# Patient Record
Sex: MALE | Race: BLACK OR AFRICAN AMERICAN
[De-identification: names, ages, dates, MRNs, and addresses within clinical notes are randomized per-mention and may not be internally consistent; named-entity substitution may affect disease eponyms.]

---

## 2018-01-05 ENCOUNTER — HOSPITAL ENCOUNTER (INPATIENT)
Dept: HOSPITAL 72 - EMR | Age: 63
LOS: 28 days | Discharge: HOME | DRG: 871 | End: 2018-02-02
Payer: MEDICAID

## 2018-01-05 VITALS — SYSTOLIC BLOOD PRESSURE: 141 MMHG | DIASTOLIC BLOOD PRESSURE: 72 MMHG

## 2018-01-05 VITALS — DIASTOLIC BLOOD PRESSURE: 67 MMHG | SYSTOLIC BLOOD PRESSURE: 120 MMHG

## 2018-01-05 VITALS — WEIGHT: 137 LBS | BODY MASS INDEX: 19.61 KG/M2 | HEIGHT: 70 IN

## 2018-01-05 DIAGNOSIS — D63.1: ICD-10-CM

## 2018-01-05 DIAGNOSIS — N17.9: ICD-10-CM

## 2018-01-05 DIAGNOSIS — N10: ICD-10-CM

## 2018-01-05 DIAGNOSIS — R19.7: ICD-10-CM

## 2018-01-05 DIAGNOSIS — E87.5: ICD-10-CM

## 2018-01-05 DIAGNOSIS — J18.9: ICD-10-CM

## 2018-01-05 DIAGNOSIS — R11.2: ICD-10-CM

## 2018-01-05 DIAGNOSIS — I26.99: ICD-10-CM

## 2018-01-05 DIAGNOSIS — I12.9: ICD-10-CM

## 2018-01-05 DIAGNOSIS — A41.9: Primary | ICD-10-CM

## 2018-01-05 DIAGNOSIS — N18.9: ICD-10-CM

## 2018-01-05 DIAGNOSIS — I82.433: ICD-10-CM

## 2018-01-05 DIAGNOSIS — R31.0: ICD-10-CM

## 2018-01-05 DIAGNOSIS — D57.1: ICD-10-CM

## 2018-01-05 DIAGNOSIS — N28.1: ICD-10-CM

## 2018-01-05 DIAGNOSIS — D69.6: ICD-10-CM

## 2018-01-05 DIAGNOSIS — K59.00: ICD-10-CM

## 2018-01-05 DIAGNOSIS — I82.443: ICD-10-CM

## 2018-01-05 LAB
ADD MANUAL DIFF: YES
ALBUMIN SERPL-MCNC: 3.1 G/DL (ref 3.4–5)
ALBUMIN/GLOB SERPL: 0.9 {RATIO} (ref 1–2.7)
ALP SERPL-CCNC: 69 U/L (ref 46–116)
ALT SERPL-CCNC: 21 U/L (ref 12–78)
ANION GAP SERPL CALC-SCNC: 11 MMOL/L (ref 5–15)
APPEARANCE UR: CLEAR
APTT PPP: (no result) S
AST SERPL-CCNC: 28 U/L (ref 15–37)
BILIRUB SERPL-MCNC: 0.4 MG/DL (ref 0.2–1)
BUN SERPL-MCNC: 31 MG/DL (ref 7–18)
CALCIUM SERPL-MCNC: 8.2 MG/DL (ref 8.5–10.1)
CHLORIDE SERPL-SCNC: 105 MMOL/L (ref 98–107)
CO2 SERPL-SCNC: 20 MMOL/L (ref 21–32)
CREAT SERPL-MCNC: 2.7 MG/DL (ref 0.55–1.3)
ERYTHROCYTE [DISTWIDTH] IN BLOOD BY AUTOMATED COUNT: 11.4 % (ref 11.6–14.8)
GLOBULIN SER-MCNC: 3.4 G/DL
GLUCOSE UR STRIP-MCNC: NEGATIVE MG/DL
HCT VFR BLD CALC: 35 % (ref 42–52)
HGB BLD-MCNC: 11.2 G/DL (ref 14.2–18)
KETONES UR QL STRIP: (no result)
LEUKOCYTE ESTERASE UR QL STRIP: NEGATIVE
MCV RBC AUTO: 86 FL (ref 80–99)
NITRITE UR QL STRIP: NEGATIVE
PH UR STRIP: 6 [PH] (ref 4.5–8)
PLATELET # BLD: 226 K/UL (ref 150–450)
POTASSIUM SERPL-SCNC: 4.4 MMOL/L (ref 3.5–5.1)
PROT UR QL STRIP: (no result)
RBC # BLD AUTO: 4.09 M/UL (ref 4.7–6.1)
SODIUM SERPL-SCNC: 136 MMOL/L (ref 136–145)
SP GR UR STRIP: 1.01 (ref 1–1.03)
UROBILINOGEN UR-MCNC: NORMAL MG/DL (ref 0–1)
WBC # BLD AUTO: 17 K/UL (ref 4.8–10.8)

## 2018-01-05 PROCEDURE — 81001 URINALYSIS AUTO W/SCOPE: CPT

## 2018-01-05 PROCEDURE — 86850 RBC ANTIBODY SCREEN: CPT

## 2018-01-05 PROCEDURE — 93306 TTE W/DOPPLER COMPLETE: CPT

## 2018-01-05 PROCEDURE — 83550 IRON BINDING TEST: CPT

## 2018-01-05 PROCEDURE — 84165 PROTEIN E-PHORESIS SERUM: CPT

## 2018-01-05 PROCEDURE — 82746 ASSAY OF FOLIC ACID SERUM: CPT

## 2018-01-05 PROCEDURE — 82728 ASSAY OF FERRITIN: CPT

## 2018-01-05 PROCEDURE — 87324 CLOSTRIDIUM AG IA: CPT

## 2018-01-05 PROCEDURE — 87205 SMEAR GRAM STAIN: CPT

## 2018-01-05 PROCEDURE — 80053 COMPREHEN METABOLIC PANEL: CPT

## 2018-01-05 PROCEDURE — 86710 INFLUENZA VIRUS ANTIBODY: CPT

## 2018-01-05 PROCEDURE — 85007 BL SMEAR W/DIFF WBC COUNT: CPT

## 2018-01-05 PROCEDURE — 84484 ASSAY OF TROPONIN QUANT: CPT

## 2018-01-05 PROCEDURE — 83090 ASSAY OF HOMOCYSTEINE: CPT

## 2018-01-05 PROCEDURE — 85060 BLOOD SMEAR INTERPRETATION: CPT

## 2018-01-05 PROCEDURE — 94760 N-INVAS EAR/PLS OXIMETRY 1: CPT

## 2018-01-05 PROCEDURE — 87045 FECES CULTURE AEROBIC BACT: CPT

## 2018-01-05 PROCEDURE — 71045 X-RAY EXAM CHEST 1 VIEW: CPT

## 2018-01-05 PROCEDURE — 84550 ASSAY OF BLOOD/URIC ACID: CPT

## 2018-01-05 PROCEDURE — 85044 MANUAL RETICULOCYTE COUNT: CPT

## 2018-01-05 PROCEDURE — 86039 ANTINUCLEAR ANTIBODIES (ANA): CPT

## 2018-01-05 PROCEDURE — 87040 BLOOD CULTURE FOR BACTERIA: CPT

## 2018-01-05 PROCEDURE — 80048 BASIC METABOLIC PNL TOTAL CA: CPT

## 2018-01-05 PROCEDURE — 84443 ASSAY THYROID STIM HORMONE: CPT

## 2018-01-05 PROCEDURE — 83020 HEMOGLOBIN ELECTROPHORESIS: CPT

## 2018-01-05 PROCEDURE — 87086 URINE CULTURE/COLONY COUNT: CPT

## 2018-01-05 PROCEDURE — 82607 VITAMIN B-12: CPT

## 2018-01-05 PROCEDURE — 83540 ASSAY OF IRON: CPT

## 2018-01-05 PROCEDURE — 86021 WBC ANTIBODY IDENTIFICATION: CPT

## 2018-01-05 PROCEDURE — 71250 CT THORAX DX C-: CPT

## 2018-01-05 PROCEDURE — 93970 EXTREMITY STUDY: CPT

## 2018-01-05 PROCEDURE — 93005 ELECTROCARDIOGRAM TRACING: CPT

## 2018-01-05 PROCEDURE — 87070 CULTURE OTHR SPECIMN AEROBIC: CPT

## 2018-01-05 PROCEDURE — 74176 CT ABD & PELVIS W/O CONTRAST: CPT

## 2018-01-05 PROCEDURE — 84439 ASSAY OF FREE THYROXINE: CPT

## 2018-01-05 PROCEDURE — 85610 PROTHROMBIN TIME: CPT

## 2018-01-05 PROCEDURE — 85025 COMPLETE CBC W/AUTO DIFF WBC: CPT

## 2018-01-05 PROCEDURE — 83735 ASSAY OF MAGNESIUM: CPT

## 2018-01-05 PROCEDURE — 36415 COLL VENOUS BLD VENIPUNCTURE: CPT

## 2018-01-05 PROCEDURE — 86901 BLOOD TYPING SEROLOGIC RH(D): CPT

## 2018-01-05 PROCEDURE — 82962 GLUCOSE BLOOD TEST: CPT

## 2018-01-05 PROCEDURE — 83921 ORGANIC ACID SINGLE QUANT: CPT

## 2018-01-05 PROCEDURE — 84100 ASSAY OF PHOSPHORUS: CPT

## 2018-01-05 PROCEDURE — 94664 DEMO&/EVAL PT USE INHALER: CPT

## 2018-01-05 PROCEDURE — 86900 BLOOD TYPING SEROLOGIC ABO: CPT

## 2018-01-05 PROCEDURE — 84238 ASSAY NONENDOCRINE RECEPTOR: CPT

## 2018-01-05 PROCEDURE — 82270 OCCULT BLOOD FECES: CPT

## 2018-01-05 PROCEDURE — 83690 ASSAY OF LIPASE: CPT

## 2018-01-05 PROCEDURE — 86920 COMPATIBILITY TEST SPIN: CPT

## 2018-01-05 PROCEDURE — 85730 THROMBOPLASTIN TIME PARTIAL: CPT

## 2018-01-05 PROCEDURE — 85384 FIBRINOGEN ACTIVITY: CPT

## 2018-01-05 PROCEDURE — 83615 LACTATE (LD) (LDH) ENZYME: CPT

## 2018-01-05 PROCEDURE — 81003 URINALYSIS AUTO W/O SCOPE: CPT

## 2018-01-05 PROCEDURE — 76775 US EXAM ABDO BACK WALL LIM: CPT

## 2018-01-05 PROCEDURE — 94640 AIRWAY INHALATION TREATMENT: CPT

## 2018-01-05 PROCEDURE — 36600 WITHDRAWAL OF ARTERIAL BLOOD: CPT

## 2018-01-05 PROCEDURE — 82378 CARCINOEMBRYONIC ANTIGEN: CPT

## 2018-01-05 PROCEDURE — 99285 EMERGENCY DEPT VISIT HI MDM: CPT

## 2018-01-05 PROCEDURE — 86431 RHEUMATOID FACTOR QUANT: CPT

## 2018-01-05 PROCEDURE — 82803 BLOOD GASES ANY COMBINATION: CPT

## 2018-01-05 NOTE — EMERGENCY ROOM REPORT
History of Present Illness


General


Chief Complaint:  Abdominal Pain


Source:  Patient, EMS





Present Illness


HPI


Patient presents emergency department today complaining acute onset of severe 

abdominal pain associate nausea and vomiting.  Patient denies any fever.  

Denies any chest pain shortness of breath.  Symptoms noted to be severe and 

patient came in for further evaluation.  Patient states that he has not had the 

symptoms before.  He denies any cough or runny nose.  Denies any diarrhea.No 

other modifying factors.  No other associated signs and symptoms.  No other 

complaints were noted.


Allergies:  


Coded Allergies:  


     CODEINE (Verified  Allergy, Unknown, 1/5/18)





Patient History


Past Medical History:  other - sickle cell disease


Past Surgical History:  none


Pertinent Family History:  none


Social History:  Denies: smoking, alcohol use, drug use


Reviewed Nursing Documentation:  PMH: Agreed, PSxH: Agreed





Nursing Documentation-PMH


Past Medical History:  No Stated History





Review of Systems


All Other Systems:  negative except mentioned in HPI





Physical Exam





Vital Signs








  Date Time  Temp Pulse Resp B/P (MAP) Pulse Ox O2 Delivery O2 Flow Rate FiO2


 


1/5/18 18:37 99.5  20 157/79 99 Room Air  








Sp02 EP Interpretation:  reviewed, normal


General Appearance:  alert, moderate distress


Head:  atraumatic


Eyes:  bilateral eye normal inspection


ENT:  normal ENT inspection, hearing grossly normal, normal voice


Neck:  normal inspection, full range of motion, supple, no bony tend


Respiratory:  normal inspection, lungs clear, normal breath sounds, no 

respiratory distress, no retraction, no wheezing


Cardiovascular #1:  regular rate, rhythm, no edema


Gastrointestinal:  soft, other - diffuse tender to palpation


Genitourinary:  no CVA tenderness


Musculoskeletal:  normal inspection, back normal, normal range of motion


Neurologic:  normal inspection, alert, responsive, speech normal


Psychiatric:  normal inspection, judgement/insight normal, mood/affect normal


Skin:  normal inspection, normal color, no rash





Medical Decision Making


Diagnostic Impression:  


 Primary Impression:  


 Abdominal pain


 Additional Impressions:  


 Renal cyst, native, hemorrhage


 Renal mass, left


ER Course


Patient presents emergency department today by acute onset abdominal pain.  

Differential considerations include bowel obstruction, colitis, gastritis, 

kidney stones just to name a few. Given the severity of the patient's 

presentation I felt this is a highly complex patient.  This patient required 

extensive workup.  Patient's laboratory workup shows elevated white blood cell 

count.  Patient's chemistry showed renal insufficiency.  Patient's CT scan of 

the abdomen and pelvis show evidence of left renal mass with evidence of 

hemorrhage.  Because his presentation I felt the patient require admission to 

the hospital.  Case was discussed with Dr. Holm the patient will be admitted 

to [facility] further treatment.  Because of patient's elevated white blood cell 

count patient was started on antibiotics.





Labs








Test


  1/5/18


19:54


 


White Blood Count


  17.0 K/UL


(4.8-10.8)


 


Red Blood Count


  4.09 M/UL


(4.70-6.10)


 


Hemoglobin


  11.2 G/DL


(14.2-18.0)


 


Hematocrit


  35.0 %


(42.0-52.0)


 


Mean Corpuscular Volume 86 FL (80-99) 


 


Mean Corpuscular Hemoglobin


  27.3 PG


(27.0-31.0)


 


Mean Corpuscular Hemoglobin


Concent 31.9 G/DL


(32.0-36.0)


 


Red Cell Distribution Width


  11.4 %


(11.6-14.8)


 


Platelet Count


  226 K/UL


(150-450)


 


Mean Platelet Volume


  10.6 FL


(6.5-10.1)


 


Neutrophils (%) (Auto)  % (45.0-75.0) 


 


Lymphocytes (%) (Auto)  % (20.0-45.0) 


 


Monocytes (%) (Auto)  % (1.0-10.0) 


 


Eosinophils (%) (Auto)  % (0.0-3.0) 


 


Basophils (%) (Auto)  % (0.0-2.0) 


 


Differential Total Cells


Counted 100 


 


 


Neutrophils % (Manual) 94 % (45-75) 


 


Lymphocytes % (Manual) 3 % (20-45) 


 


Monocytes % (Manual) 3 % (1-10) 


 


Eosinophils % (Manual) 0 % (0-3) 


 


Basophils % (Manual) 0 % (0-2) 


 


Band Neutrophils 0 % (0-8) 


 


Platelet Estimate Adequate 


 


Platelet Morphology Normal 


 


Sodium Level


  136 MMOL/L


(136-145)


 


Potassium Level


  4.4 MMOL/L


(3.5-5.1)


 


Chloride Level


  105 MMOL/L


()


 


Carbon Dioxide Level


  20 MMOL/L


(21-32)


 


Anion Gap


  11 mmol/L


(5-15)


 


Blood Urea Nitrogen


  31 mg/dL


(7-18)


 


Creatinine


  2.7 MG/DL


(0.55-1.30)


 


Estimat Glomerular Filtration


Rate 23.9 mL/min


(>60)


 


Glucose Level


  131 MG/DL


()


 


Calcium Level


  8.2 MG/DL


(8.5-10.1)


 


Total Bilirubin


  0.4 MG/DL


(0.2-1.0)


 


Aspartate Amino Transf


(AST/SGOT) 28 U/L (15-37) 


 


 


Alanine Aminotransferase


(ALT/SGPT) 21 U/L (12-78) 


 


 


Alkaline Phosphatase


  69 U/L


()


 


Total Protein


  6.5 G/DL


(6.4-8.2)


 


Albumin


  3.1 G/DL


(3.4-5.0)


 


Globulin 3.4 g/dL 


 


Albumin/Globulin Ratio 0.9 (1.0-2.7) 


 


Lipase


  132 U/L


()








CT/MRI/US Diagnostic Results


CT/MRI/US Diagnostic Results :  


   Imaging Test Ordered:  left renal mass on CT





Last Vital Signs








  Date Time  Temp Pulse Resp B/P (MAP) Pulse Ox O2 Delivery O2 Flow Rate FiO2


 


1/5/18 18:37 99.5  20 157/79 99 Room Air  








Status:  improved


Disposition:  ADMITTED AS INPATIENT


Condition:  Serious


Referrals:  


NOT CHOSEN IPA/MD,REFERRING (PCP)











IMER MONTENEGRO M.D. Jan 5, 2018 22:11

## 2018-01-06 VITALS — SYSTOLIC BLOOD PRESSURE: 112 MMHG | DIASTOLIC BLOOD PRESSURE: 75 MMHG

## 2018-01-06 VITALS — DIASTOLIC BLOOD PRESSURE: 74 MMHG | SYSTOLIC BLOOD PRESSURE: 140 MMHG

## 2018-01-06 VITALS — DIASTOLIC BLOOD PRESSURE: 81 MMHG | SYSTOLIC BLOOD PRESSURE: 119 MMHG

## 2018-01-06 VITALS — SYSTOLIC BLOOD PRESSURE: 113 MMHG | DIASTOLIC BLOOD PRESSURE: 75 MMHG

## 2018-01-06 VITALS — DIASTOLIC BLOOD PRESSURE: 79 MMHG | SYSTOLIC BLOOD PRESSURE: 112 MMHG

## 2018-01-06 VITALS — DIASTOLIC BLOOD PRESSURE: 65 MMHG | SYSTOLIC BLOOD PRESSURE: 124 MMHG

## 2018-01-06 LAB
ADD MANUAL DIFF: YES
ANION GAP SERPL CALC-SCNC: 9 MMOL/L (ref 5–15)
BUN SERPL-MCNC: 30 MG/DL (ref 7–18)
CALCIUM SERPL-MCNC: 7.3 MG/DL (ref 8.5–10.1)
CHLORIDE SERPL-SCNC: 107 MMOL/L (ref 98–107)
CO2 SERPL-SCNC: 20 MMOL/L (ref 21–32)
CREAT SERPL-MCNC: 2.6 MG/DL (ref 0.55–1.3)
ERYTHROCYTE [DISTWIDTH] IN BLOOD BY AUTOMATED COUNT: 11.4 % (ref 11.6–14.8)
HCT VFR BLD CALC: 30 % (ref 42–52)
HGB BLD-MCNC: 10 G/DL (ref 14.2–18)
MCV RBC AUTO: 85 FL (ref 80–99)
PLATELET # BLD: 141 K/UL (ref 150–450)
POTASSIUM SERPL-SCNC: 4.8 MMOL/L (ref 3.5–5.1)
RBC # BLD AUTO: 3.53 M/UL (ref 4.7–6.1)
SODIUM SERPL-SCNC: 136 MMOL/L (ref 136–145)
WBC # BLD AUTO: 13.4 K/UL (ref 4.8–10.8)

## 2018-01-06 RX ADMIN — MORPHINE SULFATE PRN MG: 2 INJECTION, SOLUTION INTRAMUSCULAR; INTRAVENOUS at 22:20

## 2018-01-06 RX ADMIN — MORPHINE SULFATE PRN MG: 2 INJECTION, SOLUTION INTRAMUSCULAR; INTRAVENOUS at 18:14

## 2018-01-06 RX ADMIN — MORPHINE SULFATE PRN MG: 2 INJECTION, SOLUTION INTRAMUSCULAR; INTRAVENOUS at 12:48

## 2018-01-06 RX ADMIN — MORPHINE SULFATE PRN MG: 2 INJECTION, SOLUTION INTRAMUSCULAR; INTRAVENOUS at 01:21

## 2018-01-06 RX ADMIN — MORPHINE SULFATE PRN MG: 2 INJECTION, SOLUTION INTRAMUSCULAR; INTRAVENOUS at 05:46

## 2018-01-06 NOTE — CONSULTATION
History of Present Illness


General


Date patient seen:  Jan 6, 2018


Time patient seen:  16:00


Chief Complaint:  Abdominal Pain


Referring physician:  


Reason for Consultation:  Renal cyst, hemorrhage





Present Illness


HPI


61 yo male with severe abdominal pain.  Imagine showed left renal cyst with 

hemorrhage.  patient complains of diffuse abdominal pain, mild left flank pain.

  No nausea vomiting at this time.


Allergies:  


Coded Allergies:  


     CODEINE (Verified  Allergy, Unknown, 1/5/18)





Medication History


Scheduled


No Known Medications* (NKM - No Known Medications*), 0 ., (Reported)





Patient History


History Provided By:  Patient


Healthcare decision maker


n./a


Resuscitation status


Full Code


Advanced Directive on File


No





Past Medical/Surgical History


Past Medical/Surgical History:  


(1) Abdominal pain





Review of Systems


Constitutional:  Denies: no symptoms, see HPI, chills, sweats, fever, malaise, 

weakness, other


Eye:  Denies: no symptoms, see HPI, eye pain, blurred vision, tearing, double 

vision, nose pain, nose congestion, acuity changes, discharge, other


ENT:  Denies: no symptoms, see HPI, ear pain, ear discharge, nose pain, nose 

congestion, throat pain, throat swelling, mouth pain, hearing loss, nasal 

discharge, other


Respiratory:  Denies: no symptoms, see HPI, cough, orthopnea, shortness of 

breath, stridor, wheezing, LYNN, sputum, other


Cardiovascular:  Denies: no symptoms, see HPI, chest pain, edema, palpitations, 

syncope, PND, other


Gastrointestinal:  Reports: abdominal pain


Genitourinary:  Denies: no symptoms, see HPI, discharge, dysuria, frequency, 

hematuria, pain, retention, incontinence, urgency, vag bleed/dc, other


Musculoskeletal:  Denies: no symptoms, see HPI, back pain, gout, joint pain, 

joint swelling, muscle pain, muscle stiffness, other


Skin:  Denies: no symptoms, see HPI, rash, change in color, change in hair/nails

, dryness, lesions, other


Psychiatric:  Denies: no symptoms, see HPI, prior hx, anxiety, depressed 

feelings, emotional problems, SI, HI, hallucinations, other


Neurological:  Denies: no symptoms, see HPI, headache, numbness, paresthesia, 

seizure, tingling, tremors, focal weakness, syncope, dizziness, other


Endocrine:  Denies: no symptoms, see HPI, excessive sweating, flushing, 

intolerance to temperature, increased thirst, increased urine, unexplained 

weight loss, other


Hematologic/Lymphatic:  Denies: no symptoms, see HPI, anemia, blood clots, easy 

bleeding, easy bruising, swollen glands, diathesis, other





Physical Exam


General Appearance:  moderate distress


HEENT:  atraumatic


Neck:  non-tender


Respiratory/Chest:  lungs clear


Cardiovascular/Chest:  normal rate, regular rhythm


Abdomen:  soft, distended


Extremities:  non-tender


Skin Exam:  warm/dry


Neurologic:  alert, oriented x 3





Last 24 Hour Vital Signs








  Date Time  Temp Pulse Resp B/P (MAP) Pulse Ox O2 Delivery O2 Flow Rate FiO2


 


1/6/18 20:54 98.6 110 20 113/75 95   


 


1/6/18 18:33 99.5       


 


1/6/18 16:00 100.7 108 20 124/65 97 Room Air  


 


1/6/18 12:00 98.9 101 18 119/81 97 Room Air  


 


1/6/18 08:00 98.9 99 18 112/75 99 Room Air  


 


1/6/18 04:00 98.8 95 20 112/79 99 Room Air  


 


1/6/18 00:00 98.3 91 20 140/74 100 Room Air  

















Intake and Output  


 


 1/5/18 1/6/18





 19:00 07:00


 


Intake Total  1315 ml


 


Balance  1315 ml


 


  


 


Intake Oral  240 ml


 


IV Total  1075 ml


 


# Voids  2











Laboratory Tests








Test


  1/6/18


11:35


 


White Blood Count


  13.4 K/UL


(4.8-10.8)  H


 


Red Blood Count


  3.53 M/UL


(4.70-6.10)  L


 


Hemoglobin


  10.0 G/DL


(14.2-18.0)  L


 


Hematocrit


  30.0 %


(42.0-52.0)  L


 


Mean Corpuscular Volume 85 FL (80-99)  


 


Mean Corpuscular Hemoglobin


  28.3 PG


(27.0-31.0)


 


Mean Corpuscular Hemoglobin


Concent 33.2 G/DL


(32.0-36.0)


 


Red Cell Distribution Width


  11.4 %


(11.6-14.8)  L


 


Platelet Count


  141 K/UL


(150-450)  L


 


Mean Platelet Volume


  10.6 FL


(6.5-10.1)  H


 


Neutrophils (%) (Auto)


  % (45.0-75.0)


 


 


Lymphocytes (%) (Auto)


  % (20.0-45.0)


 


 


Monocytes (%) (Auto)  % (1.0-10.0)  


 


Eosinophils (%) (Auto)  % (0.0-3.0)  


 


Basophils (%) (Auto)  % (0.0-2.0)  


 


Differential Total Cells


Counted 100  


 


 


Neutrophils % (Manual) 86 % (45-75)  H


 


Lymphocytes % (Manual) 6 % (20-45)  L


 


Monocytes % (Manual) 8 % (1-10)  


 


Eosinophils % (Manual) 0 % (0-3)  


 


Basophils % (Manual) 0 % (0-2)  


 


Band Neutrophils 0 % (0-8)  


 


Platelet Estimate Decreased  L


 


Platelet Morphology Normal  


 


Hypochromasia 1+  


 


Anisocytosis 1+  


 


Sodium Level


  136 MMOL/L


(136-145)


 


Potassium Level


  4.8 MMOL/L


(3.5-5.1)


 


Chloride Level


  107 MMOL/L


()


 


Carbon Dioxide Level


  20 MMOL/L


(21-32)  L


 


Anion Gap


  9 mmol/L


(5-15)


 


Blood Urea Nitrogen


  30 mg/dL


(7-18)  H


 


Creatinine


  2.6 MG/DL


(0.55-1.30)  H


 


Estimat Glomerular Filtration


Rate 30.3 mL/min


(>60)


 


Glucose Level


  133 MG/DL


()  H


 


Calcium Level


  7.3 MG/DL


(8.5-10.1)  L








Height (Feet):  5


Height (Inches):  10.00


Weight (Pounds):  138


Medications





Current Medications








 Medications


  (Trade)  Dose


 Ordered  Sig/Judie


 Route


 PRN Reason  Start Time


 Stop Time Status Last Admin


Dose Admin


 


 Acetaminophen


  (Tylenol)  650 mg  Q4H  PRN


 ORAL


 Mild Pain/Temp > 100.5  1/6/18 00:45


 2/5/18 00:44  1/6/18 17:34


 


 


 Dextrose


  (Dextrose 50%)    STAT  PRN


 IV


 Hypoglycemia  1/6/18 00:45


 2/5/18 00:44   


 


 


 Morphine Sulfate


  (Morphine


 Sulfate)  2 mg  Q4H  PRN


 IVP


 Severe Pain (Pain Scale 7-10)  1/6/18 01:15


 1/13/18 01:14  1/6/18 22:20


 


 


 Ondansetron HCl


  (Zofran)  4 mg  Q6H  PRN


 IVP


 Nausea & Vomiting  1/6/18 00:45


 2/5/18 00:44   


 


 


 Sodium Chloride  1,000 ml @ 


 75 mls/hr  [id number]


 IV


   1/6/18 00:45


 2/5/18 00:44  1/6/18 15:10


 








Objective Narrative


CT:left renal cysts, multiple.  Larger cyst with hemorrhage in it.  stranding 

around kidney





Assessment/Plan


Status:  stable


Assessment/Plan


61 yo male with likely ruptured cyst with hemorrhage on left side.  Should not 

be affecting other side of abdomen.  At this point, unclear etiology for rest 

of abdominal pain.  Cyst hemorrhage is very benign and self limited.  





1. pain control.


2. no intervention











Gus Jhaveri M.D. Jan 6, 2018 23:08

## 2018-01-06 NOTE — HISTORY AND PHYSICAL
History of Present Illness


General


Date patient seen:  Jan 6, 2018


Reason for Hospitalization:  Abdominal Pain





Present Illness


HPI


63 y/o male patient with PMH for sickle cell trait who presented to the ED c/o 

severe 10/10 abdominal pain. Patient also reported nausea but no vomiting or 

diarrhea. Patient denies any fever.  Denies any chest pain shortness of breath. 

He has been in otherwise good health. 





In the ED, patient was noted to have leukocytosis with gross hematuria. CT A/P 

showed renal mass. He was given empiric abx and admitted for further care.


Allergies:  


Coded Allergies:  


     CODEINE (Verified  Allergy, Unknown, 1/5/18)





Medication History


Scheduled


No Known Medications* (NKM - No Known Medications*), 0 ., (Reported)





Patient History


Healthcare decision maker


n./a


Resuscitation status


Full Code


Advanced Directive on File


No





Past Medical/Surgical History


Past Medical/Surgical History:  


(1) Sickle cell trait





Review of Systems


All Other Systems:  negative except mentioned in HPI





Physical Exam


General Appearance:  WD/WN, no apparent distress


HEENT:  normocephalic, atraumatic


Neck:  supple


Respiratory/Chest:  lungs clear


Cardiovascular/Chest:  normal rate, regular rhythm


Abdomen:  soft, tender


Extremities:  no edema


Neurologic:  alert, oriented x 3





Last 24 Hour Vital Signs








  Date Time  Temp Pulse Resp B/P (MAP) Pulse Ox O2 Delivery O2 Flow Rate FiO2


 


1/6/18 08:00 98.9 99 18 112/75 99 Room Air  


 


1/6/18 04:00 98.8 95 20 112/79 99 Room Air  


 


1/6/18 00:00 98.3 91 20 140/74 100 Room Air  


 


1/5/18 22:44 97.6 90 20 120/67 100 Room Air  


 


1/5/18 22:32 97.6 90 20 120/67 100 Room Air  


 


1/5/18 20:51 97.6       


 


1/5/18 19:30 99.5 97 20 141/72 99 Room Air  


 


1/5/18 18:37 99.5  20 157/79 99 Room Air  

















Intake and Output  


 


 1/5/18 1/6/18





 19:00 07:00


 


Intake Total  1315 ml


 


Balance  1315 ml


 


  


 


Intake Oral  240 ml


 


IV Total  1075 ml


 


# Voids  2











Laboratory Tests








Test


  1/5/18


19:54 1/5/18


22:10 1/6/18


11:35


 


White Blood Count


  17.0 K/UL


(4.8-10.8)  H 


  Pending  


 


 


Red Blood Count


  4.09 M/UL


(4.70-6.10)  L 


  Pending  


 


 


Hemoglobin


  11.2 G/DL


(14.2-18.0)  L 


  Pending  


 


 


Hematocrit


  35.0 %


(42.0-52.0)  L 


  Pending  


 


 


Mean Corpuscular Volume 86 FL (80-99)    Pending  


 


Mean Corpuscular Hemoglobin


  27.3 PG


(27.0-31.0) 


  Pending  


 


 


Mean Corpuscular Hemoglobin


Concent 31.9 G/DL


(32.0-36.0)  L 


  Pending  


 


 


Red Cell Distribution Width


  11.4 %


(11.6-14.8)  L 


  Pending  


 


 


Platelet Count


  226 K/UL


(150-450) 


  Pending  


 


 


Mean Platelet Volume


  10.6 FL


(6.5-10.1)  H 


  Pending  


 


 


Neutrophils (%) (Auto)


  % (45.0-75.0)


  


  Pending  


 


 


Lymphocytes (%) (Auto)


  % (20.0-45.0)


  


  Pending  


 


 


Monocytes (%) (Auto)  % (1.0-10.0)    Pending  


 


Eosinophils (%) (Auto)  % (0.0-3.0)    Pending  


 


Basophils (%) (Auto)  % (0.0-2.0)    Pending  


 


Differential Total Cells


Counted 100  


  


  


 


 


Neutrophils % (Manual) 94 % (45-75)  H  


 


Lymphocytes % (Manual) 3 % (20-45)  L  


 


Monocytes % (Manual) 3 % (1-10)    


 


Eosinophils % (Manual) 0 % (0-3)    


 


Basophils % (Manual) 0 % (0-2)    


 


Band Neutrophils 0 % (0-8)    


 


Platelet Estimate Adequate    


 


Platelet Morphology Normal    


 


Sodium Level


  136 MMOL/L


(136-145) 


  Pending  


 


 


Potassium Level


  4.4 MMOL/L


(3.5-5.1) 


  Pending  


 


 


Chloride Level


  105 MMOL/L


() 


  Pending  


 


 


Carbon Dioxide Level


  20 MMOL/L


(21-32)  L 


  Pending  


 


 


Anion Gap


  11 mmol/L


(5-15) 


  


 


 


Blood Urea Nitrogen


  31 mg/dL


(7-18)  H 


  Pending  


 


 


Creatinine


  2.7 MG/DL


(0.55-1.30)  H 


  Pending  


 


 


Estimat Glomerular Filtration


Rate 23.9 mL/min


(>60) 


  Pending  


 


 


Glucose Level


  131 MG/DL


()  H 


  Pending  


 


 


Calcium Level


  8.2 MG/DL


(8.5-10.1)  L 


  Pending  


 


 


Total Bilirubin


  0.4 MG/DL


(0.2-1.0) 


  


 


 


Aspartate Amino Transf


(AST/SGOT) 28 U/L (15-37)


  


  


 


 


Alanine Aminotransferase


(ALT/SGPT) 21 U/L (12-78)


  


  


 


 


Alkaline Phosphatase


  69 U/L


() 


  


 


 


Total Protein


  6.5 G/DL


(6.4-8.2) 


  


 


 


Albumin


  3.1 G/DL


(3.4-5.0)  L 


  


 


 


Globulin 3.4 g/dL    


 


Albumin/Globulin Ratio


  0.9 (1.0-2.7)


L 


  


 


 


Lipase


  132 U/L


() 


  


 


 


Urine Color  Pale yellow   


 


Urine Appearance  Clear   


 


Urine pH  6 (4.5-8.0)   


 


Urine Specific Gravity


  


  1.015


(1.005-1.035) 


 


 


Urine Protein


  


  4+ (NEGATIVE)


H 


 


 


Urine Glucose (UA)


  


  Negative


(NEGATIVE) 


 


 


Urine Ketones


  


  1+ (NEGATIVE)


H 


 


 


Urine Occult Blood


  


  4+ (NEGATIVE)


H 


 


 


Urine Nitrite


  


  Negative


(NEGATIVE) 


 


 


Urine Bilirubin


  


  Negative


(NEGATIVE) 


 


 


Urine Urobilinogen


  


  Normal MG/DL


(0.0-1.0) 


 


 


Urine Leukocyte Esterase


  


  Negative


(NEGATIVE) 


 


 


Urine RBC


  


  2-4 /HPF (0 -


0)  H 


 


 


Urine WBC


  


  0-2 /HPF (0 -


0) 


 


 


Urine Squamous Epithelial


Cells 


  Few /LPF


(NONE/OCC) 


 


 


Urine Bacteria


  


  Few /HPF


(NONE) 


 








Height (Feet):  5


Height (Inches):  10.00


Weight (Pounds):  138


Medications





Current Medications








 Medications


  (Trade)  Dose


 Ordered  Sig/Judie


 Route


 PRN Reason  Start Time


 Stop Time Status Last Admin


Dose Admin


 


 Acetaminophen


  (Tylenol)  650 mg  Q4H  PRN


 ORAL


 Mild Pain/Temp > 100.5  1/6/18 00:45


 2/5/18 00:44   


 


 


 Dextrose


  (Dextrose 50%)    STAT  PRN


 IV


 Hypoglycemia  1/6/18 00:45


 2/5/18 00:44   


 


 


 Morphine Sulfate


  (Morphine


 Sulfate)  2 mg  Q4H  PRN


 IVP


 Severe Pain (Pain Scale 7-10)  1/6/18 01:15


 1/13/18 01:14  1/6/18 05:46


 


 


 Ondansetron HCl


  (Zofran)  4 mg  Q6H  PRN


 IVP


 Nausea & Vomiting  1/6/18 00:45


 2/5/18 00:44   


 


 


 Sodium Chloride  1,000 ml @ 


 75 mls/hr  [id number]


 IV


   1/6/18 00:45


 2/5/18 00:44  1/6/18 00:45


 











Assessment/Plan


Problem List:  


(1) Abdominal pain


ICD Codes:  R10.9 - Unspecified abdominal pain


SNOMED:  20719176


(2) Renal mass, left


ICD Codes:  N28.89 - Other specified disorders of kidney and ureter


SNOMED:  875923030


(3) Sickle cell trait


ICD Codes:  D57.3 - Sickle-cell trait


SNOMED:  82657800


(4) Renal cyst, native, hemorrhage


ICD Codes:  N28.89 - Other specified disorders of kidney and ureter; N28.1 - 

Cyst of kidney, acquired


SNOMED:  23102126


Assessment/Plan


Urology consult pending. 


Follow up CT A/P results.


IVF. Pain management. 


D/w care with Dr. Jim who agrees with plan.











DELVIS SKELTON Jan 6, 2018 12:21

## 2018-01-07 VITALS — SYSTOLIC BLOOD PRESSURE: 115 MMHG | DIASTOLIC BLOOD PRESSURE: 75 MMHG

## 2018-01-07 VITALS — SYSTOLIC BLOOD PRESSURE: 116 MMHG | DIASTOLIC BLOOD PRESSURE: 67 MMHG

## 2018-01-07 VITALS — DIASTOLIC BLOOD PRESSURE: 58 MMHG | SYSTOLIC BLOOD PRESSURE: 102 MMHG

## 2018-01-07 VITALS — DIASTOLIC BLOOD PRESSURE: 76 MMHG | SYSTOLIC BLOOD PRESSURE: 139 MMHG

## 2018-01-07 VITALS — DIASTOLIC BLOOD PRESSURE: 68 MMHG | SYSTOLIC BLOOD PRESSURE: 123 MMHG

## 2018-01-07 VITALS — DIASTOLIC BLOOD PRESSURE: 74 MMHG | SYSTOLIC BLOOD PRESSURE: 120 MMHG

## 2018-01-07 LAB
ADD MANUAL DIFF: NO
ANION GAP SERPL CALC-SCNC: 11 MMOL/L (ref 5–15)
BASOPHILS NFR BLD AUTO: 0.8 % (ref 0–2)
BUN SERPL-MCNC: 35 MG/DL (ref 7–18)
CALCIUM SERPL-MCNC: 7.3 MG/DL (ref 8.5–10.1)
CHLORIDE SERPL-SCNC: 106 MMOL/L (ref 98–107)
CO2 SERPL-SCNC: 19 MMOL/L (ref 21–32)
CREAT SERPL-MCNC: 2.9 MG/DL (ref 0.55–1.3)
EOSINOPHIL NFR BLD AUTO: 0.1 % (ref 0–3)
ERYTHROCYTE [DISTWIDTH] IN BLOOD BY AUTOMATED COUNT: 11.4 % (ref 11.6–14.8)
HCT VFR BLD CALC: 25.8 % (ref 42–52)
HGB BLD-MCNC: 8.7 G/DL (ref 14.2–18)
LYMPHOCYTES NFR BLD AUTO: 4.5 % (ref 20–45)
MCV RBC AUTO: 85 FL (ref 80–99)
MONOCYTES NFR BLD AUTO: 9.7 % (ref 1–10)
NEUTROPHILS NFR BLD AUTO: 84.8 % (ref 45–75)
PLATELET # BLD: 160 K/UL (ref 150–450)
POTASSIUM SERPL-SCNC: 4.1 MMOL/L (ref 3.5–5.1)
RBC # BLD AUTO: 3.04 M/UL (ref 4.7–6.1)
SODIUM SERPL-SCNC: 136 MMOL/L (ref 136–145)
WBC # BLD AUTO: 15.6 K/UL (ref 4.8–10.8)

## 2018-01-07 RX ADMIN — SODIUM CHLORIDE SCH MLS/HR: 0.9 INJECTION INTRAVENOUS at 11:49

## 2018-01-07 RX ADMIN — MORPHINE SULFATE PRN MG: 2 INJECTION, SOLUTION INTRAMUSCULAR; INTRAVENOUS at 08:56

## 2018-01-07 RX ADMIN — METRONIDAZOLE SCH MG: 500 TABLET ORAL at 21:18

## 2018-01-07 RX ADMIN — MORPHINE SULFATE PRN MG: 2 INJECTION, SOLUTION INTRAMUSCULAR; INTRAVENOUS at 04:41

## 2018-01-07 RX ADMIN — METRONIDAZOLE SCH MG: 500 TABLET ORAL at 14:12

## 2018-01-07 RX ADMIN — MORPHINE SULFATE PRN MG: 2 INJECTION, SOLUTION INTRAMUSCULAR; INTRAVENOUS at 15:32

## 2018-01-07 NOTE — NEPHROLOGY PROGRESS NOTE
Assessment/Plan


Problem List:  


(1) Abdominal pain


(2) Renal mass, left


(3) Sickle cell trait


(4) Renal cyst, native, hemorrhage


(5) Fever


Plan


check influenza. chck bcx and ucx. check cxr. 


ID consulted Dr. Talley called. 


empiric cefepime. 


d/w Dr. Jim. 


D/w urology - no need for any intervention.





Subjective


Subjective


abd pain slightly improved. no n/v. no dysuria. does have some sob. spiking 

temp since last night.





Objective


Objective





Last 24 Hour Vital Signs








  Date Time  Temp Pulse Resp B/P (MAP) Pulse Ox O2 Delivery O2 Flow Rate FiO2


 


1/7/18 04:55 102.1 109 19 123/68 94   


 


1/7/18 00:00 98.4 107 18 120/74 96 Room Air  


 


1/6/18 20:54 98.6 110 20 113/75 95   


 


1/6/18 18:33 99.5       


 


1/6/18 16:00 100.7 108 20 124/65 97 Room Air  


 


1/6/18 12:00 98.9 101 18 119/81 97 Room Air  

















Intake and Output  


 


 1/6/18 1/7/18





 19:00 07:00


 


Intake Total 1305 ml 300 ml


 


Balance 1305 ml 300 ml


 


  


 


Intake Oral 480 ml 300 ml


 


IV Total 825 ml 


 


# Voids 2 2








Laboratory Tests


1/6/18 11:35: 


White Blood Count 13.4H, Red Blood Count 3.53L, Hemoglobin 10.0L, Hematocrit 

30.0L, Mean Corpuscular Volume 85, Mean Corpuscular Hemoglobin 28.3, Mean 

Corpuscular Hemoglobin Concent 33.2, Red Cell Distribution Width 11.4L, 

Platelet Count 141L, Mean Platelet Volume 10.6H, Neutrophils (%) (Auto) , 

Lymphocytes (%) (Auto) , Monocytes (%) (Auto) , Eosinophils (%) (Auto) , 

Basophils (%) (Auto) , Differential Total Cells Counted 100, Neutrophils % (

Manual) 86H, Lymphocytes % (Manual) 6L, Monocytes % (Manual) 8, Eosinophils % (

Manual) 0, Basophils % (Manual) 0, Band Neutrophils 0, Platelet Estimate 

DecreasedL, Platelet Morphology Normal, Hypochromasia 1+, Anisocytosis 1+, 

Sodium Level 136, Potassium Level 4.8, Chloride Level 107, Carbon Dioxide Level 

20L, Anion Gap 9, Blood Urea Nitrogen 30H, Creatinine 2.6H, Estimat Glomerular 

Filtration Rate 30.3, Glucose Level 133H, Calcium Level 7.3L


Height (Feet):  5


Height (Inches):  10.00


Weight (Pounds):  138


General Appearance:  no apparent distress


Cardiovascular:  normal rate, regular rhythm


Respiratory/Chest:  lungs clear


Abdomen:  non tender, soft


Extremities:  non-tender


Neurologic:  alert, oriented x 3











DELVIS SKELTON Jan 7, 2018 09:17

## 2018-01-07 NOTE — INFECTIOUS DISEASES PROG NOTE
Assessment/Plan


Problems:  


(1) Renal cyst, native, hemorrhage


Assessment & Plan:  possible pyelonephritis, will send blood culture and urine 

culture, start cefepime with flagyl empiric coverage , urology eval no 

intervention





(2) Sepsis


Assessment & Plan:  due to the above , will send blood culture, and start 

cefepime empiric coverage 





(3) Abdominal pain


Assessment & Plan:  due to the above, continue pain meds 





(4) Fever


Assessment & Plan:  due to hemorrhagic cysts most likely, continue antibiotics 

pending culture, tylenol prn 








Subjective


Allergies:  


Coded Allergies:  


     CODEINE (Verified  Allergy, Unknown, 1/5/18)





Objective


Vital Signs





Last 24 Hour Vital Signs








  Date Time  Temp Pulse Resp B/P (MAP) Pulse Ox O2 Delivery O2 Flow Rate FiO2


 


1/7/18 08:00 99.8 101 18 116/67 94 Nasal Cannula  


 


1/7/18 04:55 102.1 109 19 123/68 94   


 


1/7/18 00:00 98.4 107 18 120/74 96 Room Air  


 


1/6/18 20:54 98.6 110 20 113/75 95   


 


1/6/18 18:33 99.5       


 


1/6/18 16:00 100.7 108 20 124/65 97 Room Air  


 


1/6/18 12:00 98.9 101 18 119/81 97 Room Air  








Height (Feet):  5


Height (Inches):  10.00


Weight (Pounds):  138





Laboratory Tests








Test


  1/6/18


11:35


 


White Blood Count


  13.4 K/UL


(4.8-10.8)  H


 


Red Blood Count


  3.53 M/UL


(4.70-6.10)  L


 


Hemoglobin


  10.0 G/DL


(14.2-18.0)  L


 


Hematocrit


  30.0 %


(42.0-52.0)  L


 


Mean Corpuscular Volume 85 FL (80-99)  


 


Mean Corpuscular Hemoglobin


  28.3 PG


(27.0-31.0)


 


Mean Corpuscular Hemoglobin


Concent 33.2 G/DL


(32.0-36.0)


 


Red Cell Distribution Width


  11.4 %


(11.6-14.8)  L


 


Platelet Count


  141 K/UL


(150-450)  L


 


Mean Platelet Volume


  10.6 FL


(6.5-10.1)  H


 


Neutrophils (%) (Auto)


  % (45.0-75.0)


 


 


Lymphocytes (%) (Auto)


  % (20.0-45.0)


 


 


Monocytes (%) (Auto)  % (1.0-10.0)  


 


Eosinophils (%) (Auto)  % (0.0-3.0)  


 


Basophils (%) (Auto)  % (0.0-2.0)  


 


Differential Total Cells


Counted 100  


 


 


Neutrophils % (Manual) 86 % (45-75)  H


 


Lymphocytes % (Manual) 6 % (20-45)  L


 


Monocytes % (Manual) 8 % (1-10)  


 


Eosinophils % (Manual) 0 % (0-3)  


 


Basophils % (Manual) 0 % (0-2)  


 


Band Neutrophils 0 % (0-8)  


 


Platelet Estimate Decreased  L


 


Platelet Morphology Normal  


 


Hypochromasia 1+  


 


Anisocytosis 1+  


 


Sodium Level


  136 MMOL/L


(136-145)


 


Potassium Level


  4.8 MMOL/L


(3.5-5.1)


 


Chloride Level


  107 MMOL/L


()


 


Carbon Dioxide Level


  20 MMOL/L


(21-32)  L


 


Anion Gap


  9 mmol/L


(5-15)


 


Blood Urea Nitrogen


  30 mg/dL


(7-18)  H


 


Creatinine


  2.6 MG/DL


(0.55-1.30)  H


 


Estimat Glomerular Filtration


Rate 30.3 mL/min


(>60)


 


Glucose Level


  133 MG/DL


()  H


 


Calcium Level


  7.3 MG/DL


(8.5-10.1)  L











Current Medications








 Medications


  (Trade)  Dose


 Ordered  Sig/Judie


 Route


 PRN Reason  Start Time


 Stop Time Status Last Admin


Dose Admin


 


 Acetaminophen


  (Tylenol)  650 mg  Q4H  PRN


 ORAL


 Mild Pain/Temp > 100.5  1/6/18 00:45


 2/5/18 00:44  1/6/18 17:34


 


 


 Cefepime HCl 1 gm/


 Dextrose  55 ml @ 


 110 mls/hr  Q24H


 IVPB


   1/7/18 10:30


 1/14/18 10:29   


 


 


 Dextrose


  (Dextrose 50%)    STAT  PRN


 IV


 Hypoglycemia  1/6/18 00:45


 2/5/18 00:44   


 


 


 Metronidazole


  (Flagyl)  500 mg  Q8HR


 ORAL


   1/7/18 14:00


 1/14/18 13:59   


 


 


 Morphine Sulfate


  (Morphine


 Sulfate)  2 mg  Q4H  PRN


 IVP


 Severe Pain (Pain Scale 7-10)  1/6/18 01:15


 1/13/18 01:14  1/7/18 08:56


 


 


 Ondansetron HCl


  (Zofran)  4 mg  Q6H  PRN


 IVP


 Nausea & Vomiting  1/6/18 00:45


 2/5/18 00:44   


 


 


 Sodium Chloride  1,000 ml @ 


 75 mls/hr  [id number]


 IV


   1/6/18 00:45


 2/5/18 00:44  1/7/18 03:44


 

















Pablo Talley M.D. Jan 7, 2018 11:06

## 2018-01-07 NOTE — CONSULTATION
DATE OF CONSULTATION:  01/07/2018



INFECTIOUS DISEASE CONSULTATION



CONSULTING PHYSICIAN:  Pablo Talley M.D.



REQUESTING PHYSICIAN:  Rajeev Jim M.D.



REASON FOR CONSULTATION:  Fever, sepsis, and possible pyelonephritis.

Recommendation for antibiotics treatment.



HISTORY OF PRESENT ILLNESS:  The patient is a 62-year-old male with history

of sickle cell disease, presented to [facility] emergency room

with sudden onset of severe abdominal pain, mainly localized in the

periumbilical area and radiates to his left flank area.  His pain was

10/10 associated with nausea and vomiting.  He had also fever.  No

shortness of breath.  No chest pain.  No other symptoms.  The patient's

abdominal pain seems different to the previous episode of sickle cell

crisis, which he used to get before this time.  No cough or runny nose.

No diarrhea, but he noticed some hematuria.  In the emergency room, he had

a CT abdomen and pelvis, which showed multiple left kidney cysts and

evidence of hemorrhage in one of his cysts, so he was admitted to the

hospital.  The patient's white count was found to be elevated around

17,000.  He started spiking fever, so I was consulted by the primary

provider for antibiotics treatment and further management.



PAST MEDICAL HISTORY:  Significant for sickle cell disease with crisis.



PAST SURGICAL HISTORY:  Negative.



MEDICATIONS:  He received morphine and Zofran in the emergency

room.



ALLERGIES:  He is allergic to codeine only.



FAMILY HISTORY:  Not contributory.



SOCIAL HISTORY:  The patient lives at home with family.  Denied using any

drugs, tobacco, or alcohol.



REVIEW OF SYSTEMS:  A 14-point of system reviewed were all negative apart

from the one I mentioned above in my History and Physical.



PHYSICAL EXAMINATION:

VITAL SIGNS:  Temperature 99.8, pulse 101, respirations 18, blood pressure

116/67, and saturation 94% on nasal cannula.

GENERAL:  A middle-aged male, up in bed, awake, alert, complaining of

abdominal pain, not in acute distress.

HEENT:  Normocephalic and atraumatic.  Pupils reactive to light.  Moist

oral mucosa.  No exudate.

NECK:  Supple.  No lymphadenopathy.

CARDIOVASCULAR:  He is tachycardic.  S1 and S2 normal.  No murmur.

LUNGS:  He had diminished breathing sound on the left lower lobe.  No

wheezing.  No crackles.

ABDOMEN:  Soft and tender in the periumbilical area and left flank area

with CVA tenderness.  No rebound.  No organomegaly.  No ascites.

EXTREMITIES:  No edema.  No cyanosis.

SKIN:  No rash.  No hives.



LABORATORY DATA:  Labs showed white count of 13.4, hemoglobin of 10, and

platelet count of 141,000.  BUN of 30 and creatinine of 2.6.  Urinalysis

showed +4 occult blood and WBC of 2.



IMAGING:  Images showed left renal cysts with hemorrhage in his cyst on CT

abdomen.



ASSESSMENT AND RECOMMENDATIONS:

1. Renal cysts with hemorrhage, possible pyelonephritis.  We will send

blood culture and urine culture.  Start cefepime and Flagyl empiric

coverage.  Urology already evaluated the patient with no need for any

intervention.

2. Sepsis due to the above.  We will send blood culture and start

cefepime empiric coverage.

3. Abdominal pain due to the above.  Continue pain medicine as per

primary team.

4. Fever due to hemorrhagic cyst most likely.  Continue antibiotics

pending culture and Tylenol p.r.n.



Thank you for the consult.  ID will continue to follow.









  ______________________________________________

  Pablo Talley M.D.





DR:  RAYSHAWN

D:  01/07/2018 12:57

T:  01/07/2018 16:05

JOB#:  [id number]

CC:

## 2018-01-08 VITALS — DIASTOLIC BLOOD PRESSURE: 74 MMHG | SYSTOLIC BLOOD PRESSURE: 123 MMHG

## 2018-01-08 VITALS — DIASTOLIC BLOOD PRESSURE: 80 MMHG | SYSTOLIC BLOOD PRESSURE: 124 MMHG

## 2018-01-08 VITALS — DIASTOLIC BLOOD PRESSURE: 74 MMHG | SYSTOLIC BLOOD PRESSURE: 132 MMHG

## 2018-01-08 VITALS — SYSTOLIC BLOOD PRESSURE: 119 MMHG | DIASTOLIC BLOOD PRESSURE: 75 MMHG

## 2018-01-08 VITALS — DIASTOLIC BLOOD PRESSURE: 72 MMHG | SYSTOLIC BLOOD PRESSURE: 130 MMHG

## 2018-01-08 VITALS — DIASTOLIC BLOOD PRESSURE: 85 MMHG | SYSTOLIC BLOOD PRESSURE: 130 MMHG

## 2018-01-08 LAB
ADD MANUAL DIFF: YES
ANION GAP SERPL CALC-SCNC: 14 MMOL/L (ref 5–15)
BUN SERPL-MCNC: 42 MG/DL (ref 7–18)
CALCIUM SERPL-MCNC: 7.8 MG/DL (ref 8.5–10.1)
CHLORIDE SERPL-SCNC: 106 MMOL/L (ref 98–107)
CO2 SERPL-SCNC: 16 MMOL/L (ref 21–32)
CREAT SERPL-MCNC: 3.2 MG/DL (ref 0.55–1.3)
ERYTHROCYTE [DISTWIDTH] IN BLOOD BY AUTOMATED COUNT: 11.3 % (ref 11.6–14.8)
HCT VFR BLD CALC: 26.2 % (ref 42–52)
HGB BLD-MCNC: 9.1 G/DL (ref 14.2–18)
MCV RBC AUTO: 85 FL (ref 80–99)
PLATELET # BLD: 178 K/UL (ref 150–450)
POTASSIUM SERPL-SCNC: 3.9 MMOL/L (ref 3.5–5.1)
RBC # BLD AUTO: 3.1 M/UL (ref 4.7–6.1)
SODIUM SERPL-SCNC: 136 MMOL/L (ref 136–145)
WBC # BLD AUTO: 19.6 K/UL (ref 4.8–10.8)

## 2018-01-08 RX ADMIN — MORPHINE SULFATE PRN MG: 2 INJECTION, SOLUTION INTRAMUSCULAR; INTRAVENOUS at 18:00

## 2018-01-08 RX ADMIN — METRONIDAZOLE SCH MG: 500 TABLET ORAL at 22:00

## 2018-01-08 RX ADMIN — METRONIDAZOLE SCH MG: 500 TABLET ORAL at 05:42

## 2018-01-08 RX ADMIN — METRONIDAZOLE SCH MG: 500 TABLET ORAL at 15:19

## 2018-01-08 RX ADMIN — MORPHINE SULFATE PRN MG: 2 INJECTION, SOLUTION INTRAMUSCULAR; INTRAVENOUS at 08:02

## 2018-01-08 RX ADMIN — SODIUM CHLORIDE SCH MLS/HR: 0.9 INJECTION INTRAVENOUS at 11:34

## 2018-01-08 NOTE — NEPHROLOGY PROGRESS NOTE
Assessment/Plan


Problem List:  


(1) Sickle cell trait


(2) Abdominal pain


(3) Renal cyst, native, hemorrhage


(4) Sepsis


Plan


Continue current treatment plan


Monitor lytes, correct prn


Monitor lytes, correct prn


Monitor renal function, avoid nephrotoxins


Add chavo-ivet to treatment plan


CT abdomen result pending 


Pain management prn


AM labs





Subjective


Constitutional:  Denies: no symptoms, chills, diaphoresis, fever, malaise, 

weakness, other


HEENT:  Denies: no symptoms, eye pain, blurred vision, tearing, double vision, 

ear pain, ear discharge, nose pain, nose congestion, throat pain, throat 

swelling, mouth pain, mouth swelling, other


Genitourinary:  Denies: no symptoms, burning, discharge, frequency, flank pain, 

hematuria, incontinence, pain, urgency, other


Neurologic/Psychiatric:  Denies: no symptoms, anxiety, depressed, emotional 

problems, headache, numbness, paresthesia, pre-existing deficit, seizure, 

tingling, tremors, weakness, other


Subjective


States that he is still having gen abd pain, denies chest pain, no N/V. Also 

stated that he was told in the past that he has some kidney disease and will be 

monitored





Objective


Objective





Last 24 Hour Vital Signs








  Date Time  Temp Pulse Resp B/P (MAP) Pulse Ox O2 Delivery O2 Flow Rate FiO2


 


1/8/18 12:00 98.2 98 17 119/75 97   


 


1/8/18 08:00 99.5 101 19 123/74 97   


 


1/8/18 04:00 98.8 99 18 132/74 95 Room Air  


 


1/8/18 00:00 100.2 100 19 130/72 94 Room Air  


 


1/7/18 20:00 102.4 111 19 139/76 96 Room Air  

















Intake and Output  


 


 1/7/18 1/8/18





 19:00 07:00


 


Intake Total 765 ml 


 


Output Total 400 ml 


 


Balance 365 ml 


 


  


 


Intake Oral 240 ml 


 


IV Total 525 ml 


 


Output Urine Total 400 ml 








Laboratory Tests


1/8/18 05:10: 


White Blood Count 19.6H, Red Blood Count 3.10L, Hemoglobin 9.1L, Hematocrit 

26.2L, Mean Corpuscular Volume 85, Mean Corpuscular Hemoglobin 29.3, Mean 

Corpuscular Hemoglobin Concent 34.6, Red Cell Distribution Width 11.3L, 

Platelet Count 178, Mean Platelet Volume 9.6, Neutrophils (%) (Auto) , 

Lymphocytes (%) (Auto) , Monocytes (%) (Auto) , Eosinophils (%) (Auto) , 

Basophils (%) (Auto) , Differential Total Cells Counted 100, Neutrophils % (

Manual) 85H, Lymphocytes % (Manual) 8L, Monocytes % (Manual) 7, Eosinophils % (

Manual) 0, Basophils % (Manual) 0, Band Neutrophils 0, Platelet Estimate 

Adequate, Platelet Morphology Normal, Hypochromasia 2+, Anisocytosis 1+, Sodium 

Level 136, Potassium Level 3.9, Chloride Level 106, Carbon Dioxide Level 16L, 

Anion Gap 14, Blood Urea Nitrogen 42H, Creatinine 3.2H, Estimat Glomerular 

Filtration Rate 23.9, Glucose Level 114H, Calcium Level 7.8L


Height (Feet):  5


Height (Inches):  10.00


Weight (Pounds):  138


General Appearance:  no apparent distress, alert


EENT:  normal ENT inspection


Neck:  normal alignment, supple, normal inspection


Cardiovascular:  normal rate, regular rhythm


Respiratory/Chest:  lungs clear, normal breath sounds, no respiratory distress


Abdomen:  non tender, distended


Extremities:  non-tender, normal inspection


Neurologic:  alert, oriented x 3, responsive, normal mood/affect











Dayanara Hawley N.P. Jan 8, 2018 18:30

## 2018-01-08 NOTE — INFECTIOUS DISEASES PROG NOTE
Assessment/Plan


Problems:  


(1) Renal cyst, native, hemorrhage


Assessment & Plan:  possible pyelonephritis, await  blood culture and urine 

culture, continue  cefepime with flagyl empiric coverage , urology eval no 

intervention





(2) Sepsis


Assessment & Plan:  due to the above , await  blood culture, and start cefepime 

empiric coverage 





(3) Abdominal pain


Assessment & Plan:  due to the above, continue pain meds 





(4) Fever


Assessment & Plan:  due to hemorrhagic cysts most likely, continue antibiotics 

pending culture, tylenol prn 





(5) Diarrhea


Assessment & Plan:  rule out C diff related, will send stool sample, he is 

already on flagyl 








Subjective


Constitutional:  Reports: no symptoms


HEENT:  Reports: no symptoms


Respiratory:  Reports: no symptoms


Breasts:  Reports: no symptoms


Cardiovascular:  Reports: no symptoms


Gastrointestinal/Abdominal:  Reports: diarrhea


Genitourinary:  Reports: dysuria, hematuria


Neurologic:  Reports: no symptoms


Psychiatric:  Reports: no symptoms


Skin:  Reports: no symptoms


Endocrine:  Reports: no symptoms


Hematologic:  Reports: no symptoms


Musculoskeletal:  Reports: no symptoms


Allergies:  


Coded Allergies:  


     CODEINE (Verified  Allergy, Unknown, 1/5/18)





Objective


Vital Signs





Last 24 Hour Vital Signs








  Date Time  Temp Pulse Resp B/P (MAP) Pulse Ox O2 Delivery O2 Flow Rate FiO2


 


1/8/18 12:00 98.2 98 17 119/75 97   


 


1/8/18 08:00 99.5 101 19 123/74 97   


 


1/8/18 04:00 98.8 99 18 132/74 95 Room Air  


 


1/8/18 00:00 100.2 100 19 130/72 94 Room Air  


 


1/7/18 20:00 102.4 111 19 139/76 96 Room Air  


 


1/7/18 16:00 100.2 102 20 115/75 98 Room Air  


 


1/7/18 15:47 100.4       








Height (Feet):  5


Height (Inches):  10.00


Weight (Pounds):  138


General Appearance:  WD/WN, no acute distress


HEENT:  normocephalic, atraumatic, anicteric, mucous membranes moist, PERRL


Respiratory/Chest:  chest wall non-tender, lungs clear, normal breath sounds, 

no respiratory distress, no accessory muscle use


Cardiovascular:  normal peripheral pulses, normal rate, regular rhythm, no 

gallop/murmur, no JVD


Abdomen:  normal bowel sounds, soft, non tender, no organomegaly, non distended

, no mass, no scars


Extremities:  no cyanosis, no clubbing


Skin:  no rash, no lesions, no ulcers


Neurologic/Psychiatric:  alert, oriented x 3, responsive





Microbiology








 Date/Time


Source Procedure


Growth Status


 


 


 1/7/18 15:45


Nasal Nares Influenza Types A,B Antigen (ILYA) - Final Complete


 


 1/7/18 09:45


Urine,Clean Catch Urine Culture - Preliminary Resulted











Laboratory Tests








Test


  1/8/18


05:10


 


White Blood Count


  19.6 K/UL


(4.8-10.8)  H


 


Red Blood Count


  3.10 M/UL


(4.70-6.10)  L


 


Hemoglobin


  9.1 G/DL


(14.2-18.0)  L


 


Hematocrit


  26.2 %


(42.0-52.0)  L


 


Mean Corpuscular Volume 85 FL (80-99)  


 


Mean Corpuscular Hemoglobin


  29.3 PG


(27.0-31.0)


 


Mean Corpuscular Hemoglobin


Concent 34.6 G/DL


(32.0-36.0)


 


Red Cell Distribution Width


  11.3 %


(11.6-14.8)  L


 


Platelet Count


  178 K/UL


(150-450)


 


Mean Platelet Volume


  9.6 FL


(6.5-10.1)


 


Neutrophils (%) (Auto)


  % (45.0-75.0)


 


 


Lymphocytes (%) (Auto)


  % (20.0-45.0)


 


 


Monocytes (%) (Auto)  % (1.0-10.0)  


 


Eosinophils (%) (Auto)  % (0.0-3.0)  


 


Basophils (%) (Auto)  % (0.0-2.0)  


 


Differential Total Cells


Counted 100  


 


 


Neutrophils % (Manual) 85 % (45-75)  H


 


Lymphocytes % (Manual) 8 % (20-45)  L


 


Monocytes % (Manual) 7 % (1-10)  


 


Eosinophils % (Manual) 0 % (0-3)  


 


Basophils % (Manual) 0 % (0-2)  


 


Band Neutrophils 0 % (0-8)  


 


Platelet Estimate Adequate  


 


Platelet Morphology Normal  


 


Hypochromasia 2+  


 


Anisocytosis 1+  


 


Sodium Level


  136 MMOL/L


(136-145)


 


Potassium Level


  3.9 MMOL/L


(3.5-5.1)


 


Chloride Level


  106 MMOL/L


()


 


Carbon Dioxide Level


  16 MMOL/L


(21-32)  L


 


Anion Gap


  14 mmol/L


(5-15)


 


Blood Urea Nitrogen


  42 mg/dL


(7-18)  H


 


Creatinine


  3.2 MG/DL


(0.55-1.30)  H


 


Estimat Glomerular Filtration


Rate 23.9 mL/min


(>60)


 


Glucose Level


  114 MG/DL


()  H


 


Calcium Level


  7.8 MG/DL


(8.5-10.1)  L











Current Medications








 Medications


  (Trade)  Dose


 Ordered  Sig/Judie


 Route


 PRN Reason  Start Time


 Stop Time Status Last Admin


Dose Admin


 


 Acetaminophen


  (Tylenol)  650 mg  Q4H  PRN


 ORAL


 Mild Pain/Temp > 100.5  1/6/18 00:45


 2/5/18 00:44  1/7/18 14:48


 


 


 Cefepime HCl 1 gm/


 Dextrose  55 ml @ 


 110 mls/hr  Q24H


 IVPB


   1/7/18 10:30


 1/14/18 10:29  1/8/18 11:34


 


 


 Dextrose


  (Dextrose 50%)    STAT  PRN


 IV


 Hypoglycemia  1/6/18 00:45


 2/5/18 00:44   


 


 


 Magnesium


 Hydroxide


  (Mom)  30 ml  BIDPRN  PRN


 ORAL


 constipation  1/7/18 15:30


 2/6/18 15:29   


 


 


 Metronidazole


  (Flagyl)  500 mg  Q8HR


 ORAL


   1/7/18 14:00


 1/14/18 13:59  1/8/18 05:42


 


 


 Morphine Sulfate


  (Morphine


 Sulfate)  2 mg  Q4H  PRN


 IVP


 Severe Pain (Pain Scale 7-10)  1/6/18 01:15


 1/13/18 01:14  1/8/18 08:02


 


 


 Ondansetron HCl


  (Zofran)  4 mg  Q6H  PRN


 IVP


 Nausea & Vomiting  1/6/18 00:45


 2/5/18 00:44   


 


 


 Sodium Chloride  1,000 ml @ 


 75 mls/hr  [id number]


 IV


   1/6/18 00:45


 2/5/18 00:44  1/8/18 05:42


 

















Pablo Talley M.D. Jan 8, 2018 14:06

## 2018-01-08 NOTE — DIAGNOSTIC IMAGING REPORT
Indication: Dyspnea

 

Comparison:  None

 

A single view chest radiograph was obtained.

 

Findings:

 

Cardiomediastinal appearance is within normal limits for age.  Pulmonary vascularity

is appropriate. The diaphragmatic contour is smooth and costophrenic angles are

sharp. No pleural effusions are identified. The bones are osteopenic.

 

Impression: No acute findings

## 2018-01-09 VITALS — DIASTOLIC BLOOD PRESSURE: 79 MMHG | SYSTOLIC BLOOD PRESSURE: 138 MMHG

## 2018-01-09 VITALS — DIASTOLIC BLOOD PRESSURE: 88 MMHG | SYSTOLIC BLOOD PRESSURE: 161 MMHG

## 2018-01-09 VITALS — DIASTOLIC BLOOD PRESSURE: 82 MMHG | SYSTOLIC BLOOD PRESSURE: 148 MMHG

## 2018-01-09 VITALS — DIASTOLIC BLOOD PRESSURE: 75 MMHG | SYSTOLIC BLOOD PRESSURE: 123 MMHG

## 2018-01-09 VITALS — SYSTOLIC BLOOD PRESSURE: 126 MMHG | DIASTOLIC BLOOD PRESSURE: 73 MMHG

## 2018-01-09 VITALS — SYSTOLIC BLOOD PRESSURE: 115 MMHG | DIASTOLIC BLOOD PRESSURE: 70 MMHG

## 2018-01-09 VITALS — DIASTOLIC BLOOD PRESSURE: 76 MMHG | SYSTOLIC BLOOD PRESSURE: 121 MMHG

## 2018-01-09 VITALS — DIASTOLIC BLOOD PRESSURE: 80 MMHG | SYSTOLIC BLOOD PRESSURE: 124 MMHG

## 2018-01-09 VITALS — SYSTOLIC BLOOD PRESSURE: 118 MMHG | DIASTOLIC BLOOD PRESSURE: 68 MMHG

## 2018-01-09 LAB
ADD MANUAL DIFF: YES
ADD MANUAL DIFF: YES
ANION GAP SERPL CALC-SCNC: 13 MMOL/L (ref 5–15)
ANION GAP SERPL CALC-SCNC: 14 MMOL/L (ref 5–15)
BUN SERPL-MCNC: 37 MG/DL (ref 7–18)
BUN SERPL-MCNC: 37 MG/DL (ref 7–18)
CALCIUM SERPL-MCNC: 7.4 MG/DL (ref 8.5–10.1)
CALCIUM SERPL-MCNC: 8.1 MG/DL (ref 8.5–10.1)
CHLORIDE SERPL-SCNC: 107 MMOL/L (ref 98–107)
CHLORIDE SERPL-SCNC: 107 MMOL/L (ref 98–107)
CO2 SERPL-SCNC: 16 MMOL/L (ref 21–32)
CO2 SERPL-SCNC: 17 MMOL/L (ref 21–32)
CREAT SERPL-MCNC: 2.9 MG/DL (ref 0.55–1.3)
CREAT SERPL-MCNC: 3 MG/DL (ref 0.55–1.3)
ERYTHROCYTE [DISTWIDTH] IN BLOOD BY AUTOMATED COUNT: 11.3 % (ref 11.6–14.8)
ERYTHROCYTE [DISTWIDTH] IN BLOOD BY AUTOMATED COUNT: 11.3 % (ref 11.6–14.8)
HCT VFR BLD CALC: 20.9 % (ref 42–52)
HCT VFR BLD CALC: 21.2 % (ref 42–52)
HGB BLD-MCNC: 6.9 G/DL (ref 14.2–18)
HGB BLD-MCNC: 7.2 G/DL (ref 14.2–18)
LDH SERPL L TO P-CCNC: 233 U/L (ref 135–225)
MCV RBC AUTO: 84 FL (ref 80–99)
MCV RBC AUTO: 85 FL (ref 80–99)
PLATELET # BLD: 171 K/UL (ref 150–450)
PLATELET # BLD: 186 K/UL (ref 150–450)
POTASSIUM SERPL-SCNC: 3.3 MMOL/L (ref 3.5–5.1)
POTASSIUM SERPL-SCNC: 3.6 MMOL/L (ref 3.5–5.1)
RBC # BLD AUTO: 2.5 M/UL (ref 4.7–6.1)
RBC # BLD AUTO: 2.5 M/UL (ref 4.7–6.1)
SODIUM SERPL-SCNC: 136 MMOL/L (ref 136–145)
SODIUM SERPL-SCNC: 137 MMOL/L (ref 136–145)
WBC # BLD AUTO: 13.3 K/UL (ref 4.8–10.8)
WBC # BLD AUTO: 13.6 K/UL (ref 4.8–10.8)

## 2018-01-09 RX ADMIN — SODIUM CHLORIDE SCH MLS/HR: 0.9 INJECTION INTRAVENOUS at 12:03

## 2018-01-09 RX ADMIN — METRONIDAZOLE SCH MG: 500 TABLET ORAL at 14:12

## 2018-01-09 RX ADMIN — MORPHINE SULFATE PRN MG: 2 INJECTION, SOLUTION INTRAMUSCULAR; INTRAVENOUS at 23:26

## 2018-01-09 RX ADMIN — METRONIDAZOLE SCH MG: 500 TABLET ORAL at 05:31

## 2018-01-09 RX ADMIN — MORPHINE SULFATE PRN MG: 2 INJECTION, SOLUTION INTRAMUSCULAR; INTRAVENOUS at 04:35

## 2018-01-09 RX ADMIN — Medication PRN ML: at 08:47

## 2018-01-09 RX ADMIN — MORPHINE SULFATE PRN MG: 2 INJECTION, SOLUTION INTRAMUSCULAR; INTRAVENOUS at 14:13

## 2018-01-09 RX ADMIN — MORPHINE SULFATE PRN MG: 2 INJECTION, SOLUTION INTRAMUSCULAR; INTRAVENOUS at 08:47

## 2018-01-09 RX ADMIN — MORPHINE SULFATE PRN MG: 2 INJECTION, SOLUTION INTRAMUSCULAR; INTRAVENOUS at 18:26

## 2018-01-09 RX ADMIN — Medication PRN ML: at 18:26

## 2018-01-09 RX ADMIN — METRONIDAZOLE SCH MG: 500 TABLET ORAL at 20:37

## 2018-01-09 NOTE — NEPHROLOGY PROGRESS NOTE
Assessment/Plan


Problem List:  


(1) Abdominal pain


(2) Renal mass, left


(3) Sickle cell trait


Assessment:  with crisis? 





(4) Renal cyst, native, hemorrhage


(5) Fever


Plan


Dr. Sanches was called for hemeonc. 


cont IVF. cont pain management.


ID following. 


empiric cefepime and metronidazole. 


d/w Dr. Jim. 


D/w urology - no need for any intervention.





Subjective


Subjective


abd pain better. Having water clear stool. Still having fevers.





Objective


Objective





Last 24 Hour Vital Signs








  Date Time  Temp Pulse Resp B/P (MAP) Pulse Ox O2 Delivery O2 Flow Rate FiO2


 


1/9/18 09:17 98.6       


 


1/9/18 08:00 100.1 99 20 121/76 97   


 


1/9/18 08:00      Room Air  


 


1/9/18 04:59 98.6 106 18 115/70 95   


 


1/9/18 00:00 100.3 104 19 124/80 94   


 


1/8/18 20:00 100.9 109 20 130/85 94   


 


1/8/18 16:00 99.1 104 20 124/80 95   

















Intake and Output  


 


 1/8/18 1/9/18





 19:00 07:00


 


Intake Total 865 ml 990 ml


 


Balance 865 ml 990 ml


 


  


 


Intake Oral 790 ml 240 ml


 


IV Total 75 ml 750 ml


 


# Voids 4 2








Laboratory Tests


1/9/18 06:35: 


White Blood Count 13.6H, Red Blood Count 2.50L, Hemoglobin 7.2L, Hematocrit 

21.2L, Mean Corpuscular Volume 85, Mean Corpuscular Hemoglobin 28.7, Mean 

Corpuscular Hemoglobin Concent 33.9, Red Cell Distribution Width 11.3L, 

Platelet Count 171, Mean Platelet Volume 9.4, Neutrophils (%) (Auto) , 

Lymphocytes (%) (Auto) , Monocytes (%) (Auto) , Eosinophils (%) (Auto) , 

Basophils (%) (Auto) , Differential Total Cells Counted 100, Neutrophils % (

Manual) 82H, Lymphocytes % (Manual) 9L, Monocytes % (Manual) 8, Eosinophils % (

Manual) 1, Basophils % (Manual) 0, Band Neutrophils 0, Platelet Estimate 

Adequate, Platelet Morphology Normal, Hypochromasia 3+, Anisocytosis 1+, 

Spherocytes 2+, Sodium Level 137, Potassium Level 3.6, Chloride Level 107, 

Carbon Dioxide Level 17L, Anion Gap 14, Blood Urea Nitrogen 37H, Creatinine 3.0H

, Estimat Glomerular Filtration Rate 25.7, Glucose Level 110H, Calcium Level 

7.4L


Height (Feet):  5


Height (Inches):  10.00


Weight (Pounds):  138


General Appearance:  no apparent distress


Cardiovascular:  normal rate, regular rhythm


Respiratory/Chest:  lungs clear


Abdomen:  non tender, soft


Extremities:  non-pitting


Neurologic:  alert, oriented x 3











DELVIS SKELTON Jan 9, 2018 12:49

## 2018-01-09 NOTE — INFECTIOUS DISEASES PROG NOTE
Assessment/Plan


Problems:  


(1) Renal cyst, native, hemorrhage


Assessment & Plan:  possible pyelonephritis, await  blood culture ,  urine 

culture grew mixed contaminant , continue  cefepime with flagyl empiric 

coverage , had recurrent bleeding today , already had urology eval with no 

intervention





(2) Sepsis


Assessment & Plan:  due to the above , await  blood culture, and start cefepime 

empiric coverage 





(3) Abdominal pain


Assessment & Plan:  due to the above, continue pain meds 





(4) Fever


Assessment & Plan:  due to hemorrhagic cysts most likely, continue antibiotics 

pending culture, tylenol prn 





(5) Diarrhea


Assessment & Plan:  rule out C diff related, await  stool sample, he is already 

on flagyl 








Subjective


Constitutional:  Reports: fatigue


HEENT:  Reports: no symptoms


Respiratory:  Reports: no symptoms


Breasts:  Reports: no symptoms


Cardiovascular:  Reports: no symptoms


Gastrointestinal/Abdominal:  Reports: diarrhea


Genitourinary:  Reports: hematuria


Neurologic:  Reports: no symptoms


Psychiatric:  Reports: no symptoms


Skin:  Reports: no symptoms


Endocrine:  Reports: no symptoms


Hematologic:  Reports: no symptoms


Musculoskeletal:  Reports: no symptoms


Allergies:  


Coded Allergies:  


     CODEINE (Verified  Allergy, Unknown, 1/5/18)


Uncoded Allergies:  


     PEANUTS (Adverse Reaction, Severe, Anaphylaxis, 1/9/18)





Objective


Vital Signs





Last 24 Hour Vital Signs








  Date Time  Temp Pulse Resp B/P (MAP) Pulse Ox O2 Delivery O2 Flow Rate FiO2


 


1/9/18 14:43 98.5       


 


1/9/18 12:00 98.5 100 20 161/88 98   


 


1/9/18 08:00 100.1 99 20 121/76 97   


 


1/9/18 08:00      Room Air  


 


1/9/18 04:59 98.6 106 18 115/70 95   


 


1/9/18 00:00 100.3 104 19 124/80 94   


 


1/8/18 20:00 100.9 109 20 130/85 94   


 


1/8/18 16:00 99.1 104 20 124/80 95   








Height (Feet):  5


Height (Inches):  10.00


Weight (Pounds):  138


General Appearance:  WD/WN, no acute distress


HEENT:  normocephalic, atraumatic, anicteric, mucous membranes moist, PERRL, 

EOMI, pharynx normal, supple, no JVD


Respiratory/Chest:  chest wall non-tender, lungs clear, normal breath sounds, 

no respiratory distress, no accessory muscle use


Cardiovascular:  normal peripheral pulses, normal rate, regular rhythm, no 

gallop/murmur, no JVD


Abdomen:  normal bowel sounds, soft, non tender, no organomegaly, non distended

, no mass, no scars


Extremities:  no cyanosis, no clubbing


Skin:  no rash, no lesions, no ulcers


Neurologic/Psychiatric:  alert, responsive





Microbiology








 Date/Time


Source Procedure


Growth Status


 


 


 1/7/18 12:25


Blood Blood Culture - Preliminary


NO GROWTH AFTER 24 HOURS Resulted


 


 1/7/18 12:20


Blood Blood Culture - Preliminary


NO GROWTH AFTER 24 HOURS Resulted


 


 1/7/18 15:45


Nasal Nares Influenza Types A,B Antigen (ILYA) - Final Complete


 


 1/7/18 09:45


Urine,Clean Catch Urine Culture - Preliminary


Mixed Urogenital Contaminants Resulted











Laboratory Tests








Test


  1/9/18


06:35 1/9/18


12:08


 


White Blood Count


  13.6 K/UL


(4.8-10.8)  H 


 


 


Red Blood Count


  2.50 M/UL


(4.70-6.10)  L 


 


 


Hemoglobin


  7.2 G/DL


(14.2-18.0)  L 


 


 


Hematocrit


  21.2 %


(42.0-52.0)  L 


 


 


Mean Corpuscular Volume 85 FL (80-99)   


 


Mean Corpuscular Hemoglobin


  28.7 PG


(27.0-31.0) 


 


 


Mean Corpuscular Hemoglobin


Concent 33.9 G/DL


(32.0-36.0) 


 


 


Red Cell Distribution Width


  11.3 %


(11.6-14.8)  L 


 


 


Platelet Count


  171 K/UL


(150-450) 


 


 


Mean Platelet Volume


  9.4 FL


(6.5-10.1) 


 


 


Neutrophils (%) (Auto)


  % (45.0-75.0)


  


 


 


Lymphocytes (%) (Auto)


  % (20.0-45.0)


  


 


 


Monocytes (%) (Auto)  % (1.0-10.0)   


 


Eosinophils (%) (Auto)  % (0.0-3.0)   


 


Basophils (%) (Auto)  % (0.0-2.0)   


 


Differential Total Cells


Counted 100  


  


 


 


Neutrophils % (Manual) 82 % (45-75)  H 


 


Lymphocytes % (Manual) 9 % (20-45)  L 


 


Monocytes % (Manual) 8 % (1-10)   


 


Eosinophils % (Manual) 1 % (0-3)   


 


Basophils % (Manual) 0 % (0-2)   


 


Band Neutrophils 0 % (0-8)   


 


Platelet Estimate Adequate   


 


Platelet Morphology Normal   


 


Hypochromasia 3+   


 


Anisocytosis 1+   


 


Spherocytes 2+   


 


Sodium Level


  137 MMOL/L


(136-145) 


 


 


Potassium Level


  3.6 MMOL/L


(3.5-5.1) 


 


 


Chloride Level


  107 MMOL/L


() 


 


 


Carbon Dioxide Level


  17 MMOL/L


(21-32)  L 


 


 


Anion Gap


  14 mmol/L


(5-15) 


 


 


Blood Urea Nitrogen


  37 mg/dL


(7-18)  H 


 


 


Creatinine


  3.0 MG/DL


(0.55-1.30)  H 


 


 


Estimat Glomerular Filtration


Rate 25.7 mL/min


(>60) 


 


 


Glucose Level


  110 MG/DL


()  H 


 


 


Calcium Level


  7.4 MG/DL


(8.5-10.1)  L 


 


 


Stool Occult Blood  Pending  











Current Medications








 Medications


  (Trade)  Dose


 Ordered  Sig/Judie


 Route


 PRN Reason  Start Time


 Stop Time Status Last Admin


Dose Admin


 


 Acetaminophen


  (Tylenol)  650 mg  Q4H  PRN


 ORAL


 Mild Pain/Temp > 100.5  1/6/18 00:45


 2/5/18 00:44  1/7/18 14:48


 


 


 Cefepime HCl 1 gm/


 Dextrose  55 ml @ 


 110 mls/hr  Q24H


 IVPB


   1/7/18 10:30


 1/14/18 10:29  1/9/18 12:03


 


 


 Dextrose


  (Dextrose 50%)    STAT  PRN


 IV


 Hypoglycemia  1/6/18 00:45


 2/5/18 00:44   


 


 


 Magnesium


 Hydroxide


  (Mom)  30 ml  BIDPRN  PRN


 ORAL


 constipation  1/7/18 15:30


 2/6/18 15:29   


 


 


 Metronidazole


  (Flagyl)  500 mg  Q8HR


 ORAL


   1/7/18 14:00


 1/14/18 13:59  1/9/18 14:12


 


 


 Morphine Sulfate


  (Morphine


 Sulfate)  2 mg  Q4H  PRN


 IVP


 Severe Pain (Pain Scale 7-10)  1/6/18 01:15


 1/13/18 01:14  1/9/18 14:13


 


 


 Ondansetron HCl


  (Zofran)  4 mg  Q6H  PRN


 IVP


 Nausea & Vomiting  1/6/18 00:45


 2/5/18 00:44   


 


 


 Sodium Chloride  1,000 ml @ 


 75 mls/hr  [id number]


 IV


   1/6/18 00:45


 2/5/18 00:44  1/9/18 05:31


 


 


 Vitamin B Complex/


 Vit C/Folic Acid


  (Nephrovite)  1 tab  DAILY


 ORAL


   1/9/18 09:00


 2/8/18 08:59  1/9/18 08:46


 

















Pablo Talley M.D. Jan 9, 2018 14:58

## 2018-01-09 NOTE — GI INITIAL CONSULT NOTE
LesviaElsy Nolasco N.P. 1/9/18 1623:


History of Present Illness


General


Date patient seen:  Jan 9, 2018


Time patient seen:  12:00


Reason for Hospitalization:  Abdominal Pain


Referring physician:  


Reason for Consultation:  Renal cyst, hemorrhage





Present Illness


HPI


Patient presents emergency department today complaining acute onset of severe 

abdominal pain associate nausea and vomiting.  Patient denies any fever.  

Denies any chest pain shortness of breath.  Symptoms noted to be severe and 

patient came in for further evaluation.  Patient states that he has not had the 

symptoms before.  He denies any cough or runny nose.  Denies any diarrhea.No 

other modifying factors.  No other associated signs and symptoms.  No other 

complaints were noted.


GI consulted for diarrhea.   HPI noted above.  Pt seen on floor, awake A&Ox4 

NAD with no active s/sx of N/V.  Noted patient has active diarrhea x 4 days, 

noted to be clear in color.  Denies any recent travels.  Denies any changes in 

dietary habits.  First episode of clear diarrhea.  Has abdominal tenderness and 

generalized weakness.   State he had hematuria, where he noted urine appeared 

as stream of blood.  He presents today with anemia due to blood loss Hgb 7.2.  

Last colonoscopy was 8 years ago in which the patient stated was unremarkable.


Home Meds


Reported Medications


No Known Medications* (NKM - No Known Medications*)  ., 0 ., 0 Refills


   1/5/18


Med list reviewed/reconciled:  Yes


Allergies:  


Coded Allergies:  


     CODEINE (Verified  Allergy, Unknown, 1/5/18)


Uncoded Allergies:  


     PEANUTS (Adverse Reaction, Severe, Anaphylaxis, 1/9/18)





Patient History


History Provided By:  Patient, Medical Record


PMH Narrative


Past Medical History:  other - sickle cell disease


Past Surgical History:  none


Pertinent Family History:  none


Social History:  Denies: smoking, alcohol use, drug use


Reviewed Nursing Documentation:  PMH: Agreed, PSxH: Agreed





Nursing Documentation-PM


Past Medical History:  No Stated History


Social History:  Denies: smoking, alcohol use, drug use, other





Review of Systems


All Other Systems:  limited





Physical Exam





Vital Signs








  Date Time  Temp Pulse Resp B/P (MAP) Pulse Ox O2 Delivery O2 Flow Rate FiO2


 


1/5/18 18:37 99.5  20 157/79 99 Room Air  


 


1/5/18 19:30  97      








Sp02 EP Interpretation:  reviewed, normal


Labs





Laboratory Tests








Test


  1/9/18


06:35 1/9/18


12:08


 


White Blood Count


  13.6 K/UL


(4.8-10.8)  H 


 


 


Red Blood Count


  2.50 M/UL


(4.70-6.10)  L 


 


 


Hemoglobin


  7.2 G/DL


(14.2-18.0)  L 


 


 


Hematocrit


  21.2 %


(42.0-52.0)  L 


 


 


Mean Corpuscular Volume 85 FL (80-99)   


 


Mean Corpuscular Hemoglobin


  28.7 PG


(27.0-31.0) 


 


 


Mean Corpuscular Hemoglobin


Concent 33.9 G/DL


(32.0-36.0) 


 


 


Red Cell Distribution Width


  11.3 %


(11.6-14.8)  L 


 


 


Platelet Count


  171 K/UL


(150-450) 


 


 


Mean Platelet Volume


  9.4 FL


(6.5-10.1) 


 


 


Neutrophils (%) (Auto)


  % (45.0-75.0)


  


 


 


Lymphocytes (%) (Auto)


  % (20.0-45.0)


  


 


 


Monocytes (%) (Auto)  % (1.0-10.0)   


 


Eosinophils (%) (Auto)  % (0.0-3.0)   


 


Basophils (%) (Auto)  % (0.0-2.0)   


 


Differential Total Cells


Counted 100  


  


 


 


Neutrophils % (Manual) 82 % (45-75)  H 


 


Lymphocytes % (Manual) 9 % (20-45)  L 


 


Monocytes % (Manual) 8 % (1-10)   


 


Eosinophils % (Manual) 1 % (0-3)   


 


Basophils % (Manual) 0 % (0-2)   


 


Band Neutrophils 0 % (0-8)   


 


Platelet Estimate Adequate   


 


Platelet Morphology Normal   


 


Hypochromasia 3+   


 


Anisocytosis 1+   


 


Spherocytes 2+   


 


Sodium Level


  137 MMOL/L


(136-145) 


 


 


Potassium Level


  3.6 MMOL/L


(3.5-5.1) 


 


 


Chloride Level


  107 MMOL/L


() 


 


 


Carbon Dioxide Level


  17 MMOL/L


(21-32)  L 


 


 


Anion Gap


  14 mmol/L


(5-15) 


 


 


Blood Urea Nitrogen


  37 mg/dL


(7-18)  H 


 


 


Creatinine


  3.0 MG/DL


(0.55-1.30)  H 


 


 


Estimat Glomerular Filtration


Rate 25.7 mL/min


(>60) 


 


 


Glucose Level


  110 MG/DL


()  H 


 


 


Calcium Level


  7.4 MG/DL


(8.5-10.1)  L 


 


 


Stool Occult Blood  Pending  








General Appearance:  well appearing, no apparent distress, alert


Head:  normocephalic


EENT:  PERRL/EOMI, normal ENT inspection


Neck:  supple


Respiratory:  normal breath sounds, no respiratory distress


Cardiovascular:  normal rate


Gastrointestinal:  normal inspection, non tender, soft, normal bowel sounds, non

-distended


Rectal:  deferred


Genitourinary:  deferred


Musculoskeletal:  normal inspection, back normal


Neurologic:  normal inspection, alert, oriented x3, responsive


Psychiatric:  normal inspection, judgement/insight normal, memory normal


Skin:  normal inspection, normal color, no rash, warm/dry, palpation normal, 

well hydrated


Lymphatic:  normal inspection, no adenopathy


Current Medications





Current Medications








 Medications


  (Trade)  Dose


 Ordered  Sig/Judie


 Route


 PRN Reason  Start Time


 Stop Time Status Last Admin


Dose Admin


 


 Acetaminophen


  (Tylenol)  650 mg  Q4H  PRN


 ORAL


 Mild Pain/Temp > 100.5  1/6/18 00:45


 2/5/18 00:44  1/7/18 14:48


 


 


 Cefepime HCl 1 gm/


 Dextrose  55 ml @ 


 110 mls/hr  Q24H


 IVPB


   1/7/18 10:30


 1/14/18 10:29  1/9/18 12:03


 


 


 Dextrose


  (Dextrose 50%)    STAT  PRN


 IV


 Hypoglycemia  1/6/18 00:45


 2/5/18 00:44   


 


 


 Magnesium


 Hydroxide


  (Mom)  30 ml  BIDPRN  PRN


 ORAL


 constipation  1/7/18 15:30


 2/6/18 15:29   


 


 


 Metronidazole


  (Flagyl)  500 mg  Q8HR


 ORAL


   1/7/18 14:00


 1/14/18 13:59  1/9/18 14:12


 


 


 Morphine Sulfate


  (Morphine


 Sulfate)  2 mg  Q4H  PRN


 IVP


 Severe Pain (Pain Scale 7-10)  1/6/18 01:15


 1/13/18 01:14  1/9/18 14:13


 


 


 Ondansetron HCl


  (Zofran)  4 mg  Q6H  PRN


 IVP


 Nausea & Vomiting  1/6/18 00:45


 2/5/18 00:44   


 


 


 Sodium Chloride  1,000 ml @ 


 75 mls/hr  [id number]


 IV


   1/6/18 00:45


 2/5/18 00:44  1/9/18 05:31


 


 


 Vitamin B Complex/


 Vit C/Folic Acid


  (Nephrovite)  1 tab  DAILY


 ORAL


   1/9/18 09:00


 2/8/18 08:59  1/9/18 08:46


 











GI: Plan


Problems:  


(1) Diarrhea


(2) Renal cyst, native, hemorrhage


(3) Abdominal pain


(4) Sickle cell trait


Plan


anemia work up


OB stool r/o GI bleed 


monitor H&H, prn transfusions


send for cdiff, stool studies, O&P


renal diet, tolerating


ppi


abx


fu labs


outpatient GI procedures





Discussed with Dr. Delgado.


Thank you for this patient referral, we will follow.





EMERITA DELGADO 1/11/18 0721:


History of Present Illness


General


Reason for Hospitalization:  Abdominal Pain





Present Illness


Home Meds


Reported Medications


No Known Medications* (NKM - No Known Medications*)  ., 0 ., 0 Refills


   1/5/18


Allergies:  


Coded Allergies:  


     CODEINE (Verified  Allergy, Unknown, 1/5/18)


Uncoded Allergies:  


     PEANUTS (Adverse Reaction, Severe, Anaphylaxis, 1/9/18)





GI: Plan


Plan


The patient was seen and examined at bedside and all new and available data was 

reviewed in the patients chart. I agree with the above findings, impression 

and plan.  (Patient seen earlier today. Signature stamp does not reflect 

patient encounter time.). - MD Lesvia Rothman Anh Gaurav GRAVES Jan 9, 2018 16:23


EMERITA DELGADO Jan 11, 2018 07:21

## 2018-01-10 VITALS — DIASTOLIC BLOOD PRESSURE: 85 MMHG | SYSTOLIC BLOOD PRESSURE: 138 MMHG

## 2018-01-10 VITALS — SYSTOLIC BLOOD PRESSURE: 117 MMHG | DIASTOLIC BLOOD PRESSURE: 71 MMHG

## 2018-01-10 VITALS — SYSTOLIC BLOOD PRESSURE: 139 MMHG | DIASTOLIC BLOOD PRESSURE: 74 MMHG

## 2018-01-10 VITALS — SYSTOLIC BLOOD PRESSURE: 136 MMHG | DIASTOLIC BLOOD PRESSURE: 77 MMHG

## 2018-01-10 VITALS — DIASTOLIC BLOOD PRESSURE: 87 MMHG | SYSTOLIC BLOOD PRESSURE: 141 MMHG

## 2018-01-10 VITALS — SYSTOLIC BLOOD PRESSURE: 137 MMHG | DIASTOLIC BLOOD PRESSURE: 75 MMHG

## 2018-01-10 LAB
% IRON SATURATION: 11 % (ref 15–50)
ADD MANUAL DIFF: NO
ANION GAP SERPL CALC-SCNC: 13 MMOL/L (ref 5–15)
APTT BLD: 31 SEC (ref 23–33)
BASOPHILS NFR BLD AUTO: 0.8 % (ref 0–2)
BUN SERPL-MCNC: 33 MG/DL (ref 7–18)
CALCIUM SERPL-MCNC: 8.3 MG/DL (ref 8.5–10.1)
CHLORIDE SERPL-SCNC: 108 MMOL/L (ref 98–107)
CO2 SERPL-SCNC: 17 MMOL/L (ref 21–32)
CREAT SERPL-MCNC: 2.7 MG/DL (ref 0.55–1.3)
EOSINOPHIL NFR BLD AUTO: 3.4 % (ref 0–3)
ERYTHROCYTE [DISTWIDTH] IN BLOOD BY AUTOMATED COUNT: 11.7 % (ref 11.6–14.8)
FERRITIN SERPL-MCNC: 498 NG/ML (ref 8–388)
HCT VFR BLD CALC: 24.3 % (ref 42–52)
HGB BLD-MCNC: 8 G/DL (ref 14.2–18)
INR PPP: 1.1 (ref 0.9–1.1)
IRON SERPL-MCNC: 15 UG/DL (ref 50–175)
LYMPHOCYTES NFR BLD AUTO: 5.2 % (ref 20–45)
MCV RBC AUTO: 85 FL (ref 80–99)
MONOCYTES NFR BLD AUTO: 8.1 % (ref 1–10)
NEUTROPHILS NFR BLD AUTO: 82.6 % (ref 45–75)
PLATELET # BLD: 188 K/UL (ref 150–450)
POTASSIUM SERPL-SCNC: 3.3 MMOL/L (ref 3.5–5.1)
RBC # BLD AUTO: 2.87 M/UL (ref 4.7–6.1)
SODIUM SERPL-SCNC: 138 MMOL/L (ref 136–145)
TIBC SERPL-MCNC: 137 UG/DL (ref 250–450)
UNSATURATED IRON BINDING: 122 UG/DL (ref 112–346)
WBC # BLD AUTO: 12 K/UL (ref 4.8–10.8)

## 2018-01-10 RX ADMIN — MORPHINE SULFATE PRN MG: 2 INJECTION, SOLUTION INTRAMUSCULAR; INTRAVENOUS at 23:19

## 2018-01-10 RX ADMIN — SODIUM CHLORIDE SCH MLS/HR: 0.9 INJECTION INTRAVENOUS at 10:50

## 2018-01-10 RX ADMIN — Medication SCH TAB: at 10:49

## 2018-01-10 RX ADMIN — MORPHINE SULFATE PRN MG: 2 INJECTION, SOLUTION INTRAMUSCULAR; INTRAVENOUS at 18:31

## 2018-01-10 RX ADMIN — METRONIDAZOLE SCH MG: 500 TABLET ORAL at 15:10

## 2018-01-10 RX ADMIN — MORPHINE SULFATE PRN MG: 2 INJECTION, SOLUTION INTRAMUSCULAR; INTRAVENOUS at 06:38

## 2018-01-10 RX ADMIN — METRONIDAZOLE SCH MG: 500 TABLET ORAL at 22:41

## 2018-01-10 RX ADMIN — METRONIDAZOLE SCH MG: 500 TABLET ORAL at 06:34

## 2018-01-10 NOTE — CARDIOLOGY REPORT
--------------- APPROVED REPORT --------------





EXAM: Two-dimensional and M-mode echocardiogram with Doppler and color 

Doppler.



INDICATION

Chest Pain 



M-Mode DIMENSIONS 

IVSd0.9 (0.7-1.1cm)Left Atrium (MM)3.4 (1.6-4.0cm)

LVDd4.2 (3.5-5.6cm)Aortic Root3.4 (2.0-3.7cm)

PWd0.9 (0.7-1.1cm)Aortic Cusp Exc.2.0 (1.5-2.0cm)



LVDs2.6 (2.5-4.0cm)

PWs0.8 cm





Technically difficult study due to poor acoustical windows.

Normal left ventricular chamber size, systolic function and wall motion.

Left ventricular ejection fraction estimated to be 60-65%.

No evidence of  left ventricular hypertrophy.

No evidence of pericardial or pleural effusion.

All other cardiac chamber sizes are within normal limits.

Focal aortic valve sclerosis with adequate cusp excursion.

Thickened mitral valve leaflets with normal excursion.

Mild mitral annulus and aortic root calcification.

Pulmonic valve is well visualized.

Normal tricuspid valve structure.

IVC is normal in size and collapsible with respiration.



A  color flow and spectral Doppler study was performed and revealed:

No aortic regurgitation.

Trace mitral regurgitation.

Mitral inflow velocities indicates possible pseudo normalization pattern implying 

significant left ventricular diastolic dysfunction.

Trace tricuspid regurgitation.

Pulmonic regurgitation present.

## 2018-01-10 NOTE — CONSULTATION
DATE OF CONSULTATION:  01/09/2018



NOTE:  POOR AUDIO



HEMATOLOGY/ONCOLOGY CONSULTATION



CONSULTING PHYSICIAN:  Joe Sanches M.D.



REASON FOR CONSULTATION:  Evaluation of sickle cell trait.



IDENTIFYING DATA:  Dear Dr. Rajeev Jim and _____,



The patient is a pleasant 62-year-old male with past medical history

significant for sickle cell trait, at this time presents to the ER with

10/10 abdominal pain with nausea and vomiting.  Denies any fevers.  Denies

any shortness of breath.  Otherwise, has been in good health _____ gross

hematuria.  CAT scan showed adrenal mass, admitted for further evaluation,

started on empiric antibiotics specifically.  The CAT scan is still

pending at this time.  _____ sickle cell trait.



PAST MEDICAL HISTORY:  Sickle cell disease, sickle cell trait.



PAST SURGICAL HISTORY:  None known.



FAMILY HISTORY:  Noncontributory.



SOCIAL HISTORY:  No alcohol, tobacco, or illicit drug use.



REVIEW OF SYSTEMS:  The 12-point review of systems _____ otherwise

negative.



PHYSICAL EXAMINATION:

GENERAL:  No acute distress.

VITAL SIGNS:  Reviewed.

PULMONARY:  Decreased breath sounds.

CARDIOVASCULAR:  Regular rate.  No S3 or S4.

ABDOMEN:  Soft, nontender, and nondistended.

EXTREMITIES:  A 1+ edema.



LABORATORY AND DIAGNOSTIC DATA:  WBC 13.6, hemoglobin 7.2, hematocrit 21,

and platelet count 171,000.  No differential.  _____ pending.  _____.  BUN

of 31 and creatinine of 3.  Other _____ pending.



ASSESSMENT AND RECOMMENDATIONS:

1. Leukocytosis with fever.  It is unlikely the patient has a sickle

cell disease _____ MCV.  Peripheral smear has been reviewed.  Does not

show significant amount of _____ that may be consistent with sickle cell

crisis.  _____ sickle cell crisis.  In addition, the reticulocyte count is

pending _____ likely within normal limits.

2. Anemia, likely related to chronic disease in addition to kidney

disease.  _____ workup.

3. Anemia secondary to chronic kidney disease.

4. Nausea, vomiting, and diarrhea.  Evaluated by GI Service.  Outpatient

gastrointestinal procedure as needed.

5. Abdominal pain.  Imaging reviewed.

6. _____.  Continue to closely monitor.

7. Sepsis.  Blood culture is pending.  He is on cefepime.

8. Hypertension, currently better, is improving.  Continue to closely

monitor.  Again, unlikely the patient has sickle cell crisis and/or

disease.  We will order _____ hemoglobin electrophoresis to confirm this.

 









  ______________________________________________

  Joe Sanches M.D.





DR:  MINH

D:  01/09/2018 20:37

T:  01/10/2018 07:44

JOB#:  [id number]

CC:

## 2018-01-10 NOTE — INFECTIOUS DISEASES PROG NOTE
Assessment/Plan


Problems:  


(1) Renal cyst, native, hemorrhage


Assessment & Plan:  possible pyelonephritis, blood culture remains negative ,  

urine culture grew mixed contaminant , continue  cefepime with flagyl empiric 

coverage , had recurrent bleeding yesterday , getting blood transfusion today , 

already had urology eval with no intervention





(2) Sepsis


Assessment & Plan:  due to the above , await  blood culture, and start cefepime 

empiric coverage 





(3) Abdominal pain


Assessment & Plan:  due to the above, continue pain meds 





(4) Fever


Assessment & Plan:  due to hemorrhagic cysts most likely, blood culture and 

urine culture remained negative ,  continue antibiotics empiric coverage , and  

tylenol prn 





(5) Diarrhea


Assessment & Plan:  rule out C diff related, await  stool study , he is already 

on flagyl 








Subjective


Constitutional:  Reports: no symptoms


HEENT:  Reports: no symptoms


Respiratory:  Reports: no symptoms


Breasts:  Reports: no symptoms


Cardiovascular:  Reports: no symptoms


Gastrointestinal/Abdominal:  Reports: no symptoms


Genitourinary:  Reports: no symptoms


Neurologic:  Reports: no symptoms


Psychiatric:  Reports: no symptoms


Skin:  Reports: no symptoms


Endocrine:  Reports: no symptoms


Hematologic:  Reports: no symptoms


Musculoskeletal:  Reports: no symptoms


Allergies:  


Coded Allergies:  


     CODEINE (Verified  Allergy, Unknown, 1/5/18)


Uncoded Allergies:  


     PEANUTS (Adverse Reaction, Severe, Anaphylaxis, 1/9/18)





Objective


Vital Signs





Last 24 Hour Vital Signs








  Date Time  Temp Pulse Resp B/P (MAP) Pulse Ox O2 Delivery O2 Flow Rate FiO2


 


1/10/18 08:00  118      


 


1/10/18 04:00 98.6 95 18 117/71 96 Room Air  


 


1/10/18 04:00  102      


 


1/10/18 00:12 97.5 93 19 138/85 97 Room Air  


 


1/10/18 00:00  91      


 


1/9/18 21:56 97.7 96 19 118/68 95 Nasal Cannula 2.0 


 


1/9/18 21:30 98.9 96 19 138/79 97   


 


1/9/18 20:40 99.3 102 19 148/82 95   


 


1/9/18 20:00 100.8 100 19 126/73 93   


 


1/9/18 19:29 100.1       


 


1/9/18 18:56 100.1       


 


1/9/18 18:00      Room Air  


 


1/9/18 16:00 100.2 99 19 123/75 95   








Height (Feet):  5


Height (Inches):  10.00


Weight (Pounds):  138


General Appearance:  WD/WN, no acute distress


HEENT:  normocephalic, atraumatic, anicteric, mucous membranes moist, PERRL


Respiratory/Chest:  chest wall non-tender, lungs clear, normal breath sounds, 

no respiratory distress, no accessory muscle use


Cardiovascular:  normal peripheral pulses, normal rate, regular rhythm, no 

gallop/murmur, no JVD


Abdomen:  normal bowel sounds, no organomegaly, non distended, no mass, no scars

, tender


Extremities:  no cyanosis, no clubbing


Skin:  no rash, no lesions, no ulcers


Neurologic/Psychiatric:  alert, oriented x 3





Microbiology








 Date/Time


Source Procedure


Growth Status


 


 


 1/7/18 15:45


Nasal Nares Influenza Types A,B Antigen (ILYA) - Final Complete


 


 1/9/18 12:08


Stool Stool Culture - Preliminary


NO GROWTH Resulted











Laboratory Tests








Test


  1/9/18


21:10 1/10/18


05:50


 


White Blood Count


  13.3 K/UL


(4.8-10.8)  H 12.0 K/UL


(4.8-10.8)  H


 


Red Blood Count


  2.50 M/UL


(4.70-6.10)  L 2.87 M/UL


(4.70-6.10)  L


 


Hemoglobin


  6.9 G/DL


(14.2-18.0)  *L 8.0 G/DL


(14.2-18.0)  L


 


Hematocrit


  20.9 %


(42.0-52.0)  L 24.3 %


(42.0-52.0)  L


 


Mean Corpuscular Volume 84 FL (80-99)   85 FL (80-99)  


 


Mean Corpuscular Hemoglobin


  27.8 PG


(27.0-31.0) 28.0 PG


(27.0-31.0)


 


Mean Corpuscular Hemoglobin


Concent 33.2 G/DL


(32.0-36.0) 33.1 G/DL


(32.0-36.0)


 


Red Cell Distribution Width


  11.3 %


(11.6-14.8)  L 11.7 %


(11.6-14.8)


 


Platelet Count


  186 K/UL


(150-450) 188 K/UL


(150-450)


 


Mean Platelet Volume


  8.2 FL


(6.5-10.1) 8.7 FL


(6.5-10.1)


 


Neutrophils (%) (Auto)


  % (45.0-75.0)


  82.6 %


(45.0-75.0)  H


 


Lymphocytes (%) (Auto)


  % (20.0-45.0)


  5.2 %


(20.0-45.0)  L


 


Monocytes (%) (Auto)


   % (1.0-10.0)  


  8.1 %


(1.0-10.0)


 


Eosinophils (%) (Auto)


   % (0.0-3.0)  


  3.4 %


(0.0-3.0)  H


 


Basophils (%) (Auto)


   % (0.0-2.0)  


  0.8 %


(0.0-2.0)


 


Differential Total Cells


Counted 100  


  


 


 


Neutrophils % (Manual) 89 % (45-75)  H 


 


Lymphocytes % (Manual) 5 % (20-45)  L 


 


Monocytes % (Manual) 4 % (1-10)   


 


Eosinophils % (Manual) 1 % (0-3)   


 


Basophils % (Manual) 0 % (0-2)   


 


Band Neutrophils 1 % (0-8)   


 


Other Cell Type


  Pathologist


comment 


 


 


Platelet Estimate Adequate   


 


Platelet Morphology Normal   


 


Hypochromasia 3+   


 


Anisocytosis 1+   


 


Spherocytes    


 


Hemoglobin A Pending   


 


Hemoglobin A2 Pending   


 


Hemoglobin C Pending   


 


Hemoglobin F (Fetal) Pending   


 


Hemoglobin S Pending   


 


Variant Hemoglobin Pending   


 


Hemoglobin Electrophoresis


Interp Pending  


  


 


 


Hemoglobin Interpretation Pending   


 


Hemoglobin Solubility Pending   


 


Fibrinogen


  743 mg/dL


(200-400)  H 


 


 


Sodium Level


  136 MMOL/L


(136-145) 138 MMOL/L


(136-145)


 


Potassium Level


  3.3 MMOL/L


(3.5-5.1)  L 3.3 MMOL/L


(3.5-5.1)  L


 


Chloride Level


  107 MMOL/L


() 108 MMOL/L


()  H


 


Carbon Dioxide Level


  16 MMOL/L


(21-32)  L 17 MMOL/L


(21-32)  L


 


Anion Gap


  13 mmol/L


(5-15) 13 mmol/L


(5-15)


 


Blood Urea Nitrogen


  37 mg/dL


(7-18)  H 33 mg/dL


(7-18)  H


 


Creatinine


  2.9 MG/DL


(0.55-1.30)  H 2.7 MG/DL


(0.55-1.30)  H


 


Estimat Glomerular Filtration


Rate 26.7 mL/min


(>60) 29.0 mL/min


(>60)


 


Glucose Level


  142 MG/DL


()  H 116 MG/DL


()  H


 


Uric Acid


  7.2 MG/DL


(2.6-7.2) 


 


 


Calcium Level


  8.1 MG/DL


(8.5-10.1)  L 8.3 MG/DL


(8.5-10.1)  L


 


Soluble Transferrin Receptor Pending   


 


Lactate Dehydrogenase


  233 U/L


(135-225)  H 


 


 


Troponin I


  0.010 ng/mL


(0.000-0.056) 0.011 ng/mL


(0.000-0.056)


 


Total Protein (PEP) Pending   


 


Albumin (PEP) Pending   


 


Globulin (PEP) Pending   


 


Albumin/Globulin Ratio Pending   


 


Alpha-1-Globulins Pending   


 


Alpha-2-Globulins Pending   


 


Beta Globulins Pending   


 


Beta Gamma Globulin Pending   


 


PEP Abnormal Protein Bands Pending   


 


Protein Electrophoresis


Interpret Pending  


  


 


 


Methylmalonic Acid Pending   


 


Homocystine Pending   


 


Reticulocyte Count


  


  1.0 %


(0.0-2.0)


 


Prothrombin Time


  


  11.1 SEC


(9.30-11.50)


 


Prothromb Time International


Ratio 


  1.1 (0.9-1.1)  


 


 


Activated Partial


Thromboplast Time 


  31 SEC (23-33)


 


 


Iron Level


  


  15 ug/dL


()  L


 


Total Iron Binding Capacity


  


  137 ug/dL


(250-450)  L


 


Percent Iron Saturation  11 % (15-50)  L


 


Unsaturated Iron Binding


  


  122 ug/dL


(112-346)


 


Ferritin


  


  498 NG/ML


(8-388)  H


 


Vitamin B12 Level


  


  478 PG/ML


(193-986)


 


Folate


  


  6.6 NG/ML


(8.6-58.9)  L


 


Thyroid Stimulating Hormone


(TSH) 


  2.004 uiU/mL


(0.358-3.740)


 


Free Thyroxine


  


  1.02 NG/DL


(0.76-1.46)











Current Medications








 Medications


  (Trade)  Dose


 Ordered  Sig/Judie


 Route


 PRN Reason  Start Time


 Stop Time Status Last Admin


Dose Admin


 


 Acetaminophen


  (Tylenol)  650 mg  Q4H  PRN


 ORAL


 Mild Pain/Temp > 100.5  1/10/18 08:45


 2/5/18 00:44   


 


 


 Cefepime HCl 1 gm/


 Dextrose  55 ml @ 


 110 mls/hr  Q24H


 IVPB


   1/10/18 10:30


 1/14/18 10:29  1/10/18 10:50


 


 


 Dextrose


  (Dextrose 50%)    STAT  PRN


 IV


 Hypoglycemia  1/10/18 08:30


 2/9/18 08:29   


 


 


 Diphenoxylate HCl/


 Atropine


  (Lomotil)  2.5 mg  Q4H  PRN


 ORAL


 Diarrhea  1/10/18 13:15


 2/9/18 13:14   


 


 


 Magnesium


 Hydroxide


  (Mom)  30 ml  BIDPRN  PRN


 ORAL


 constipation  1/10/18 08:15


 2/6/18 08:14   


 


 


 Metronidazole


  (Flagyl)  500 mg  Q8HR


 ORAL


   1/10/18 14:00


 1/14/18 13:59   


 


 


 Morphine Sulfate


  (Morphine


 Sulfate)  2 mg  Q4H  PRN


 IVP


 Severe Pain (Pain Scale 7-10)  1/10/18 10:00


 1/13/18 09:59   


 


 


 Ondansetron HCl


  (Zofran)  4 mg  Q6H  PRN


 IVP


 Nausea & Vomiting  1/10/18 08:15


 2/5/18 08:14   


 


 


 Sodium Chloride  1,000 ml @ 


 75 mls/hr  [id number]


 IV


   1/10/18 08:30


 2/5/18 08:29  1/10/18 10:49


 


 


 Vitamin B Complex/


 Vit C/Folic Acid


  (Nephrovite)  1 tab  DAILY


 ORAL


   1/10/18 09:00


 2/8/18 08:59  1/10/18 10:49


 

















Pablo Talley M.D. Jase 10, 2018 13:53

## 2018-01-10 NOTE — CARDIAC ELECTROPHYSIOLOGY PN
Subjective


Subjective


Cardiology consult dictated. Tachy due to pain, anemia and fever. EF 60% [id number]





Objective





Last 24 Hour Vital Signs








  Date Time  Temp Pulse Resp B/P (MAP) Pulse Ox O2 Delivery O2 Flow Rate FiO2


 


1/10/18 12:00 98.8 90 20 139/74 96 Room Air  


 


1/10/18 08:00  118      


 


1/10/18 08:00 99.0 108 18 136/77 96 Room Air  


 


1/10/18 04:00 98.6 95 18 117/71 96 Room Air  


 


1/10/18 04:00  102      


 


1/10/18 00:12 97.5 93 19 138/85 97 Room Air  


 


1/10/18 00:00  91      


 


1/9/18 21:56 97.7 96 19 118/68 95 Nasal Cannula 2.0 


 


1/9/18 21:30 98.9 96 19 138/79 97   


 


1/9/18 20:40 99.3 102 19 148/82 95   


 


1/9/18 20:00 100.8 100 19 126/73 93   


 


1/9/18 19:29 100.1       


 


1/9/18 18:56 100.1       


 


1/9/18 18:00      Room Air  


 


1/9/18 16:00 100.2 99 19 123/75 95   

















Intake and Output  


 


 1/9/18 1/10/18





 19:00 07:00


 


Intake Total 1485 ml 


 


Output Total  625 ml


 


Balance 1485 ml -625 ml


 


  


 


Intake Oral 680 ml 


 


IV Total 805 ml 


 


Output Urine Total  625 ml











Laboratory Tests








Test


  1/9/18


21:10 1/10/18


05:50


 


White Blood Count


  13.3 K/UL


(4.8-10.8)  H 12.0 K/UL


(4.8-10.8)  H


 


Red Blood Count


  2.50 M/UL


(4.70-6.10)  L 2.87 M/UL


(4.70-6.10)  L


 


Hemoglobin


  6.9 G/DL


(14.2-18.0)  *L 8.0 G/DL


(14.2-18.0)  L


 


Hematocrit


  20.9 %


(42.0-52.0)  L 24.3 %


(42.0-52.0)  L


 


Mean Corpuscular Volume 84 FL (80-99)   85 FL (80-99)  


 


Mean Corpuscular Hemoglobin


  27.8 PG


(27.0-31.0) 28.0 PG


(27.0-31.0)


 


Mean Corpuscular Hemoglobin


Concent 33.2 G/DL


(32.0-36.0) 33.1 G/DL


(32.0-36.0)


 


Red Cell Distribution Width


  11.3 %


(11.6-14.8)  L 11.7 %


(11.6-14.8)


 


Platelet Count


  186 K/UL


(150-450) 188 K/UL


(150-450)


 


Mean Platelet Volume


  8.2 FL


(6.5-10.1) 8.7 FL


(6.5-10.1)


 


Neutrophils (%) (Auto)


  % (45.0-75.0)


  82.6 %


(45.0-75.0)  H


 


Lymphocytes (%) (Auto)


  % (20.0-45.0)


  5.2 %


(20.0-45.0)  L


 


Monocytes (%) (Auto)


   % (1.0-10.0)  


  8.1 %


(1.0-10.0)


 


Eosinophils (%) (Auto)


   % (0.0-3.0)  


  3.4 %


(0.0-3.0)  H


 


Basophils (%) (Auto)


   % (0.0-2.0)  


  0.8 %


(0.0-2.0)


 


Differential Total Cells


Counted 100  


  


 


 


Neutrophils % (Manual) 89 % (45-75)  H 


 


Lymphocytes % (Manual) 5 % (20-45)  L 


 


Monocytes % (Manual) 4 % (1-10)   


 


Eosinophils % (Manual) 1 % (0-3)   


 


Basophils % (Manual) 0 % (0-2)   


 


Band Neutrophils 1 % (0-8)   


 


Other Cell Type


  Pathologist


comment 


 


 


Platelet Estimate Adequate   


 


Platelet Morphology Normal   


 


Hypochromasia 3+   


 


Anisocytosis 1+   


 


Spherocytes    


 


Hemoglobin A Pending   


 


Hemoglobin A2 Pending   


 


Hemoglobin C Pending   


 


Hemoglobin F (Fetal) Pending   


 


Hemoglobin S Pending   


 


Variant Hemoglobin Pending   


 


Hemoglobin Electrophoresis


Interp Pending  


  


 


 


Hemoglobin Interpretation Pending   


 


Hemoglobin Solubility Pending   


 


Fibrinogen


  743 mg/dL


(200-400)  H 


 


 


Sodium Level


  136 MMOL/L


(136-145) 138 MMOL/L


(136-145)


 


Potassium Level


  3.3 MMOL/L


(3.5-5.1)  L 3.3 MMOL/L


(3.5-5.1)  L


 


Chloride Level


  107 MMOL/L


() 108 MMOL/L


()  H


 


Carbon Dioxide Level


  16 MMOL/L


(21-32)  L 17 MMOL/L


(21-32)  L


 


Anion Gap


  13 mmol/L


(5-15) 13 mmol/L


(5-15)


 


Blood Urea Nitrogen


  37 mg/dL


(7-18)  H 33 mg/dL


(7-18)  H


 


Creatinine


  2.9 MG/DL


(0.55-1.30)  H 2.7 MG/DL


(0.55-1.30)  H


 


Estimat Glomerular Filtration


Rate 26.7 mL/min


(>60) 29.0 mL/min


(>60)


 


Glucose Level


  142 MG/DL


()  H 116 MG/DL


()  H


 


Uric Acid


  7.2 MG/DL


(2.6-7.2) 


 


 


Calcium Level


  8.1 MG/DL


(8.5-10.1)  L 8.3 MG/DL


(8.5-10.1)  L


 


Soluble Transferrin Receptor Pending   


 


Lactate Dehydrogenase


  233 U/L


(135-225)  H 


 


 


Troponin I


  0.010 ng/mL


(0.000-0.056) 0.011 ng/mL


(0.000-0.056)


 


Total Protein (PEP) Pending   


 


Albumin (PEP) Pending   


 


Globulin (PEP) Pending   


 


Albumin/Globulin Ratio Pending   


 


Alpha-1-Globulins Pending   


 


Alpha-2-Globulins Pending   


 


Beta Globulins Pending   


 


Beta Gamma Globulin Pending   


 


PEP Abnormal Protein Bands Pending   


 


Protein Electrophoresis


Interpret Pending  


  


 


 


Methylmalonic Acid Pending   


 


Homocystine Pending   


 


Reticulocyte Count


  


  1.0 %


(0.0-2.0)


 


Prothrombin Time


  


  11.1 SEC


(9.30-11.50)


 


Prothromb Time International


Ratio 


  1.1 (0.9-1.1)  


 


 


Activated Partial


Thromboplast Time 


  31 SEC (23-33)


 


 


Iron Level


  


  15 ug/dL


()  L


 


Total Iron Binding Capacity


  


  137 ug/dL


(250-450)  L


 


Percent Iron Saturation  11 % (15-50)  L


 


Unsaturated Iron Binding


  


  122 ug/dL


(112-346)


 


Ferritin


  


  498 NG/ML


(8-388)  H


 


Vitamin B12 Level


  


  478 PG/ML


(193-986)


 


Folate


  


  6.6 NG/ML


(8.6-58.9)  L


 


Thyroid Stimulating Hormone


(TSH) 


  2.004 uiU/mL


(0.358-3.740)


 


Free Thyroxine


  


  1.02 NG/DL


(0.76-1.46)











Microbiology








 Date/Time


Source Procedure


Growth Status


 


 


 1/7/18 15:45


Nasal Nares Influenza Types A,B Antigen (ILYA) - Final Complete


 


 1/9/18 12:08


Stool Stool Culture - Preliminary


NO GROWTH Resulted

















KAYLEEN HAIR Jase 10, 2018 15:14

## 2018-01-10 NOTE — NEPHROLOGY PROGRESS NOTE
Assessment/Plan


Problem List:  


(1) Sickle cell trait


(2) Abdominal pain


(3) Renal cyst, native, hemorrhage


(4) Sepsis


(5) Hematuria


(6) Shortness of breath


(7) Chest pain


(8) Severe anemia


Plan


Continue current treatment plan


Monitor H&H and transfuse prn


Hematology, cardiology GI, and ID following, will f/u with recs


Monitor lytes, correct prn


Continue abx per ID


Monitor renal function, avoid nephrotoxins


CT abdomen result pending 


Pain management prn


AM labs





Subjective


Constitutional:  Reports: weakness


HEENT:  Denies: no symptoms, eye pain, blurred vision, tearing, double vision, 

ear pain, ear discharge, nose pain, nose congestion, throat pain, throat 

swelling, mouth pain, mouth swelling, other


Genitourinary:  Reports: hematuria


Neurologic/Psychiatric:  Denies: no symptoms, anxiety, depressed, emotional 

problems, headache, numbness, paresthesia, pre-existing deficit, seizure, 

tingling, tremors, weakness, other


Subjective


In bed, blood transfusion ongoing, in no apparent distress, stated that he had 

blood in his urine earlier.





Objective


Objective





Last 24 Hour Vital Signs








  Date Time  Temp Pulse Resp B/P (MAP) Pulse Ox O2 Delivery O2 Flow Rate FiO2


 


1/10/18 12:00 98.8 90 20 139/74 96 Room Air  


 


1/10/18 08:00  118      


 


1/10/18 08:00 99.0 108 18 136/77 96 Room Air  


 


1/10/18 04:00 98.6 95 18 117/71 96 Room Air  


 


1/10/18 04:00  102      


 


1/10/18 00:12 97.5 93 19 138/85 97 Room Air  


 


1/10/18 00:00  91      


 


1/9/18 21:56 97.7 96 19 118/68 95 Nasal Cannula 2.0 


 


1/9/18 21:30 98.9 96 19 138/79 97   


 


1/9/18 20:40 99.3 102 19 148/82 95   


 


1/9/18 20:00 100.8 100 19 126/73 93   


 


1/9/18 19:29 100.1       


 


1/9/18 18:56 100.1       


 


1/9/18 18:00      Room Air  


 


1/9/18 16:00 100.2 99 19 123/75 95   

















Intake and Output  


 


 1/9/18 1/10/18





 19:00 07:00


 


Intake Total 1485 ml 


 


Output Total  625 ml


 


Balance 1485 ml -625 ml


 


  


 


Intake Oral 680 ml 


 


IV Total 805 ml 


 


Output Urine Total  625 ml








Laboratory Tests


1/9/18 21:10: 


White Blood Count 13.3H, Red Blood Count 2.50L, Hemoglobin 6.9*L, Hematocrit 

20.9L, Mean Corpuscular Volume 84, Mean Corpuscular Hemoglobin 27.8, Mean 

Corpuscular Hemoglobin Concent 33.2, Red Cell Distribution Width 11.3L, 

Platelet Count 186, Mean Platelet Volume 8.2, Neutrophils (%) (Auto) , 

Lymphocytes (%) (Auto) , Monocytes (%) (Auto) , Eosinophils (%) (Auto) , 

Basophils (%) (Auto) , Differential Total Cells Counted 100, Neutrophils % (

Manual) 89H, Lymphocytes % (Manual) 5L, Monocytes % (Manual) 4, Eosinophils % (

Manual) 1, Basophils % (Manual) 0, Band Neutrophils 1, Other Cell Type 

Pathologist comment, Platelet Estimate Adequate, Platelet Morphology Normal, 

Hypochromasia 3+, Anisocytosis 1+, Spherocytes , Hemoglobin A [Pending], 

Hemoglobin A2 [Pending], Hemoglobin C [Pending], Hemoglobin F (Fetal) [Pending]

, Hemoglobin S [Pending], Variant Hemoglobin [Pending], Hemoglobin 

Electrophoresis Interp [Pending], Hemoglobin Interpretation [Pending], 

Hemoglobin Solubility [Pending], Fibrinogen 743H, Sodium Level 136, Potassium 

Level 3.3L, Chloride Level 107, Carbon Dioxide Level 16L, Anion Gap 13, Blood 

Urea Nitrogen 37H, Creatinine 2.9H, Estimat Glomerular Filtration Rate 26.7, 

Glucose Level 142H, Uric Acid 7.2, Calcium Level 8.1L, Soluble Transferrin 

Receptor [Pending], Lactate Dehydrogenase 233H, Troponin I 0.010, Total Protein 

(PEP) [Pending], Albumin (PEP) [Pending], Globulin (PEP) [Pending], Albumin/

Globulin Ratio [Pending], Alpha-1-Globulins [Pending], Alpha-2-Globulins [

Pending], Beta Globulins [Pending], Beta Gamma Globulin [Pending], PEP Abnormal 

Protein Bands [Pending], Protein Electrophoresis Interpret [Pending], 

Methylmalonic Acid [Pending], Homocystine [Pending]


1/10/18 05:50: 


White Blood Count 12.0H, Red Blood Count 2.87L, Hemoglobin 8.0L, Hematocrit 

24.3L, Mean Corpuscular Volume 85, Mean Corpuscular Hemoglobin 28.0, Mean 

Corpuscular Hemoglobin Concent 33.1, Red Cell Distribution Width 11.7, Platelet 

Count 188, Mean Platelet Volume 8.7, Neutrophils (%) (Auto) 82.6H, Lymphocytes (

%) (Auto) 5.2L, Monocytes (%) (Auto) 8.1, Eosinophils (%) (Auto) 3.4H, 

Basophils (%) (Auto) 0.8, Sodium Level 138, Potassium Level 3.3L, Chloride 

Level 108H, Carbon Dioxide Level 17L, Anion Gap 13, Blood Urea Nitrogen 33H, 

Creatinine 2.7H, Estimat Glomerular Filtration Rate 29.0, Glucose Level 116H, 

Calcium Level 8.3L, Troponin I 0.011, Reticulocyte Count 1.0, Prothrombin Time 

11.1, Prothromb Time International Ratio 1.1, Activated Partial Thromboplast 

Time 31, Iron Level 15L, Total Iron Binding Capacity 137L, Percent Iron 

Saturation 11L, Unsaturated Iron Binding 122, Ferritin 498H, Vitamin B12 Level 

478, Folate 6.6L, Thyroid Stimulating Hormone (TSH) 2.004, Free Thyroxine 1.02


Height (Feet):  5


Height (Inches):  10.00


Weight (Pounds):  138


General Appearance:  no apparent distress, alert


EENT:  normal ENT inspection


Neck:  normal alignment, supple


Cardiovascular:  normal rate


Respiratory/Chest:  normal breath sounds, no respiratory distress


Abdomen:  soft, no organomegaly


Extremities:  normal inspection, no calf tenderness, normal capillary refill


Neurologic:  alert, oriented x 3, responsive, normal mood/affect











Dayanara Hawley N.P. Jase 10, 2018 15:47

## 2018-01-10 NOTE — GI PROGRESS NOTE
Assessment/Plan


Problems:  


(1) Diarrhea


ICD Codes:  R19.7 - Diarrhea, unspecified


SNOMED:  01745967


(2) Sickle cell trait


ICD Codes:  D57.3 - Sickle-cell trait


SNOMED:  21752163


(3) Abdominal pain


ICD Codes:  R10.9 - Unspecified abdominal pain


SNOMED:  21828590


(4) Renal cyst, native, hemorrhage


ICD Codes:  N28.89 - Other specified disorders of kidney and ureter; N28.1 - 

Cyst of kidney, acquired


SNOMED:  40383224


Status:  unchanged


Status Narrative


Discussed with Dr. Gamez.


Assessment/Plan


anemia work up


- no iron supplementation given elevated ferritin levels


OB stool r/o GI bleed  >> negative


stool cx >> negative





monitor H&H, prn transfusions


send for cdiff, O&P


renal diet, tolerating


lomotil prn


ppi


abx


fu labs


outpatient GI procedures





The patient's chart including H&P, medications and appropriate diagnostic test 

results have been reviewed. The risks, benefits, and alternatives to the above 

recommended method of anesthesia have been discussed with the patient and/or 

their legal representative and he/she or a responsible party has consented to 

the planned method of anesthesia. All of patient's questions have been 

answered. The patient agrees with the anesthetic plan and wishes to proceed.





Subjective


Subjective


generalized weakness





Objective





Last 24 Hour Vital Signs








  Date Time  Temp Pulse Resp B/P (MAP) Pulse Ox O2 Delivery O2 Flow Rate FiO2


 


1/10/18 04:00 98.6 95 18 117/71 96 Room Air  


 


1/10/18 04:00  102      


 


1/10/18 00:12 97.5 93 19 138/85 97 Room Air  


 


1/10/18 00:00  91      


 


1/9/18 21:56 97.7 96 19 118/68 95 Nasal Cannula 2.0 


 


1/9/18 21:30 98.9 96 19 138/79 97   


 


1/9/18 20:40 99.3 102 19 148/82 95   


 


1/9/18 20:00 100.8 100 19 126/73 93   


 


1/9/18 19:29 100.1       


 


1/9/18 18:56 100.1       


 


1/9/18 18:00      Room Air  


 


1/9/18 16:00 100.2 99 19 123/75 95   

















Intake and Output  


 


 1/9/18 1/10/18





 19:00 07:00


 


Intake Total 1485 ml 


 


Output Total  625 ml


 


Balance 1485 ml -625 ml


 


  


 


Intake Oral 680 ml 


 


IV Total 805 ml 


 


Output Urine Total  625 ml











Laboratory Tests








Test


  1/9/18


21:10 1/10/18


05:50


 


White Blood Count


  13.3 K/UL


(4.8-10.8)  H 12.0 K/UL


(4.8-10.8)  H


 


Red Blood Count


  2.50 M/UL


(4.70-6.10)  L 2.87 M/UL


(4.70-6.10)  L


 


Hemoglobin


  6.9 G/DL


(14.2-18.0)  *L 8.0 G/DL


(14.2-18.0)  L


 


Hematocrit


  20.9 %


(42.0-52.0)  L 24.3 %


(42.0-52.0)  L


 


Mean Corpuscular Volume 84 FL (80-99)   85 FL (80-99)  


 


Mean Corpuscular Hemoglobin


  27.8 PG


(27.0-31.0) 28.0 PG


(27.0-31.0)


 


Mean Corpuscular Hemoglobin


Concent 33.2 G/DL


(32.0-36.0) 33.1 G/DL


(32.0-36.0)


 


Red Cell Distribution Width


  11.3 %


(11.6-14.8)  L 11.7 %


(11.6-14.8)


 


Platelet Count


  186 K/UL


(150-450) 188 K/UL


(150-450)


 


Mean Platelet Volume


  8.2 FL


(6.5-10.1) 8.7 FL


(6.5-10.1)


 


Neutrophils (%) (Auto)


  % (45.0-75.0)


  82.6 %


(45.0-75.0)  H


 


Lymphocytes (%) (Auto)


  % (20.0-45.0)


  5.2 %


(20.0-45.0)  L


 


Monocytes (%) (Auto)


   % (1.0-10.0)  


  8.1 %


(1.0-10.0)


 


Eosinophils (%) (Auto)


   % (0.0-3.0)  


  3.4 %


(0.0-3.0)  H


 


Basophils (%) (Auto)


   % (0.0-2.0)  


  0.8 %


(0.0-2.0)


 


Differential Total Cells


Counted 100  


  


 


 


Neutrophils % (Manual) 89 % (45-75)  H 


 


Lymphocytes % (Manual) 5 % (20-45)  L 


 


Monocytes % (Manual) 4 % (1-10)   


 


Eosinophils % (Manual) 1 % (0-3)   


 


Basophils % (Manual) 0 % (0-2)   


 


Band Neutrophils 1 % (0-8)   


 


Other Cell Type


  Pathologist


comment 


 


 


Platelet Estimate Adequate   


 


Platelet Morphology Normal   


 


Hypochromasia 3+   


 


Anisocytosis 1+   


 


Spherocytes    


 


Hemoglobin A Pending   


 


Hemoglobin A2 Pending   


 


Hemoglobin C Pending   


 


Hemoglobin F (Fetal) Pending   


 


Hemoglobin S Pending   


 


Variant Hemoglobin Pending   


 


Hemoglobin Electrophoresis


Interp Pending  


  


 


 


Hemoglobin Interpretation Pending   


 


Hemoglobin Solubility Pending   


 


Fibrinogen


  743 mg/dL


(200-400)  H 


 


 


Sodium Level


  136 MMOL/L


(136-145) 138 MMOL/L


(136-145)


 


Potassium Level


  3.3 MMOL/L


(3.5-5.1)  L 3.3 MMOL/L


(3.5-5.1)  L


 


Chloride Level


  107 MMOL/L


() 108 MMOL/L


()  H


 


Carbon Dioxide Level


  16 MMOL/L


(21-32)  L 17 MMOL/L


(21-32)  L


 


Anion Gap


  13 mmol/L


(5-15) 13 mmol/L


(5-15)


 


Blood Urea Nitrogen


  37 mg/dL


(7-18)  H 33 mg/dL


(7-18)  H


 


Creatinine


  2.9 MG/DL


(0.55-1.30)  H 2.7 MG/DL


(0.55-1.30)  H


 


Estimat Glomerular Filtration


Rate 26.7 mL/min


(>60) 29.0 mL/min


(>60)


 


Glucose Level


  142 MG/DL


()  H 116 MG/DL


()  H


 


Uric Acid


  7.2 MG/DL


(2.6-7.2) 


 


 


Calcium Level


  8.1 MG/DL


(8.5-10.1)  L 8.3 MG/DL


(8.5-10.1)  L


 


Soluble Transferrin Receptor Pending   


 


Lactate Dehydrogenase


  233 U/L


(135-225)  H 


 


 


Troponin I


  0.010 ng/mL


(0.000-0.056) 0.011 ng/mL


(0.000-0.056)


 


Total Protein (PEP) Pending   


 


Albumin (PEP) Pending   


 


Globulin (PEP) Pending   


 


Albumin/Globulin Ratio Pending   


 


Alpha-1-Globulins Pending   


 


Alpha-2-Globulins Pending   


 


Beta Globulins Pending   


 


Beta Gamma Globulin Pending   


 


PEP Abnormal Protein Bands Pending   


 


Protein Electrophoresis


Interpret Pending  


  


 


 


Methylmalonic Acid Pending   


 


Homocystine Pending   


 


Reticulocyte Count


  


  1.0 %


(0.0-2.0)


 


Prothrombin Time


  


  11.1 SEC


(9.30-11.50)


 


Prothromb Time International


Ratio 


  1.1 (0.9-1.1)  


 


 


Activated Partial


Thromboplast Time 


  31 SEC (23-33)


 


 


Iron Level


  


  15 ug/dL


()  L


 


Total Iron Binding Capacity


  


  137 ug/dL


(250-450)  L


 


Percent Iron Saturation  11 % (15-50)  L


 


Unsaturated Iron Binding


  


  122 ug/dL


(112-346)


 


Ferritin


  


  498 NG/ML


(8-388)  H


 


Vitamin B12 Level


  


  478 PG/ML


(193-986)


 


Folate


  


  6.6 NG/ML


(8.6-58.9)  L


 


Thyroid Stimulating Hormone


(TSH) 


  2.004 uiU/mL


(0.358-3.740)


 


Free Thyroxine


  


  1.02 NG/DL


(0.76-1.46)








Height (Feet):  5


Height (Inches):  10.00


Weight (Pounds):  138


General Appearance:  WD/WN, no apparent distress, alert


Cardiovascular:  normal rate


Respiratory/Chest:  normal breath sounds, no respiratory distress


Abdominal Exam:  normal bowel sounds, non tender, soft


Extremities:  normal range of motion, non-tender











Elsy Lakhani N.COLBY Jase 10, 2018 13:05


EMERITA GAMEZ Jan 11, 2018 07:51

## 2018-01-10 NOTE — CONSULTATION
DATE OF CONSULTATION:  01/10/2018



CARDIOLOGY CONSULTATION



CONSULTING PHYSICIAN:  Moses Mott M.D.



ATTENDING/REFERRING PHYSICIAN:  Rajeev Jim M.D.



REASON FOR CONSULTATION:  Shortness of breath and tachycardia.



HISTORY OF PRESENT ILLNESS:  The patient is a 62-year-old 

gentleman with history of sickle cell anemia, who presented to the

emergency room with severe abdominal pain in periumbilical area with

radiation to his left flank.  The pain was 10/10, associated with nausea

and vomiting.  The patient's pain was different than his previous sickle

cell crisis.  The patient underwent CT of abdomen and pelvis that showed

multiple left kidney cysts and there was hemorrhage around the cysts.  The

patient was admitted and cardiology consultation was obtained for further

evaluation.  The patient's white count was at 17,000, _____ fever, so ID

consultation was obtained by Dr. Talley, who already evaluated the

patient.



REVIEW OF SYSTEMS:  Review of systems was performed and was negative other

than what was mentioned in the history of present illness.



PAST MEDICAL HISTORY:

1. Sickle cell anemia.

2. Sickle cell crisis.



PAST SURGICAL HISTORY:  Negative.



MEDICATIONS:  Per reconciliation.



ALLERGIES:  He is allergic to codeine.



FAMILY HISTORY:  Noncontributory.



SOCIAL HISTORY:  He lives at home.  Does not smoke or drink

alcohol.



PHYSICAL EXAMINATION:

VITAL SIGNS:  Blood pressure is 139/74, pulse is 90, respirations 18, and

he is afebrile.

HEAD AND NECK:  No JVD.

LUNGS:  Clear.

CARDIOVASCULAR:  Regular S1 and S2 with no gallop or murmur.

ABDOMEN:  Soft and nontender.

EXTREMITIES:  No pitting edema.



LABORATORY AND DIAGNOSTIC DATA:  White count of 12.  Hemoglobin was 6.9,

and after transfusion, it was 8.  Platelet count is 188,000.  Sodium 138,

potassium is 3.3, BUN of 37, creatinine 2.7, and glucose of 116.  Troponin

is negative x2.  INR is 1.1.



ASSESSMENT AND PLAN:

1. Tachycardia due to pain of sickle cell crisis as well as fever and

severe anemia.  He needs treatment of underlying cause.  We will hold off

on any beta-blocker at this time.  _____ the patient underwent an

echocardiogram which showed ejection fraction of 60% to 65%.

2. Sepsis, on intravenous antibiotic per Dr. Talley.

3. Abdominal pain, likely due to renal cysts and hemorrhage.

4. Diarrhea.  Clostridium difficile colitis has been ruled out.  Further

evaluation by gastroenterology.



Thank you very much, Dr. Jim, for allowing me to participate in

the care of this patient.  Please do not hesitate to contact me for any

questions regarding my evaluation.









  ______________________________________________

  Moses Mott M.D.





DR:  ADAN

D:  01/10/2018 15:13

T:  01/10/2018 21:47

JOB#:  [id number]

CC:

## 2018-01-11 VITALS — SYSTOLIC BLOOD PRESSURE: 124 MMHG | DIASTOLIC BLOOD PRESSURE: 65 MMHG

## 2018-01-11 VITALS — SYSTOLIC BLOOD PRESSURE: 129 MMHG | DIASTOLIC BLOOD PRESSURE: 69 MMHG

## 2018-01-11 VITALS — DIASTOLIC BLOOD PRESSURE: 81 MMHG | SYSTOLIC BLOOD PRESSURE: 135 MMHG

## 2018-01-11 VITALS — SYSTOLIC BLOOD PRESSURE: 115 MMHG | DIASTOLIC BLOOD PRESSURE: 78 MMHG

## 2018-01-11 VITALS — SYSTOLIC BLOOD PRESSURE: 150 MMHG | DIASTOLIC BLOOD PRESSURE: 78 MMHG

## 2018-01-11 VITALS — DIASTOLIC BLOOD PRESSURE: 81 MMHG | SYSTOLIC BLOOD PRESSURE: 155 MMHG

## 2018-01-11 LAB
ADD MANUAL DIFF: NO
ANION GAP SERPL CALC-SCNC: 12 MMOL/L (ref 5–15)
BASOPHILS NFR BLD AUTO: 0.9 % (ref 0–2)
BUN SERPL-MCNC: 32 MG/DL (ref 7–18)
CALCIUM SERPL-MCNC: 8.1 MG/DL (ref 8.5–10.1)
CHLORIDE SERPL-SCNC: 111 MMOL/L (ref 98–107)
CO2 SERPL-SCNC: 17 MMOL/L (ref 21–32)
CREAT SERPL-MCNC: 2.5 MG/DL (ref 0.55–1.3)
EOSINOPHIL NFR BLD AUTO: 6 % (ref 0–3)
ERYTHROCYTE [DISTWIDTH] IN BLOOD BY AUTOMATED COUNT: 11.8 % (ref 11.6–14.8)
HCT VFR BLD CALC: 23.3 % (ref 42–52)
HGB BLD-MCNC: 8 G/DL (ref 14.2–18)
LYMPHOCYTES NFR BLD AUTO: 6.3 % (ref 20–45)
MCV RBC AUTO: 84 FL (ref 80–99)
MONOCYTES NFR BLD AUTO: 10.8 % (ref 1–10)
NEUTROPHILS NFR BLD AUTO: 75.9 % (ref 45–75)
PLATELET # BLD: 192 K/UL (ref 150–450)
POTASSIUM SERPL-SCNC: 3.3 MMOL/L (ref 3.5–5.1)
RBC # BLD AUTO: 2.77 M/UL (ref 4.7–6.1)
SODIUM SERPL-SCNC: 140 MMOL/L (ref 136–145)
WBC # BLD AUTO: 9.9 K/UL (ref 4.8–10.8)

## 2018-01-11 RX ADMIN — METRONIDAZOLE SCH MG: 500 TABLET ORAL at 21:31

## 2018-01-11 RX ADMIN — MORPHINE SULFATE PRN MG: 2 INJECTION, SOLUTION INTRAMUSCULAR; INTRAVENOUS at 17:07

## 2018-01-11 RX ADMIN — Medication SCH TAB: at 10:39

## 2018-01-11 RX ADMIN — METRONIDAZOLE SCH MG: 500 TABLET ORAL at 14:49

## 2018-01-11 RX ADMIN — METRONIDAZOLE SCH MG: 500 TABLET ORAL at 05:41

## 2018-01-11 RX ADMIN — DIPHENOXYLATE HYDROCHLORIDE AND ATROPINE SULFATE PRN MG: 2.5; .025 TABLET ORAL at 14:52

## 2018-01-11 RX ADMIN — MORPHINE SULFATE PRN MG: 2 INJECTION, SOLUTION INTRAMUSCULAR; INTRAVENOUS at 04:45

## 2018-01-11 RX ADMIN — SODIUM CHLORIDE SCH MLS/HR: 0.9 INJECTION INTRAVENOUS at 10:40

## 2018-01-11 RX ADMIN — MORPHINE SULFATE PRN MG: 2 INJECTION, SOLUTION INTRAMUSCULAR; INTRAVENOUS at 10:42

## 2018-01-11 NOTE — NEPHROLOGY PROGRESS NOTE
Assessment/Plan


Problem List:  


(1) Sickle cell trait


Assessment:  ??





(2) Fever


(3) Sepsis


(4) Diarrhea


(5) Abdominal pain


(6) Renal mass, left


(7) Renal cyst, native, hemorrhage


(8) Shortness of breath


(9) Hematuria


(10) Chest pain


(11) Severe anemia


Plan


f/u hemeonc recs. 


monitor h/h. 


transfuse prn. 


d/c plan soon.





Subjective


Subjective


BP stable.





Objective


Objective





Last 24 Hour Vital Signs








  Date Time  Temp Pulse Resp B/P (MAP) Pulse Ox O2 Delivery O2 Flow Rate FiO2


 


1/11/18 20:00 99.5 97 20 135/81 96   


 


1/11/18 16:00 98.8 81 20 129/69 96 Room Air  


 


1/11/18 16:00  88      


 


1/11/18 12:00  84      


 


1/11/18 12:00 97.3 90 20 115/78 96 Room Air  


 


1/11/18 08:00 97.3 86 20 124/65 98 Nasal Cannula 2.0 


 


1/11/18 08:00  89      


 


1/11/18 05:27 98.2       


 


1/11/18 04:15 98.2 84 20 155/81 97   


 


1/11/18 04:00  85      


 


1/11/18 00:00 99.3 96 20 150/78 95   


 


1/11/18 00:00  91      

















Intake and Output  


 


 1/10/18 1/11/18





 19:00 07:00


 


Output Total 650 ml 400 ml


 


Balance -650 ml -400 ml


 


  


 


Output Urine Total 650 ml 400 ml


 


# Voids  2








Laboratory Tests


1/11/18 09:05: 


White Blood Count 9.9, Red Blood Count 2.77L, Hemoglobin 8.0L, Hematocrit 23.3L

, Mean Corpuscular Volume 84, Mean Corpuscular Hemoglobin 29.1, Mean 

Corpuscular Hemoglobin Concent 34.6, Red Cell Distribution Width 11.8, Platelet 

Count 192, Mean Platelet Volume 7.2, Neutrophils (%) (Auto) 75.9H, Lymphocytes (

%) (Auto) 6.3L, Monocytes (%) (Auto) 10.8H, Eosinophils (%) (Auto) 6.0H, 

Basophils (%) (Auto) 0.9, Sodium Level 140, Potassium Level 3.3L, Chloride 

Level 111H, Carbon Dioxide Level 17L, Anion Gap 12, Blood Urea Nitrogen 32H, 

Creatinine 2.5H, Estimat Glomerular Filtration Rate 31.6, Glucose Level 108H, 

Calcium Level 8.1L


Height (Feet):  5


Height (Inches):  10.00


Weight (Pounds):  138


General Appearance:  no apparent distress


Cardiovascular:  normal rate, regular rhythm


Respiratory/Chest:  lungs clear


Abdomen:  non tender, soft











SCARLETT ELIZABETH Jan 11, 2018 22:16

## 2018-01-11 NOTE — GI PROGRESS NOTE
Assessment/Plan


Problems:  


(1) Diarrhea


ICD Codes:  R19.7 - Diarrhea, unspecified


SNOMED:  15276228


(2) Sickle cell trait


ICD Codes:  D57.3 - Sickle-cell trait


SNOMED:  87860417


(3) Abdominal pain


ICD Codes:  R10.9 - Unspecified abdominal pain


SNOMED:  35774668


(4) Renal cyst, native, hemorrhage


ICD Codes:  N28.89 - Other specified disorders of kidney and ureter; N28.1 - 

Cyst of kidney, acquired


SNOMED:  81859820


Status:  unchanged


Status Narrative


Discussed with Dr. Gamez.


Assessment/Plan


anemia work up


iv iron


OB stool r/o GI bleed  >> negative


stool cx >> negative





monitor H&H, prn transfusions


send for cdiff, O&P


renal diet, tolerating


lomotil prn


ppi


abx


fu labs


outpatient GI procedures





Subjective


Subjective


bilaterally flank pain


diarrhea still present





Objective





Last 24 Hour Vital Signs








  Date Time  Temp Pulse Resp B/P (MAP) Pulse Ox O2 Delivery O2 Flow Rate FiO2


 


1/11/18 08:00 97.3 86 20 124/65 98 Nasal Cannula 2.0 


 


1/11/18 08:00  89      


 


1/11/18 05:27 98.2       


 


1/11/18 04:15 98.2 84 20 155/81 97   


 


1/11/18 04:00  85      


 


1/11/18 00:00 99.3 96 20 150/78 95   


 


1/11/18 00:00  91      


 


1/10/18 20:20 99.0 105 20 141/87 95   


 


1/10/18 20:00  108      


 


1/10/18 16:00 99.0 89 18 137/75 95 Room Air  


 


1/10/18 16:00  91      

















Intake and Output  


 


 1/10/18 1/11/18





 19:00 07:00


 


Output Total 650 ml 400 ml


 


Balance -650 ml -400 ml


 


  


 


Output Urine Total 650 ml 400 ml


 


# Voids  2











Laboratory Tests








Test


  1/11/18


09:05


 


White Blood Count


  9.9 K/UL


(4.8-10.8)


 


Red Blood Count


  2.77 M/UL


(4.70-6.10)  L


 


Hemoglobin


  8.0 G/DL


(14.2-18.0)  L


 


Hematocrit


  23.3 %


(42.0-52.0)  L


 


Mean Corpuscular Volume 84 FL (80-99)  


 


Mean Corpuscular Hemoglobin


  29.1 PG


(27.0-31.0)


 


Mean Corpuscular Hemoglobin


Concent 34.6 G/DL


(32.0-36.0)


 


Red Cell Distribution Width


  11.8 %


(11.6-14.8)


 


Platelet Count


  192 K/UL


(150-450)


 


Mean Platelet Volume


  7.2 FL


(6.5-10.1)


 


Neutrophils (%) (Auto)


  75.9 %


(45.0-75.0)  H


 


Lymphocytes (%) (Auto)


  6.3 %


(20.0-45.0)  L


 


Monocytes (%) (Auto)


  10.8 %


(1.0-10.0)  H


 


Eosinophils (%) (Auto)


  6.0 %


(0.0-3.0)  H


 


Basophils (%) (Auto)


  0.9 %


(0.0-2.0)


 


Sodium Level


  140 MMOL/L


(136-145)


 


Potassium Level


  3.3 MMOL/L


(3.5-5.1)  L


 


Chloride Level


  111 MMOL/L


()  H


 


Carbon Dioxide Level


  17 MMOL/L


(21-32)  L


 


Anion Gap


  12 mmol/L


(5-15)


 


Blood Urea Nitrogen


  32 mg/dL


(7-18)  H


 


Creatinine


  2.5 MG/DL


(0.55-1.30)  H


 


Estimat Glomerular Filtration


Rate 31.6 mL/min


(>60)


 


Glucose Level


  108 MG/DL


()  H


 


Calcium Level


  8.1 MG/DL


(8.5-10.1)  L








Height (Feet):  5


Height (Inches):  10.00


Weight (Pounds):  138


General Appearance:  WD/WN, no apparent distress, alert


Cardiovascular:  normal rate


Respiratory/Chest:  normal breath sounds, no respiratory distress


Abdominal Exam:  normal bowel sounds, non tender, soft


Extremities:  normal range of motion, non-tender











Elsy Lakhani N.P. Jan 11, 2018 12:56


EMERITA GAMEZ Jan 12, 2018 09:35

## 2018-01-11 NOTE — CARDIAC ELECTROPHYSIOLOGY PN
Assessment/Plan


Assessment/Plan


1. Tachycardia due to pain of sickle cell crisis as well as fever and severe 

anemia. Better after PRBC 1 unit.


Echocardiogram which showed ejection fraction of 60% to 65%.  





2. Sepsis, on intravenous antibiotic per Dr. Talley. 





3. Abdominal pain, likely due to renal cysts and hemorrhage.





4. Diarrhea.Persistent.  Clostridium difficile colitis has been ruled out.  

Further evaluation by gastroenterology.


Awaiting Stool sample





Subjective


Subjective


Feeling better. No chest pain or SOB.





Objective





Last 24 Hour Vital Signs








  Date Time  Temp Pulse Resp B/P (MAP) Pulse Ox O2 Delivery O2 Flow Rate FiO2


 


1/11/18 12:00 97.3 90 20 115/78 96 Room Air  


 


1/11/18 08:00 97.3 86 20 124/65 98 Nasal Cannula 2.0 


 


1/11/18 08:00  89      


 


1/11/18 05:27 98.2       


 


1/11/18 04:15 98.2 84 20 155/81 97   


 


1/11/18 04:00  85      


 


1/11/18 00:00 99.3 96 20 150/78 95   


 


1/11/18 00:00  91      


 


1/10/18 20:20 99.0 105 20 141/87 95   


 


1/10/18 20:00  108      


 


1/10/18 16:00 99.0 89 18 137/75 95 Room Air  


 


1/10/18 16:00  91      

















Intake and Output  


 


 1/10/18 1/11/18





 19:00 07:00


 


Output Total 650 ml 400 ml


 


Balance -650 ml -400 ml


 


  


 


Output Urine Total 650 ml 400 ml


 


# Voids  2











Laboratory Tests








Test


  1/11/18


09:05


 


White Blood Count


  9.9 K/UL


(4.8-10.8)


 


Red Blood Count


  2.77 M/UL


(4.70-6.10)  L


 


Hemoglobin


  8.0 G/DL


(14.2-18.0)  L


 


Hematocrit


  23.3 %


(42.0-52.0)  L


 


Mean Corpuscular Volume 84 FL (80-99)  


 


Mean Corpuscular Hemoglobin


  29.1 PG


(27.0-31.0)


 


Mean Corpuscular Hemoglobin


Concent 34.6 G/DL


(32.0-36.0)


 


Red Cell Distribution Width


  11.8 %


(11.6-14.8)


 


Platelet Count


  192 K/UL


(150-450)


 


Mean Platelet Volume


  7.2 FL


(6.5-10.1)


 


Neutrophils (%) (Auto)


  75.9 %


(45.0-75.0)  H


 


Lymphocytes (%) (Auto)


  6.3 %


(20.0-45.0)  L


 


Monocytes (%) (Auto)


  10.8 %


(1.0-10.0)  H


 


Eosinophils (%) (Auto)


  6.0 %


(0.0-3.0)  H


 


Basophils (%) (Auto)


  0.9 %


(0.0-2.0)


 


Sodium Level


  140 MMOL/L


(136-145)


 


Potassium Level


  3.3 MMOL/L


(3.5-5.1)  L


 


Chloride Level


  111 MMOL/L


()  H


 


Carbon Dioxide Level


  17 MMOL/L


(21-32)  L


 


Anion Gap


  12 mmol/L


(5-15)


 


Blood Urea Nitrogen


  32 mg/dL


(7-18)  H


 


Creatinine


  2.5 MG/DL


(0.55-1.30)  H


 


Estimat Glomerular Filtration


Rate 31.6 mL/min


(>60)


 


Glucose Level


  108 MG/DL


()  H


 


Calcium Level


  8.1 MG/DL


(8.5-10.1)  L











Microbiology








 Date/Time


Source Procedure


Growth Status


 


 


 1/9/18 12:08


Stool Stool Culture - Preliminary Resulted


 


 1/10/18 04:18


Urine,Clean Catch Urine Culture - Preliminary


NO GROWTH AFTER 24 HOURS Resulted








Objective


HEAD AND NECK:  No JVD.


LUNGS:  Clear.


CARDIOVASCULAR:  Regular S1 and S2 with no gallop or murmur.


ABDOMEN:  Soft and nontender.


EXTREMITIES:  No pitting edema.











KAYLEEN HAIR Jan 11, 2018 15:51

## 2018-01-11 NOTE — INFECTIOUS DISEASES PROG NOTE
Assessment/Plan


Problems:  


(1) Renal cyst, native, hemorrhage


Assessment & Plan:  possible pyelonephritis, blood culture remains negative ,  

urine culture grew mixed contaminant , continue  cefepime with flagyl empiric 

coverage , had recurrent bleeding yesterday , getting blood transfusion today , 

already had urology eval with no intervention





(2) Sepsis


Assessment & Plan:  due to the above , await  blood culture, and start cefepime 

empiric coverage 





(3) Abdominal pain


Assessment & Plan:  due to the above, continue pain meds 





(4) Fever


Assessment & Plan:  due to hemorrhagic cysts most likely, blood culture and 

urine culture remained negative ,  continue antibiotics empiric coverage , and  

tylenol prn 





(5) Diarrhea


Assessment & Plan:  rule out C diff related, await  stool study , he is already 

on flagyl 








Subjective


Constitutional:  Reports: fatigue


HEENT:  Reports: no symptoms


Respiratory:  Reports: no symptoms


Breasts:  Reports: no symptoms


Cardiovascular:  Reports: no symptoms


Gastrointestinal/Abdominal:  Reports: no symptoms


Genitourinary:  Reports: frequency


Neurologic:  Reports: no symptoms


Psychiatric:  Reports: no symptoms


Skin:  Reports: no symptoms


Endocrine:  Reports: no symptoms


Hematologic:  Reports: no symptoms


Musculoskeletal:  Reports: pain


Allergies:  


Coded Allergies:  


     CODEINE (Verified  Allergy, Unknown, 1/5/18)


Uncoded Allergies:  


     PEANUTS (Adverse Reaction, Severe, Anaphylaxis, 1/9/18)





Objective


Vital Signs





Last 24 Hour Vital Signs








  Date Time  Temp Pulse Resp B/P (MAP) Pulse Ox O2 Delivery O2 Flow Rate FiO2


 


1/11/18 12:00 97.3 90 20 115/78 96 Room Air  


 


1/11/18 08:00 97.3 86 20 124/65 98 Nasal Cannula 2.0 


 


1/11/18 08:00  89      


 


1/11/18 05:27 98.2       


 


1/11/18 04:15 98.2 84 20 155/81 97   


 


1/11/18 04:00  85      


 


1/11/18 00:00 99.3 96 20 150/78 95   


 


1/11/18 00:00  91      


 


1/10/18 20:20 99.0 105 20 141/87 95   


 


1/10/18 20:00  108      


 


1/10/18 16:00 99.0 89 18 137/75 95 Room Air  


 


1/10/18 16:00  91      








Height (Feet):  5


Height (Inches):  10.00


Weight (Pounds):  138


General Appearance:  WD/WN, no acute distress


HEENT:  normocephalic, atraumatic, anicteric, mucous membranes moist, PERRL, 

supple, no JVD


Respiratory/Chest:  chest wall non-tender, lungs clear, normal breath sounds, 

no respiratory distress, no accessory muscle use, decreased breath sounds


Cardiovascular:  normal peripheral pulses, normal rate, regular rhythm, no 

gallop/murmur, no JVD


Abdomen:  normal bowel sounds, soft, non tender, no organomegaly, non distended

, no mass, no scars


Extremities:  no cyanosis, no clubbing


Skin:  no rash, no lesions, no ulcers


Neurologic/Psychiatric:  alert, oriented x 3, responsive





Microbiology








 Date/Time


Source Procedure


Growth Status


 


 


 1/9/18 12:08


Stool Stool Culture - Preliminary Resulted


 


 1/10/18 04:18


Urine,Clean Catch Urine Culture - Preliminary


NO GROWTH AFTER 24 HOURS Resulted











Laboratory Tests








Test


  1/11/18


09:05


 


White Blood Count


  9.9 K/UL


(4.8-10.8)


 


Red Blood Count


  2.77 M/UL


(4.70-6.10)  L


 


Hemoglobin


  8.0 G/DL


(14.2-18.0)  L


 


Hematocrit


  23.3 %


(42.0-52.0)  L


 


Mean Corpuscular Volume 84 FL (80-99)  


 


Mean Corpuscular Hemoglobin


  29.1 PG


(27.0-31.0)


 


Mean Corpuscular Hemoglobin


Concent 34.6 G/DL


(32.0-36.0)


 


Red Cell Distribution Width


  11.8 %


(11.6-14.8)


 


Platelet Count


  192 K/UL


(150-450)


 


Mean Platelet Volume


  7.2 FL


(6.5-10.1)


 


Neutrophils (%) (Auto)


  75.9 %


(45.0-75.0)  H


 


Lymphocytes (%) (Auto)


  6.3 %


(20.0-45.0)  L


 


Monocytes (%) (Auto)


  10.8 %


(1.0-10.0)  H


 


Eosinophils (%) (Auto)


  6.0 %


(0.0-3.0)  H


 


Basophils (%) (Auto)


  0.9 %


(0.0-2.0)


 


Sodium Level


  140 MMOL/L


(136-145)


 


Potassium Level


  3.3 MMOL/L


(3.5-5.1)  L


 


Chloride Level


  111 MMOL/L


()  H


 


Carbon Dioxide Level


  17 MMOL/L


(21-32)  L


 


Anion Gap


  12 mmol/L


(5-15)


 


Blood Urea Nitrogen


  32 mg/dL


(7-18)  H


 


Creatinine


  2.5 MG/DL


(0.55-1.30)  H


 


Estimat Glomerular Filtration


Rate 31.6 mL/min


(>60)


 


Glucose Level


  108 MG/DL


()  H


 


Calcium Level


  8.1 MG/DL


(8.5-10.1)  L











Current Medications








 Medications


  (Trade)  Dose


 Ordered  Sig/Judie


 Route


 PRN Reason  Start Time


 Stop Time Status Last Admin


Dose Admin


 


 Acetaminophen


  (Tylenol)  650 mg  Q4H  PRN


 ORAL


 Mild Pain/Temp > 100.5  1/10/18 08:45


 2/5/18 00:44   


 


 


 Cefepime HCl 1 gm/


 Dextrose  55 ml @ 


 110 mls/hr  Q24H


 IVPB


   1/10/18 10:30


 1/14/18 10:29  1/11/18 10:40


 


 


 Dextrose


  (Dextrose 50%)    STAT  PRN


 IV


 Hypoglycemia  1/10/18 08:30


 2/9/18 08:29   


 


 


 Diphenoxylate HCl/


 Atropine


  (Lomotil)  2.5 mg  Q4H  PRN


 ORAL


 Diarrhea  1/10/18 13:15


 2/9/18 13:14  1/11/18 14:52


 


 


 Magnesium


 Hydroxide


  (Mom)  30 ml  BIDPRN  PRN


 ORAL


 constipation  1/10/18 08:15


 2/6/18 08:14   


 


 


 Metronidazole


  (Flagyl)  500 mg  Q8HR


 ORAL


   1/10/18 14:00


 1/14/18 13:59  1/11/18 14:49


 


 


 Morphine Sulfate


  (Morphine


 Sulfate)  2 mg  Q4H  PRN


 IVP


 Severe Pain (Pain Scale 7-10)  1/10/18 10:00


 1/13/18 09:59  1/11/18 10:42


 


 


 Ondansetron HCl


  (Zofran)  4 mg  Q6H  PRN


 IVP


 Nausea & Vomiting  1/10/18 08:15


 2/5/18 08:14   


 


 


 Sodium Chloride  1,000 ml @ 


 75 mls/hr  [id number]


 IV


   1/11/18 14:00


 2/10/18 13:59  1/11/18 14:49


 


 


 Vitamin B Complex/


 Vit C/Folic Acid


  (Nephrovite)  1 tab  DAILY


 ORAL


   1/10/18 09:00


 2/8/18 08:59  1/11/18 10:39


 

















Pablo Talley M.D. Jan 11, 2018 15:16

## 2018-01-12 VITALS — SYSTOLIC BLOOD PRESSURE: 115 MMHG | DIASTOLIC BLOOD PRESSURE: 71 MMHG

## 2018-01-12 VITALS — SYSTOLIC BLOOD PRESSURE: 134 MMHG | DIASTOLIC BLOOD PRESSURE: 73 MMHG

## 2018-01-12 VITALS — SYSTOLIC BLOOD PRESSURE: 107 MMHG | DIASTOLIC BLOOD PRESSURE: 68 MMHG

## 2018-01-12 VITALS — DIASTOLIC BLOOD PRESSURE: 80 MMHG | SYSTOLIC BLOOD PRESSURE: 136 MMHG

## 2018-01-12 VITALS — SYSTOLIC BLOOD PRESSURE: 139 MMHG | DIASTOLIC BLOOD PRESSURE: 75 MMHG

## 2018-01-12 VITALS — DIASTOLIC BLOOD PRESSURE: 73 MMHG | SYSTOLIC BLOOD PRESSURE: 131 MMHG

## 2018-01-12 LAB
ADD MANUAL DIFF: NO
ANION GAP SERPL CALC-SCNC: 12 MMOL/L (ref 5–15)
BASOPHILS NFR BLD AUTO: 3.1 % (ref 0–2)
BUN SERPL-MCNC: 31 MG/DL (ref 7–18)
CALCIUM SERPL-MCNC: 8.4 MG/DL (ref 8.5–10.1)
CHLORIDE SERPL-SCNC: 111 MMOL/L (ref 98–107)
CO2 SERPL-SCNC: 19 MMOL/L (ref 21–32)
CREAT SERPL-MCNC: 2.5 MG/DL (ref 0.55–1.3)
EOSINOPHIL NFR BLD AUTO: 3.4 % (ref 0–3)
ERYTHROCYTE [DISTWIDTH] IN BLOOD BY AUTOMATED COUNT: 11.5 % (ref 11.6–14.8)
HCT VFR BLD CALC: 47.6 % (ref 42–52)
HGB BLD-MCNC: 16.6 G/DL (ref 14.2–18)
LYMPHOCYTES NFR BLD AUTO: 29.8 % (ref 20–45)
MCV RBC AUTO: 90 FL (ref 80–99)
MONOCYTES NFR BLD AUTO: 6.7 % (ref 1–10)
NEUTROPHILS NFR BLD AUTO: 56.9 % (ref 45–75)
PLATELET # BLD: 281 K/UL (ref 150–450)
POTASSIUM SERPL-SCNC: 3.2 MMOL/L (ref 3.5–5.1)
RBC # BLD AUTO: 5.28 M/UL (ref 4.7–6.1)
SODIUM SERPL-SCNC: 142 MMOL/L (ref 136–145)
WBC # BLD AUTO: 4.4 K/UL (ref 4.8–10.8)

## 2018-01-12 RX ADMIN — SODIUM CHLORIDE SCH MLS/HR: 0.9 INJECTION INTRAVENOUS at 09:36

## 2018-01-12 RX ADMIN — METRONIDAZOLE SCH MG: 500 TABLET ORAL at 14:13

## 2018-01-12 RX ADMIN — METRONIDAZOLE SCH MG: 500 TABLET ORAL at 05:35

## 2018-01-12 RX ADMIN — MORPHINE SULFATE PRN MG: 2 INJECTION, SOLUTION INTRAMUSCULAR; INTRAVENOUS at 23:36

## 2018-01-12 RX ADMIN — SODIUM CHLORIDE SCH MLS/HR: 0.9 INJECTION INTRAVENOUS at 21:25

## 2018-01-12 RX ADMIN — MORPHINE SULFATE PRN MG: 2 INJECTION, SOLUTION INTRAMUSCULAR; INTRAVENOUS at 02:39

## 2018-01-12 RX ADMIN — DIPHENOXYLATE HYDROCHLORIDE AND ATROPINE SULFATE PRN MG: 2.5; .025 TABLET ORAL at 14:13

## 2018-01-12 RX ADMIN — Medication SCH TAB: at 09:36

## 2018-01-12 NOTE — NEPHROLOGY PROGRESS NOTE
Assessment/Plan


Problem List:  


(1) Sickle cell trait


(2) Abdominal pain


(3) Renal cyst, native, hemorrhage


(4) Sepsis


(5) Hematuria


(6) Shortness of breath


(7) Chest pain


(8) Severe anemia


Plan


Continue current treatment plan


H&H improved post transfusion, will monitor


Hematology, cardiology GI, and ID following, will f/u with recs


Monitor lytes, correct prn


Continue abx per ID


Monitor renal function, avoid nephrotoxins


renal ultrasound 


Pain management prn


AM labs





Subjective


Constitutional:  Denies: no symptoms, chills, diaphoresis, fever, malaise, 

weakness, other


HEENT:  Denies: no symptoms, eye pain, blurred vision, tearing, double vision, 

ear pain, ear discharge, nose pain, nose congestion, throat pain, throat 

swelling, mouth pain, mouth swelling, other


Genitourinary:  Denies: no symptoms, burning, discharge, frequency, flank pain, 

hematuria, incontinence, pain, urgency, other


Neurologic/Psychiatric:  Denies: no symptoms, anxiety, depressed, emotional 

problems, headache, numbness, paresthesia, pre-existing deficit, seizure, 

tingling, tremors, weakness, other


Subjective


In bed, in no apparent distress, states that he feels better





Objective


Objective





Last 24 Hour Vital Signs








  Date Time  Temp Pulse Resp B/P (MAP) Pulse Ox O2 Delivery O2 Flow Rate FiO2


 


1/12/18 12:00 97.2 88 22 139/75 99 Room Air  


 


1/12/18 12:00  98      


 


1/12/18 08:00 97.8 80 21 134/73 98 Room Air  


 


1/12/18 08:00  95      


 


1/12/18 05:00 99.5       


 


1/12/18 04:30      Room Air  


 


1/12/18 04:30 100.6 91 20 115/71 98 Room Air  


 


1/12/18 04:00  84      


 


1/12/18 03:51 99.5       


 


1/12/18 00:30 99.5       


 


1/12/18 00:00      Room Air  


 


1/12/18 00:00  92      


 


1/12/18 00:00 100.0 101 20 107/68 95   


 


1/11/18 20:00 99.5 97 20 135/81 96   


 


1/11/18 20:00  100      


 


1/11/18 20:00      Room Air  


 


1/11/18 16:00 98.8 81 20 129/69 96 Room Air  


 


1/11/18 16:00  88      

















Intake and Output  


 


 1/11/18 1/12/18





 19:00 07:00


 


Intake Total 650 ml 895 ml


 


Output Total 1000 ml 


 


Balance -350 ml 895 ml


 


  


 


Intake Oral 650 ml 


 


IV Total  895 ml


 


Output Urine Total 1000 ml 


 


# Voids  2








Laboratory Tests


1/12/18 08:11: 


White Blood Count 4.4#L, Red Blood Count 5.28, Hemoglobin 16.6#, Hematocrit 47.6

#, Mean Corpuscular Volume 90, Mean Corpuscular Hemoglobin 31.4H, Mean 

Corpuscular Hemoglobin Concent 34.8, Red Cell Distribution Width 11.5L, 

Platelet Count 281, Mean Platelet Volume 6.4L, Neutrophils (%) (Auto) 56.9, 

Lymphocytes (%) (Auto) 29.8, Monocytes (%) (Auto) 6.7, Eosinophils (%) (Auto) 

3.4H, Basophils (%) (Auto) 3.1H, Sodium Level 142, Potassium Level 3.2L, 

Chloride Level 111H, Carbon Dioxide Level 19L, Anion Gap 12, Blood Urea 

Nitrogen 31H, Creatinine 2.5H, Estimat Glomerular Filtration Rate 31.6, Glucose 

Level 103, Calcium Level 8.4L


Height (Feet):  5


Height (Inches):  10.00


Weight (Pounds):  138


General Appearance:  no apparent distress, alert


EENT:  normal ENT inspection


Neck:  normal alignment, supple


Cardiovascular:  regular rhythm


Respiratory/Chest:  normal breath sounds, no respiratory distress


Abdomen:  soft, no organomegaly


Extremities:  non-tender, normal inspection


Neurologic:  alert, oriented x 3, responsive, normal mood/affect











Dayanara Hawley N.P. Jan 12, 2018 16:06

## 2018-01-12 NOTE — GI PROGRESS NOTE
Assessment/Plan


Problems:  


(1) Diarrhea


ICD Codes:  R19.7 - Diarrhea, unspecified


SNOMED:  14746612


(2) Sickle cell trait


ICD Codes:  D57.3 - Sickle-cell trait


SNOMED:  77280221


(3) Abdominal pain


ICD Codes:  R10.9 - Unspecified abdominal pain


SNOMED:  24504480


(4) Renal cyst, native, hemorrhage


ICD Codes:  N28.89 - Other specified disorders of kidney and ureter; N28.1 - 

Cyst of kidney, acquired


SNOMED:  16813226


Status:  unchanged


Status Narrative


Discussed with Dr. Gamez.


Assessment/Plan


anemia work up


- no iron supplementation given elevated ferritin levels


OB stool r/o GI bleed  >> negative


stool cx >> negative


cdiff >> negative





monitor H&H, prn transfusions


fu O&P


renal diet, tolerating


lomotil prn


ppi


abx


fu labs


outpatient GI procedures





Subjective


Subjective


generalized weakness


diarrhea still present





Objective





Last 24 Hour Vital Signs








  Date Time  Temp Pulse Resp B/P (MAP) Pulse Ox O2 Delivery O2 Flow Rate FiO2


 


1/12/18 08:00 97.8 80 21 134/73 98 Room Air  


 


1/12/18 08:00  95      


 


1/12/18 05:00 99.5       


 


1/12/18 04:30      Room Air  


 


1/12/18 04:30 100.6 91 20 115/71 98 Room Air  


 


1/12/18 04:00  84      


 


1/12/18 03:51 99.5       


 


1/12/18 00:30 99.5       


 


1/12/18 00:00      Room Air  


 


1/12/18 00:00  92      


 


1/12/18 00:00 100.0 101 20 107/68 95   


 


1/11/18 20:00 99.5 97 20 135/81 96   


 


1/11/18 20:00  100      


 


1/11/18 20:00      Room Air  


 


1/11/18 16:00 98.8 81 20 129/69 96 Room Air  


 


1/11/18 16:00  88      


 


1/11/18 12:00  84      


 


1/11/18 12:00 97.3 90 20 115/78 96 Room Air  

















Intake and Output  


 


 1/11/18 1/12/18





 19:00 07:00


 


Intake Total 650 ml 895 ml


 


Output Total 1000 ml 


 


Balance -350 ml 895 ml


 


  


 


Intake Oral 650 ml 


 


IV Total  895 ml


 


Output Urine Total 1000 ml 


 


# Voids  2











Laboratory Tests








Test


  1/12/18


08:11


 


White Blood Count


  4.4 K/UL


(4.8-10.8)  #L


 


Red Blood Count


  5.28 M/UL


(4.70-6.10)


 


Hemoglobin


  16.6 G/DL


(14.2-18.0)  #


 


Hematocrit


  47.6 %


(42.0-52.0)  #


 


Mean Corpuscular Volume 90 FL (80-99)  


 


Mean Corpuscular Hemoglobin


  31.4 PG


(27.0-31.0)  H


 


Mean Corpuscular Hemoglobin


Concent 34.8 G/DL


(32.0-36.0)


 


Red Cell Distribution Width


  11.5 %


(11.6-14.8)  L


 


Platelet Count


  281 K/UL


(150-450)


 


Mean Platelet Volume


  6.4 FL


(6.5-10.1)  L


 


Neutrophils (%) (Auto)


  56.9 %


(45.0-75.0)


 


Lymphocytes (%) (Auto)


  29.8 %


(20.0-45.0)


 


Monocytes (%) (Auto)


  6.7 %


(1.0-10.0)


 


Eosinophils (%) (Auto)


  3.4 %


(0.0-3.0)  H


 


Basophils (%) (Auto)


  3.1 %


(0.0-2.0)  H


 


Sodium Level


  142 MMOL/L


(136-145)


 


Potassium Level


  3.2 MMOL/L


(3.5-5.1)  L


 


Chloride Level


  111 MMOL/L


()  H


 


Carbon Dioxide Level


  19 MMOL/L


(21-32)  L


 


Anion Gap


  12 mmol/L


(5-15)


 


Blood Urea Nitrogen


  31 mg/dL


(7-18)  H


 


Creatinine


  2.5 MG/DL


(0.55-1.30)  H


 


Estimat Glomerular Filtration


Rate 31.6 mL/min


(>60)


 


Glucose Level


  103 MG/DL


()


 


Calcium Level


  8.4 MG/DL


(8.5-10.1)  L











Microbiology








 Date/Time


Source Procedure


Growth Status


 


 


 1/11/18 21:20


Stool Clostridium difficile Toxin Assay - Final Complete








Height (Feet):  5


Height (Inches):  10.00


Weight (Pounds):  138


General Appearance:  WD/WN, no apparent distress, alert


Cardiovascular:  normal rate


Respiratory/Chest:  normal breath sounds, no respiratory distress


Abdominal Exam:  normal bowel sounds, non tender, soft


Extremities:  normal range of motion, non-tender











Elsy Lakhani N.P. Jan 12, 2018 10:43

## 2018-01-12 NOTE — INFECTIOUS DISEASES PROG NOTE
Assessment/Plan


Problems:  


(1) Renal cyst, native, hemorrhage


Assessment & Plan:  with no evidence of  pyelonephritis, blood culture remains 

negative ,  urine culture grew mixed contaminant , will stop cefepime with 

flagyl empiric coverage , had urology eval with no intervention





(2) Abdominal pain


Assessment & Plan:  due to the above, continue pain meds 





(3) Fever


Assessment & Plan:  due to hemorrhagic cysts most likely, blood culture and 

urine culture remained negative ,  will stop  antibiotics empiric coverage , 

continue  tylenol prn 





(4) Diarrhea


Assessment & Plan:  no evidence of  C diff , will stop  flagyl 








Subjective


Constitutional:  Reports: no symptoms


HEENT:  Reports: no symptoms


Respiratory:  Reports: no symptoms


Breasts:  Reports: no symptoms


Cardiovascular:  Reports: no symptoms


Gastrointestinal/Abdominal:  Reports: no symptoms


Genitourinary:  Reports: no symptoms


Neurologic:  Reports: no symptoms


Psychiatric:  Reports: no symptoms


Skin:  Reports: no symptoms


Endocrine:  Reports: no symptoms


Hematologic:  Reports: no symptoms


Musculoskeletal:  Reports: no symptoms


Allergies:  


Coded Allergies:  


     CODEINE (Verified  Allergy, Unknown, 1/5/18)


Uncoded Allergies:  


     PEANUTS (Adverse Reaction, Severe, Anaphylaxis, 1/9/18)





Objective


Vital Signs





Last 24 Hour Vital Signs








  Date Time  Temp Pulse Resp B/P (MAP) Pulse Ox O2 Delivery O2 Flow Rate FiO2


 


1/12/18 12:00 97.2 88 22 139/75 99 Room Air  


 


1/12/18 12:00  98      


 


1/12/18 08:00 97.8 80 21 134/73 98 Room Air  


 


1/12/18 08:00  95      


 


1/12/18 05:00 99.5       


 


1/12/18 04:30      Room Air  


 


1/12/18 04:30 100.6 91 20 115/71 98 Room Air  


 


1/12/18 04:00  84      


 


1/12/18 03:51 99.5       


 


1/12/18 00:30 99.5       


 


1/12/18 00:00      Room Air  


 


1/12/18 00:00  92      


 


1/12/18 00:00 100.0 101 20 107/68 95   


 


1/11/18 20:00 99.5 97 20 135/81 96   


 


1/11/18 20:00  100      


 


1/11/18 20:00      Room Air  


 


1/11/18 16:00 98.8 81 20 129/69 96 Room Air  


 


1/11/18 16:00  88      








Height (Feet):  5


Height (Inches):  10.00


Weight (Pounds):  138


General Appearance:  WD/WN, no acute distress


HEENT:  normocephalic, atraumatic, anicteric, mucous membranes moist, PERRL


Respiratory/Chest:  chest wall non-tender, lungs clear, normal breath sounds, 

no respiratory distress, no accessory muscle use


Cardiovascular:  normal peripheral pulses, normal rate, regular rhythm, no 

gallop/murmur, no JVD


Abdomen:  normal bowel sounds, soft, non tender, no organomegaly, non distended

, no mass, no scars


Extremities:  no cyanosis, no clubbing


Skin:  no rash, no lesions, no ulcers


Neurologic/Psychiatric:  alert, oriented x 3, responsive





Microbiology








 Date/Time


Source Procedure


Growth Status


 


 


 1/11/18 21:20


Stool Clostridium difficile Toxin Assay - Final Complete


 


 1/10/18 04:18


Urine,Clean Catch Urine Culture - Final


Mixed Gram Positive Organism Complete











Laboratory Tests








Test


  1/12/18


08:11


 


White Blood Count


  4.4 K/UL


(4.8-10.8)  #L


 


Red Blood Count


  5.28 M/UL


(4.70-6.10)


 


Hemoglobin


  16.6 G/DL


(14.2-18.0)  #


 


Hematocrit


  47.6 %


(42.0-52.0)  #


 


Mean Corpuscular Volume 90 FL (80-99)  


 


Mean Corpuscular Hemoglobin


  31.4 PG


(27.0-31.0)  H


 


Mean Corpuscular Hemoglobin


Concent 34.8 G/DL


(32.0-36.0)


 


Red Cell Distribution Width


  11.5 %


(11.6-14.8)  L


 


Platelet Count


  281 K/UL


(150-450)


 


Mean Platelet Volume


  6.4 FL


(6.5-10.1)  L


 


Neutrophils (%) (Auto)


  56.9 %


(45.0-75.0)


 


Lymphocytes (%) (Auto)


  29.8 %


(20.0-45.0)


 


Monocytes (%) (Auto)


  6.7 %


(1.0-10.0)


 


Eosinophils (%) (Auto)


  3.4 %


(0.0-3.0)  H


 


Basophils (%) (Auto)


  3.1 %


(0.0-2.0)  H


 


Sodium Level


  142 MMOL/L


(136-145)


 


Potassium Level


  3.2 MMOL/L


(3.5-5.1)  L


 


Chloride Level


  111 MMOL/L


()  H


 


Carbon Dioxide Level


  19 MMOL/L


(21-32)  L


 


Anion Gap


  12 mmol/L


(5-15)


 


Blood Urea Nitrogen


  31 mg/dL


(7-18)  H


 


Creatinine


  2.5 MG/DL


(0.55-1.30)  H


 


Estimat Glomerular Filtration


Rate 31.6 mL/min


(>60)


 


Glucose Level


  103 MG/DL


()


 


Calcium Level


  8.4 MG/DL


(8.5-10.1)  L











Current Medications








 Medications


  (Trade)  Dose


 Ordered  Sig/Judie


 Route


 PRN Reason  Start Time


 Stop Time Status Last Admin


Dose Admin


 


 Acetaminophen


  (Tylenol)  650 mg  Q4H  PRN


 ORAL


 Mild Pain/Temp > 100.5  1/10/18 08:45


 2/5/18 00:44  1/12/18 02:52


 


 


 Cefepime HCl 1 gm/


 Dextrose  55 ml @ 


 110 mls/hr  Q24H


 IVPB


   1/10/18 10:30


 1/14/18 10:29  1/12/18 09:36


 


 


 Dextrose


  (Dextrose 50%)    STAT  PRN


 IV


 Hypoglycemia  1/10/18 08:30


 2/9/18 08:29   


 


 


 Diphenoxylate HCl/


 Atropine


  (Lomotil)  2.5 mg  Q4H  PRN


 ORAL


 Diarrhea  1/10/18 13:15


 2/9/18 13:14  1/12/18 14:13


 


 


 Folic Acid


  (Folate)  1 mg  DAILY


 ORAL


   1/12/18 15:00


 2/11/18 14:59  1/12/18 15:32


 


 


 Iron Sucrose 100


 mg/Sodium Chloride  60 ml @ 


 240 mls/hr  BEDTIME


 IV


   1/12/18 21:00


 1/16/18 21:14   


 


 


 Magnesium


 Hydroxide


  (Mom)  30 ml  BIDPRN  PRN


 ORAL


 constipation  1/10/18 08:15


 2/6/18 08:14   


 


 


 Metronidazole


  (Flagyl)  500 mg  Q8HR


 ORAL


   1/10/18 14:00


 1/14/18 13:59  1/12/18 14:13


 


 


 Morphine Sulfate


  (Morphine


 Sulfate)  2 mg  Q4H  PRN


 IVP


 Severe Pain (Pain Scale 7-10)  1/10/18 10:00


 1/13/18 09:59  1/12/18 02:39


 


 


 Ondansetron HCl


  (Zofran)  4 mg  Q6H  PRN


 IVP


 Nausea & Vomiting  1/10/18 08:15


 2/5/18 08:14   


 


 


 Potassium Chloride


  (K-Dur)  20 meq  TWICE A  DAY


 ORAL


   1/12/18 13:15


 2/11/18 13:14  1/12/18 14:14


 


 


 Sodium Chloride  1,000 ml @ 


 75 mls/hr  [id number]


 IV


   1/11/18 14:00


 2/10/18 13:59  1/12/18 03:44


 


 


 Vitamin B Complex/


 Vit C/Folic Acid


  (Nephrovite)  1 tab  DAILY


 ORAL


   1/10/18 09:00


 2/8/18 08:59  1/12/18 09:36


 

















Pablo Talley M.D. Jan 12, 2018 15:38

## 2018-01-12 NOTE — GENERAL PROGRESS NOTE
Assessment/Plan


Assessment/Plan


#. Anemia, likely related to chronic disease in addition to kidney


disease.  


--> Hemoglobin goal >7


--> Does not need transfusion at the moment. 


#. Anemia secondary to chronic kidney disease.


#. Leukocytosis with fever.  It is unlikely the patient has a sickle


cell disease  MCV.  Peripheral smear has been reviewed. 


--> hemoglobin electrophoresis revealed sick cell trait


#. Nausea, vomiting, and diarrhea.  Evaluated by GI Service.  Outpatient


gastrointestinal procedure as needed.


#. Abdominal pain.  Imaging reviewed.


#. Sepsis.  Blood culture is pending.  He is on cefepime.


#. Hypertension, currently better, is improving.  Continue to closely


monitor.





Subjective


Date patient seen:  Jan 12, 2018


Cardiovascular:  Denies: no symptoms, chest pain, edema, irregular heart rate, 

lightheadedness, palpitations, syncope, other


Hematologic/Lymphatic:  Reports: anemia, other - sickle cell trait


Allergies:  


Coded Allergies:  


     CODEINE (Verified  Allergy, Unknown, 1/5/18)


Uncoded Allergies:  


     PEANUTS (Adverse Reaction, Severe, Anaphylaxis, 1/9/18)


Subjective


Has fever. On pain control. Lying in bed.





Objective





Last 24 Hour Vital Signs








  Date Time  Temp Pulse Resp B/P (MAP) Pulse Ox O2 Delivery O2 Flow Rate FiO2


 


1/12/18 20:00 100.9 97 20 136/80 98 Room Air  


 


1/12/18 16:00  117      


 


1/12/18 16:00 97.0 98 20 131/73 98 Room Air  


 


1/12/18 12:00 97.2 88 22 139/75 99 Room Air  


 


1/12/18 12:00  98      


 


1/12/18 08:00 97.8 80 21 134/73 98 Room Air  


 


1/12/18 08:00  95      


 


1/12/18 05:00 99.5       


 


1/12/18 04:30      Room Air  


 


1/12/18 04:30 100.6 91 20 115/71 98 Room Air  


 


1/12/18 04:00  84      


 


1/12/18 03:51 99.5       


 


1/12/18 00:30 99.5       


 


1/12/18 00:00      Room Air  


 


1/12/18 00:00  92      


 


1/12/18 00:00 100.0 101 20 107/68 95   

















Intake and Output  


 


 1/11/18 1/12/18





 19:00 07:00


 


Intake Total 650 ml 895 ml


 


Output Total 1000 ml 


 


Balance -350 ml 895 ml


 


  


 


Intake Oral 650 ml 


 


IV Total  895 ml


 


Output Urine Total 1000 ml 


 


# Voids  2








Laboratory Tests


1/12/18 08:11: 


White Blood Count 4.4#L, Red Blood Count 5.28, Hemoglobin 16.6#, Hematocrit 47.6

#, Mean Corpuscular Volume 90, Mean Corpuscular Hemoglobin 31.4H, Mean 

Corpuscular Hemoglobin Concent 34.8, Red Cell Distribution Width 11.5L, 

Platelet Count 281, Mean Platelet Volume 6.4L, Neutrophils (%) (Auto) 56.9, 

Lymphocytes (%) (Auto) 29.8, Monocytes (%) (Auto) 6.7, Eosinophils (%) (Auto) 

3.4H, Basophils (%) (Auto) 3.1H, Sodium Level 142, Potassium Level 3.2L, 

Chloride Level 111H, Carbon Dioxide Level 19L, Anion Gap 12, Blood Urea 

Nitrogen 31H, Creatinine 2.5H, Estimat Glomerular Filtration Rate 31.6, Glucose 

Level 103, Calcium Level 8.4L


Height (Feet):  5


Height (Inches):  10.00


Weight (Pounds):  138


Cardiovascular:  tachycardia


Edema:  trace edema











Joe Sanches Jan 12, 2018 23:27

## 2018-01-12 NOTE — CARDIAC ELECTROPHYSIOLOGY PN
Assessment/Plan


Assessment/Plan


1. Tachycardia due to  sickle cell crisis, fever and severe anemia. Better 

after 1 unit PRBC 


       Echocardiogram ejection fraction of 60% to 65%.  





2. Sepsis, on intravenous antibiotic per Dr. Talley. 





3. Abdominal pain, likely due to renal cysts and hemorrhage.





4. Diarrhea.Persistent.  Clostridium difficile colitis has been ruled out.  


     Stool culture negative.








DW RN





Subjective


Subjective


Still has diarrhea. No Arrhythmias on tele. No chest pain or SOB.





Objective





Last 24 Hour Vital Signs








  Date Time  Temp Pulse Resp B/P (MAP) Pulse Ox O2 Delivery O2 Flow Rate FiO2


 


1/12/18 12:00 97.2 88 22 139/75 99 Room Air  


 


1/12/18 08:00 97.8 80 21 134/73 98 Room Air  


 


1/12/18 08:00  95      


 


1/12/18 05:00 99.5       


 


1/12/18 04:30      Room Air  


 


1/12/18 04:30 100.6 91 20 115/71 98 Room Air  


 


1/12/18 04:00  84      


 


1/12/18 03:51 99.5       


 


1/12/18 00:30 99.5       


 


1/12/18 00:00      Room Air  


 


1/12/18 00:00  92      


 


1/12/18 00:00 100.0 101 20 107/68 95   


 


1/11/18 20:00 99.5 97 20 135/81 96   


 


1/11/18 20:00  100      


 


1/11/18 20:00      Room Air  


 


1/11/18 16:00 98.8 81 20 129/69 96 Room Air  


 


1/11/18 16:00  88      

















Intake and Output  


 


 1/11/18 1/12/18





 19:00 07:00


 


Intake Total 650 ml 895 ml


 


Output Total 1000 ml 


 


Balance -350 ml 895 ml


 


  


 


Intake Oral 650 ml 


 


IV Total  895 ml


 


Output Urine Total 1000 ml 


 


# Voids  2











Laboratory Tests








Test


  1/12/18


08:11


 


White Blood Count


  4.4 K/UL


(4.8-10.8)  #L


 


Red Blood Count


  5.28 M/UL


(4.70-6.10)


 


Hemoglobin


  16.6 G/DL


(14.2-18.0)  #


 


Hematocrit


  47.6 %


(42.0-52.0)  #


 


Mean Corpuscular Volume 90 FL (80-99)  


 


Mean Corpuscular Hemoglobin


  31.4 PG


(27.0-31.0)  H


 


Mean Corpuscular Hemoglobin


Concent 34.8 G/DL


(32.0-36.0)


 


Red Cell Distribution Width


  11.5 %


(11.6-14.8)  L


 


Platelet Count


  281 K/UL


(150-450)


 


Mean Platelet Volume


  6.4 FL


(6.5-10.1)  L


 


Neutrophils (%) (Auto)


  56.9 %


(45.0-75.0)


 


Lymphocytes (%) (Auto)


  29.8 %


(20.0-45.0)


 


Monocytes (%) (Auto)


  6.7 %


(1.0-10.0)


 


Eosinophils (%) (Auto)


  3.4 %


(0.0-3.0)  H


 


Basophils (%) (Auto)


  3.1 %


(0.0-2.0)  H


 


Sodium Level


  142 MMOL/L


(136-145)


 


Potassium Level


  3.2 MMOL/L


(3.5-5.1)  L


 


Chloride Level


  111 MMOL/L


()  H


 


Carbon Dioxide Level


  19 MMOL/L


(21-32)  L


 


Anion Gap


  12 mmol/L


(5-15)


 


Blood Urea Nitrogen


  31 mg/dL


(7-18)  H


 


Creatinine


  2.5 MG/DL


(0.55-1.30)  H


 


Estimat Glomerular Filtration


Rate 31.6 mL/min


(>60)


 


Glucose Level


  103 MG/DL


()


 


Calcium Level


  8.4 MG/DL


(8.5-10.1)  L











Microbiology








 Date/Time


Source Procedure


Growth Status


 


 


 1/11/18 21:20


Stool Clostridium difficile Toxin Assay - Final Complete


 


 1/10/18 04:18


Urine,Clean Catch Urine Culture - Final


Mixed Gram Positive Organism Complete








Objective


HEAD AND NECK:  No JVD.


LUNGS:  Clear.


CARDIOVASCULAR:  Regular S1 and S2 with no gallop or murmur.


ABDOMEN:  Soft and nontender.


EXTREMITIES:  No pitting edema.











KAYLEEN HAIR Jan 12, 2018 13:29

## 2018-01-13 VITALS — SYSTOLIC BLOOD PRESSURE: 133 MMHG | DIASTOLIC BLOOD PRESSURE: 74 MMHG

## 2018-01-13 VITALS — SYSTOLIC BLOOD PRESSURE: 135 MMHG | DIASTOLIC BLOOD PRESSURE: 72 MMHG

## 2018-01-13 VITALS — DIASTOLIC BLOOD PRESSURE: 70 MMHG | SYSTOLIC BLOOD PRESSURE: 128 MMHG

## 2018-01-13 VITALS — DIASTOLIC BLOOD PRESSURE: 78 MMHG | SYSTOLIC BLOOD PRESSURE: 145 MMHG

## 2018-01-13 VITALS — SYSTOLIC BLOOD PRESSURE: 121 MMHG | DIASTOLIC BLOOD PRESSURE: 72 MMHG

## 2018-01-13 VITALS — DIASTOLIC BLOOD PRESSURE: 72 MMHG | SYSTOLIC BLOOD PRESSURE: 133 MMHG

## 2018-01-13 LAB
ADD MANUAL DIFF: NO
ADD MANUAL DIFF: NO
ANION GAP SERPL CALC-SCNC: 10 MMOL/L (ref 5–15)
BASOPHILS NFR BLD AUTO: 0.6 % (ref 0–2)
BASOPHILS NFR BLD AUTO: 0.7 % (ref 0–2)
BUN SERPL-MCNC: 24 MG/DL (ref 7–18)
CALCIUM SERPL-MCNC: 8.2 MG/DL (ref 8.5–10.1)
CHLORIDE SERPL-SCNC: 111 MMOL/L (ref 98–107)
CO2 SERPL-SCNC: 18 MMOL/L (ref 21–32)
CREAT SERPL-MCNC: 2.3 MG/DL (ref 0.55–1.3)
EOSINOPHIL NFR BLD AUTO: 0.2 % (ref 0–3)
EOSINOPHIL NFR BLD AUTO: 2.3 % (ref 0–3)
ERYTHROCYTE [DISTWIDTH] IN BLOOD BY AUTOMATED COUNT: 12.2 % (ref 11.6–14.8)
ERYTHROCYTE [DISTWIDTH] IN BLOOD BY AUTOMATED COUNT: 12.6 % (ref 11.6–14.8)
HCT VFR BLD CALC: 25.1 % (ref 42–52)
HCT VFR BLD CALC: 25.7 % (ref 42–52)
HGB BLD-MCNC: 8.5 G/DL (ref 14.2–18)
HGB BLD-MCNC: 8.7 G/DL (ref 14.2–18)
LYMPHOCYTES NFR BLD AUTO: 2.6 % (ref 20–45)
LYMPHOCYTES NFR BLD AUTO: 4.7 % (ref 20–45)
MCV RBC AUTO: 84 FL (ref 80–99)
MCV RBC AUTO: 85 FL (ref 80–99)
MONOCYTES NFR BLD AUTO: 10.4 % (ref 1–10)
MONOCYTES NFR BLD AUTO: 7.6 % (ref 1–10)
NEUTROPHILS NFR BLD AUTO: 82 % (ref 45–75)
NEUTROPHILS NFR BLD AUTO: 88.9 % (ref 45–75)
PLATELET # BLD: 250 K/UL (ref 150–450)
PLATELET # BLD: 262 K/UL (ref 150–450)
POTASSIUM SERPL-SCNC: 3.3 MMOL/L (ref 3.5–5.1)
RBC # BLD AUTO: 2.98 M/UL (ref 4.7–6.1)
RBC # BLD AUTO: 3.03 M/UL (ref 4.7–6.1)
SODIUM SERPL-SCNC: 139 MMOL/L (ref 136–145)
WBC # BLD AUTO: 15.7 K/UL (ref 4.8–10.8)
WBC # BLD AUTO: 19.3 K/UL (ref 4.8–10.8)

## 2018-01-13 RX ADMIN — IPRATROPIUM BROMIDE AND ALBUTEROL SULFATE SCH ML: .5; 3 SOLUTION RESPIRATORY (INHALATION) at 19:00

## 2018-01-13 RX ADMIN — SODIUM CHLORIDE SCH MLS/HR: 0.9 INJECTION INTRAVENOUS at 14:47

## 2018-01-13 RX ADMIN — IPRATROPIUM BROMIDE AND ALBUTEROL SULFATE SCH ML: .5; 3 SOLUTION RESPIRATORY (INHALATION) at 23:40

## 2018-01-13 RX ADMIN — SODIUM CHLORIDE SCH MLS/HR: 0.9 INJECTION INTRAVENOUS at 20:41

## 2018-01-13 RX ADMIN — IPRATROPIUM BROMIDE AND ALBUTEROL SULFATE SCH ML: .5; 3 SOLUTION RESPIRATORY (INHALATION) at 14:51

## 2018-01-13 RX ADMIN — MORPHINE SULFATE PRN MG: 2 INJECTION, SOLUTION INTRAMUSCULAR; INTRAVENOUS at 03:40

## 2018-01-13 RX ADMIN — SODIUM CHLORIDE SCH MLS/HR: 0.9 INJECTION INTRAVENOUS at 22:31

## 2018-01-13 RX ADMIN — Medication SCH TAB: at 09:05

## 2018-01-13 RX ADMIN — MORPHINE SULFATE PRN MG: 2 INJECTION, SOLUTION INTRAMUSCULAR; INTRAVENOUS at 19:17

## 2018-01-13 NOTE — CONSULTATION
Consult Note


Consult Note


DATE OF CONSULTATION:  01/13/2018





PULMONARY CONSULTATION





CONSULTING PHYSICIAN:  Ethan Tom M.D.





ATTENDING/REFERRING PHYSICIAN:  Rajeev Jim M.D.





REASON FOR CONSULTATION:  Shortness of breath and tachycardia.





HISTORY OF PRESENT ILLNESS:  The patient is a 62-year-old 


gentleman with history of sickle cell anemia, who presented to the


emergency room with severe abdominal pain in periumbilical area with


radiation to his left flank.  The pain was 10/10, associated with nausea


and vomiting. He underwent CT of abdomen and pelvis that showed


multiple left kidney cysts and there was hemorrhage around the cysts.  The


patient was admitted and pulmonary consultation was obtained for further


evaluation.  The patient's white count was at 17,000.








REVIEW OF SYSTEMS:  Review of systems was performed and was negative other


than what was mentioned in the history of present illness.





PAST MEDICAL HISTORY:


1. Sickle cell anemia.


2. Sickle cell crisis.





PAST SURGICAL HISTORY:  Negative.





MEDICATIONS:  Per reconciliation.





ALLERGIES:  He is allergic to codeine.





FAMILY HISTORY:  Noncontributory.





SOCIAL HISTORY:  He lives at home.  Does not smoke or drink


alcohol.





PHYSICAL EXAMINATION:


VITAL SIGNS:  Blood pressure is 139/74, pulse is 90, respirations 18, and


he is afebrile.


HEAD AND NECK:  No JVD.


LUNGS:  Clear.


CARDIOVASCULAR:  Regular S1 and S2 with no gallop or murmur.


ABDOMEN:  Soft and nontender.


EXTREMITIES:  No pitting edema.





LABORATORY AND DIAGNOSTIC DATA:  White count of 12.  Hemoglobin was 6.9,


and after transfusion, it was 8.  Platelet count is 188,000.  Sodium 138,


potassium is 3.3, BUN of 37, creatinine 2.7, and glucose of 116.  Troponin


is negative x2.  INR is 1.1.





ASSESSMENT AND PLAN:


1. Tachycardia and tachypnea due to pain of sickle cell crisis. CXR shows 

streaky infiltrates; possible Pna


2. Sepsis, on intravenous antibiotic per Dr. Talley.


3. Abdominal pain, likely due to renal cysts and hemorrhage.


4. Diarrhea.  




















  ______________________________________________


Ethan Katz M.D., MD Jan 13, 2018 17:30

## 2018-01-13 NOTE — DIAGNOSTIC IMAGING REPORT
Indication: Renal failure

 

Technique: Multiplanar grayscale and color Doppler imaging of the kidneys and

bladder.

 

Comparison: No prior renal ultrasound available for comparison. Correlation made to

CT of the abdomen and pelvis 1/5/2018

 

Findings: Bladder is unremarkable in appearance.

 

Right kidney measures approximately 12 cm in length. Multiple well-circumscribed

anechoic lesions are noted within the right kidney compatible with simple cysts.

Please note that the hyperdense lesions noted on prior CT are not definitively

identified on ultrasound. There is no hydronephrosis on the right. Renal parenchymal

echogenicity appears within normal limits.

 

Large heterogeneous lesion in the left kidney consistent with findings of prior CT.

There are hyperechoic and hypoechoic portions of this lesion. Some anechoic

well-circumscribed structures with enhanced through transmission are noted in the

left kidney compatible with simple cysts.

 

Impression: 

Persistent large heterogeneous mass lesion arising from the left kidney. There are

hyperechoic and hypoechoic portions of this lesion possibly related to fluid and

hemorrhage as suggested on prior CT. A solid component cannot be excluded.

Contrast-enhanced CT or MRI would provide a better assessment. Difficult to assess

evolution of the lesion given prior study was a different modality.

 

Multiple simple renal cysts noted bilaterally.

## 2018-01-13 NOTE — NEPHROLOGY PROGRESS NOTE
Assessment/Plan


Problem List:  


(1) Sickle cell trait


(2) Abdominal pain


(3) Renal cyst, native, hemorrhage


(4) Sepsis


(5) Hematuria


(6) Shortness of breath


(7) Chest pain


(8) Severe anemia


(9) Pneumonia


Plan


Continue current treatment plan


Monitor H&H and transfuse prn


Hematology, cardiology GI, and ID and pulmo following, will f/u with recs


Monitor lytes, correct prn


Continue abx per ID


Monitor renal function, avoid nephrotoxins 


Pain management prn


Continue neb treatment


Daily PPI


AM labs





Subjective


Constitutional:  Denies: no symptoms, chills, diaphoresis, fever, malaise, 

weakness, other


HEENT:  Denies: no symptoms, eye pain, blurred vision, tearing, double vision, 

ear pain, ear discharge, nose pain, nose congestion, throat pain, throat 

swelling, mouth pain, mouth swelling, other


Genitourinary:  Denies: no symptoms, burning, discharge, frequency, flank pain, 

hematuria, incontinence, pain, urgency, other


Neurologic/Psychiatric:  Denies: no symptoms, anxiety, depressed, emotional 

problems, headache, numbness, paresthesia, pre-existing deficit, seizure, 

tingling, tremors, weakness, other


Subjective


In bed, in no apparent distress, stated that he was SOB earlier but that he 

feels better now, denies hematuria





Objective


Objective





Last 24 Hour Vital Signs








  Date Time  Temp Pulse Resp B/P (MAP) Pulse Ox O2 Delivery O2 Flow Rate FiO2


 


1/13/18 16:00  102      


 


1/13/18 16:00 99.3 100 22 145/78 99 Nasal Cannula 4.0 


 


1/13/18 15:01  102 22  99 Nasal Cannula 4.0 36


 


1/13/18 14:51  99 22  99 Nasal Cannula 4.0 36


 


1/13/18 13:16  102 24  99 Nasal Cannula 4.0 36


 


1/13/18 13:06  100 24  96 Nasal Cannula 4.0 36


 


1/13/18 13:00  100 24   Nasal Cannula 4.0 36


 


1/13/18 12:00  106      


 


1/13/18 12:00 98.2 92 22 128/70 100 Nasal Cannula 4.0 


 


1/13/18 08:00  84      


 


1/13/18 08:00 97.0 87 18 121/72 100 Nasal Cannula 3.0 


 


1/13/18 05:51 99.5       


 


1/13/18 04:00 101.0 88 20 133/72 99 Nasal Cannula  


 


1/13/18 04:00  90      


 


1/13/18 00:00  84      


 


1/13/18 00:00 100.6 83 20 135/72 99 Room Air  


 


1/12/18 20:00 100.9 97 20 136/80 98 Room Air  


 


1/12/18 20:00  100      

















Intake and Output  


 


 1/12/18 1/13/18





 19:00 07:00


 


Intake Total  1135 ml


 


Output Total  1100 ml


 


Balance  35 ml


 


  


 


Intake Oral  400 ml


 


IV Total  735 ml


 


Output Urine Total  1100 ml


 


# Voids 3 








Laboratory Tests


1/13/18 07:25: 


Sodium Level 139, Potassium Level 3.3L, Chloride Level 111H, Carbon Dioxide 

Level 18L, Anion Gap 10, Blood Urea Nitrogen 24H, Creatinine 2.3H, Estimat 

Glomerular Filtration Rate 34.9, Glucose Level 131H, Calcium Level 8.2L


1/13/18 09:45: 


White Blood Count 15.7#H, Red Blood Count 2.98L, Hemoglobin 8.7#L, Hematocrit 

25.1#L, Mean Corpuscular Volume 84, Mean Corpuscular Hemoglobin 29.1, Mean 

Corpuscular Hemoglobin Concent 34.6, Red Cell Distribution Width 12.6, Platelet 

Count 262, Mean Platelet Volume 8.3, Neutrophils (%) (Auto) 82.0H, Lymphocytes (

%) (Auto) 4.7L, Monocytes (%) (Auto) 10.4H, Eosinophils (%) (Auto) 2.3, 

Basophils (%) (Auto) 0.6


1/13/18 13:30: 


Arterial Blood pH 7.382, Arterial Blood Partial Pressure CO2 25.3L, Arterial 

Blood Partial Pressure O2 94.0, Arterial Blood HCO3 14.7L, Arterial Blood 

Oxygen Saturation 97.0, Arterial Blood Base Excess -9.1, Kevyn Test Positive


1/13/18 16:25: 


White Blood Count 19.3H, Red Blood Count 3.03L, Hemoglobin 8.5L, Hematocrit 

25.7L, Mean Corpuscular Volume 85, Mean Corpuscular Hemoglobin 27.9, Mean 

Corpuscular Hemoglobin Concent 32.9, Red Cell Distribution Width 12.2, Platelet 

Count 250, Mean Platelet Volume 7.7, Neutrophils (%) (Auto) 88.9H, Lymphocytes (

%) (Auto) 2.6L, Monocytes (%) (Auto) 7.6, Eosinophils (%) (Auto) 0.2, Basophils 

(%) (Auto) 0.7, Rheumatoid Factor Screen [Pending], Anti-Nuclear Antibody 

Screen [Pending], c-ANCA Titer [Pending], p-ANCA Titer [Pending]


Height (Feet):  5


Height (Inches):  10.00


Weight (Pounds):  138


General Appearance:  no apparent distress, alert


EENT:  normal ENT inspection


Neck:  normal alignment


Cardiovascular:  normal rate, regular rhythm


Respiratory/Chest:  normal breath sounds, no respiratory distress


Abdomen:  non tender, soft


Extremities:  non-tender, normal inspection


Neurologic:  alert, oriented x 3, responsive, normal mood/affect











Dayanara Hawley N.P. Jan 13, 2018 19:04

## 2018-01-13 NOTE — CARDIAC ELECTROPHYSIOLOGY PN
Assessment/Plan


Assessment/Plan


1. Tachycardia due to  sickle cell crisis, fever and severe anemia. Better 

after 1 unit PRBC 


       Echocardiogram ejection fraction of 60% to 65%.  





2. Sepsis, on intravenous antibiotic per Dr. Talley. 





3. Abdominal pain, likely due to renal cysts and hemorrhage.





4. Diarrhea.Clostridium difficile colitis has been ruled out.  


     Stool culture negative.





5. Hematuria and anemia. HB on 1/10 and 1/11 were 8 and on 1/12 without 

transfusion was 16.6!. Likely lab error.








SERENA RN





Subjective


Subjective


  No Arrhythmias on tele in SR. Has hematuria.  No chest pain or SOB.





Objective





Last 24 Hour Vital Signs








  Date Time  Temp Pulse Resp B/P (MAP) Pulse Ox O2 Delivery O2 Flow Rate FiO2


 


1/13/18 16:00 99.3 100 22 145/78 99 Nasal Cannula 4.0 


 


1/13/18 15:01  102 22  99 Nasal Cannula 4.0 36


 


1/13/18 14:51  99 22  99 Nasal Cannula 4.0 36


 


1/13/18 13:16  102 24  99 Nasal Cannula 4.0 36


 


1/13/18 13:06  100 24  96 Nasal Cannula 4.0 36


 


1/13/18 13:00  100 24   Nasal Cannula 4.0 36


 


1/13/18 12:00  106      


 


1/13/18 12:00 98.2 92 22 128/70 100 Nasal Cannula 4.0 


 


1/13/18 08:00  84      


 


1/13/18 08:00 97.0 87 18 121/72 100 Nasal Cannula 3.0 


 


1/13/18 05:51 99.5       


 


1/13/18 04:00 101.0 88 20 133/72 99 Nasal Cannula  


 


1/13/18 04:00  90      


 


1/13/18 00:00  84      


 


1/13/18 00:00 100.6 83 20 135/72 99 Room Air  


 


1/12/18 20:00 100.9 97 20 136/80 98 Room Air  


 


1/12/18 20:00  100      

















Intake and Output  


 


 1/12/18 1/13/18





 19:00 07:00


 


Intake Total  1135 ml


 


Output Total  1100 ml


 


Balance  35 ml


 


  


 


Intake Oral  400 ml


 


IV Total  735 ml


 


Output Urine Total  1100 ml


 


# Voids 3 











Laboratory Tests








Test


  1/13/18


07:25 1/13/18


09:45 1/13/18


13:30


 


Sodium Level


  139 MMOL/L


(136-145) 


  


 


 


Potassium Level


  3.3 MMOL/L


(3.5-5.1)  L 


  


 


 


Chloride Level


  111 MMOL/L


()  H 


  


 


 


Carbon Dioxide Level


  18 MMOL/L


(21-32)  L 


  


 


 


Anion Gap


  10 mmol/L


(5-15) 


  


 


 


Blood Urea Nitrogen


  24 mg/dL


(7-18)  H 


  


 


 


Creatinine


  2.3 MG/DL


(0.55-1.30)  H 


  


 


 


Estimat Glomerular Filtration


Rate 34.9 mL/min


(>60) 


  


 


 


Glucose Level


  131 MG/DL


()  H 


  


 


 


Calcium Level


  8.2 MG/DL


(8.5-10.1)  L 


  


 


 


White Blood Count


  


  15.7 K/UL


(4.8-10.8)  #H 


 


 


Red Blood Count


  


  2.98 M/UL


(4.70-6.10)  L 


 


 


Hemoglobin


  


  8.7 G/DL


(14.2-18.0)  #L 


 


 


Hematocrit


  


  25.1 %


(42.0-52.0)  #L 


 


 


Mean Corpuscular Volume  84 FL (80-99)   


 


Mean Corpuscular Hemoglobin


  


  29.1 PG


(27.0-31.0) 


 


 


Mean Corpuscular Hemoglobin


Concent 


  34.6 G/DL


(32.0-36.0) 


 


 


Red Cell Distribution Width


  


  12.6 %


(11.6-14.8) 


 


 


Platelet Count


  


  262 K/UL


(150-450) 


 


 


Mean Platelet Volume


  


  8.3 FL


(6.5-10.1) 


 


 


Neutrophils (%) (Auto)


  


  82.0 %


(45.0-75.0)  H 


 


 


Lymphocytes (%) (Auto)


  


  4.7 %


(20.0-45.0)  L 


 


 


Monocytes (%) (Auto)


  


  10.4 %


(1.0-10.0)  H 


 


 


Eosinophils (%) (Auto)


  


  2.3 %


(0.0-3.0) 


 


 


Basophils (%) (Auto)


  


  0.6 %


(0.0-2.0) 


 


 


Arterial Blood pH


  


  


  7.382


(7.350-7.450)


 


Arterial Blood Partial


Pressure CO2 


  


  25.3 mmHg


(35.0-45.0)  L


 


Arterial Blood Partial


Pressure O2 


  


  94.0 mmHg


(75.0-100.0)


 


Arterial Blood HCO3


  


  


  14.7 mmol/L


(22.0-26.0)  L


 


Arterial Blood Oxygen


Saturation 


  


  97.0 %


(92.0-98.0)


 


Arterial Blood Base Excess   -9.1  


 


Kevyn Test   Positive  











Microbiology








 Date/Time


Source Procedure


Growth Status


 


 


 1/11/18 21:20


Stool Clostridium difficile Toxin Assay - Final Complete








Objective


HEAD AND NECK:  No JVD.


LUNGS:  Clear.


CARDIOVASCULAR:  Regular S1 and S2 with no gallop or murmur.


ABDOMEN:  Soft and nontender.


EXTREMITIES:  No pitting edema.











KAYLEEN HAIR Jan 13, 2018 16:34

## 2018-01-13 NOTE — INFECTIOUS DISEASES PROG NOTE
Assessment/Plan


Problems:  


(1) HAP (hospital-acquired pneumonia)


Assessment & Plan:  will send sputum culture and start zosyn empiric coverage , 

monitor CXR 





(2) Sepsis


Assessment & Plan:  due to the above , will send blood culture, and start zosyn

  empiric coverage 





(3) Renal cyst, native, hemorrhage


Assessment & Plan:  with no evidence of  pyelonephritis, blood culture remains 

negative ,  urine culture grew mixed contaminant , will stop cefepime with 

flagyl empiric coverage , had urology eval with no intervention





(4) Abdominal pain


Assessment & Plan:  due to the above, continue pain meds 





(5) Fever


Assessment & Plan:  due to the above , will start antibiotics empiric coverage 

, continue  tylenol prn 





(6) Diarrhea


Assessment & Plan:  no evidence of  C diff , continue antidiarrhea meds  








Subjective


Constitutional:  Reports: fever, fatigue, anorexia


HEENT:  Reports: no symptoms


Respiratory:  Reports: shortness of breath, productive cough


Breasts:  Reports: no symptoms


Cardiovascular:  Reports: no symptoms


Gastrointestinal/Abdominal:  Reports: diarrhea, bloating


Genitourinary:  Reports: no symptoms


Neurologic:  Reports: no symptoms


Psychiatric:  Reports: no symptoms


Skin:  Reports: no symptoms


Endocrine:  Reports: no symptoms


Hematologic:  Reports: no symptoms


Musculoskeletal:  Reports: no symptoms


Allergies:  


Coded Allergies:  


     CODEINE (Verified  Allergy, Unknown, 1/5/18)


Uncoded Allergies:  


     PEANUTS (Adverse Reaction, Severe, Anaphylaxis, 1/9/18)





Objective


Vital Signs





Last 24 Hour Vital Signs








  Date Time  Temp Pulse Resp B/P (MAP) Pulse Ox O2 Delivery O2 Flow Rate FiO2


 


1/13/18 13:16  102 24  99 Nasal Cannula 4.0 36


 


1/13/18 13:06  100 24  96 Nasal Cannula 4.0 36


 


1/13/18 13:00  100 24   Nasal Cannula 4.0 36


 


1/13/18 12:00 98.2 92 22 128/70 100 Nasal Cannula 4.0 


 


1/13/18 08:00  84      


 


1/13/18 08:00 97.0 87 18 121/72 100 Nasal Cannula 3.0 


 


1/13/18 05:51 99.5       


 


1/13/18 04:00 101.0 88 20 133/72 99 Nasal Cannula  


 


1/13/18 04:00  90      


 


1/13/18 00:00  84      


 


1/13/18 00:00 100.6 83 20 135/72 99 Room Air  


 


1/12/18 20:00 100.9 97 20 136/80 98 Room Air  


 


1/12/18 20:00  100      


 


1/12/18 16:00  117      


 


1/12/18 16:00 97.0 98 20 131/73 98 Room Air  








Height (Feet):  5


Height (Inches):  10.00


Weight (Pounds):  138


General Appearance:  WD/WN, no acute distress


HEENT:  normocephalic, atraumatic, anicteric, mucous membranes moist, PERRL, 

EOMI, pharynx normal, supple


Respiratory/Chest:  chest wall non-tender, no respiratory distress, no 

accessory muscle use, decreased breath sounds, crackles/rales


Cardiovascular:  normal peripheral pulses, normal rate, regular rhythm, no 

gallop/murmur, no JVD


Abdomen:  normal bowel sounds, soft, non tender, no organomegaly, non distended

, no mass, no scars


Extremities:  no cyanosis, no clubbing


Skin:  no rash, no lesions, no ulcers


Neurologic/Psychiatric:  alert, oriented x 3, responsive





Microbiology








 Date/Time


Source Procedure


Growth Status


 


 


 1/11/18 21:20


Stool Clostridium difficile Toxin Assay - Final Complete











Laboratory Tests








Test


  1/13/18


07:25 1/13/18


09:45 1/13/18


13:30


 


Sodium Level


  139 MMOL/L


(136-145) 


  


 


 


Potassium Level


  3.3 MMOL/L


(3.5-5.1)  L 


  


 


 


Chloride Level


  111 MMOL/L


()  H 


  


 


 


Carbon Dioxide Level


  18 MMOL/L


(21-32)  L 


  


 


 


Anion Gap


  10 mmol/L


(5-15) 


  


 


 


Blood Urea Nitrogen


  24 mg/dL


(7-18)  H 


  


 


 


Creatinine


  2.3 MG/DL


(0.55-1.30)  H 


  


 


 


Estimat Glomerular Filtration


Rate 34.9 mL/min


(>60) 


  


 


 


Glucose Level


  131 MG/DL


()  H 


  


 


 


Calcium Level


  8.2 MG/DL


(8.5-10.1)  L 


  


 


 


White Blood Count


  


  15.7 K/UL


(4.8-10.8)  #H 


 


 


Red Blood Count


  


  2.98 M/UL


(4.70-6.10)  L 


 


 


Hemoglobin


  


  8.7 G/DL


(14.2-18.0)  #L 


 


 


Hematocrit


  


  25.1 %


(42.0-52.0)  #L 


 


 


Mean Corpuscular Volume  84 FL (80-99)   


 


Mean Corpuscular Hemoglobin


  


  29.1 PG


(27.0-31.0) 


 


 


Mean Corpuscular Hemoglobin


Concent 


  34.6 G/DL


(32.0-36.0) 


 


 


Red Cell Distribution Width


  


  12.6 %


(11.6-14.8) 


 


 


Platelet Count


  


  262 K/UL


(150-450) 


 


 


Mean Platelet Volume


  


  8.3 FL


(6.5-10.1) 


 


 


Neutrophils (%) (Auto)


  


  82.0 %


(45.0-75.0)  H 


 


 


Lymphocytes (%) (Auto)


  


  4.7 %


(20.0-45.0)  L 


 


 


Monocytes (%) (Auto)


  


  10.4 %


(1.0-10.0)  H 


 


 


Eosinophils (%) (Auto)


  


  2.3 %


(0.0-3.0) 


 


 


Basophils (%) (Auto)


  


  0.6 %


(0.0-2.0) 


 


 


Arterial Blood pH


  


  


  7.382


(7.350-7.450)


 


Arterial Blood Partial


Pressure CO2 


  


  25.3 mmHg


(35.0-45.0)  L


 


Arterial Blood Partial


Pressure O2 


  


  94.0 mmHg


(75.0-100.0)


 


Arterial Blood HCO3


  


  


  14.7 mmol/L


(22.0-26.0)  L


 


Arterial Blood Oxygen


Saturation 


  


  97.0 %


(92.0-98.0)


 


Arterial Blood Base Excess   -9.1  


 


Kevyn Test   Positive  











Current Medications








 Medications


  (Trade)  Dose


 Ordered  Sig/Judie


 Route


 PRN Reason  Start Time


 Stop Time Status Last Admin


Dose Admin


 


 Acetaminophen


  (Tylenol)  650 mg  Q4H  PRN


 ORAL


 Mild Pain/Temp > 100.5  1/10/18 08:45


 2/5/18 00:44  1/13/18 04:52


 


 


 Albuterol/


 Ipratropium


  (Albuterol/


 Ipratropium)  3 ml  Q2H  PRN


 HHN


 Shortness of Breath  1/13/18 12:30


 1/18/18 12:29  1/13/18 13:06


 


 


 Albuterol/


 Ipratropium


  (Albuterol/


 Ipratropium)  3 ml  Q4HRT


 HHN


   1/13/18 15:00


 1/18/18 14:59   


 


 


 Dextrose


  (Dextrose 50%)    STAT  PRN


 IV


 Hypoglycemia  1/10/18 08:30


 2/9/18 08:29   


 


 


 Diphenoxylate HCl/


 Atropine


  (Lomotil)  2.5 mg  Q4H  PRN


 ORAL


 Diarrhea  1/10/18 13:15


 2/9/18 13:14  1/12/18 14:13


 


 


 Folic Acid


  (Folate)  1 mg  DAILY


 ORAL


   1/12/18 15:00


 2/11/18 14:59  1/13/18 09:05


 


 


 Iron Sucrose 100


 mg/Sodium Chloride  60 ml @ 


 240 mls/hr  BEDTIME


 IV


   1/12/18 21:00


 1/16/18 21:14  1/12/18 21:25


 


 


 Magnesium


 Hydroxide


  (Mom)  30 ml  BIDPRN  PRN


 ORAL


 constipation  1/10/18 08:15


 2/6/18 08:14   


 


 


 Ondansetron HCl


  (Zofran)  4 mg  Q6H  PRN


 IVP


 Nausea & Vomiting  1/10/18 08:15


 2/5/18 08:14   


 


 


 Piperacillin Sod/


 Tazobactam Sod


 2.25 gm/Dextrose  55 ml @ 


 110 mls/hr  Q8HR


 IVPB


   1/13/18 15:00


 1/18/18 14:59   


 


 


 Potassium Chloride


  (K-Dur)  20 meq  TWICE A  DAY


 ORAL


   1/12/18 13:15


 2/11/18 13:14  1/13/18 09:05


 


 


 Sodium Chloride  1,000 ml @ 


 75 mls/hr  [id number]


 IV


   1/11/18 14:00


 2/10/18 13:59  1/13/18 05:45


 


 


 Vitamin B Complex/


 Vit C/Folic Acid


  (Nephrovite)  1 tab  DAILY


 ORAL


   1/10/18 09:00


 2/8/18 08:59  1/13/18 09:05


 

















Pablo Talley M.D. Jan 13, 2018 14:39

## 2018-01-13 NOTE — DIAGNOSTIC IMAGING REPORT
Indication: COUGH

 

Comparison:  1/7/2019

 

A single view chest radiograph was obtained.

 

Findings:

 

Heart size and mediastinal contours are stable. There are streaky opacities at the

right base thought to represent subsegmental atelectasis. Developing pneumonia should

be excluded clinically. No pleural effusion or pneumothorax. No acute bony lesion

identified.

 

Impression: 

Development of streaky opacities at the right base thought to represent subsegmental

atelectasis. Developing pneumonia not entirely excluded. Clinical correlation and

follow-up exam recommended.

## 2018-01-14 VITALS — DIASTOLIC BLOOD PRESSURE: 69 MMHG | SYSTOLIC BLOOD PRESSURE: 122 MMHG

## 2018-01-14 VITALS — SYSTOLIC BLOOD PRESSURE: 118 MMHG | DIASTOLIC BLOOD PRESSURE: 62 MMHG

## 2018-01-14 VITALS — DIASTOLIC BLOOD PRESSURE: 64 MMHG | SYSTOLIC BLOOD PRESSURE: 144 MMHG

## 2018-01-14 VITALS — SYSTOLIC BLOOD PRESSURE: 122 MMHG | DIASTOLIC BLOOD PRESSURE: 70 MMHG

## 2018-01-14 VITALS — DIASTOLIC BLOOD PRESSURE: 84 MMHG | SYSTOLIC BLOOD PRESSURE: 133 MMHG

## 2018-01-14 LAB
ADD MANUAL DIFF: YES
ANION GAP SERPL CALC-SCNC: 13 MMOL/L (ref 5–15)
BUN SERPL-MCNC: 26 MG/DL (ref 7–18)
CALCIUM SERPL-MCNC: 8.5 MG/DL (ref 8.5–10.1)
CHLORIDE SERPL-SCNC: 110 MMOL/L (ref 98–107)
CO2 SERPL-SCNC: 16 MMOL/L (ref 21–32)
CREAT SERPL-MCNC: 2.6 MG/DL (ref 0.55–1.3)
ERYTHROCYTE [DISTWIDTH] IN BLOOD BY AUTOMATED COUNT: 12.6 % (ref 11.6–14.8)
ERYTHROCYTE [DISTWIDTH] IN BLOOD BY AUTOMATED COUNT: 12.8 % (ref 11.6–14.8)
ERYTHROCYTE [DISTWIDTH] IN BLOOD BY AUTOMATED COUNT: 13.1 % (ref 11.6–14.8)
HCT VFR BLD CALC: 21.8 % (ref 42–52)
HCT VFR BLD CALC: 24.7 % (ref 42–52)
HCT VFR BLD CALC: 25.6 % (ref 42–52)
HGB BLD-MCNC: 7.7 G/DL (ref 14.2–18)
HGB BLD-MCNC: 7.9 G/DL (ref 14.2–18)
HGB BLD-MCNC: 8 G/DL (ref 14.2–18)
MCV RBC AUTO: 85 FL (ref 80–99)
MCV RBC AUTO: 85 FL (ref 80–99)
MCV RBC AUTO: 87 FL (ref 80–99)
PLATELET # BLD: 241 K/UL (ref 150–450)
PLATELET # BLD: 261 K/UL (ref 150–450)
PLATELET # BLD: 288 K/UL (ref 150–450)
POTASSIUM SERPL-SCNC: 3.6 MMOL/L (ref 3.5–5.1)
RBC # BLD AUTO: 2.56 M/UL (ref 4.7–6.1)
RBC # BLD AUTO: 2.92 M/UL (ref 4.7–6.1)
RBC # BLD AUTO: 2.93 M/UL (ref 4.7–6.1)
SODIUM SERPL-SCNC: 139 MMOL/L (ref 136–145)
WBC # BLD AUTO: 18.9 K/UL (ref 4.8–10.8)
WBC # BLD AUTO: 22.8 K/UL (ref 4.8–10.8)
WBC # BLD AUTO: 23.5 K/UL (ref 4.8–10.8)

## 2018-01-14 RX ADMIN — SODIUM CHLORIDE SCH MLS/HR: 0.9 INJECTION INTRAVENOUS at 22:57

## 2018-01-14 RX ADMIN — IPRATROPIUM BROMIDE AND ALBUTEROL SULFATE SCH ML: .5; 3 SOLUTION RESPIRATORY (INHALATION) at 11:00

## 2018-01-14 RX ADMIN — DEXTROSE MONOHYDRATE SCH MLS/HR: 50 INJECTION, SOLUTION INTRAVENOUS at 22:59

## 2018-01-14 RX ADMIN — Medication SCH MCG: at 23:55

## 2018-01-14 RX ADMIN — DEXTROSE MONOHYDRATE SCH MLS/HR: 50 INJECTION, SOLUTION INTRAVENOUS at 14:38

## 2018-01-14 RX ADMIN — IPRATROPIUM BROMIDE AND ALBUTEROL SULFATE SCH ML: .5; 3 SOLUTION RESPIRATORY (INHALATION) at 08:14

## 2018-01-14 RX ADMIN — IPRATROPIUM BROMIDE AND ALBUTEROL SULFATE SCH ML: .5; 3 SOLUTION RESPIRATORY (INHALATION) at 19:00

## 2018-01-14 RX ADMIN — HEPARIN SODIUM SCH MLS/HR: 5000 INJECTION, SOLUTION INTRAVENOUS at 11:20

## 2018-01-14 RX ADMIN — LEVALBUTEROL HYDROCHLORIDE SCH MG: 1.25 SOLUTION, CONCENTRATE RESPIRATORY (INHALATION) at 23:55

## 2018-01-14 RX ADMIN — IPRATROPIUM BROMIDE AND ALBUTEROL SULFATE SCH ML: .5; 3 SOLUTION RESPIRATORY (INHALATION) at 03:35

## 2018-01-14 RX ADMIN — HEPARIN SODIUM SCH MLS/HR: 5000 INJECTION, SOLUTION INTRAVENOUS at 20:50

## 2018-01-14 RX ADMIN — IPRATROPIUM BROMIDE AND ALBUTEROL SULFATE SCH ML: .5; 3 SOLUTION RESPIRATORY (INHALATION) at 10:44

## 2018-01-14 RX ADMIN — MORPHINE SULFATE PRN MG: 2 INJECTION, SOLUTION INTRAMUSCULAR; INTRAVENOUS at 19:00

## 2018-01-14 RX ADMIN — Medication SCH TAB: at 09:18

## 2018-01-14 RX ADMIN — MORPHINE SULFATE PRN MG: 2 INJECTION, SOLUTION INTRAMUSCULAR; INTRAVENOUS at 11:45

## 2018-01-14 RX ADMIN — IPRATROPIUM BROMIDE AND ALBUTEROL SULFATE SCH ML: .5; 3 SOLUTION RESPIRATORY (INHALATION) at 14:44

## 2018-01-14 RX ADMIN — SODIUM CHLORIDE SCH MLS/HR: 0.9 INJECTION INTRAVENOUS at 05:37

## 2018-01-14 NOTE — DIAGNOSTIC IMAGING REPORT
Indication: Dyspnea

 

Comparison:  1/13/2018

 

A single view chest radiograph was obtained.

 

Findings:

 

Heart size and mediastinal contours are stable. There are streaky bibasilar airspace

opacities. No definite pneumothorax. No large pleural effusion. There is elevation of

the right hemidiaphragm.

 

Impression: 

Increased streaky bibasilar airspace opacities thought to be related to increasing

atelectasis. Follow-up exam recommended.

## 2018-01-14 NOTE — NEPHROLOGY PROGRESS NOTE
Assessment/Plan


Problem List:  


(1) Sickle cell trait


(2) Abdominal pain


(3) Renal cyst, native, hemorrhage


(4) Sepsis


(5) Hematuria


(6) Shortness of breath


(7) Chest pain


(8) Severe anemia


(9) Pneumonia


(10) DVT (deep venous thrombosis)


(11) Tachycardia


(12) Sickle cell anemia


(13) HAP (hospital-acquired pneumonia)


Plan


Continue current treatment plan


Continue heparin drip per protocol


Monitor HR, cardio following


Continue o2 therapy


CT angio


Monitor H&H and transfuse if hgb is <7.5


Hematology, cardiology GI, and ID and pulmo following, will f/u with recs


Monitor lytes, correct prn


Continue abx per ID


Monitor renal function, avoid nephrotoxins 


Pain management prn


Continue neb treatment


Daily PPI


AM labs





Subjective


Subjective


In bed, on o2 via face mask, in no apparent distress, asleep, arousable, on 

heparin drip, clear yellow urine in urinal





Objective


Objective





Last 24 Hour Vital Signs








  Date Time  Temp Pulse Resp B/P (MAP) Pulse Ox O2 Delivery O2 Flow Rate FiO2


 


1/14/18 16:00 97.3 132 28 133/84 97 Venturi Mask 15.0 


 


1/14/18 14:51      Venturi Mask 14.0 55


 


1/14/18 14:50      Venturi Mask 14.0 55


 


1/14/18 12:34 98.1 133 36 144/64 99 Venturi Mask 15.0 


 


1/14/18 12:00  131      


 


1/14/18 12:00 98.1 133 36  99 Venturi Mask 15.0 


 


1/14/18 10:51      Venturi Mask 14.0 55


 


1/14/18 10:47      Venturi Mask 14.0 55


 


1/14/18 08:24  99 20  98 Nasal Cannula 2.0 28


 


1/14/18 08:14  101 16  96 Nasal Cannula 2.0 28


 


1/14/18 08:00  93      


 


1/14/18 08:00 98.2 103 24  95 Nasal Cannula 4.0 


 


1/14/18 05:25 97.5       


 


1/14/18 04:14 95.5       


 


1/14/18 04:00 99.5 113 24 118/62 95 Nasal Cannula 3.0 


 


1/14/18 04:00  99      


 


1/14/18 03:40  100 18  97 Nasal Cannula 2.0 28


 


1/14/18 03:38        28


 


1/14/18 03:37  98 18  96 Nasal Cannula 2.0 28


 


1/14/18 00:00 98.2 104 20 122/70 100 Nasal Cannula 2.0 


 


1/14/18 00:00  96      


 


1/14/18 00:00 98.2 104 20 122/70 100   


 


1/14/18 00:00 98.2 104 20 122/70 100   


 


1/13/18 23:50  105 20  96 Nasal Cannula 2.0 28


 


1/13/18 23:41        36


 


1/13/18 23:40  104 20  97 Nasal Cannula 4.0 36


 


1/13/18 20:00  119      


 


1/13/18 20:00 101.8 115 24 133/74 100 Nasal Cannula 3.0 


 


1/13/18 19:57  94 22  95 Nasal Cannula 4.0 36


 


1/13/18 19:38        36


 


1/13/18 19:37  94 22  95 Nasal Cannula 4.0 36

















Intake and Output  


 


 1/13/18 1/14/18





 19:00 07:00


 


Intake Total  2245 ml


 


Output Total 650 ml 1200 ml


 


Balance -650 ml 1045 ml


 


  


 


Intake Oral  1400 ml


 


IV Total  845 ml


 


Output Urine Total 650 ml 1200 ml


 


# Voids  3








Laboratory Tests


1/14/18 06:30: 


White Blood Count 18.9H, Red Blood Count 2.56L, Hemoglobin 7.7L, Hematocrit 

21.8L, Mean Corpuscular Volume 85, Mean Corpuscular Hemoglobin 30.2, Mean 

Corpuscular Hemoglobin Concent 35.5, Red Cell Distribution Width 12.6, Platelet 

Count 241, Mean Platelet Volume 8.3, Neutrophils (%) (Auto) , Lymphocytes (%) (

Auto) , Monocytes (%) (Auto) , Eosinophils (%) (Auto) , Basophils (%) (Auto) , 

Differential Total Cells Counted 100, Neutrophils % (Manual) 86H, Lymphocytes % 

(Manual) 5L, Monocytes % (Manual) 8, Eosinophils % (Manual) 0, Basophils % (

Manual) 1, Band Neutrophils 0, Platelet Estimate Adequate, Platelet Morphology 

Normal, Hypochromasia 1+, Sodium Level 139, Potassium Level 3.6, Chloride Level 

110H, Carbon Dioxide Level 16L, Anion Gap 13, Blood Urea Nitrogen 26H, 

Creatinine 2.6H, Estimat Glomerular Filtration Rate 30.3, Glucose Level 150H, 

Calcium Level 8.5


1/14/18 11:25: Activated Partial Thromboplast Time 30


Height (Feet):  5


Height (Inches):  10.00


Weight (Pounds):  138


General Appearance:  no apparent distress, alert


EENT:  normal ENT inspection


Neck:  normal alignment, supple


Cardiovascular:  tachycardia


Respiratory/Chest:  decreased breath sounds


Abdomen:  soft


Neurologic:  alert, oriented x 3, responsive, normal mood/affect











Dayanara Hawley N.P. Jan 14, 2018 17:13

## 2018-01-14 NOTE — PULMONOLOGY PROGRESS NOTE
Assessment/Plan


Assessment/Plan


ASSESSMENT AND PLAN:


1. Tachycardia and tachypnea due to pain of sickle cell crisis. CXR shows 

streaky infiltrates; possible pneumonia


2. Sepsis, on intravenous antibiotic per Dr. Talley.


3. Abdominal pain, likely due to renal cysts and hemorrhage.


4. Diarrhea.  





Agree with pain control


Antibiotics


IV fluids





Subjective


Interval Events:  Remains SOB


Constitutional:  Reports: no symptoms


HEENT:  Repors: no symptoms


Respiratory:  Reports: dry cough, shortness of breath


Cardiovascular:  Reports: no symptoms


Gastrointestinal/Abdominal:  Reports: no symptoms


Genitourinary:  Reports: no symptoms


Allergies:  


Coded Allergies:  


     CODEINE (Verified  Allergy, Unknown, 1/5/18)


Uncoded Allergies:  


     PEANUTS (Adverse Reaction, Severe, Anaphylaxis, 1/9/18)





Objective





Last 24 Hour Vital Signs








  Date Time  Temp Pulse Resp B/P (MAP) Pulse Ox O2 Delivery O2 Flow Rate FiO2


 


1/14/18 05:25 97.5       


 


1/14/18 04:14 95.5       


 


1/14/18 04:00 99.5 113 24 118/62 95 Nasal Cannula 3.0 


 


1/14/18 04:00  99      


 


1/14/18 03:40  100 18  97 Nasal Cannula 2.0 28


 


1/14/18 03:38        28


 


1/14/18 03:37  98 18  96 Nasal Cannula 2.0 28


 


1/14/18 00:00 98.2 104 20 122/70 100 Nasal Cannula 2.0 


 


1/14/18 00:00  96      


 


1/14/18 00:00 98.2 104 20 122/70 100   


 


1/14/18 00:00 98.2 104 20 122/70 100   


 


1/13/18 23:50  105 20  96 Nasal Cannula 2.0 28


 


1/13/18 23:41        36


 


1/13/18 23:40  104 20  97 Nasal Cannula 4.0 36


 


1/13/18 20:00  119      


 


1/13/18 20:00 101.8 115 24 133/74 100 Nasal Cannula 3.0 


 


1/13/18 19:57  94 22  95 Nasal Cannula 4.0 36


 


1/13/18 19:38        36


 


1/13/18 19:37  94 22  95 Nasal Cannula 4.0 36


 


1/13/18 16:00  102      


 


1/13/18 16:00 99.3 100 22 145/78 99 Nasal Cannula 4.0 


 


1/13/18 15:01  102 22  99 Nasal Cannula 4.0 36


 


1/13/18 14:51  99 22  99 Nasal Cannula 4.0 36


 


1/13/18 13:16  102 24  99 Nasal Cannula 4.0 36


 


1/13/18 13:06  100 24  96 Nasal Cannula 4.0 36


 


1/13/18 13:00  100 24   Nasal Cannula 4.0 36


 


1/13/18 12:00  106      


 


1/13/18 12:00 98.2 92 22 128/70 100 Nasal Cannula 4.0 

















Intake and Output  


 


 1/13/18 1/14/18





 19:00 07:00


 


Intake Total  2245 ml


 


Output Total 650 ml 1200 ml


 


Balance -650 ml 1045 ml


 


  


 


Intake Oral  1400 ml


 


IV Total  845 ml


 


Output Urine Total 650 ml 1200 ml


 


# Voids  3








General Appearance:  no acute distress


HEENT:  normocephalic


Respiratory/Chest:  chest wall non-tender


Cardiovascular:  normal peripheral pulses


Abdomen:  normal bowel sounds, soft, non tender





Microbiology








 Date/Time


Source Procedure


Growth Status


 


 


 1/11/18 21:20


Stool Clostridium difficile Toxin Assay - Final Complete








Laboratory Tests


1/13/18 13:30: 


Arterial Blood pH 7.382, Arterial Blood Partial Pressure CO2 25.3L, Arterial 

Blood Partial Pressure O2 94.0, Arterial Blood HCO3 14.7L, Arterial Blood 

Oxygen Saturation 97.0, Arterial Blood Base Excess -9.1, Kevyn Test Positive


1/13/18 16:25: 


White Blood Count 19.3H, Red Blood Count 3.03L, Hemoglobin 8.5L, Hematocrit 

25.7L, Mean Corpuscular Volume 85, Mean Corpuscular Hemoglobin 27.9, Mean 

Corpuscular Hemoglobin Concent 32.9, Red Cell Distribution Width 12.2, Platelet 

Count 250, Mean Platelet Volume 7.7, Neutrophils (%) (Auto) 88.9H, Lymphocytes (

%) (Auto) 2.6L, Monocytes (%) (Auto) 7.6, Eosinophils (%) (Auto) 0.2, Basophils 

(%) (Auto) 0.7, Rheumatoid Factor Screen 15.1H, Anti-Nuclear Antibody Screen [

Pending], c-ANCA Titer [Pending], p-ANCA Titer [Pending]


1/14/18 06:30: 


White Blood Count 18.9H, Red Blood Count 2.56L, Hemoglobin 7.7L, Hematocrit 

21.8L, Mean Corpuscular Volume 85, Mean Corpuscular Hemoglobin 30.2, Mean 

Corpuscular Hemoglobin Concent 35.5, Red Cell Distribution Width 12.6, Platelet 

Count 241, Mean Platelet Volume 8.3, Neutrophils (%) (Auto) , Lymphocytes (%) (

Auto) , Monocytes (%) (Auto) , Eosinophils (%) (Auto) , Basophils (%) (Auto) , 

Differential Total Cells Counted 100, Neutrophils % (Manual) 86H, Lymphocytes % 

(Manual) 5L, Monocytes % (Manual) 8, Eosinophils % (Manual) 0, Basophils % (

Manual) 1, Band Neutrophils 0, Platelet Estimate Adequate, Platelet Morphology 

Normal, Hypochromasia 1+, Sodium Level 139, Potassium Level 3.6, Chloride Level 

110H, Carbon Dioxide Level 16L, Anion Gap 13, Blood Urea Nitrogen 26H, 

Creatinine 2.6H, Estimat Glomerular Filtration Rate 30.3, Glucose Level 150H, 

Calcium Level 8.5





Current Medications








 Medications


  (Trade)  Dose


 Ordered  Sig/Judie


 Route


 PRN Reason  Start Time


 Stop Time Status Last Admin


Dose Admin


 


 Acetaminophen


  (Tylenol)  650 mg  Q4H  PRN


 ORAL


 Mild Pain/Temp > 100.5  1/10/18 08:45


 2/5/18 00:44  1/14/18 03:15


 


 


 Albuterol/


 Ipratropium


  (Albuterol/


 Ipratropium)  3 ml  Q2H  PRN


 HHN


 Shortness of Breath  1/13/18 12:30


 1/18/18 12:29  1/13/18 13:06


 


 


 Albuterol/


 Ipratropium


  (Albuterol/


 Ipratropium)  3 ml  Q4HRT


 HHN


   1/13/18 15:00


 1/18/18 14:59  1/14/18 08:14


 


 


 Dextrose


  (Dextrose 50%)    STAT  PRN


 IV


 Hypoglycemia  1/10/18 08:30


 2/9/18 08:29   


 


 


 Diphenoxylate HCl/


 Atropine


  (Lomotil)  2.5 mg  Q4H  PRN


 ORAL


 Diarrhea  1/10/18 13:15


 2/9/18 13:14  1/12/18 14:13


 


 


 Folic Acid


  (Folate)  1 mg  DAILY


 ORAL


   1/12/18 15:00


 2/11/18 14:59  1/14/18 09:19


 


 


 Iron Sucrose 100


 mg/Sodium Chloride  60 ml @ 


 240 mls/hr  BEDTIME


 IV


   1/12/18 21:00


 1/16/18 21:14  1/13/18 20:41


 


 


 Magnesium


 Hydroxide


  (Mom)  30 ml  BIDPRN  PRN


 ORAL


 constipation  1/10/18 08:15


 2/6/18 08:14   


 


 


 Morphine Sulfate


  (Morphine


 Sulfate)  2 mg  Q4H  PRN


 IVP


 Moderate to Severe Pain  1/13/18 18:30


 1/20/18 18:29  1/13/18 19:17


 


 


 Ondansetron HCl


  (Zofran)  4 mg  Q6H  PRN


 IVP


 Nausea & Vomiting  1/10/18 08:15


 2/5/18 08:14   


 


 


 Pantoprazole


  (Protonix)  40 mg  DAILY


 ORAL


   1/14/18 09:00


 2/13/18 08:59  1/14/18 09:19


 


 


 Piperacillin Sod/


 Tazobactam Sod


 2.25 gm/Dextrose  55 ml @ 


 110 mls/hr  Q8HR


 IVPB


   1/13/18 15:00


 1/18/18 14:59  1/14/18 05:37


 


 


 Potassium Chloride


  (K-Dur)  20 meq  TWICE A  DAY


 ORAL


   1/12/18 13:15


 2/11/18 13:14  1/14/18 09:19


 


 


 Sodium Chloride  1,000 ml @ 


 75 mls/hr  [id number]


 IV


   1/11/18 14:00


 2/10/18 13:59  1/14/18 09:18


 


 


 Vitamin B Complex/


 Vit C/Folic Acid


  (Nephrovite)  1 tab  DAILY


 ORAL


   1/10/18 09:00


 2/8/18 08:59  1/14/18 09:18


 

















Ethan Tom MD Jan 14, 2018 10:06

## 2018-01-15 VITALS — SYSTOLIC BLOOD PRESSURE: 129 MMHG | DIASTOLIC BLOOD PRESSURE: 69 MMHG

## 2018-01-15 VITALS — DIASTOLIC BLOOD PRESSURE: 73 MMHG | SYSTOLIC BLOOD PRESSURE: 131 MMHG

## 2018-01-15 VITALS — DIASTOLIC BLOOD PRESSURE: 72 MMHG | SYSTOLIC BLOOD PRESSURE: 127 MMHG

## 2018-01-15 VITALS — SYSTOLIC BLOOD PRESSURE: 132 MMHG | DIASTOLIC BLOOD PRESSURE: 67 MMHG

## 2018-01-15 VITALS — DIASTOLIC BLOOD PRESSURE: 73 MMHG | SYSTOLIC BLOOD PRESSURE: 128 MMHG

## 2018-01-15 VITALS — DIASTOLIC BLOOD PRESSURE: 66 MMHG | SYSTOLIC BLOOD PRESSURE: 128 MMHG

## 2018-01-15 LAB
ADD MANUAL DIFF: YES
ANION GAP SERPL CALC-SCNC: 15 MMOL/L (ref 5–15)
BUN SERPL-MCNC: 29 MG/DL (ref 7–18)
CALCIUM SERPL-MCNC: 8.7 MG/DL (ref 8.5–10.1)
CHLORIDE SERPL-SCNC: 112 MMOL/L (ref 98–107)
CO2 SERPL-SCNC: 15 MMOL/L (ref 21–32)
CREAT SERPL-MCNC: 2.7 MG/DL (ref 0.55–1.3)
ERYTHROCYTE [DISTWIDTH] IN BLOOD BY AUTOMATED COUNT: 13.1 % (ref 11.6–14.8)
HCT VFR BLD CALC: 24.9 % (ref 42–52)
HGB BLD-MCNC: 8.5 G/DL (ref 14.2–18)
MCV RBC AUTO: 85 FL (ref 80–99)
PLATELET # BLD: 308 K/UL (ref 150–450)
POTASSIUM SERPL-SCNC: 4.1 MMOL/L (ref 3.5–5.1)
RBC # BLD AUTO: 2.93 M/UL (ref 4.7–6.1)
SODIUM SERPL-SCNC: 142 MMOL/L (ref 136–145)
WBC # BLD AUTO: 22.9 K/UL (ref 4.8–10.8)

## 2018-01-15 RX ADMIN — CLINDAMYCIN PHOSPHATE SCH MLS/HR: 12 INJECTION, SOLUTION INTRAVENOUS at 17:05

## 2018-01-15 RX ADMIN — LEVALBUTEROL HYDROCHLORIDE SCH MG: 1.25 SOLUTION, CONCENTRATE RESPIRATORY (INHALATION) at 22:59

## 2018-01-15 RX ADMIN — LEVALBUTEROL HYDROCHLORIDE SCH MG: 1.25 SOLUTION, CONCENTRATE RESPIRATORY (INHALATION) at 03:00

## 2018-01-15 RX ADMIN — MORPHINE SULFATE PRN MG: 2 INJECTION, SOLUTION INTRAMUSCULAR; INTRAVENOUS at 17:06

## 2018-01-15 RX ADMIN — Medication SCH MCG: at 18:46

## 2018-01-15 RX ADMIN — LEVALBUTEROL HYDROCHLORIDE SCH MG: 1.25 SOLUTION, CONCENTRATE RESPIRATORY (INHALATION) at 10:31

## 2018-01-15 RX ADMIN — HEPARIN SODIUM SCH MLS/HR: 5000 INJECTION, SOLUTION INTRAVENOUS at 08:46

## 2018-01-15 RX ADMIN — HEPARIN SODIUM SCH MLS/HR: 5000 INJECTION, SOLUTION INTRAVENOUS at 21:51

## 2018-01-15 RX ADMIN — Medication SCH MCG: at 03:00

## 2018-01-15 RX ADMIN — DEXTROSE MONOHYDRATE SCH MLS/HR: 50 INJECTION, SOLUTION INTRAVENOUS at 21:41

## 2018-01-15 RX ADMIN — LEVALBUTEROL HYDROCHLORIDE SCH MG: 1.25 SOLUTION, CONCENTRATE RESPIRATORY (INHALATION) at 18:46

## 2018-01-15 RX ADMIN — DEXTROSE MONOHYDRATE SCH MLS/HR: 50 INJECTION, SOLUTION INTRAVENOUS at 05:28

## 2018-01-15 RX ADMIN — DEXTROSE MONOHYDRATE SCH MLS/HR: 50 INJECTION, SOLUTION INTRAVENOUS at 13:28

## 2018-01-15 RX ADMIN — Medication SCH TAB: at 08:42

## 2018-01-15 RX ADMIN — Medication SCH MCG: at 06:45

## 2018-01-15 RX ADMIN — Medication SCH MCG: at 10:31

## 2018-01-15 RX ADMIN — Medication SCH MCG: at 15:22

## 2018-01-15 RX ADMIN — LEVALBUTEROL HYDROCHLORIDE SCH MG: 1.25 SOLUTION, CONCENTRATE RESPIRATORY (INHALATION) at 15:22

## 2018-01-15 RX ADMIN — Medication SCH MCG: at 22:59

## 2018-01-15 RX ADMIN — LEVALBUTEROL HYDROCHLORIDE SCH MG: 1.25 SOLUTION, CONCENTRATE RESPIRATORY (INHALATION) at 06:45

## 2018-01-15 RX ADMIN — SODIUM CHLORIDE SCH MLS/HR: 0.9 INJECTION INTRAVENOUS at 20:51

## 2018-01-15 NOTE — CARDIAC ELECTROPHYSIOLOGY PN
Assessment/Plan


Assessment/Plan


1. Tachycardia due to  sickle cell crisis, fever , anemia and possible PE in 

view of acute bilateral DVt. Better after 1 unit PRBC 


       Echocardiogram ejection fraction of 60% to 65%.  No SVT or VT.





2.  Sepsis, on intravenous antibiotic per Dr. Talley. 





3.  Abdominal pain, likely due to renal cysts and hemorrhage.





4.  Diarrhea.Clostridium difficile colitis has been ruled out.  


     Stool culture negative.





5. Hematuria and anemia. HB on 1/10 and 1/11 were 8 and on 1/12 without 

transfusion was 16.6!. Likely lab error.





6. Acute bilateral LE DVT. Has creatinine 2.5 and Chest CT angio put him at 

risk of going on HD.


SERENA Tom. Will treat with iv heparin.





SERENA RN





Subjective


Subjective


  Transferred to [facility] for tachycardia and Acute bilateral LE DVT.  Heparin drip 

was resumed as has bilateral LE DVT and possible PE as SOB and tachycardic.





Objective





Last 24 Hour Vital Signs








  Date Time  Temp Pulse Resp B/P (MAP) Pulse Ox O2 Delivery O2 Flow Rate FiO2


 


1/15/18 10:40  111 22  99 Nasal Cannula 3.0 32


 


1/15/18 10:32  110 24  98 Nasal Cannula 3.0 32


 


1/15/18 08:12  116      


 


1/15/18 08:00 98.1 113 24 127/72 100 Venturi Mask 15.0 


 


1/15/18 06:54  113 20  97 Venturi Mask 10.0 45


 


1/15/18 06:52      Venturi Mask 10.0 45


 


1/15/18 06:52     97 Venturi Mask 10.0 45


 


1/15/18 06:45  113 20  97 Venturi Mask 10.0 45


 


1/15/18 04:00 99.7 114 20 131/73 99 Venturi Mask 15.0 


 


1/15/18 04:00  121      


 


1/15/18 04:00      Venturi Mask 10.0 45


 


1/15/18 04:00      Venturi Mask 10.0 45


 


1/15/18 00:00 99.4 115 24 128/73 98 Venturi Mask 15.0 


 


1/15/18 00:00  116      


 


1/14/18 23:56  120 20  100 Venturi Mask 6.0 35


 


1/14/18 23:55  117 20  98 Venturi Mask 10.0 45


 


1/14/18 22:54 100.1       


 


1/14/18 21:30 100.4 130      


 


1/14/18 20:00  130      


 


1/14/18 19:38      Venturi Mask 14.0 55


 


1/14/18 19:38      Venturi Mask 14.0 55


 


1/14/18 19:38      Venturi Mask 14.0 55


 


1/14/18 19:35     98 Venturi Mask 14.0 55


 


1/14/18 19:30 101.8 132 24 122/69 100 Venturi Mask 15.0 


 


1/14/18 16:00 97.3 132 28 133/84 97 Venturi Mask 15.0 


 


1/14/18 16:00  134      


 


1/14/18 14:51      Venturi Mask 14.0 55


 


1/14/18 14:50      Venturi Mask 14.0 55


 


1/14/18 12:34 98.1 133 36 144/64 99 Venturi Mask 15.0 


 


1/14/18 12:00  131      


 


1/14/18 12:00 98.1 133 36  99 Venturi Mask 15.0 

















Intake and Output  


 


 1/14/18 1/15/18





 19:00 07:00


 


Intake Total 120 ml 1160.4623 ml


 


Output Total 700 ml 975 ml


 


Balance -580 ml 185.4623 ml


 


  


 


Intake Oral 120 ml 


 


IV Total  1160.4623 ml


 


Output Urine Total 700 ml 975 ml











Laboratory Tests








Test


  1/14/18


18:20 1/14/18


20:38 1/15/18


07:15


 


White Blood Count


  23.5 K/UL


(4.8-10.8)  *H 22.8 K/UL


(4.8-10.8)  *H 22.9 K/UL


(4.8-10.8)  *H


 


Red Blood Count


  2.92 M/UL


(4.70-6.10)  L 2.93 M/UL


(4.70-6.10)  L 2.93 M/UL


(4.70-6.10)  L


 


Hemoglobin


  7.9 G/DL


(14.2-18.0)  L 8.0 G/DL


(14.2-18.0)  L 8.5 G/DL


(14.2-18.0)  L


 


Hematocrit


  24.7 %


(42.0-52.0)  L 25.6 %


(42.0-52.0)  L 24.9 %


(42.0-52.0)  L


 


Mean Corpuscular Volume 85 FL (80-99)   87 FL (80-99)   85 FL (80-99)  


 


Mean Corpuscular Hemoglobin


  27.3 PG


(27.0-31.0) 27.4 PG


(27.0-31.0) 28.8 PG


(27.0-31.0)


 


Mean Corpuscular Hemoglobin


Concent 32.2 G/DL


(32.0-36.0) 31.3 G/DL


(32.0-36.0)  L 34.0 G/DL


(32.0-36.0)


 


Red Cell Distribution Width


  12.8 %


(11.6-14.8) 13.1 %


(11.6-14.8) 13.1 %


(11.6-14.8)


 


Platelet Count


  288 K/UL


(150-450) 261 K/UL


(150-450) 308 K/UL


(150-450)


 


Mean Platelet Volume


  8.2 FL


(6.5-10.1) 7.8 FL


(6.5-10.1) 8.6 FL


(6.5-10.1)


 


Neutrophils (%) (Auto)


  % (45.0-75.0)


 


 


Lymphocytes (%) (Auto)


  % (20.0-45.0)


 


 


Monocytes (%) (Auto)  % (1.0-10.0)    % (1.0-10.0)    % (1.0-10.0)  


 


Eosinophils (%) (Auto)  % (0.0-3.0)    % (0.0-3.0)    % (0.0-3.0)  


 


Basophils (%) (Auto)  % (0.0-2.0)    % (0.0-2.0)    % (0.0-2.0)  


 


Differential Total Cells


Counted 100  


  100  


  100  


 


 


Neutrophils % (Manual) 87 % (45-75)  H 86 % (45-75)  H 79 % (45-75)  H


 


Lymphocytes % (Manual) 4 % (20-45)  L 6 % (20-45)  L 13 % (20-45)  L


 


Monocytes % (Manual) 9 % (1-10)   8 % (1-10)   6 % (1-10)  


 


Eosinophils % (Manual) 0 % (0-3)   0 % (0-3)   1 % (0-3)  


 


Basophils % (Manual) 0 % (0-2)   0 % (0-2)   0 % (0-2)  


 


Band Neutrophils 0 % (0-8)   0 % (0-8)   1 % (0-8)  


 


Platelet Estimate Adequate   Adequate   Adequate  


 


Platelet Morphology Normal   Normal   Normal  


 


Hypochromasia 1+   1+   1+  


 


Anisocytosis 1+   1+   


 


Activated Partial


Thromboplast Time 43 SEC (23-33)


H 


  49 SEC (23-33)


H


 


Sodium Level


  


  


  142 MMOL/L


(136-145)


 


Potassium Level


  


  


  4.1 MMOL/L


(3.5-5.1)


 


Chloride Level


  


  


  112 MMOL/L


()  H


 


Carbon Dioxide Level


  


  


  15 MMOL/L


(21-32)  L


 


Anion Gap


  


  


  15 mmol/L


(5-15)


 


Blood Urea Nitrogen


  


  


  29 mg/dL


(7-18)  H


 


Creatinine


  


  


  2.7 MG/DL


(0.55-1.30)  H


 


Estimat Glomerular Filtration


Rate 


  


  29.0 mL/min


(>60)


 


Glucose Level


  


  


  129 MG/DL


()  H


 


Calcium Level


  


  


  8.7 MG/DL


(8.5-10.1)











Microbiology








 Date/Time


Source Procedure


Growth Status


 


 


 1/13/18 16:25


Blood Blood Culture - Preliminary


NO GROWTH AFTER 24 HOURS Resulted


 


 1/13/18 16:00


Blood Blood Culture - Preliminary


NO GROWTH AFTER 24 HOURS Resulted





 1/14/18 01:00


Sputum Expectorated Gram Stain


Pending Resulted


 


 1/14/18 01:00


Sputum Expectorated Sputum Culture - Preliminary


NORMAL UPPER RESPIRATORY MERVIN AT 24 ... Resulted








Objective


HEAD AND NECK:  No JVD.


LUNGS:  Clear.


CARDIOVASCULAR:  Tachycardic S1 and S2 with no gallop or murmur.


ABDOMEN:  Soft and nontender.


EXTREMITIES:  Bilateral pitting edema.











KAYLEEN HAIR Jase 15, 2018 11:58

## 2018-01-15 NOTE — DIAGNOSTIC IMAGING REPORT
Indication: Acute cardiac, tachypnea

 

Technique: CT chest was performed utilizing automated exposure control without

intravenous contrast material. Axial, sagittal and coronal images were generated.

 

CT dose: Total .16 mGycm; CTDI vol 20.38 mGy

 

Comparison: Comparison made to images of the lower lungs on CT of the abdomen and

pelvis 1/6/2018

 

Findings: 

There is biapical pleural-parenchymal scarring, right greater than left. There are

areas of dense consolidation with air bronchograms in the bilateral lower lobes,

right greater than left. There is a trace right pleural effusion. There is no

pneumothorax. There is linear atelectasis/scarring in the middle lobe. There is no

pneumothorax.

 

Heart size within normal limits. There is hypoattenuation of the ventricular contrast

limits relative to the intraventricular septum suggesting anemia. There is trace

pericardial effusion. Thoracic aorta appears normal in caliber. Main pulmonary artery

appears normal in size. There is no appreciable pathologically enlarged hilar or

mediastinal lymphadenopathy. Imaged portions of the thyroid are grossly unremarkable.

 

Interposition of the colon anterior to the liver. Multiple well-circumscribed

low-attenuation lesions are noted in the liver, many of which demonstrate density

measurements compatible with simple hepatic cysts. Additional smaller lesions are too

small to fully characterize but also likely represent simple hepatic cysts. Spleen is

unremarkable. Left adrenal adenoma is stable. Multiple low-attenuation and high

attenuation cystic lesions are noted in the kidneys, as previously described. There

is a large mass lesion in the left kidney with evidence of low and  high attenuation

components. This is partially visualized. It measures up to 8.3 cm in AP dimension.

There is more uniform high attenuation material within the lesion raising question

for recurrent hemorrhage. Better assessment for interval change can be made with

dedicated imaging of the abdomen. No acute osseous abnormality is identified.

 

IMPRESSION:

Dense consolidations with air bronchograms in the bilateral lower lobes most

concerning for multifocal pneumonia.

 

Linear atelectasis/scarring in the middle lobe.

 

Findings suggestive of anemia.

 

Known large complex mass in the left kidney partially visualized. It demonstrates

more uniform high attenuation components compared to the prior exam raising question

for recurrent hemorrhage. Better assessment for interval change can be made with

dedicated imaging of the abdomen. 

 

Additional findings as above.

 

The CT scanner at [facility] is accredited by the [employer]

[employer] and the scans are performed using protocols designed to limit radiation

exposure to as low as reasonably achievable to attain images of sufficient resolution

adequate for diagnostic evaluation.

## 2018-01-15 NOTE — PULMONOLOGY PROGRESS NOTE
Assessment/Plan


Assessment/Plan


ASSESSMENT AND PLAN:


1. Tachycardia and tachypnea due to pain of sickle cell crisis. CXR shows 

streaky infiltrates; possible pneumonia


2. Sepsis, on intravenous antibiotic per Dr. Talley.


3. Abdominal pain, likely due to renal cysts and hemorrhage.


4. Diarrhea.  


5. DVT.





Agree with pain control


Antibiotics


IV fluids


started on IV heparin.  It would be best to assume that he has pulmonary 

embolism and treat accordingly.





Subjective


Interval Events:  feeling better.  On heparin drip now.


Constitutional:  Reports: no symptoms


HEENT:  Repors: no symptoms


Respiratory:  Reports: productive cough, shortness of breath


Cardiovascular:  Reports: no symptoms


Gastrointestinal/Abdominal:  Reports: no symptoms


Allergies:  


Coded Allergies:  


     CODEINE (Verified  Allergy, Unknown, 1/5/18)


Uncoded Allergies:  


     PEANUTS (Adverse Reaction, Severe, Anaphylaxis, 1/9/18)





Objective





Last 24 Hour Vital Signs








  Date Time  Temp Pulse Resp B/P (MAP) Pulse Ox O2 Delivery O2 Flow Rate FiO2


 


1/15/18 10:40  111 22  99 Nasal Cannula 3.0 32


 


1/15/18 10:32  110 24  98 Nasal Cannula 3.0 32


 


1/15/18 08:12  116      


 


1/15/18 08:00 98.1 113 24 127/72 100 Venturi Mask 15.0 


 


1/15/18 06:54  113 20  97 Venturi Mask 10.0 45


 


1/15/18 06:52      Venturi Mask 10.0 45


 


1/15/18 06:52     97 Venturi Mask 10.0 45


 


1/15/18 06:45  113 20  97 Venturi Mask 10.0 45


 


1/15/18 04:00 99.7 114 20 131/73 99 Venturi Mask 15.0 


 


1/15/18 04:00  121      


 


1/15/18 04:00      Venturi Mask 10.0 45


 


1/15/18 04:00      Venturi Mask 10.0 45


 


1/15/18 00:00 99.4 115 24 128/73 98 Venturi Mask 15.0 


 


1/15/18 00:00  116      


 


1/14/18 23:56  120 20  100 Venturi Mask 6.0 35


 


1/14/18 23:55  117 20  98 Venturi Mask 10.0 45


 


1/14/18 22:54 100.1       


 


1/14/18 21:30 100.4 130      


 


1/14/18 20:00  130      


 


1/14/18 19:38      Venturi Mask 14.0 55


 


1/14/18 19:38      Venturi Mask 14.0 55


 


1/14/18 19:38      Venturi Mask 14.0 55


 


1/14/18 19:35     98 Venturi Mask 14.0 55


 


1/14/18 19:30 101.8 132 24 122/69 100 Venturi Mask 15.0 


 


1/14/18 16:00 97.3 132 28 133/84 97 Venturi Mask 15.0 


 


1/14/18 16:00  134      


 


1/14/18 14:51      Venturi Mask 14.0 55


 


1/14/18 14:50      Venturi Mask 14.0 55


 


1/14/18 12:34 98.1 133 36 144/64 99 Venturi Mask 15.0 

















Intake and Output  


 


 1/14/18 1/15/18





 19:00 07:00


 


Intake Total 120 ml 1160.4623 ml


 


Output Total 700 ml 975 ml


 


Balance -580 ml 185.4623 ml


 


  


 


Intake Oral 120 ml 


 


IV Total  1160.4623 ml


 


Output Urine Total 700 ml 975 ml








General Appearance:  no acute distress


HEENT:  normocephalic


Respiratory/Chest:  chest wall non-tender, lungs clear


Cardiovascular:  normal peripheral pulses, normal rate


Abdomen:  normal bowel sounds, soft, non tender





Microbiology








 Date/Time


Source Procedure


Growth Status


 


 


 1/13/18 16:25


Blood Blood Culture - Preliminary


NO GROWTH AFTER 24 HOURS Resulted


 


 1/13/18 16:00


Blood Blood Culture - Preliminary


NO GROWTH AFTER 24 HOURS Resulted





 1/14/18 01:00


Sputum Expectorated Gram Stain - Final Resulted


 


 1/14/18 01:00


Sputum Expectorated Sputum Culture - Preliminary


NORMAL UPPER RESPIRATORY MERVIN AT 24 ... Resulted








Laboratory Tests


1/14/18 18:20: 


White Blood Count 23.5*H, Red Blood Count 2.92L, Hemoglobin 7.9L, Hematocrit 

24.7L, Mean Corpuscular Volume 85, Mean Corpuscular Hemoglobin 27.3, Mean 

Corpuscular Hemoglobin Concent 32.2, Red Cell Distribution Width 12.8, Platelet 

Count 288, Mean Platelet Volume 8.2, Neutrophils (%) (Auto) , Lymphocytes (%) (

Auto) , Monocytes (%) (Auto) , Eosinophils (%) (Auto) , Basophils (%) (Auto) , 

Differential Total Cells Counted 100, Neutrophils % (Manual) 87H, Lymphocytes % 

(Manual) 4L, Monocytes % (Manual) 9, Eosinophils % (Manual) 0, Basophils % (

Manual) 0, Band Neutrophils 0, Platelet Estimate Adequate, Platelet Morphology 

Normal, Hypochromasia 1+, Anisocytosis 1+, Activated Partial Thromboplast Time 

43H


1/14/18 20:38: 


White Blood Count 22.8*H, Red Blood Count 2.93L, Hemoglobin 8.0L, Hematocrit 

25.6L, Mean Corpuscular Volume 87, Mean Corpuscular Hemoglobin 27.4, Mean 

Corpuscular Hemoglobin Concent 31.3L, Red Cell Distribution Width 13.1, 

Platelet Count 261, Mean Platelet Volume 7.8, Neutrophils (%) (Auto) , 

Lymphocytes (%) (Auto) , Monocytes (%) (Auto) , Eosinophils (%) (Auto) , 

Basophils (%) (Auto) , Differential Total Cells Counted 100, Neutrophils % (

Manual) 86H, Lymphocytes % (Manual) 6L, Monocytes % (Manual) 8, Eosinophils % (

Manual) 0, Basophils % (Manual) 0, Band Neutrophils 0, Platelet Estimate 

Adequate, Platelet Morphology Normal, Hypochromasia 1+, Anisocytosis 1+


1/15/18 07:15: 


White Blood Count 22.9*H, Red Blood Count 2.93L, Hemoglobin 8.5L, Hematocrit 

24.9L, Mean Corpuscular Volume 85, Mean Corpuscular Hemoglobin 28.8, Mean 

Corpuscular Hemoglobin Concent 34.0, Red Cell Distribution Width 13.1, Platelet 

Count 308, Mean Platelet Volume 8.6, Neutrophils (%) (Auto) , Lymphocytes (%) (

Auto) , Monocytes (%) (Auto) , Eosinophils (%) (Auto) , Basophils (%) (Auto) , 

Differential Total Cells Counted 100, Neutrophils % (Manual) 79H, Lymphocytes % 

(Manual) 13L, Monocytes % (Manual) 6, Eosinophils % (Manual) 1, Basophils % (

Manual) 0, Band Neutrophils 1, Platelet Estimate Adequate, Platelet Morphology 

Normal, Hypochromasia 1+, Activated Partial Thromboplast Time 49H, Sodium Level 

142, Potassium Level 4.1, Chloride Level 112H, Carbon Dioxide Level 15L, Anion 

Gap 15, Blood Urea Nitrogen 29H, Creatinine 2.7H, Estimat Glomerular Filtration 

Rate 29.0, Glucose Level 129H, Calcium Level 8.7





Current Medications








 Medications


  (Trade)  Dose


 Ordered  Sig/Judie


 Route


 PRN Reason  Start Time


 Stop Time Status Last Admin


Dose Admin


 


 Acetaminophen


  (Tylenol)  650 mg  Q4H  PRN


 ORAL


 Mild Pain/Temp > 100.5  1/10/18 08:45


 2/5/18 00:44  1/14/18 20:35


 


 


 Dextrose


  (Dextrose 50%)    STAT  PRN


 IV


 Hypoglycemia  1/10/18 08:30


 2/9/18 08:29   


 


 


 Diphenoxylate HCl/


 Atropine


  (Lomotil)  2.5 mg  Q4H  PRN


 ORAL


 Diarrhea  1/10/18 13:15


 2/9/18 13:14  1/12/18 14:13


 


 


 Folic Acid


  (Folate)  1 mg  DAILY


 ORAL


   1/12/18 15:00


 2/11/18 14:59  1/15/18 08:42


 


 


 Heparin Sodium/


 Dextrose  500 ml @ 


 32.55 mls/


 hr  adjust per protocol


 IV


   1/15/18 08:45


 2/14/18 08:44  1/15/18 08:46


 


 


 Ipratropium


 Bromide


  (Atrovent)  500 mcg  Q2H  PRN


 HHN


 Shortness of Breath  1/14/18 20:15


 1/19/18 20:14   


 


 


 Ipratropium


 Bromide


  (Atrovent)  500 mcg  Q4HRT


 HHN


   1/14/18 23:00


 1/19/18 22:59  1/15/18 10:31


 


 


 Iron Sucrose 100


 mg/Sodium Chloride  60 ml @ 


 240 mls/hr  BEDTIME


 IV


   1/12/18 21:00


 1/16/18 21:14  1/14/18 22:57


 


 


 Levalbuterol HCl


  (Xopenex)  1.25 mg  Q2H  PRN


 INH


 Shortness of Breath  1/14/18 20:15


 1/19/18 20:14   


 


 


 Levalbuterol HCl


  (Xopenex)  1.25 mg  Q4HRT


 HHN


   1/14/18 23:00


 1/19/18 22:59  1/15/18 10:31


 


 


 Magnesium


 Hydroxide


  (Mom)  30 ml  BIDPRN  PRN


 ORAL


 constipation  1/10/18 08:15


 2/6/18 08:14   


 


 


 Morphine Sulfate


  (Morphine


 Sulfate)  2 mg  Q4H  PRN


 IVP


 Moderate to Severe Pain  1/13/18 18:30


 1/20/18 18:29  1/14/18 19:00


 


 


 Ondansetron HCl


  (Zofran)  4 mg  Q6H  PRN


 IVP


 Nausea & Vomiting  1/10/18 08:15


 2/5/18 08:14   


 


 


 Pantoprazole


  (Protonix)  40 mg  DAILY


 ORAL


   1/14/18 09:00


 2/13/18 08:59  1/15/18 08:43


 


 


 Piperacillin Sod/


 Tazobactam Sod


 3.375 gm/Dextrose  55 ml @ 


 110 mls/hr  Q8HR


 IVPB


   1/14/18 14:00


 1/21/18 13:59  1/15/18 05:28


 


 


 Potassium Chloride


  (K-Dur)  20 meq  TWICE A  DAY


 ORAL


   1/12/18 13:15


 2/11/18 13:14  1/15/18 08:43


 


 


 Sodium Chloride  1,000 ml @ 


 75 mls/hr  [id number]


 IV


   1/11/18 14:00


 2/10/18 13:59  1/15/18 11:31


 


 


 Vitamin B Complex/


 Vit C/Folic Acid


  (Nephrovite)  1 tab  DAILY


 ORAL


   1/10/18 09:00


 2/8/18 08:59  1/15/18 08:42


 

















Ethan Tom MD Jase 15, 2018 12:23

## 2018-01-15 NOTE — INFECTIOUS DISEASES PROG NOTE
Assessment/Plan


Problems:  


(1) HAP (hospital-acquired pneumonia)


Assessment & Plan:  await  sputum culture , continue  zosyn empiric coverage , 

will add clindamycin for MRSA coverage since WBC went up on zosyn alone , 

monitor CXR 





(2) Sepsis


Assessment & Plan:  due to the above , will send blood culture, and start zosyn

  empiric coverage 





(3) Renal cyst, native, hemorrhage


Assessment & Plan:  with no evidence of  pyelonephritis, blood culture remains 

negative ,  urine culture grew mixed contaminant , will stop cefepime with 

flagyl empiric coverage , had urology eval with no intervention





(4) Abdominal pain


Assessment & Plan:  due to the above, continue pain meds 





(5) Fever


Assessment & Plan:  due to the above , will start antibiotics empiric coverage 

, continue  tylenol prn 





(6) Diarrhea


Assessment & Plan:  no evidence of  C diff , continue antidiarrhea meds  





(7) DVT (deep venous thrombosis)


Assessment & Plan:  in both legs, with possible PE, on heparin drip pending CT 

angio of the lungs. 








Subjective


Constitutional:  Reports: fatigue


HEENT:  Reports: no symptoms


Respiratory:  Reports: shortness of breath, productive cough


Breasts:  Reports: no symptoms


Cardiovascular:  Reports: chest pain


Gastrointestinal/Abdominal:  Reports: bloating


Genitourinary:  Reports: no symptoms


Neurologic:  Reports: weakness


Psychiatric:  Reports: no symptoms


Skin:  Reports: no symptoms


Endocrine:  Reports: no symptoms


Hematologic:  Reports: bleeding


Musculoskeletal:  Reports: pain


Allergies:  


Coded Allergies:  


     CODEINE (Verified  Allergy, Unknown, 1/5/18)


Uncoded Allergies:  


     PEANUTS (Adverse Reaction, Severe, Anaphylaxis, 1/9/18)





Objective


Vital Signs





Last 24 Hour Vital Signs








  Date Time  Temp Pulse Resp B/P (MAP) Pulse Ox O2 Delivery O2 Flow Rate FiO2


 


1/15/18 12:00 99.0 114 24 128/66 100 Nasal Cannula 4.0 


 


1/15/18 11:42  114      


 


1/15/18 10:40  111 22  99 Nasal Cannula 3.0 32


 


1/15/18 10:32  110 24  98 Nasal Cannula 3.0 32


 


1/15/18 08:12  116      


 


1/15/18 08:00 98.1 113 24 127/72 100 Venturi Mask 15.0 


 


1/15/18 06:54  113 20  97 Venturi Mask 10.0 45


 


1/15/18 06:52      Venturi Mask 10.0 45


 


1/15/18 06:52     97 Venturi Mask 10.0 45


 


1/15/18 06:45  113 20  97 Venturi Mask 10.0 45


 


1/15/18 04:00 99.7 114 20 131/73 99 Venturi Mask 15.0 


 


1/15/18 04:00  121      


 


1/15/18 04:00      Venturi Mask 10.0 45


 


1/15/18 04:00      Venturi Mask 10.0 45


 


1/15/18 00:00 99.4 115 24 128/73 98 Venturi Mask 15.0 


 


1/15/18 00:00  116      


 


1/14/18 23:56  120 20  100 Venturi Mask 6.0 35


 


1/14/18 23:55  117 20  98 Venturi Mask 10.0 45


 


1/14/18 22:54 100.1       


 


1/14/18 21:30 100.4 130      


 


1/14/18 20:00  130      


 


1/14/18 19:38      Venturi Mask 14.0 55


 


1/14/18 19:38      Venturi Mask 14.0 55


 


1/14/18 19:38      Venturi Mask 14.0 55


 


1/14/18 19:35     98 Venturi Mask 14.0 55


 


1/14/18 19:30 101.8 132 24 122/69 100 Venturi Mask 15.0 


 


1/14/18 16:00 97.3 132 28 133/84 97 Venturi Mask 15.0 


 


1/14/18 16:00  134      








Height (Feet):  5


Height (Inches):  10.00


Weight (Pounds):  138


General Appearance:  WD/WN, no acute distress


HEENT:  normocephalic, atraumatic, anicteric, mucous membranes moist, PERRL, 

EOMI, pharynx normal, supple, no JVD


Respiratory/Chest:  chest wall non-tender, no respiratory distress, no 

accessory muscle use, decreased breath sounds, crackles/rales


Cardiovascular:  normal peripheral pulses, normal rate, regular rhythm, no 

gallop/murmur, no JVD


Abdomen:  normal bowel sounds, soft, non tender, no organomegaly, non distended

, no mass, no scars


Extremities:  no cyanosis, no clubbing


Skin:  no rash, no lesions, no ulcers


Neurologic/Psychiatric:  alert, oriented x 3, responsive





Microbiology








 Date/Time


Source Procedure


Growth Status


 


 


 1/13/18 16:25


Blood Blood Culture - Preliminary


NO GROWTH AFTER 24 HOURS Resulted


 


 1/13/18 16:00


Blood Blood Culture - Preliminary


NO GROWTH AFTER 24 HOURS Resulted





 1/14/18 01:00


Sputum Expectorated Gram Stain - Final Resulted


 


 1/14/18 01:00


Sputum Expectorated Sputum Culture - Preliminary


NORMAL UPPER RESPIRATORY MERVIN AT 24 ... Resulted











Laboratory Tests








Test


  1/14/18


18:20 1/14/18


20:38 1/15/18


07:15 1/15/18


14:40


 


White Blood Count


  23.5 K/UL


(4.8-10.8)  *H 22.8 K/UL


(4.8-10.8)  *H 22.9 K/UL


(4.8-10.8)  *H 


 


 


Red Blood Count


  2.92 M/UL


(4.70-6.10)  L 2.93 M/UL


(4.70-6.10)  L 2.93 M/UL


(4.70-6.10)  L 


 


 


Hemoglobin


  7.9 G/DL


(14.2-18.0)  L 8.0 G/DL


(14.2-18.0)  L 8.5 G/DL


(14.2-18.0)  L 


 


 


Hematocrit


  24.7 %


(42.0-52.0)  L 25.6 %


(42.0-52.0)  L 24.9 %


(42.0-52.0)  L 


 


 


Mean Corpuscular Volume 85 FL (80-99)   87 FL (80-99)   85 FL (80-99)   


 


Mean Corpuscular Hemoglobin


  27.3 PG


(27.0-31.0) 27.4 PG


(27.0-31.0) 28.8 PG


(27.0-31.0) 


 


 


Mean Corpuscular Hemoglobin


Concent 32.2 G/DL


(32.0-36.0) 31.3 G/DL


(32.0-36.0)  L 34.0 G/DL


(32.0-36.0) 


 


 


Red Cell Distribution Width


  12.8 %


(11.6-14.8) 13.1 %


(11.6-14.8) 13.1 %


(11.6-14.8) 


 


 


Platelet Count


  288 K/UL


(150-450) 261 K/UL


(150-450) 308 K/UL


(150-450) 


 


 


Mean Platelet Volume


  8.2 FL


(6.5-10.1) 7.8 FL


(6.5-10.1) 8.6 FL


(6.5-10.1) 


 


 


Neutrophils (%) (Auto)


  % (45.0-75.0)


  


 


 


Lymphocytes (%) (Auto)


  % (20.0-45.0)


  


 


 


Monocytes (%) (Auto)  % (1.0-10.0)    % (1.0-10.0)    % (1.0-10.0)   


 


Eosinophils (%) (Auto)  % (0.0-3.0)    % (0.0-3.0)    % (0.0-3.0)   


 


Basophils (%) (Auto)  % (0.0-2.0)    % (0.0-2.0)    % (0.0-2.0)   


 


Differential Total Cells


Counted 100  


  100  


  100  


  


 


 


Neutrophils % (Manual) 87 % (45-75)  H 86 % (45-75)  H 79 % (45-75)  H 


 


Lymphocytes % (Manual) 4 % (20-45)  L 6 % (20-45)  L 13 % (20-45)  L 


 


Monocytes % (Manual) 9 % (1-10)   8 % (1-10)   6 % (1-10)   


 


Eosinophils % (Manual) 0 % (0-3)   0 % (0-3)   1 % (0-3)   


 


Basophils % (Manual) 0 % (0-2)   0 % (0-2)   0 % (0-2)   


 


Band Neutrophils 0 % (0-8)   0 % (0-8)   1 % (0-8)   


 


Platelet Estimate Adequate   Adequate   Adequate   


 


Platelet Morphology Normal   Normal   Normal   


 


Hypochromasia 1+   1+   1+   


 


Anisocytosis 1+   1+    


 


Activated Partial


Thromboplast Time 43 SEC (23-33)


H 


  49 SEC (23-33)


H Pending  


 


 


Sodium Level


  


  


  142 MMOL/L


(136-145) 


 


 


Potassium Level


  


  


  4.1 MMOL/L


(3.5-5.1) 


 


 


Chloride Level


  


  


  112 MMOL/L


()  H 


 


 


Carbon Dioxide Level


  


  


  15 MMOL/L


(21-32)  L 


 


 


Anion Gap


  


  


  15 mmol/L


(5-15) 


 


 


Blood Urea Nitrogen


  


  


  29 mg/dL


(7-18)  H 


 


 


Creatinine


  


  


  2.7 MG/DL


(0.55-1.30)  H 


 


 


Estimat Glomerular Filtration


Rate 


  


  29.0 mL/min


(>60) 


 


 


Glucose Level


  


  


  129 MG/DL


()  H 


 


 


Calcium Level


  


  


  8.7 MG/DL


(8.5-10.1) 


 











Current Medications








 Medications


  (Trade)  Dose


 Ordered  Sig/Judie


 Route


 PRN Reason  Start Time


 Stop Time Status Last Admin


Dose Admin


 


 Acetaminophen


  (Tylenol)  650 mg  Q4H  PRN


 ORAL


 Mild Pain/Temp > 100.5  1/10/18 08:45


 2/5/18 00:44  1/14/18 20:35


 


 


 Clindamycin HCl/


 Dextrose  50 ml @ 


 100 mls/hr  Q8H


 IV


   1/15/18 18:00


 1/22/18 17:59   


 


 


 Dextrose


  (Dextrose 50%)    STAT  PRN


 IV


 Hypoglycemia  1/10/18 08:30


 2/9/18 08:29   


 


 


 Diphenoxylate HCl/


 Atropine


  (Lomotil)  2.5 mg  Q4H  PRN


 ORAL


 Diarrhea  1/10/18 13:15


 2/9/18 13:14  1/12/18 14:13


 


 


 Folic Acid


  (Folate)  1 mg  DAILY


 ORAL


   1/12/18 15:00


 2/11/18 14:59  1/15/18 08:42


 


 


 Heparin Sodium/


 Dextrose  500 ml @ 


 32.55 mls/


 hr  adjust per protocol


 IV


   1/15/18 08:45


 2/14/18 08:44  1/15/18 08:46


 


 


 Ipratropium


 Bromide


  (Atrovent)  500 mcg  Q2H  PRN


 HHN


 Shortness of Breath  1/14/18 20:15


 1/19/18 20:14   


 


 


 Ipratropium


 Bromide


  (Atrovent)  500 mcg  Q4HRT


 HHN


   1/14/18 23:00


 1/19/18 22:59  1/15/18 10:31


 


 


 Iron Sucrose 100


 mg/Sodium Chloride  60 ml @ 


 240 mls/hr  BEDTIME


 IV


   1/12/18 21:00


 1/16/18 21:14  1/14/18 22:57


 


 


 Levalbuterol HCl


  (Xopenex)  1.25 mg  Q2H  PRN


 INH


 Shortness of Breath  1/14/18 20:15


 1/19/18 20:14   


 


 


 Levalbuterol HCl


  (Xopenex)  1.25 mg  Q4HRT


 HHN


   1/14/18 23:00


 1/19/18 22:59  1/15/18 10:31


 


 


 Magnesium


 Hydroxide


  (Mom)  30 ml  BIDPRN  PRN


 ORAL


 constipation  1/10/18 08:15


 2/6/18 08:14   


 


 


 Morphine Sulfate


  (Morphine


 Sulfate)  2 mg  Q4H  PRN


 IVP


 Moderate to Severe Pain  1/13/18 18:30


 1/20/18 18:29  1/14/18 19:00


 


 


 Ondansetron HCl


  (Zofran)  4 mg  Q6H  PRN


 IVP


 Nausea & Vomiting  1/10/18 08:15


 2/5/18 08:14   


 


 


 Pantoprazole


  (Protonix)  40 mg  DAILY


 ORAL


   1/14/18 09:00


 2/13/18 08:59  1/15/18 08:43


 


 


 Piperacillin Sod/


 Tazobactam Sod


 3.375 gm/Dextrose  55 ml @ 


 110 mls/hr  Q8HR


 IVPB


   1/14/18 14:00


 1/21/18 13:59  1/15/18 13:28


 


 


 Potassium Chloride


  (K-Dur)  20 meq  TWICE A  DAY


 ORAL


   1/12/18 13:15


 2/11/18 13:14  1/15/18 08:43


 


 


 Sodium Chloride  1,000 ml @ 


 75 mls/hr  [id number]


 IV


   1/11/18 14:00


 2/10/18 13:59  1/15/18 11:31


 


 


 Vitamin B Complex/


 Vit C/Folic Acid


  (Nephrovite)  1 tab  DAILY


 ORAL


   1/10/18 09:00


 2/8/18 08:59  1/15/18 08:42


 

















Pablo Talley M.D. Jase 15, 2018 15:01

## 2018-01-15 NOTE — GI PROGRESS NOTE
Assessment/Plan


Problems:  


(1) Diarrhea


ICD Codes:  R19.7 - Diarrhea, unspecified


SNOMED:  13619765


(2) Sickle cell trait


ICD Codes:  D57.3 - Sickle-cell trait


SNOMED:  02894627


(3) Abdominal pain


ICD Codes:  R10.9 - Unspecified abdominal pain


SNOMED:  53828106


(4) Renal cyst, native, hemorrhage


ICD Codes:  N28.89 - Other specified disorders of kidney and ureter; N28.1 - 

Cyst of kidney, acquired


SNOMED:  05007481


Status:  stable


Status Narrative


Discussed with Dr. Gamez.


Assessment/Plan


anemia work up


- no iron supplementation given elevated ferritin levels


OB stool r/o GI bleed  >> negative


stool cx >> negative


cdiff >> negative


O&P >> negative


diarrhea resolved >> lomotil prn





monitor H&H, prn transfusions


renal diet, tolerating


ppi


abx


fu labs


outpatient GI procedures





Subjective


Subjective


generalized weakness


diarrhea resolved





Objective





Last 24 Hour Vital Signs








  Date Time  Temp Pulse Resp B/P (MAP) Pulse Ox O2 Delivery O2 Flow Rate FiO2


 


1/15/18 10:40  111 22  99 Nasal Cannula 3.0 32


 


1/15/18 10:32  110 24  98 Nasal Cannula 3.0 32


 


1/15/18 08:12  116      


 


1/15/18 08:00 98.1 113 24 127/72 100 Venturi Mask 15.0 


 


1/15/18 06:54  113 20  97 Venturi Mask 10.0 45


 


1/15/18 06:52      Venturi Mask 10.0 45


 


1/15/18 06:52     97 Venturi Mask 10.0 45


 


1/15/18 06:45  113 20  97 Venturi Mask 10.0 45


 


1/15/18 04:00 99.7 114 20 131/73 99 Venturi Mask 15.0 


 


1/15/18 04:00  121      


 


1/15/18 04:00      Venturi Mask 10.0 45


 


1/15/18 04:00      Venturi Mask 10.0 45


 


1/15/18 00:00 99.4 115 24 128/73 98 Venturi Mask 15.0 


 


1/15/18 00:00  116      


 


1/14/18 23:56  120 20  100 Venturi Mask 6.0 35


 


1/14/18 23:55  117 20  98 Venturi Mask 10.0 45


 


1/14/18 22:54 100.1       


 


1/14/18 21:30 100.4 130      


 


1/14/18 20:00  130      


 


1/14/18 19:38      Venturi Mask 14.0 55


 


1/14/18 19:38      Venturi Mask 14.0 55


 


1/14/18 19:38      Venturi Mask 14.0 55


 


1/14/18 19:35     98 Venturi Mask 14.0 55


 


1/14/18 19:30 101.8 132 24 122/69 100 Venturi Mask 15.0 


 


1/14/18 16:00 97.3 132 28 133/84 97 Venturi Mask 15.0 


 


1/14/18 16:00  134      


 


1/14/18 14:51      Venturi Mask 14.0 55


 


1/14/18 14:50      Venturi Mask 14.0 55


 


1/14/18 12:34 98.1 133 36 144/64 99 Venturi Mask 15.0 

















Intake and Output  


 


 1/14/18 1/15/18





 19:00 07:00


 


Intake Total 120 ml 1160.4623 ml


 


Output Total 700 ml 975 ml


 


Balance -580 ml 185.4623 ml


 


  


 


Intake Oral 120 ml 


 


IV Total  1160.4623 ml


 


Output Urine Total 700 ml 975 ml











Laboratory Tests








Test


  1/14/18


18:20 1/14/18


20:38 1/15/18


07:15


 


White Blood Count


  23.5 K/UL


(4.8-10.8)  *H 22.8 K/UL


(4.8-10.8)  *H 22.9 K/UL


(4.8-10.8)  *H


 


Red Blood Count


  2.92 M/UL


(4.70-6.10)  L 2.93 M/UL


(4.70-6.10)  L 2.93 M/UL


(4.70-6.10)  L


 


Hemoglobin


  7.9 G/DL


(14.2-18.0)  L 8.0 G/DL


(14.2-18.0)  L 8.5 G/DL


(14.2-18.0)  L


 


Hematocrit


  24.7 %


(42.0-52.0)  L 25.6 %


(42.0-52.0)  L 24.9 %


(42.0-52.0)  L


 


Mean Corpuscular Volume 85 FL (80-99)   87 FL (80-99)   85 FL (80-99)  


 


Mean Corpuscular Hemoglobin


  27.3 PG


(27.0-31.0) 27.4 PG


(27.0-31.0) 28.8 PG


(27.0-31.0)


 


Mean Corpuscular Hemoglobin


Concent 32.2 G/DL


(32.0-36.0) 31.3 G/DL


(32.0-36.0)  L 34.0 G/DL


(32.0-36.0)


 


Red Cell Distribution Width


  12.8 %


(11.6-14.8) 13.1 %


(11.6-14.8) 13.1 %


(11.6-14.8)


 


Platelet Count


  288 K/UL


(150-450) 261 K/UL


(150-450) 308 K/UL


(150-450)


 


Mean Platelet Volume


  8.2 FL


(6.5-10.1) 7.8 FL


(6.5-10.1) 8.6 FL


(6.5-10.1)


 


Neutrophils (%) (Auto)


  % (45.0-75.0)


 


 


Lymphocytes (%) (Auto)


  % (20.0-45.0)


 


 


Monocytes (%) (Auto)  % (1.0-10.0)    % (1.0-10.0)    % (1.0-10.0)  


 


Eosinophils (%) (Auto)  % (0.0-3.0)    % (0.0-3.0)    % (0.0-3.0)  


 


Basophils (%) (Auto)  % (0.0-2.0)    % (0.0-2.0)    % (0.0-2.0)  


 


Differential Total Cells


Counted 100  


  100  


  100  


 


 


Neutrophils % (Manual) 87 % (45-75)  H 86 % (45-75)  H 79 % (45-75)  H


 


Lymphocytes % (Manual) 4 % (20-45)  L 6 % (20-45)  L 13 % (20-45)  L


 


Monocytes % (Manual) 9 % (1-10)   8 % (1-10)   6 % (1-10)  


 


Eosinophils % (Manual) 0 % (0-3)   0 % (0-3)   1 % (0-3)  


 


Basophils % (Manual) 0 % (0-2)   0 % (0-2)   0 % (0-2)  


 


Band Neutrophils 0 % (0-8)   0 % (0-8)   1 % (0-8)  


 


Platelet Estimate Adequate   Adequate   Adequate  


 


Platelet Morphology Normal   Normal   Normal  


 


Hypochromasia 1+   1+   1+  


 


Anisocytosis 1+   1+   


 


Activated Partial


Thromboplast Time 43 SEC (23-33)


H 


  49 SEC (23-33)


H


 


Sodium Level


  


  


  142 MMOL/L


(136-145)


 


Potassium Level


  


  


  4.1 MMOL/L


(3.5-5.1)


 


Chloride Level


  


  


  112 MMOL/L


()  H


 


Carbon Dioxide Level


  


  


  15 MMOL/L


(21-32)  L


 


Anion Gap


  


  


  15 mmol/L


(5-15)


 


Blood Urea Nitrogen


  


  


  29 mg/dL


(7-18)  H


 


Creatinine


  


  


  2.7 MG/DL


(0.55-1.30)  H


 


Estimat Glomerular Filtration


Rate 


  


  29.0 mL/min


(>60)


 


Glucose Level


  


  


  129 MG/DL


()  H


 


Calcium Level


  


  


  8.7 MG/DL


(8.5-10.1)








Height (Feet):  5


Height (Inches):  10.00


Weight (Pounds):  138


General Appearance:  WD/WN, no apparent distress, alert


Cardiovascular:  normal rate


Respiratory/Chest:  normal breath sounds, no respiratory distress


Abdominal Exam:  normal bowel sounds, non tender, soft


Extremities:  normal range of motion, non-tender











Elsy Lakhani N.P. Jase 15, 2018 12:30

## 2018-01-15 NOTE — NEPHROLOGY PROGRESS NOTE
Assessment/Plan


Problem List:  


(1) Sickle cell trait


(2) Abdominal pain


(3) Renal cyst, native, hemorrhage


(4) Sepsis


(5) Hematuria


(6) Shortness of breath


(7) Chest pain


(8) Severe anemia


(9) Pneumonia


(10) DVT (deep venous thrombosis)


(11) Tachycardia


(12) Sickle cell anemia


(13) HAP (hospital-acquired pneumonia)


Plan


Continue current treatment plan


Continue heparin drip per protocol


Monitor HR, cardio following


Continue o2 therapy


CT angio


Monitor H&H and transfuse prn


Hematology, cardiology GI, and ID and pulmo following, will f/u with recs


Monitor lytes, correct prn


Continue abx per ID


Monitor renal function, avoid nephrotoxins 


Pain management prn


Continue neb treatment


Daily PPI


AM labs





Subjective


Constitutional:  Denies: no symptoms, chills, diaphoresis, fever, malaise, 

weakness, other


HEENT:  Denies: no symptoms, eye pain, blurred vision, tearing, double vision, 

ear pain, ear discharge, nose pain, nose congestion, throat pain, throat 

swelling, mouth pain, mouth swelling, other


Genitourinary:  Denies: no symptoms, burning, discharge, frequency, flank pain, 

hematuria, incontinence, pain, urgency, other


Neurologic/Psychiatric:  Denies: no symptoms, anxiety, depressed, emotional 

problems, headache, numbness, paresthesia, pre-existing deficit, seizure, 

tingling, tremors, weakness, other


Subjective


In bed, in no apparent distress, asleep, arousable, on heparin drip





Objective


Objective





Last 24 Hour Vital Signs








  Date Time  Temp Pulse Resp B/P (MAP) Pulse Ox O2 Delivery O2 Flow Rate FiO2


 


1/15/18 17:36 97.9       


 


1/15/18 16:00  118      


 


1/15/18 16:00 97.9 119 21 132/67 100 Nasal Cannula 4.0 


 


1/15/18 15:39  114 24  97 Nasal Cannula 3.0 32


 


1/15/18 15:22  114 24  96 Nasal Cannula 3.0 32


 


1/15/18 12:00 99.0 114 24 128/66 100 Nasal Cannula 4.0 


 


1/15/18 11:42  114      


 


1/15/18 10:40  111 22  99 Nasal Cannula 3.0 32


 


1/15/18 10:32  110 24  98 Nasal Cannula 3.0 32


 


1/15/18 08:12  116      


 


1/15/18 08:00 98.1 113 24 127/72 100 Venturi Mask 15.0 


 


1/15/18 06:54  113 20  97 Venturi Mask 10.0 45


 


1/15/18 06:52      Venturi Mask 10.0 45


 


1/15/18 06:52     97 Venturi Mask 10.0 45


 


1/15/18 06:45  113 20  97 Venturi Mask 10.0 45


 


1/15/18 04:00 99.7 114 20 131/73 99 Venturi Mask 15.0 


 


1/15/18 04:00  121      


 


1/15/18 04:00      Venturi Mask 10.0 45


 


1/15/18 04:00      Venturi Mask 10.0 45


 


1/15/18 00:00 99.4 115 24 128/73 98 Venturi Mask 15.0 


 


1/15/18 00:00  116      


 


1/14/18 23:56  120 20  100 Venturi Mask 6.0 35


 


1/14/18 23:55  117 20  98 Venturi Mask 10.0 45


 


1/14/18 22:54 100.1       


 


1/14/18 21:30 100.4 130      


 


1/14/18 20:00  130      


 


1/14/18 19:38      Venturi Mask 14.0 55


 


1/14/18 19:38      Venturi Mask 14.0 55


 


1/14/18 19:38      Venturi Mask 14.0 55


 


1/14/18 19:35     98 Venturi Mask 14.0 55


 


1/14/18 19:30 101.8 132 24 122/69 100 Venturi Mask 15.0 

















Intake and Output  


 


 1/14/18 1/15/18





 19:00 07:00


 


Intake Total 120 ml 1160.4623 ml


 


Output Total 700 ml 975 ml


 


Balance -580 ml 185.4623 ml


 


  


 


Intake Oral 120 ml 


 


IV Total  1160.4623 ml


 


Output Urine Total 700 ml 975 ml








Laboratory Tests


1/14/18 18:20: 


White Blood Count 23.5*H, Red Blood Count 2.92L, Hemoglobin 7.9L, Hematocrit 

24.7L, Mean Corpuscular Volume 85, Mean Corpuscular Hemoglobin 27.3, Mean 

Corpuscular Hemoglobin Concent 32.2, Red Cell Distribution Width 12.8, Platelet 

Count 288, Mean Platelet Volume 8.2, Neutrophils (%) (Auto) , Lymphocytes (%) (

Auto) , Monocytes (%) (Auto) , Eosinophils (%) (Auto) , Basophils (%) (Auto) , 

Differential Total Cells Counted 100, Neutrophils % (Manual) 87H, Lymphocytes % 

(Manual) 4L, Monocytes % (Manual) 9, Eosinophils % (Manual) 0, Basophils % (

Manual) 0, Band Neutrophils 0, Platelet Estimate Adequate, Platelet Morphology 

Normal, Hypochromasia 1+, Anisocytosis 1+, Activated Partial Thromboplast Time 

43H


1/14/18 20:38: 


White Blood Count 22.8*H, Red Blood Count 2.93L, Hemoglobin 8.0L, Hematocrit 

25.6L, Mean Corpuscular Volume 87, Mean Corpuscular Hemoglobin 27.4, Mean 

Corpuscular Hemoglobin Concent 31.3L, Red Cell Distribution Width 13.1, 

Platelet Count 261, Mean Platelet Volume 7.8, Neutrophils (%) (Auto) , 

Lymphocytes (%) (Auto) , Monocytes (%) (Auto) , Eosinophils (%) (Auto) , 

Basophils (%) (Auto) , Differential Total Cells Counted 100, Neutrophils % (

Manual) 86H, Lymphocytes % (Manual) 6L, Monocytes % (Manual) 8, Eosinophils % (

Manual) 0, Basophils % (Manual) 0, Band Neutrophils 0, Platelet Estimate 

Adequate, Platelet Morphology Normal, Hypochromasia 1+, Anisocytosis 1+


1/15/18 07:15: 


White Blood Count 22.9*H, Red Blood Count 2.93L, Hemoglobin 8.5L, Hematocrit 

24.9L, Mean Corpuscular Volume 85, Mean Corpuscular Hemoglobin 28.8, Mean 

Corpuscular Hemoglobin Concent 34.0, Red Cell Distribution Width 13.1, Platelet 

Count 308, Mean Platelet Volume 8.6, Neutrophils (%) (Auto) , Lymphocytes (%) (

Auto) , Monocytes (%) (Auto) , Eosinophils (%) (Auto) , Basophils (%) (Auto) , 

Differential Total Cells Counted 100, Neutrophils % (Manual) 79H, Lymphocytes % 

(Manual) 13L, Monocytes % (Manual) 6, Eosinophils % (Manual) 1, Basophils % (

Manual) 0, Band Neutrophils 1, Platelet Estimate Adequate, Platelet Morphology 

Normal, Hypochromasia 1+, Activated Partial Thromboplast Time 49H, Sodium Level 

142, Potassium Level 4.1, Chloride Level 112H, Carbon Dioxide Level 15L, Anion 

Gap 15, Blood Urea Nitrogen 29H, Creatinine 2.7H, Estimat Glomerular Filtration 

Rate 29.0, Glucose Level 129H, Calcium Level 8.7


1/15/18 14:40: Activated Partial Thromboplast Time 82H


Height (Feet):  5


Height (Inches):  10.00


Weight (Pounds):  138


General Appearance:  no apparent distress


EENT:  normal ENT inspection


Neck:  normal alignment, supple


Cardiovascular:  tachycardia


Respiratory/Chest:  normal breath sounds, no respiratory distress


Abdomen:  non tender


Extremities:  calf tenderness


Neurologic:  alert, oriented x 3, responsive, normal mood/affect











Dayanara Hawley N.P. Jase 15, 2018 18:00

## 2018-01-16 VITALS — SYSTOLIC BLOOD PRESSURE: 134 MMHG | DIASTOLIC BLOOD PRESSURE: 68 MMHG

## 2018-01-16 VITALS — DIASTOLIC BLOOD PRESSURE: 86 MMHG | SYSTOLIC BLOOD PRESSURE: 146 MMHG

## 2018-01-16 VITALS — DIASTOLIC BLOOD PRESSURE: 96 MMHG | SYSTOLIC BLOOD PRESSURE: 157 MMHG

## 2018-01-16 VITALS — DIASTOLIC BLOOD PRESSURE: 67 MMHG | SYSTOLIC BLOOD PRESSURE: 126 MMHG

## 2018-01-16 VITALS — SYSTOLIC BLOOD PRESSURE: 126 MMHG | DIASTOLIC BLOOD PRESSURE: 68 MMHG

## 2018-01-16 VITALS — DIASTOLIC BLOOD PRESSURE: 93 MMHG | SYSTOLIC BLOOD PRESSURE: 143 MMHG

## 2018-01-16 LAB
ADD MANUAL DIFF: YES
ANION GAP SERPL CALC-SCNC: 15 MMOL/L (ref 5–15)
BUN SERPL-MCNC: 34 MG/DL (ref 7–18)
CALCIUM SERPL-MCNC: 8.3 MG/DL (ref 8.5–10.1)
CHLORIDE SERPL-SCNC: 112 MMOL/L (ref 98–107)
CO2 SERPL-SCNC: 14 MMOL/L (ref 21–32)
CREAT SERPL-MCNC: 3 MG/DL (ref 0.55–1.3)
ERYTHROCYTE [DISTWIDTH] IN BLOOD BY AUTOMATED COUNT: 13 % (ref 11.6–14.8)
HCT VFR BLD CALC: 21.9 % (ref 42–52)
HGB BLD-MCNC: 7.5 G/DL (ref 14.2–18)
MCV RBC AUTO: 85 FL (ref 80–99)
PLATELET # BLD: 334 K/UL (ref 150–450)
POTASSIUM SERPL-SCNC: 3.9 MMOL/L (ref 3.5–5.1)
RBC # BLD AUTO: 2.56 M/UL (ref 4.7–6.1)
SODIUM SERPL-SCNC: 141 MMOL/L (ref 136–145)
WBC # BLD AUTO: 23.3 K/UL (ref 4.8–10.8)

## 2018-01-16 RX ADMIN — MORPHINE SULFATE PRN MG: 2 INJECTION, SOLUTION INTRAMUSCULAR; INTRAVENOUS at 11:40

## 2018-01-16 RX ADMIN — HEPARIN SODIUM SCH MLS/HR: 5000 INJECTION, SOLUTION INTRAVENOUS at 12:58

## 2018-01-16 RX ADMIN — CLINDAMYCIN PHOSPHATE SCH MLS/HR: 12 INJECTION, SOLUTION INTRAVENOUS at 10:04

## 2018-01-16 RX ADMIN — MORPHINE SULFATE PRN MG: 2 INJECTION, SOLUTION INTRAMUSCULAR; INTRAVENOUS at 19:37

## 2018-01-16 RX ADMIN — Medication SCH MCG: at 11:08

## 2018-01-16 RX ADMIN — SODIUM CHLORIDE SCH MLS/HR: 0.9 INJECTION INTRAVENOUS at 20:57

## 2018-01-16 RX ADMIN — MORPHINE SULFATE PRN MG: 2 INJECTION, SOLUTION INTRAMUSCULAR; INTRAVENOUS at 23:01

## 2018-01-16 RX ADMIN — LEVALBUTEROL HYDROCHLORIDE SCH MG: 1.25 SOLUTION, CONCENTRATE RESPIRATORY (INHALATION) at 11:08

## 2018-01-16 RX ADMIN — MORPHINE SULFATE PRN MG: 2 INJECTION, SOLUTION INTRAMUSCULAR; INTRAVENOUS at 05:53

## 2018-01-16 RX ADMIN — MORPHINE SULFATE PRN MG: 2 INJECTION, SOLUTION INTRAMUSCULAR; INTRAVENOUS at 15:45

## 2018-01-16 RX ADMIN — DEXTROSE MONOHYDRATE SCH MLS/HR: 50 INJECTION, SOLUTION INTRAVENOUS at 14:15

## 2018-01-16 RX ADMIN — Medication SCH MCG: at 03:30

## 2018-01-16 RX ADMIN — Medication SCH TAB: at 10:04

## 2018-01-16 RX ADMIN — LEVALBUTEROL HYDROCHLORIDE SCH MG: 1.25 SOLUTION, CONCENTRATE RESPIRATORY (INHALATION) at 07:05

## 2018-01-16 RX ADMIN — Medication SCH MCG: at 07:05

## 2018-01-16 RX ADMIN — DEXTROSE MONOHYDRATE SCH MLS/HR: 50 INJECTION, SOLUTION INTRAVENOUS at 22:15

## 2018-01-16 RX ADMIN — DEXTROSE MONOHYDRATE SCH MLS/HR: 50 INJECTION, SOLUTION INTRAVENOUS at 05:37

## 2018-01-16 RX ADMIN — Medication SCH MCG: at 22:55

## 2018-01-16 RX ADMIN — LEVALBUTEROL HYDROCHLORIDE SCH MG: 1.25 SOLUTION, CONCENTRATE RESPIRATORY (INHALATION) at 14:53

## 2018-01-16 RX ADMIN — LEVALBUTEROL HYDROCHLORIDE SCH MG: 1.25 SOLUTION, CONCENTRATE RESPIRATORY (INHALATION) at 22:55

## 2018-01-16 RX ADMIN — Medication SCH MCG: at 14:53

## 2018-01-16 RX ADMIN — LEVALBUTEROL HYDROCHLORIDE SCH MG: 1.25 SOLUTION, CONCENTRATE RESPIRATORY (INHALATION) at 21:15

## 2018-01-16 RX ADMIN — LEVALBUTEROL HYDROCHLORIDE SCH MG: 1.25 SOLUTION, CONCENTRATE RESPIRATORY (INHALATION) at 03:30

## 2018-01-16 RX ADMIN — Medication SCH MCG: at 21:14

## 2018-01-16 RX ADMIN — CLINDAMYCIN PHOSPHATE SCH MLS/HR: 12 INJECTION, SOLUTION INTRAVENOUS at 18:26

## 2018-01-16 RX ADMIN — CLINDAMYCIN PHOSPHATE SCH MLS/HR: 12 INJECTION, SOLUTION INTRAVENOUS at 01:49

## 2018-01-16 NOTE — PULMONOLOGY PROGRESS NOTE
Assessment/Plan


Assessment/Plan


ASSESSMENT AND PLAN:


1. Tachycardia and tachypnea due to pain of sickle cell crisis. CXR shows 

streaky infiltrates; possible pneumonia; will treat as pulm embolism


2. Sepsis, on intravenous antibiotic per Dr. Talley.


3. Abdominal pain, likely due to renal cysts and hemorrhage.


4. Diarrhea.  


5. DVT.





Agree with pain control


Antibiotics


IV fluids


started on IV heparin.  It would be best to assume that he has pulmonary 

embolism and treat accordingly.





Subjective


Interval Events:  none


Constitutional:  Reports: no symptoms


HEENT:  Repors: no symptoms


Respiratory:  Reports: dry cough, shortness of breath, pleuritic pain


Cardiovascular:  Reports: chest pain


Gastrointestinal/Abdominal:  Reports: no symptoms


Allergies:  


Coded Allergies:  


     CODEINE (Verified  Allergy, Unknown, 1/5/18)


Uncoded Allergies:  


     PEANUTS (Adverse Reaction, Severe, Anaphylaxis, 1/9/18)





Objective





Last 24 Hour Vital Signs








  Date Time  Temp Pulse Resp B/P (MAP) Pulse Ox O2 Delivery O2 Flow Rate FiO2


 


1/16/18 08:00 100.0 114 22 126/67 97 Nasal Cannula 3.0 


 


1/16/18 07:36  115      


 


1/16/18 07:14  114 22  98 Nasal Cannula 3.0 32


 


1/16/18 07:10     97 Nasal Cannula 3.0 32


 


1/16/18 07:09      Nasal Cannula 3.0 32


 


1/16/18 07:09  113 22  97 Nasal Cannula 3.0 32


 


1/16/18 06:23 99.6       


 


1/16/18 04:00 98.4 113 32 134/68 98 Nasal Cannula 3.0 


 


1/16/18 04:00  111      


 


1/16/18 03:15  106 24  97 Nasal Cannula 3.0 32


 


1/16/18 03:05  116 22  96 Nasal Cannula 3.0 32


 


1/16/18 00:00  124      


 


1/16/18 00:00 99.6 118 32 126/68 97 Nasal Cannula 3.0 


 


1/15/18 23:10  103 24  98 Nasal Cannula 3.0 32


 


1/15/18 22:59  103 24  95 Nasal Cannula 3.0 32


 


1/15/18 20:00 97.9 127 24 129/69 98 Nasal Cannula 3.0 


 


1/15/18 20:00  123      


 


1/15/18 19:01  110 24  98 Nasal Cannula 3.0 32


 


1/15/18 18:47     98 Nasal Cannula 3.0 32


 


1/15/18 18:47      Nasal Cannula 3.0 32


 


1/15/18 18:46  111 24  98 Nasal Cannula 3.0 32


 


1/15/18 16:00  118      


 


1/15/18 16:00 97.9 119 21 132/67 100 Nasal Cannula 4.0 


 


1/15/18 15:39  114 24  97 Nasal Cannula 3.0 32


 


1/15/18 15:22  114 24  96 Nasal Cannula 3.0 32


 


1/15/18 12:00 99.0 114 24 128/66 100 Nasal Cannula 4.0 


 


1/15/18 11:42  114      


 


1/15/18 10:40  111 22  99 Nasal Cannula 3.0 32


 


1/15/18 10:32  110 24  98 Nasal Cannula 3.0 32

















Intake and Output  


 


 1/15/18 1/16/18





 19:00 07:00


 


Intake Total 1681.2423 ml 1860.60 ml


 


Output Total 550 ml 550 ml


 


Balance 1131.2423 ml 1310.60 ml


 


  


 


Intake Oral 300 ml 350 ml


 


IV Total 1381.2423 ml 1510.60 ml


 


Output Urine Total 550 ml 550 ml








General Appearance:  no acute distress


HEENT:  normocephalic


Respiratory/Chest:  chest wall non-tender, lungs clear


Cardiovascular:  normal peripheral pulses, tachycardia


Abdomen:  normal bowel sounds





Microbiology








 Date/Time


Source Procedure


Growth Status


 


 


 1/13/18 16:25


Blood Blood Culture - Preliminary


NO GROWTH AFTER 48 HOURS Resulted


 


 1/13/18 16:00


Blood Blood Culture - Preliminary


NO GROWTH AFTER 48 HOURS Resulted





 1/14/18 01:00


Sputum Expectorated Gram Stain - Final Complete


 


 1/14/18 01:00 Sputum Culture - Final


Candida Albicans


Usual Upper Respiratory Denise Complete








Laboratory Tests


1/15/18 14:40: Activated Partial Thromboplast Time 82H


1/16/18 04:35: 


Activated Partial Thromboplast Time 88H, White Blood Count 23.3*H, Red Blood 

Count 2.56L, Hemoglobin 7.5L, Hematocrit 21.9L, Mean Corpuscular Volume 85, 

Mean Corpuscular Hemoglobin 29.2, Mean Corpuscular Hemoglobin Concent 34.2, Red 

Cell Distribution Width 13.0, Platelet Count 334, Mean Platelet Volume 8.0, 

Neutrophils (%) (Auto) , Lymphocytes (%) (Auto) , Monocytes (%) (Auto) , 

Eosinophils (%) (Auto) , Basophils (%) (Auto) , Neutrophils % (Manual) [Pending]

, Lymphocytes % (Manual) [Pending], Platelet Estimate [Pending], Platelet 

Morphology [Pending], Sodium Level 141, Potassium Level 3.9, Chloride Level 112H

, Carbon Dioxide Level 14L, Anion Gap 15, Blood Urea Nitrogen 34H, Creatinine 

3.0H, Estimat Glomerular Filtration Rate 25.7, Glucose Level 155H, Calcium 

Level 8.3L





Current Medications








 Medications


  (Trade)  Dose


 Ordered  Sig/Judie


 Route


 PRN Reason  Start Time


 Stop Time Status Last Admin


Dose Admin


 


 Acetaminophen


  (Tylenol)  650 mg  Q4H  PRN


 ORAL


 Mild Pain/Temp > 100.5  1/10/18 08:45


 2/5/18 00:44  1/14/18 20:35


 


 


 Clindamycin HCl/


 Dextrose  50 ml @ 


 100 mls/hr  Q8H


 IV


   1/15/18 18:00


 1/22/18 17:59  1/16/18 01:49


 


 


 Dextrose


  (Dextrose 50%)    STAT  PRN


 IV


 Hypoglycemia  1/10/18 08:30


 2/9/18 08:29   


 


 


 Diphenoxylate HCl/


 Atropine


  (Lomotil)  2.5 mg  Q4H  PRN


 ORAL


 Diarrhea  1/10/18 13:15


 2/9/18 13:14  1/12/18 14:13


 


 


 Folic Acid


  (Folate)  1 mg  DAILY


 ORAL


   1/12/18 15:00


 2/11/18 14:59  1/15/18 08:42


 


 


 Heparin Sodium/


 Dextrose  500 ml @ 


 32.55 mls/


 hr  adjust per protocol


 IV


   1/15/18 08:45


 2/14/18 08:44  1/15/18 21:51


 


 


 Ipratropium


 Bromide


  (Atrovent)  500 mcg  Q2H  PRN


 HHN


 Shortness of Breath  1/14/18 20:15


 1/19/18 20:14   


 


 


 Ipratropium


 Bromide


  (Atrovent)  500 mcg  Q4HRT


 HHN


   1/14/18 23:00


 1/19/18 22:59  1/16/18 07:05


 


 


 Iron Sucrose 100


 mg/Sodium Chloride  60 ml @ 


 240 mls/hr  BEDTIME


 IV


   1/12/18 21:00


 1/16/18 21:14  1/15/18 20:51


 


 


 Levalbuterol HCl


  (Xopenex)  1.25 mg  Q2H  PRN


 INH


 Shortness of Breath  1/14/18 20:15


 1/19/18 20:14   


 


 


 Levalbuterol HCl


  (Xopenex)  1.25 mg  Q4HRT


 HHN


   1/14/18 23:00


 1/19/18 22:59  1/16/18 07:05


 


 


 Magnesium


 Hydroxide


  (Mom)  30 ml  BIDPRN  PRN


 ORAL


 constipation  1/10/18 08:15


 2/6/18 08:14   


 


 


 Morphine Sulfate


  (Morphine


 Sulfate)  2 mg  Q4H  PRN


 IVP


 Moderate to Severe Pain  1/13/18 18:30


 1/20/18 18:29  1/16/18 05:53


 


 


 Ondansetron HCl


  (Zofran)  4 mg  Q6H  PRN


 IVP


 Nausea & Vomiting  1/10/18 08:15


 2/5/18 08:14   


 


 


 Pantoprazole


  (Protonix)  40 mg  DAILY


 ORAL


   1/14/18 09:00


 2/13/18 08:59  1/15/18 08:43


 


 


 Piperacillin Sod/


 Tazobactam Sod


 3.375 gm/Dextrose  55 ml @ 


 110 mls/hr  Q8HR


 IVPB


   1/14/18 14:00


 1/21/18 13:59  1/16/18 05:37


 


 


 Potassium Chloride


  (K-Dur)  20 meq  TWICE A  DAY


 ORAL


   1/12/18 13:15


 2/11/18 13:14  1/15/18 17:05


 


 


 Sodium Chloride  1,000 ml @ 


 75 mls/hr  [id number]


 IV


   1/11/18 14:00


 2/10/18 13:59  1/16/18 01:49


 


 


 Vitamin B Complex/


 Vit C/Folic Acid


  (Nephrovite)  1 tab  DAILY


 ORAL


   1/10/18 09:00


 2/8/18 08:59  1/15/18 08:42


 

















Ethan Tom MD Jan 16, 2018 09:58

## 2018-01-16 NOTE — GENERAL PROGRESS NOTE
Assessment/Plan


Assessment/Plan


#. DVT of the bilateral lower extremities with concern for PE but unable to 

undergo CT angio


--> agree to continue heparin gtt at this time, can take xarelto or eliquis as 

outpatient


--> if anemia worsens in future, consider ivc filter


#. Anemia, likely related to chronic disease in addition to kidney disease.  

Ferritin is elevated


--> Hemoglobin goal >7


--> Does not need transfusion at the moment. 


#. Anemia secondary to sickle cell disease (hemoglobin electrophoresis confirms 

he has sickle trait)


#. Leukocytosis with fever.  It is unlikely the patient has a sickle cell 

disease  MCV.  Peripheral smear has been reviewed. 


#. Nausea, vomiting, and diarrhea.  Evaluated by GI Service.  Outpatient 

gastrointestinal procedure as needed.


#. Abdominal pain.  Imaging reviewed.


#. Sepsis.  Likely due to PNA, on broad specturm abx


#. Hypertension, currently better, is improving.  Continue to closely monitor.





Subjective


HEENT:  Denies: no symptoms, eye pain, blurred vision, tearing, double vision, 

ear pain, ear discharge, nose pain, nose congestion, throat pain, throat 

swelling, mouth pain, mouth swelling, other


Cardiovascular:  Denies: no symptoms, chest pain, edema, irregular heart rate, 

lightheadedness, palpitations, syncope, other


Respiratory:  Denies: no symptoms, cough, orthopnea, shortness of breath, SOB 

with excertion, SOB at rest, sputum, stridor, wheezing, other


Gastrointestinal/Abdominal:  Denies: no symptoms, abdomen distended, abdominal 

pain, black stools, tarry stools, blood in stool, constipated, diarrhea, 

difficulty swallowing, nausea, poor appetite, poor fluid intake, rectal bleeding

, vomiting, other


Genitourinary:  Denies: no symptoms, burning, discharge, frequency, flank pain, 

hematuria, incontinence, pain, urgency, other


Neurologic/Psychiatric:  Denies: no symptoms, anxiety, depressed, emotional 

problems, headache, numbness, paresthesia, pre-existing deficit, seizure, 

tingling, tremors, weakness, other


Endocrine:  Denies: no symptoms, excessive sweating, flushing, intolerance to 

cold, intolerance to heat, increased hunger, increased thirst, increased urine, 

unexplained weight gain, unexplained weight loss, other


Hematologic/Lymphatic:  Denies: no symptoms, anemia, easy bleeding, easy 

bruising, other


Allergies:  


Coded Allergies:  


     CODEINE (Verified  Allergy, Unknown, 1/5/18)


Uncoded Allergies:  


     PEANUTS (Adverse Reaction, Severe, Anaphylaxis, 1/9/18)


Subjective


Has fever. On pain control. Lying in bed.on heparin gtt





Objective





Last 24 Hour Vital Signs








  Date Time  Temp Pulse Resp B/P (MAP) Pulse Ox O2 Delivery O2 Flow Rate FiO2


 


1/16/18 16:15 99.4       


 


1/16/18 16:00 98.4 123 26 157/96 96 Nasal Cannula 3.0 


 


1/16/18 15:49  119      


 


1/16/18 15:12  124 22  98 Nasal Cannula 5.0 40


 


1/16/18 14:56  121 24  96 Nasal Cannula 5.0 40


 


1/16/18 12:10 99.4       


 


1/16/18 12:00 99.4 120 26 143/93 97 Nasal Cannula 3.0 


 


1/16/18 12:00  122      


 


1/16/18 11:16  120 20  98 Nasal Cannula 3.0 32


 


1/16/18 11:11  118 22  96 Nasal Cannula 2.0 28


 


1/16/18 08:00 100.0 114 22 126/67 97 Nasal Cannula 3.0 


 


1/16/18 07:36  115      


 


1/16/18 07:14  114 22  98 Nasal Cannula 3.0 32


 


1/16/18 07:10     97 Nasal Cannula 3.0 32


 


1/16/18 07:09      Nasal Cannula 3.0 32


 


1/16/18 07:09  113 22  97 Nasal Cannula 3.0 32


 


1/16/18 04:00 98.4 113 32 134/68 98 Nasal Cannula 3.0 


 


1/16/18 04:00  111      


 


1/16/18 03:15  106 24  97 Nasal Cannula 3.0 32


 


1/16/18 03:05  116 22  96 Nasal Cannula 3.0 32


 


1/16/18 00:00  124      


 


1/16/18 00:00 99.6 118 32 126/68 97 Nasal Cannula 3.0 


 


1/15/18 23:10  103 24  98 Nasal Cannula 3.0 32


 


1/15/18 22:59  103 24  95 Nasal Cannula 3.0 32


 


1/15/18 20:00 97.9 127 24 129/69 98 Nasal Cannula 3.0 


 


1/15/18 20:00  123      

















Intake and Output  


 


 1/15/18 1/16/18





 19:00 07:00


 


Intake Total 1681.2423 ml 1893.15 ml


 


Output Total 550 ml 550 ml


 


Balance 1131.2423 ml 1343.15 ml


 


  


 


Intake Oral 300 ml 350 ml


 


IV Total 1381.2423 ml 1543.15 ml


 


Output Urine Total 550 ml 550 ml








Laboratory Tests


1/16/18 04:35: 


White Blood Count 23.3*H, Red Blood Count 2.56L, Hemoglobin 7.5L, Hematocrit 

21.9L, Mean Corpuscular Volume 85, Mean Corpuscular Hemoglobin 29.2, Mean 

Corpuscular Hemoglobin Concent 34.2, Red Cell Distribution Width 13.0, Platelet 

Count 334, Mean Platelet Volume 8.0, Neutrophils (%) (Auto) , Lymphocytes (%) (

Auto) , Monocytes (%) (Auto) , Eosinophils (%) (Auto) , Basophils (%) (Auto) , 

Differential Total Cells Counted 100, Neutrophils % (Manual) 89H, Lymphocytes % 

(Manual) 5L, Monocytes % (Manual) 5, Eosinophils % (Manual) 1, Basophils % (

Manual) 0, Band Neutrophils 0, Platelet Estimate Adequate, Platelet Morphology 

Normal, Busy Cells Occasional, Activated Partial Thromboplast Time 88H, Sodium 

Level 141, Potassium Level 3.9, Chloride Level 112H, Carbon Dioxide Level 14L, 

Anion Gap 15, Blood Urea Nitrogen 34H, Creatinine 3.0H, Estimat Glomerular 

Filtration Rate 25.7, Glucose Level 155H, Calcium Level 8.3L


Height (Feet):  5


Height (Inches):  10.00


Weight (Pounds):  138


General Appearance:  alert


EENT:  TMs normal


Neck:  supple


Cardiovascular:  regular rhythm


Respiratory/Chest:  chest wall non-tender


Abdomen:  no organomegaly


Extremities:  non-tender


Edema:  1+ Leg (L), 1+ Leg (R)


Neurologic:  alert


Skin:  normal pigmentation











Joe Sanches Jan 16, 2018 19:32

## 2018-01-16 NOTE — INFECTIOUS DISEASES PROG NOTE
Assessment/Plan


Problems:  


(1) HAP (hospital-acquired pneumonia)


Assessment & Plan:  await  sputum culture , continue  zosyn and  clindamycin to 

cover for MRSA empirically , WBC went up suspect due to bleeding too into the 

renal cysts , monitor CXR 





(2) Sepsis


Assessment & Plan:  due to the above , await  blood culture, continue  zosyn  

and clindamycin empiric coverage 





(3) Renal cyst, native, hemorrhage


Assessment & Plan:  with no evidence of  pyelonephritis, blood culture remains 

negative ,  urine culture grew mixed contaminant , now on zosyn empiric 

coverage , had urology eval with no intervention, repeated CT scan of the 

abdomen showed recurrent bleeding . recommend urology eval again , since he is 

on heparin drip now 





(4) Abdominal pain


Assessment & Plan:  due to bleeding into the left renal cysts , recommend 

urology eval and better  pain management  





(5) Fever


Assessment & Plan:  due to the above , on wide spectrum  antibiotics empiric 

coverage , continue  tylenol prn 





(6) Diarrhea


Assessment & Plan:  no evidence of  C diff , continue antidiarrhea meds  





(7) DVT (deep venous thrombosis)


Assessment & Plan:  in both legs, with possible PE, on heparin drip , recommend 

close monitor of PT/PTT, high risk for bleeding in the renal cyst 








Subjective


Constitutional:  Reports: fever, fatigue


HEENT:  Reports: no symptoms


Respiratory:  Reports: shortness of breath, dry cough


Breasts:  Reports: no symptoms


Cardiovascular:  Reports: no symptoms


Gastrointestinal/Abdominal:  Reports: bloating, other - flank pain


Genitourinary:  Reports: hematuria, frequency


Neurologic:  Reports: no symptoms


Psychiatric:  Reports: no symptoms


Skin:  Reports: no symptoms


Endocrine:  Reports: no symptoms


Hematologic:  Reports: no symptoms


Allergies:  


Coded Allergies:  


     CODEINE (Verified  Allergy, Unknown, 1/5/18)


Uncoded Allergies:  


     PEANUTS (Adverse Reaction, Severe, Anaphylaxis, 1/9/18)





Objective


Vital Signs





Last 24 Hour Vital Signs








  Date Time  Temp Pulse Resp B/P (MAP) Pulse Ox O2 Delivery O2 Flow Rate FiO2


 


1/16/18 15:12  124 22  98 Nasal Cannula 5.0 40


 


1/16/18 14:56  121 24  96 Nasal Cannula 5.0 40


 


1/16/18 12:10 99.4       


 


1/16/18 12:00 99.4 120 26 143/93 97 Nasal Cannula 3.0 


 


1/16/18 12:00  122      


 


1/16/18 11:16  120 20  98 Nasal Cannula 3.0 32


 


1/16/18 11:11  118 22  96 Nasal Cannula 2.0 28


 


1/16/18 08:00 100.0 114 22 126/67 97 Nasal Cannula 3.0 


 


1/16/18 07:36  115      


 


1/16/18 07:14  114 22  98 Nasal Cannula 3.0 32


 


1/16/18 07:10     97 Nasal Cannula 3.0 32


 


1/16/18 07:09      Nasal Cannula 3.0 32


 


1/16/18 07:09  113 22  97 Nasal Cannula 3.0 32


 


1/16/18 04:00 98.4 113 32 134/68 98 Nasal Cannula 3.0 


 


1/16/18 04:00  111      


 


1/16/18 03:15  106 24  97 Nasal Cannula 3.0 32


 


1/16/18 03:05  116 22  96 Nasal Cannula 3.0 32


 


1/16/18 00:00  124      


 


1/16/18 00:00 99.6 118 32 126/68 97 Nasal Cannula 3.0 


 


1/15/18 23:10  103 24  98 Nasal Cannula 3.0 32


 


1/15/18 22:59  103 24  95 Nasal Cannula 3.0 32


 


1/15/18 20:00 97.9 127 24 129/69 98 Nasal Cannula 3.0 


 


1/15/18 20:00  123      


 


1/15/18 19:01  110 24  98 Nasal Cannula 3.0 32


 


1/15/18 18:47     98 Nasal Cannula 3.0 32


 


1/15/18 18:47      Nasal Cannula 3.0 32


 


1/15/18 18:46  111 24  98 Nasal Cannula 3.0 32


 


1/15/18 16:00  118      


 


1/15/18 16:00 97.9 119 21 132/67 100 Nasal Cannula 4.0 








Height (Feet):  5


Height (Inches):  10.00


Weight (Pounds):  138


General Appearance:  WD/WN, no acute distress


HEENT:  normocephalic, atraumatic, anicteric, mucous membranes moist, PERRL


Respiratory/Chest:  chest wall non-tender, no respiratory distress, no 

accessory muscle use, decreased breath sounds, crackles/rales


Cardiovascular:  normal peripheral pulses, normal rate, regular rhythm, no 

gallop/murmur, no JVD


Abdomen:  no organomegaly, non distended, no mass, no scars, hypoactive bowel 

sounds, tender


Genitourinary:  normal external genitalia


Extremities:  no cyanosis, no clubbing


Skin:  no rash, no lesions, no ulcers


Neurologic/Psychiatric:  alert, oriented x 3, responsive





Microbiology








 Date/Time


Source Procedure


Growth Status


 


 


 1/13/18 16:25


Blood Blood Culture - Preliminary


NO GROWTH AFTER 48 HOURS Resulted


 


 1/13/18 16:00


Blood Blood Culture - Preliminary


NO GROWTH AFTER 48 HOURS Resulted





 1/14/18 01:00


Sputum Expectorated Gram Stain - Final Complete


 


 1/14/18 01:00 Sputum Culture - Final


Candida Albicans


Usual Upper Respiratory Denise Complete











Laboratory Tests








Test


  1/16/18


04:35


 


White Blood Count


  23.3 K/UL


(4.8-10.8)  *H


 


Red Blood Count


  2.56 M/UL


(4.70-6.10)  L


 


Hemoglobin


  7.5 G/DL


(14.2-18.0)  L


 


Hematocrit


  21.9 %


(42.0-52.0)  L


 


Mean Corpuscular Volume 85 FL (80-99)  


 


Mean Corpuscular Hemoglobin


  29.2 PG


(27.0-31.0)


 


Mean Corpuscular Hemoglobin


Concent 34.2 G/DL


(32.0-36.0)


 


Red Cell Distribution Width


  13.0 %


(11.6-14.8)


 


Platelet Count


  334 K/UL


(150-450)


 


Mean Platelet Volume


  8.0 FL


(6.5-10.1)


 


Neutrophils (%) (Auto)


  % (45.0-75.0)


 


 


Lymphocytes (%) (Auto)


  % (20.0-45.0)


 


 


Monocytes (%) (Auto)  % (1.0-10.0)  


 


Eosinophils (%) (Auto)  % (0.0-3.0)  


 


Basophils (%) (Auto)  % (0.0-2.0)  


 


Differential Total Cells


Counted 100  


 


 


Neutrophils % (Manual) 89 % (45-75)  H


 


Lymphocytes % (Manual) 5 % (20-45)  L


 


Monocytes % (Manual) 5 % (1-10)  


 


Eosinophils % (Manual) 1 % (0-3)  


 


Basophils % (Manual) 0 % (0-2)  


 


Band Neutrophils 0 % (0-8)  


 


Platelet Estimate Adequate  


 


Platelet Morphology Normal  


 


Keira Cells Occasional  


 


Activated Partial


Thromboplast Time 88 SEC (23-33)


H


 


Sodium Level


  141 MMOL/L


(136-145)


 


Potassium Level


  3.9 MMOL/L


(3.5-5.1)


 


Chloride Level


  112 MMOL/L


()  H


 


Carbon Dioxide Level


  14 MMOL/L


(21-32)  L


 


Anion Gap


  15 mmol/L


(5-15)


 


Blood Urea Nitrogen


  34 mg/dL


(7-18)  H


 


Creatinine


  3.0 MG/DL


(0.55-1.30)  H


 


Estimat Glomerular Filtration


Rate 25.7 mL/min


(>60)


 


Glucose Level


  155 MG/DL


()  H


 


Calcium Level


  8.3 MG/DL


(8.5-10.1)  L











Current Medications








 Medications


  (Trade)  Dose


 Ordered  Sig/Judie


 Route


 PRN Reason  Start Time


 Stop Time Status Last Admin


Dose Admin


 


 Acetaminophen


  (Tylenol)  650 mg  Q4H  PRN


 ORAL


 Mild Pain/Temp > 100.5  1/10/18 08:45


 2/5/18 00:44  1/14/18 20:35


 


 


 Clindamycin HCl/


 Dextrose  50 ml @ 


 100 mls/hr  Q8H


 IV


   1/15/18 18:00


 1/22/18 17:59  1/16/18 10:04


 


 


 Dextrose


  (Dextrose 50%)    STAT  PRN


 IV


 Hypoglycemia  1/10/18 08:30


 2/9/18 08:29   


 


 


 Diphenoxylate HCl/


 Atropine


  (Lomotil)  2.5 mg  Q4H  PRN


 ORAL


 Diarrhea  1/10/18 13:15


 2/9/18 13:14  1/12/18 14:13


 


 


 Folic Acid


  (Folate)  1 mg  DAILY


 ORAL


   1/12/18 15:00


 2/11/18 14:59  1/16/18 10:05


 


 


 Heparin Sodium/


 Dextrose  500 ml @ 


 32.55 mls/


 hr  adjust per protocol


 IV


   1/15/18 08:45


 2/14/18 08:44  1/16/18 12:58


 


 


 Ipratropium


 Bromide


  (Atrovent)  500 mcg  Q2H  PRN


 HHN


 Shortness of Breath  1/14/18 20:15


 1/19/18 20:14   


 


 


 Ipratropium


 Bromide


  (Atrovent)  500 mcg  Q4HRT


 HHN


   1/14/18 23:00


 1/19/18 22:59  1/16/18 14:53


 


 


 Iron Sucrose 100


 mg/Sodium Chloride  60 ml @ 


 240 mls/hr  BEDTIME


 IV


   1/12/18 21:00


 1/16/18 21:14  1/15/18 20:51


 


 


 Levalbuterol HCl


  (Xopenex)  1.25 mg  Q2H  PRN


 INH


 Shortness of Breath  1/14/18 20:15


 1/19/18 20:14   


 


 


 Levalbuterol HCl


  (Xopenex)  1.25 mg  Q4HRT


 HHN


   1/14/18 23:00


 1/19/18 22:59  1/16/18 14:53


 


 


 Magnesium


 Hydroxide


  (Mom)  30 ml  BIDPRN  PRN


 ORAL


 constipation  1/10/18 08:15


 2/6/18 08:14   


 


 


 Morphine Sulfate


  (Morphine


 Sulfate)  2 mg  Q3H  PRN


 IVP


 Severe Pain  1/16/18 15:15


 1/23/18 15:14   


 


 


 Ondansetron HCl


  (Zofran)  4 mg  Q6H  PRN


 IVP


 Nausea & Vomiting  1/10/18 08:15


 2/5/18 08:14   


 


 


 Pantoprazole


  (Protonix)  40 mg  DAILY


 ORAL


   1/14/18 09:00


 2/13/18 08:59  1/16/18 10:04


 


 


 Piperacillin Sod/


 Tazobactam Sod


 3.375 gm/Dextrose  55 ml @ 


 110 mls/hr  Q8HR


 IVPB


   1/14/18 14:00


 1/21/18 13:59  1/16/18 14:15


 


 


 Potassium Chloride


  (K-Dur)  20 meq  TWICE A  DAY


 ORAL


   1/12/18 13:15


 2/11/18 13:14  1/16/18 10:04


 


 


 Sodium Chloride  1,000 ml @ 


 75 mls/hr  [id number]


 IV


   1/11/18 14:00


 2/10/18 13:59  1/16/18 14:14


 


 


 Vitamin B Complex/


 Vit C/Folic Acid


  (Nephrovite)  1 tab  DAILY


 OR[facility]


   1/10/18 09:00


 2/8/18 08:59  1/16/18 10:04


 

















Pablo Talley M.D. Jan 16, 2018 15:52

## 2018-01-16 NOTE — CARDIOLOGY REPORT
--------------- APPROVED REPORT --------------





EKG Measurement

Heart [id number]

WV [id number]

[id number]

[id number]

[id number]





Sinus tachycardia

Otherwise normal ECG

## 2018-01-16 NOTE — GI PROGRESS NOTE
Assessment/Plan


Problems:  


(1) Diarrhea


ICD Codes:  R19.7 - Diarrhea, unspecified


SNOMED:  73079283


(2) Sickle cell trait


ICD Codes:  D57.3 - Sickle-cell trait


SNOMED:  14839099


(3) Abdominal pain


ICD Codes:  R10.9 - Unspecified abdominal pain


SNOMED:  31577267


(4) Renal cyst, native, hemorrhage


ICD Codes:  N28.89 - Other specified disorders of kidney and ureter; N28.1 - 

Cyst of kidney, acquired


SNOMED:  48461674


Status:  unchanged


Status Narrative


Discussed with Dr. Gamez.


Assessment/Plan


anemia work up


- no iron supplementation given elevated ferritin levels


OB stool r/o GI bleed  >> negative


stool cx >> negative


cdiff >> negative


O&P >> negative


diarrhea resolved >> lomotil prn





monitor H&H, prn transfusions


renal diet, tolerating


ppi


abx


fu labs


outpatient GI procedures





Subjective


Subjective


generalized weakness


diarrhea resolved





Objective





Last 24 Hour Vital Signs








  Date Time  Temp Pulse Resp B/P (MAP) Pulse Ox O2 Delivery O2 Flow Rate FiO2


 


1/16/18 15:49  119      


 


1/16/18 15:12  124 22  98 Nasal Cannula 5.0 40


 


1/16/18 14:56  121 24  96 Nasal Cannula 5.0 40


 


1/16/18 12:10 99.4       


 


1/16/18 12:00 99.4 120 26 143/93 97 Nasal Cannula 3.0 


 


1/16/18 12:00  122      


 


1/16/18 11:16  120 20  98 Nasal Cannula 3.0 32


 


1/16/18 11:11  118 22  96 Nasal Cannula 2.0 28


 


1/16/18 08:00 100.0 114 22 126/67 97 Nasal Cannula 3.0 


 


1/16/18 07:36  115      


 


1/16/18 07:14  114 22  98 Nasal Cannula 3.0 32


 


1/16/18 07:10     97 Nasal Cannula 3.0 32


 


1/16/18 07:09      Nasal Cannula 3.0 32


 


1/16/18 07:09  113 22  97 Nasal Cannula 3.0 32


 


1/16/18 04:00 98.4 113 32 134/68 98 Nasal Cannula 3.0 


 


1/16/18 04:00  111      


 


1/16/18 03:15  106 24  97 Nasal Cannula 3.0 32


 


1/16/18 03:05  116 22  96 Nasal Cannula 3.0 32


 


1/16/18 00:00  124      


 


1/16/18 00:00 99.6 118 32 126/68 97 Nasal Cannula 3.0 


 


1/15/18 23:10  103 24  98 Nasal Cannula 3.0 32


 


1/15/18 22:59  103 24  95 Nasal Cannula 3.0 32


 


1/15/18 20:00 97.9 127 24 129/69 98 Nasal Cannula 3.0 


 


1/15/18 20:00  123      


 


1/15/18 19:01  110 24  98 Nasal Cannula 3.0 32


 


1/15/18 18:47     98 Nasal Cannula 3.0 32


 


1/15/18 18:47      Nasal Cannula 3.0 32


 


1/15/18 18:46  111 24  98 Nasal Cannula 3.0 32


 


1/15/18 16:00  118      


 


1/15/18 16:00 97.9 119 21 132/67 100 Nasal Cannula 4.0 

















Intake and Output  


 


 1/15/18 1/16/18





 19:00 07:00


 


Intake Total 1681.2423 ml 1893.15 ml


 


Output Total 550 ml 550 ml


 


Balance 1131.2423 ml 1343.15 ml


 


  


 


Intake Oral 300 ml 350 ml


 


IV Total 1381.2423 ml 1543.15 ml


 


Output Urine Total 550 ml 550 ml











Laboratory Tests








Test


  1/16/18


04:35


 


White Blood Count


  23.3 K/UL


(4.8-10.8)  *H


 


Red Blood Count


  2.56 M/UL


(4.70-6.10)  L


 


Hemoglobin


  7.5 G/DL


(14.2-18.0)  L


 


Hematocrit


  21.9 %


(42.0-52.0)  L


 


Mean Corpuscular Volume 85 FL (80-99)  


 


Mean Corpuscular Hemoglobin


  29.2 PG


(27.0-31.0)


 


Mean Corpuscular Hemoglobin


Concent 34.2 G/DL


(32.0-36.0)


 


Red Cell Distribution Width


  13.0 %


(11.6-14.8)


 


Platelet Count


  334 K/UL


(150-450)


 


Mean Platelet Volume


  8.0 FL


(6.5-10.1)


 


Neutrophils (%) (Auto)


  % (45.0-75.0)


 


 


Lymphocytes (%) (Auto)


  % (20.0-45.0)


 


 


Monocytes (%) (Auto)  % (1.0-10.0)  


 


Eosinophils (%) (Auto)  % (0.0-3.0)  


 


Basophils (%) (Auto)  % (0.0-2.0)  


 


Differential Total Cells


Counted 100  


 


 


Neutrophils % (Manual) 89 % (45-75)  H


 


Lymphocytes % (Manual) 5 % (20-45)  L


 


Monocytes % (Manual) 5 % (1-10)  


 


Eosinophils % (Manual) 1 % (0-3)  


 


Basophils % (Manual) 0 % (0-2)  


 


Band Neutrophils 0 % (0-8)  


 


Platelet Estimate Adequate  


 


Platelet Morphology Normal  


 


[place] Cells Occasional  


 


Activated Partial


Thromboplast Time 88 SEC (23-33)


H


 


Sodium Level


  141 MMOL/L


(136-145)


 


Potassium Level


  3.9 MMOL/L


(3.5-5.1)


 


Chloride Level


  112 MMOL/L


()  H


 


Carbon Dioxide Level


  14 MMOL/L


(21-32)  L


 


Anion Gap


  15 mmol/L


(5-15)


 


Blood Urea Nitrogen


  34 mg/dL


(7-18)  H


 


Creatinine


  3.0 MG/DL


(0.55-1.30)  H


 


Estimat Glomerular Filtration


Rate 25.7 mL/min


(>60)


 


Glucose Level


  155 MG/DL


()  H


 


Calcium Level


  8.3 MG/DL


(8.5-10.1)  L








Height (Feet):  5


Height (Inches):  10.00


Weight (Pounds):  138


General Appearance:  WD/WN, no apparent distress, alert


Cardiovascular:  normal rate


Respiratory/Chest:  normal breath sounds, no respiratory distress


Abdominal Exam:  normal bowel sounds, non tender, soft


Extremities:  normal range of motion, non-tender











Elsy Lakhani NBRIGID Jan 16, 2018 15:53

## 2018-01-16 NOTE — CARDIAC ELECTROPHYSIOLOGY PN
Assessment/Plan


Assessment/Plan


1. Sinus Tachycardia due to  sickle cell , fever , anemia and possible PE in 

view of acute bilateral DVT. Better after 1 unit PRBC and on heparin drip.


       Echocardiogram  EF 60%.  No SVT or VT.





2.  Sepsis, on intravenous antibiotic per Dr. Talley. 





3.  Abdominal pain, likely due to renal cysts and hemorrhage.





4.  Diarrhea. Clostridium difficile colitis has been ruled out.  


     Stool culture negative.





5. Hematuria and anemia. Hb stable in 7.5-8 range





6. Acute bilateral LE DVT. Has creatinine 2.5 and Chest CT angio put him at 

risk of going on HD.


    Continue with iv heparin.





DW RN





Subjective


Subjective


  Tachycardia is better.  On heparin for acute bilateral LE DVT. No chest pain 

or SOB.





Objective





Last 24 Hour Vital Signs








  Date Time  Temp Pulse Resp B/P (MAP) Pulse Ox O2 Delivery O2 Flow Rate FiO2


 


1/16/18 16:15 99.4       


 


1/16/18 15:49  119      


 


1/16/18 15:12  124 22  98 Nasal Cannula 5.0 40


 


1/16/18 14:56  121 24  96 Nasal Cannula 5.0 40


 


1/16/18 12:10 99.4       


 


1/16/18 12:00 99.4 120 26 143/93 97 Nasal Cannula 3.0 


 


1/16/18 12:00  122      


 


1/16/18 11:16  120 20  98 Nasal Cannula 3.0 32


 


1/16/18 11:11  118 22  96 Nasal Cannula 2.0 28


 


1/16/18 08:00 100.0 114 22 126/67 97 Nasal Cannula 3.0 


 


1/16/18 07:36  115      


 


1/16/18 07:14  114 22  98 Nasal Cannula 3.0 32


 


1/16/18 07:10     97 Nasal Cannula 3.0 32


 


1/16/18 07:09      Nasal Cannula 3.0 32


 


1/16/18 07:09  113 22  97 Nasal Cannula 3.0 32


 


1/16/18 04:00 98.4 113 32 134/68 98 Nasal Cannula 3.0 


 


1/16/18 04:00  111      


 


1/16/18 03:15  106 24  97 Nasal Cannula 3.0 32


 


1/16/18 03:05  116 22  96 Nasal Cannula 3.0 32


 


1/16/18 00:00  124      


 


1/16/18 00:00 99.6 118 32 126/68 97 Nasal Cannula 3.0 


 


1/15/18 23:10  103 24  98 Nasal Cannula 3.0 32


 


1/15/18 22:59  103 24  95 Nasal Cannula 3.0 32


 


1/15/18 20:00 97.9 127 24 129/69 98 Nasal Cannula 3.0 


 


1/15/18 20:00  123      


 


1/15/18 19:01  110 24  98 Nasal Cannula 3.0 32


 


1/15/18 18:47     98 Nasal Cannula 3.0 32


 


1/15/18 18:47      Nasal Cannula 3.0 32


 


1/15/18 18:46  111 24  98 Nasal Cannula 3.0 32

















Intake and Output  


 


 1/15/18 1/16/18





 19:00 07:00


 


Intake Total 1681.2423 ml 1893.15 ml


 


Output Total 550 ml 550 ml


 


Balance 1131.2423 ml 1343.15 ml


 


  


 


Intake Oral 300 ml 350 ml


 


IV Total 1381.2423 ml 1543.15 ml


 


Output Urine Total 550 ml 550 ml











Laboratory Tests








Test


  1/16/18


04:35


 


White Blood Count


  23.3 K/UL


(4.8-10.8)  *H


 


Red Blood Count


  2.56 M/UL


(4.70-6.10)  L


 


Hemoglobin


  7.5 G/DL


(14.2-18.0)  L


 


Hematocrit


  21.9 %


(42.0-52.0)  L


 


Mean Corpuscular Volume 85 FL (80-99)  


 


Mean Corpuscular Hemoglobin


  29.2 PG


(27.0-31.0)


 


Mean Corpuscular Hemoglobin


Concent 34.2 G/DL


(32.0-36.0)


 


Red Cell Distribution Width


  13.0 %


(11.6-14.8)


 


Platelet Count


  334 K/UL


(150-450)


 


Mean Platelet Volume


  8.0 FL


(6.5-10.1)


 


Neutrophils (%) (Auto)


  % (45.0-75.0)


 


 


Lymphocytes (%) (Auto)


  % (20.0-45.0)


 


 


Monocytes (%) (Auto)  % (1.0-10.0)  


 


Eosinophils (%) (Auto)  % (0.0-3.0)  


 


Basophils (%) (Auto)  % (0.0-2.0)  


 


Differential Total Cells


Counted 100  


 


 


Neutrophils % (Manual) 89 % (45-75)  H


 


Lymphocytes % (Manual) 5 % (20-45)  L


 


Monocytes % (Manual) 5 % (1-10)  


 


Eosinophils % (Manual) 1 % (0-3)  


 


Basophils % (Manual) 0 % (0-2)  


 


Band Neutrophils 0 % (0-8)  


 


Platelet Estimate Adequate  


 


Platelet Morphology Normal  


 


[place]ermann Cells Occasional  


 


Activated Partial


Thromboplast Time 88 SEC (23-33)


H


 


Sodium Level


  141 MMOL/L


(136-145)


 


Potassium Level


  3.9 MMOL/L


(3.5-5.1)


 


Chloride Level


  112 MMOL/L


()  H


 


Carbon Dioxide Level


  14 MMOL/L


(21-32)  L


 


Anion Gap


  15 mmol/L


(5-15)


 


Blood Urea Nitrogen


  34 mg/dL


(7-18)  H


 


Creatinine


  3.0 MG/DL


(0.55-1.30)  H


 


Estimat Glomerular Filtration


Rate 25.7 mL/min


(>60)


 


Glucose Level


  155 MG/DL


()  H


 


Calcium Level


  8.3 MG/DL


(8.5-10.1)  L











Microbiology








 Date/Time


Source Procedure


Growth Status


 


 


 1/14/18 01:00


Sputum Expectorated Gram Stain - Final Complete


 


 1/14/18 01:00 Sputum Culture - Final


Candida Albicans


Usual Upper Respiratory Denise Complete








Objective


HEAD AND NECK:  No JVD.


LUNGS:  Clear.


CARDIOVASCULAR:  Tachycardic S1 and S2 with no gallop or murmur.


ABDOMEN:  Soft and nontender.


EXTREMITIES:  Bilateral pitting edema.











KAYLEEN HAIR Jan 16, 2018 16:57

## 2018-01-16 NOTE — NEPHROLOGY PROGRESS NOTE
Assessment/Plan


Problem List:  


(1) Abdominal pain


(2) Renal mass, left


(3) Sickle cell trait


Assessment:  with crisis? 





(4) Renal cyst, native, hemorrhage


(5) Fever


(6) Tachycardia


(7) DVT (deep venous thrombosis)


(8) Pneumonia


(9) Sepsis


(10) Hematuria


(11) Severe anemia


Plan


Dr. Sanches was called for hemeonc. 


cont IVF. cont pain management.


ID following. 


empiric cefepime and metronidazole. 


d/w Dr. Jim. 


D/w urology - no need for any intervention. 


now on heparin gtt.





Subjective


Subjective


c/o SOB.





Objective


Objective





Last 24 Hour Vital Signs








  Date Time  Temp Pulse Resp B/P (MAP) Pulse Ox O2 Delivery O2 Flow Rate FiO2


 


1/16/18 12:10 99.4       


 


1/16/18 12:00 99.4 120 26 143/93 97 Nasal Cannula 3.0 


 


1/16/18 12:00  122      


 


1/16/18 11:16  120 20  98 Nasal Cannula 3.0 32


 


1/16/18 11:11  118 22  96 Nasal Cannula 2.0 28


 


1/16/18 08:00 100.0 114 22 126/67 97 Nasal Cannula 3.0 


 


1/16/18 07:36  115      


 


1/16/18 07:14  114 22  98 Nasal Cannula 3.0 32


 


1/16/18 07:10     97 Nasal Cannula 3.0 32


 


1/16/18 07:09      Nasal Cannula 3.0 32


 


1/16/18 07:09  113 22  97 Nasal Cannula 3.0 32


 


1/16/18 04:00 98.4 113 32 134/68 98 Nasal Cannula 3.0 


 


1/16/18 04:00  111      


 


1/16/18 03:15  106 24  97 Nasal Cannula 3.0 32


 


1/16/18 03:05  116 22  96 Nasal Cannula 3.0 32


 


1/16/18 00:00  124      


 


1/16/18 00:00 99.6 118 32 126/68 97 Nasal Cannula 3.0 


 


1/15/18 23:10  103 24  98 Nasal Cannula 3.0 32


 


1/15/18 22:59  103 24  95 Nasal Cannula 3.0 32


 


1/15/18 20:00 97.9 127 24 129/69 98 Nasal Cannula 3.0 


 


1/15/18 20:00  123      


 


1/15/18 19:01  110 24  98 Nasal Cannula 3.0 32


 


1/15/18 18:47     98 Nasal Cannula 3.0 32


 


1/15/18 18:47      Nasal Cannula 3.0 32


 


1/15/18 18:46  111 24  98 Nasal Cannula 3.0 32


 


1/15/18 16:00  118      


 


1/15/18 16:00 97.9 119 21 132/67 100 Nasal Cannula 4.0 


 


1/15/18 15:39  114 24  97 Nasal Cannula 3.0 32


 


1/15/18 15:22  114 24  96 Nasal Cannula 3.0 32

















Intake and Output  


 


 1/15/18 1/16/18





 19:00 07:00


 


Intake Total 1681.2423 ml 1893.15 ml


 


Output Total 550 ml 550 ml


 


Balance 1131.2423 ml 1343.15 ml


 


  


 


Intake Oral 300 ml 350 ml


 


IV Total 1381.2423 ml 1543.15 ml


 


Output Urine Total 550 ml 550 ml








Laboratory Tests


1/15/18 14:40: Activated Partial Thromboplast Time 82H


1/16/18 04:35: 


Activated Partial Thromboplast Time 88H, White Blood Count 23.3*H, Red Blood 

Count 2.56L, Hemoglobin 7.5L, Hematocrit 21.9L, Mean Corpuscular Volume 85, 

Mean Corpuscular Hemoglobin 29.2, Mean Corpuscular Hemoglobin Concent 34.2, Red 

Cell Distribution Width 13.0, Platelet Count 334, Mean Platelet Volume 8.0, 

Neutrophils (%) (Auto) , Lymphocytes (%) (Auto) , Monocytes (%) (Auto) , 

Eosinophils (%) (Auto) , Basophils (%) (Auto) , Differential Total Cells 

Counted 100, Neutrophils % (Manual) 89H, Lymphocytes % (Manual) 5L, Monocytes % 

(Manual) 5, Eosinophils % (Manual) 1, Basophils % (Manual) 0, Band Neutrophils 0

, Platelet Estimate Adequate, Platelet Morphology Normal, [place] Cells Occasional

, Sodium Level 141, Potassium Level 3.9, Chloride Level 112H, Carbon Dioxide 

Level 14L, Anion Gap 15, Blood Urea Nitrogen 34H, Creatinine 3.0H, Estimat 

Glomerular Filtration Rate 25.7, Glucose Level 155H, Calcium Level 8.3L


Height (Feet):  5


Height (Inches):  10.00


Weight (Pounds):  138


General Appearance:  moderate distress


Cardiovascular:  tachycardia


Respiratory/Chest:  respiratory distress


Abdomen:  non tender, soft


Extremities:  non-pitting


Neurologic:  alert, oriented x 3











DELVIS SKELTON Jan 16, 2018 13:55

## 2018-01-17 VITALS — DIASTOLIC BLOOD PRESSURE: 82 MMHG | SYSTOLIC BLOOD PRESSURE: 138 MMHG

## 2018-01-17 VITALS — DIASTOLIC BLOOD PRESSURE: 78 MMHG | SYSTOLIC BLOOD PRESSURE: 142 MMHG

## 2018-01-17 VITALS — SYSTOLIC BLOOD PRESSURE: 152 MMHG | DIASTOLIC BLOOD PRESSURE: 80 MMHG

## 2018-01-17 VITALS — DIASTOLIC BLOOD PRESSURE: 85 MMHG | SYSTOLIC BLOOD PRESSURE: 146 MMHG

## 2018-01-17 VITALS — DIASTOLIC BLOOD PRESSURE: 75 MMHG | SYSTOLIC BLOOD PRESSURE: 139 MMHG

## 2018-01-17 VITALS — SYSTOLIC BLOOD PRESSURE: 155 MMHG | DIASTOLIC BLOOD PRESSURE: 91 MMHG

## 2018-01-17 LAB
ADD MANUAL DIFF: YES
ANION GAP SERPL CALC-SCNC: 11 MMOL/L (ref 5–15)
BUN SERPL-MCNC: 38 MG/DL (ref 7–18)
CALCIUM SERPL-MCNC: 8.9 MG/DL (ref 8.5–10.1)
CHLORIDE SERPL-SCNC: 111 MMOL/L (ref 98–107)
CO2 SERPL-SCNC: 16 MMOL/L (ref 21–32)
CREAT SERPL-MCNC: 3.3 MG/DL (ref 0.55–1.3)
ERYTHROCYTE [DISTWIDTH] IN BLOOD BY AUTOMATED COUNT: 13.3 % (ref 11.6–14.8)
HCT VFR BLD CALC: 23.5 % (ref 42–52)
HGB BLD-MCNC: 7.7 G/DL (ref 14.2–18)
MCV RBC AUTO: 86 FL (ref 80–99)
PLATELET # BLD: 430 K/UL (ref 150–450)
POTASSIUM SERPL-SCNC: 4.9 MMOL/L (ref 3.5–5.1)
RBC # BLD AUTO: 2.74 M/UL (ref 4.7–6.1)
SODIUM SERPL-SCNC: 138 MMOL/L (ref 136–145)
WBC # BLD AUTO: 26.4 K/UL (ref 4.8–10.8)

## 2018-01-17 RX ADMIN — LEVALBUTEROL HYDROCHLORIDE SCH MG: 1.25 SOLUTION, CONCENTRATE RESPIRATORY (INHALATION) at 05:20

## 2018-01-17 RX ADMIN — HEPARIN SODIUM SCH MLS/HR: 5000 INJECTION, SOLUTION INTRAVENOUS at 22:18

## 2018-01-17 RX ADMIN — LEVALBUTEROL HYDROCHLORIDE SCH MG: 1.25 SOLUTION, CONCENTRATE RESPIRATORY (INHALATION) at 11:40

## 2018-01-17 RX ADMIN — CLINDAMYCIN PHOSPHATE SCH MLS/HR: 12 INJECTION, SOLUTION INTRAVENOUS at 09:08

## 2018-01-17 RX ADMIN — DEXTROSE MONOHYDRATE SCH MLS/HR: 50 INJECTION, SOLUTION INTRAVENOUS at 22:24

## 2018-01-17 RX ADMIN — DEXTROSE MONOHYDRATE SCH MLS/HR: 50 INJECTION, SOLUTION INTRAVENOUS at 13:37

## 2018-01-17 RX ADMIN — MORPHINE SULFATE PRN MG: 2 INJECTION, SOLUTION INTRAMUSCULAR; INTRAVENOUS at 08:14

## 2018-01-17 RX ADMIN — MORPHINE SULFATE PRN MG: 2 INJECTION, SOLUTION INTRAMUSCULAR; INTRAVENOUS at 05:08

## 2018-01-17 RX ADMIN — Medication SCH MCG: at 05:18

## 2018-01-17 RX ADMIN — CLINDAMYCIN PHOSPHATE SCH MLS/HR: 12 INJECTION, SOLUTION INTRAVENOUS at 02:02

## 2018-01-17 RX ADMIN — LEVALBUTEROL HYDROCHLORIDE SCH MG: 1.25 SOLUTION, CONCENTRATE RESPIRATORY (INHALATION) at 15:37

## 2018-01-17 RX ADMIN — CLINDAMYCIN PHOSPHATE SCH MLS/HR: 12 INJECTION, SOLUTION INTRAVENOUS at 17:47

## 2018-01-17 RX ADMIN — LEVALBUTEROL HYDROCHLORIDE PRN MG: 1.25 SOLUTION, CONCENTRATE RESPIRATORY (INHALATION) at 01:26

## 2018-01-17 RX ADMIN — Medication SCH MCG: at 15:37

## 2018-01-17 RX ADMIN — MORPHINE SULFATE PRN MG: 2 INJECTION, SOLUTION INTRAMUSCULAR; INTRAVENOUS at 11:37

## 2018-01-17 RX ADMIN — LEVALBUTEROL HYDROCHLORIDE SCH MG: 1.25 SOLUTION, CONCENTRATE RESPIRATORY (INHALATION) at 23:17

## 2018-01-17 RX ADMIN — LEVALBUTEROL HYDROCHLORIDE SCH MG: 1.25 SOLUTION, CONCENTRATE RESPIRATORY (INHALATION) at 19:01

## 2018-01-17 RX ADMIN — Medication SCH MCG: at 11:40

## 2018-01-17 RX ADMIN — HEPARIN SODIUM SCH MLS/HR: 5000 INJECTION, SOLUTION INTRAVENOUS at 05:45

## 2018-01-17 RX ADMIN — LEVALBUTEROL HYDROCHLORIDE PRN MG: 1.25 SOLUTION, CONCENTRATE RESPIRATORY (INHALATION) at 17:12

## 2018-01-17 RX ADMIN — Medication SCH TAB: at 09:08

## 2018-01-17 RX ADMIN — LEVALBUTEROL HYDROCHLORIDE SCH MG: 1.25 SOLUTION, CONCENTRATE RESPIRATORY (INHALATION) at 07:10

## 2018-01-17 RX ADMIN — Medication SCH MCG: at 07:10

## 2018-01-17 RX ADMIN — Medication SCH MCG: at 19:01

## 2018-01-17 RX ADMIN — Medication PRN MCG: at 01:26

## 2018-01-17 RX ADMIN — DEXTROSE MONOHYDRATE SCH MLS/HR: 50 INJECTION, SOLUTION INTRAVENOUS at 05:52

## 2018-01-17 RX ADMIN — Medication PRN MCG: at 17:12

## 2018-01-17 RX ADMIN — MORPHINE SULFATE PRN MG: 2 INJECTION, SOLUTION INTRAMUSCULAR; INTRAVENOUS at 22:24

## 2018-01-17 RX ADMIN — MORPHINE SULFATE PRN MG: 2 INJECTION, SOLUTION INTRAMUSCULAR; INTRAVENOUS at 02:03

## 2018-01-17 RX ADMIN — MORPHINE SULFATE PRN MG: 2 INJECTION, SOLUTION INTRAMUSCULAR; INTRAVENOUS at 18:18

## 2018-01-17 RX ADMIN — Medication SCH MCG: at 23:17

## 2018-01-17 NOTE — CARDIAC ELECTROPHYSIOLOGY PN
Assessment/Plan


Assessment/Plan


1. Sinus Tachycardia due to  sickle cell , fever , anemia and possible PE in 

view of acute bilateral DVT. 


   Better after 1 unit PRBC and on heparin drip. Echocardiogram  EF 60%.  No 

SVT or VT.





2.  Sepsis, WBC increased to 26K. On intravenous antibiotic per Dr. Talley. 





3.  Abdominal pain, likely due to renal cysts and hemorrhage.





4.  Diarrhea. Clostridium difficile colitis has been ruled out.  


     Stool culture negative.





5. Hematuria and anemia. Hb stable in 7.5-8 range





6. Acute bilateral LE DVT. Has creatinine 2.5 and Chest CT angio put him at 

risk of going on HD.


    Continue with iv heparin.





SERENA RN





Subjective


Subjective


In NSR. Still on heparin drip . Only minimal chest pain/SOB.





Objective





Last 24 Hour Vital Signs








  Date Time  Temp Pulse Resp B/P (MAP) Pulse Ox O2 Delivery O2 Flow Rate FiO2


 


1/17/18 08:00 98.9 117 19 138/82 97 Nasal Cannula 3.0 


 


1/17/18 07:13  117 23  99 Nasal Cannula 3.0 


 


1/17/18 07:11      Nasal Cannula 3.0 32


 


1/17/18 07:00  113 24  97 Nasal Cannula 3.0 32


 


1/17/18 06:59     97 Nasal Cannula 3.0 32


 


1/17/18 05:28  102 22  99 Nasal Cannula 4.0 


 


1/17/18 05:20  122 24  95 Nasal Cannula 5.0 40


 


1/17/18 04:00  111      


 


1/17/18 04:00 98.5 119 32 146/85 97 Nasal Cannula 3.0 


 


1/17/18 01:27  101 24  98 Nasal Cannula 4.0 36


 


1/17/18 01:22  114 24  97 Nasal Cannula 4.0 36


 


1/17/18 00:00  124      


 


1/17/18 00:00 99.7 124 32 142/78 97 Nasal Cannula 3.0 


 


1/16/18 23:00  115 20  98 Nasal Cannula 4.0 


 


1/16/18 22:56  133 24  95 Nasal Cannula 5.0 40


 


1/16/18 21:39  122 22  98 Nasal Cannula 4.0 


 


1/16/18 21:16  120 24  99 Nasal Cannula 5.0 40


 


1/16/18 21:16      Nasal Cannula 3.0 32


 


1/16/18 21:16     99 Nasal Cannula 3.0 32


 


1/16/18 20:00 99.0 115 28 146/86 99 Nasal Cannula 3.0 


 


1/16/18 20:00  114      


 


1/16/18 16:15 99.4       


 


1/16/18 16:00 98.4 123 26 157/96 96 Nasal Cannula 3.0 


 


1/16/18 15:49  119      


 


1/16/18 15:12  124 22  98 Nasal Cannula 5.0 40


 


1/16/18 14:56  121 24  96 Nasal Cannula 5.0 40


 


1/16/18 12:10 99.4       


 


1/16/18 12:00 99.4 120 26 143/93 97 Nasal Cannula 3.0 


 


1/16/18 12:00  122      


 


1/16/18 11:16  120 20  98 Nasal Cannula 3.0 32


 


1/16/18 11:11  118 22  96 Nasal Cannula 2.0 28

















Intake and Output  


 


 1/16/18 1/17/18





 19:00 07:00


 


Intake Total 1798.15 ml 700 ml


 


Output Total 850 ml 600 ml


 


Balance 948.15 ml 100 ml


 


  


 


Intake Oral 1220 ml 700 ml


 


IV Total 578.15 ml 


 


Output Urine Total 850 ml 600 ml











Laboratory Tests








Test


  1/17/18


05:20


 


White Blood Count


  26.4 K/UL


(4.8-10.8)  *H


 


Red Blood Count


  2.74 M/UL


(4.70-6.10)  L


 


Hemoglobin


  7.7 G/DL


(14.2-18.0)  L


 


Hematocrit


  23.5 %


(42.0-52.0)  L


 


Mean Corpuscular Volume 86 FL (80-99)  


 


Mean Corpuscular Hemoglobin


  28.3 PG


(27.0-31.0)


 


Mean Corpuscular Hemoglobin


Concent 33.0 G/DL


(32.0-36.0)


 


Red Cell Distribution Width


  13.3 %


(11.6-14.8)


 


Platelet Count


  430 K/UL


(150-450)


 


Mean Platelet Volume


  8.5 FL


(6.5-10.1)


 


Neutrophils (%) (Auto)


  % (45.0-75.0)


 


 


Lymphocytes (%) (Auto)


  % (20.0-45.0)


 


 


Monocytes (%) (Auto)  % (1.0-10.0)  


 


Eosinophils (%) (Auto)  % (0.0-3.0)  


 


Basophils (%) (Auto)  % (0.0-2.0)  


 


Neutrophils % (Manual) Pending  


 


Lymphocytes % (Manual) Pending  


 


Platelet Estimate Pending  


 


Platelet Morphology Pending  


 


Activated Partial


Thromboplast Time 81 SEC (23-33)


H


 


Sodium Level


  138 MMOL/L


(136-145)


 


Potassium Level


  4.9 MMOL/L


(3.5-5.1)


 


Chloride Level


  111 MMOL/L


()  H


 


Carbon Dioxide Level


  16 MMOL/L


(21-32)  L


 


Anion Gap


  11 mmol/L


(5-15)


 


Blood Urea Nitrogen


  38 mg/dL


(7-18)  H


 


Creatinine


  3.3 MG/DL


(0.55-1.30)  H


 


Estimat Glomerular Filtration


Rate 23.0 mL/min


(>60)


 


Glucose Level


  137 MG/DL


()  H


 


Calcium Level


  8.9 MG/DL


(8.5-10.1)








Objective


HEAD AND NECK:  No JVD.


LUNGS:  Clear.


CARDIOVASCULAR:  Nl S1 and S2 with no gallop or murmur.


ABDOMEN:  Soft and nontender.


EXTREMITIES:  Bilateral pitting edema.











KAYLEEN HAIR Jan 17, 2018 09:43

## 2018-01-17 NOTE — PULMONOLOGY PROGRESS NOTE
Assessment/Plan


Assessment/Plan


ASSESSMENT AND PLAN:


1. Tachycardia and tachypnea due to pain of sickle cell crisis. CXR shows 

streaky infiltrates; possible pneumonia; will treat as pulm embolism


2. Sepsis, on intravenous antibiotic per Dr. Talley.


3. Abdominal pain, likely due to renal cysts and hemorrhage.


4. Diarrhea.  


5. DVT.





Agree with pain control


Antibiotics


IV fluids


started on IV heparin.  It would be best to assume that he has pulmonary 

embolism and treat accordingly.





Subjective


Interval Events:  None


Constitutional:  Reports: no symptoms


HEENT:  Repors: no symptoms


Respiratory:  Reports: no symptoms


Cardiovascular:  Reports: no symptoms


Allergies:  


Coded Allergies:  


     CODEINE (Verified  Allergy, Unknown, 1/5/18)


Uncoded Allergies:  


     PEANUTS (Adverse Reaction, Severe, Anaphylaxis, 1/9/18)





Objective





Last 24 Hour Vital Signs








  Date Time  Temp Pulse Resp B/P (MAP) Pulse Ox O2 Delivery O2 Flow Rate FiO2


 


1/17/18 12:00  106      


 


1/17/18 11:41  115 23  99 Nasal Cannula 3.0 


 


1/17/18 11:30  110 22   Nasal Cannula 3.0 32


 


1/17/18 08:00  120      


 


1/17/18 08:00 98.9 117 19 138/82 97 Nasal Cannula 3.0 


 


1/17/18 07:13  117 23  99 Nasal Cannula 3.0 


 


1/17/18 07:11      Nasal Cannula 3.0 32


 


1/17/18 07:00  113 24  97 Nasal Cannula 3.0 32


 


1/17/18 06:59     97 Nasal Cannula 3.0 32


 


1/17/18 05:28  102 22  99 Nasal Cannula 4.0 


 


1/17/18 05:20  122 24  95 Nasal Cannula 5.0 40


 


1/17/18 04:00  111      


 


1/17/18 04:00 98.5 119 32 146/85 97 Nasal Cannula 3.0 


 


1/17/18 01:27  101 24  98 Nasal Cannula 4.0 36


 


1/17/18 01:22  114 24  97 Nasal Cannula 4.0 36


 


1/17/18 00:00  124      


 


1/17/18 00:00 99.7 124 32 142/78 97 Nasal Cannula 3.0 


 


1/16/18 23:00  115 20  98 Nasal Cannula 4.0 


 


1/16/18 22:56  133 24  95 Nasal Cannula 5.0 40


 


1/16/18 21:39  122 22  98 Nasal Cannula 4.0 


 


1/16/18 21:16  120 24  99 Nasal Cannula 5.0 40


 


1/16/18 21:16      Nasal Cannula 3.0 32


 


1/16/18 21:16     99 Nasal Cannula 3.0 32


 


1/16/18 20:00 99.0 115 28 146/86 99 Nasal Cannula 3.0 


 


1/16/18 20:00  114      


 


1/16/18 16:15 99.4       


 


1/16/18 16:00 98.4 123 26 157/96 96 Nasal Cannula 3.0 


 


1/16/18 15:49  119      


 


1/16/18 15:12  124 22  98 Nasal Cannula 5.0 40


 


1/16/18 14:56  121 24  96 Nasal Cannula 5.0 40

















Intake and Output  


 


 1/16/18 1/17/18





 19:00 07:00


 


Intake Total 1798.15 ml 807.55 ml


 


Output Total 850 ml 600 ml


 


Balance 948.15 ml 207.55 ml


 


  


 


Intake Oral 1220 ml 700 ml


 


IV Total 578.15 ml 107.55 ml


 


Output Urine Total 850 ml 600 ml








General Appearance:  no acute distress


HEENT:  normocephalic


Respiratory/Chest:  chest wall non-tender, lungs clear


Cardiovascular:  normal peripheral pulses, normal rate


Laboratory Tests


1/17/18 05:20: 


White Blood Count 26.4*H, Red Blood Count 2.74L, Hemoglobin 7.7L, Hematocrit 

23.5L, Mean Corpuscular Volume 86, Mean Corpuscular Hemoglobin 28.3, Mean 

Corpuscular Hemoglobin Concent 33.0, Red Cell Distribution Width 13.3, Platelet 

Count 430, Mean Platelet Volume 8.5, Neutrophils (%) (Auto) , Lymphocytes (%) (

Auto) , Monocytes (%) (Auto) , Eosinophils (%) (Auto) , Basophils (%) (Auto) , 

Differential Total Cells Counted 100, Neutrophils % (Manual) 85H, Lymphocytes % 

(Manual) 3L, Monocytes % (Manual) 8, Eosinophils % (Manual) 1, Basophils % (

Manual) 0, Band Neutrophils 3, Platelet Estimate IncreasedH, Platelet 

Morphology Normal, Hypochromasia 1+, Activated Partial Thromboplast Time 81H, 

Sodium Level 138, Potassium Level 4.9, Chloride Level 111H, Carbon Dioxide 

Level 16L, Anion Gap 11, Blood Urea Nitrogen 38H, Creatinine 3.3H, Estimat 

Glomerular Filtration Rate 23.0, Glucose Level 137H, Calcium Level 8.9





Current Medications








 Medications


  (Trade)  Dose


 Ordered  Sig/Judei


 Route


 PRN Reason  Start Time


 Stop Time Status Last Admin


Dose Admin


 


 Acetaminophen


  (Tylenol)  650 mg  Q4H  PRN


 ORAL


 Mild Pain/Temp > 100.5  1/10/18 08:45


 2/5/18 00:44  1/14/18 20:35


 


 


 Clindamycin HCl/


 Dextrose  50 ml @ 


 100 mls/hr  Q8H


 IV


   1/15/18 18:00


 1/22/18 17:59  1/17/18 09:08


 


 


 Dextrose


  (Dextrose 50%)    STAT  PRN


 IV


 Hypoglycemia  1/10/18 08:30


 2/9/18 08:29   


 


 


 Diphenoxylate HCl/


 Atropine


  (Lomotil)  2.5 mg  Q4H  PRN


 ORAL


 Diarrhea  1/10/18 13:15


 2/9/18 13:14  1/12/18 14:13


 


 


 Folic Acid


  (Folate)  1 mg  DAILY


 ORAL


   1/12/18 15:00


 2/11/18 14:59  1/17/18 09:08


 


 


 Heparin Sodium/


 Dextrose  500 ml @ 


 32.55 mls/


 hr  adjust per protocol


 IV


   1/15/18 08:45


 2/14/18 08:44  1/17/18 05:45


 


 


 Ipratropium


 Bromide


  (Atrovent)  500 mcg  Q2H  PRN


 HHN


 Shortness of Breath  1/14/18 20:15


 1/19/18 20:14  1/17/18 01:26


 


 


 Ipratropium


 Bromide


  (Atrovent)  500 mcg  Q4HRT


 N


   1/14/18 23:00


 1/19/18 22:59  1/17/18 11:40


 


 


 Levalbuterol HCl


  (Xopenex)  1.25 mg  Q2H  PRN


 INH


 Shortness of Breath  1/14/18 20:15


 1/19/18 20:14  1/17/18 01:26


 


 


 Levalbuterol HCl


  (Xopenex)  1.25 mg  Q4HRT


 HHN


   1/14/18 23:00


 1/19/18 22:59  1/17/18 11:40


 


 


 Magnesium


 Hydroxide


  (Mom)  30 ml  BIDPRN  PRN


 ORAL


 constipation  1/10/18 08:15


 2/6/18 08:14   


 


 


 Morphine Sulfate


  (Morphine


 Sulfate)  2 mg  Q3H  PRN


 IVP


 Severe Pain  1/16/18 15:15


 1/23/18 15:14  1/17/18 11:37


 


 


 Ondansetron HCl


  (Zofran)  4 mg  Q6H  PRN


 IVP


 Nausea & Vomiting  1/10/18 08:15


 2/5/18 08:14   


 


 


 Pantoprazole


  (Protonix)  40 mg  DAILY


 ORAL


   1/14/18 09:00


 2/13/18 08:59  1/17/18 09:08


 


 


 Piperacillin Sod/


 Tazobactam Sod


 3.375 gm/Dextrose  55 ml @ 


 110 mls/hr  Q8HR


 IVPB


   1/14/18 14:00


 1/21/18 13:59  1/17/18 05:52


 


 


 Potassium Chloride


  (K-Dur)  20 meq  TWICE A  DAY


 ORAL


   1/12/18 13:15


 2/11/18 13:14  1/16/18 18:14


 


 


 Sodium Chloride  1,000 ml @ 


 75 mls/hr  [id number]


 IV


   1/11/18 14:00


 2/10/18 13:59  1/17/18 03:27


 


 


 Vitamin B Complex/


 Vit C/Folic Acid


  (Nephrovite)  1 tab  DAILY


 ORAL


   1/10/18 09:00


 2/8/18 08:59  1/17/18 09:08


 

















Ethan Tom MD Jan 17, 2018 12:57

## 2018-01-17 NOTE — INFECTIOUS DISEASES PROG NOTE
Assessment/Plan


Problems:  


(1) HAP (hospital-acquired pneumonia)


Assessment & Plan:  await  sputum culture , continue  zosyn and  clindamycin to 

cover for MRSA empirically , WBC went up suspect due to bleeding too into the 

renal cysts , monitor CXR 





(2) Sepsis


Assessment & Plan:  due to the above , await  blood culture, continue  zosyn  

and clindamycin empiric coverage 





(3) Renal cyst, native, hemorrhage


Assessment & Plan:  with no evidence of  pyelonephritis, blood culture remains 

negative ,  urine culture grew mixed contaminant , now on zosyn empiric 

coverage , had urology eval with no intervention, repeated CT scan of the 

abdomen showed recurrent bleeding . recommend urology eval again , since he is 

on heparin drip now 





(4) Abdominal pain


Assessment & Plan:  due to bleeding into the left renal cysts , recommend 

urology eval and better  pain management  





(5) Fever


Assessment & Plan:  due to the above , on wide spectrum  antibiotics empiric 

coverage , continue  tylenol prn 





(6) Diarrhea


Assessment & Plan:  no evidence of  C diff , continue antidiarrhea meds  





(7) DVT (deep venous thrombosis)


Assessment & Plan:  in both legs, with possible PE, on heparin drip , recommend 

close monitor of PT/PTT, high risk for bleeding in the renal cyst 








Subjective


Constitutional:  Reports: no symptoms


HEENT:  Reports: no symptoms


Respiratory:  Reports: productive cough


Cardiovascular:  Reports: no symptoms


Gastrointestinal/Abdominal:  Reports: diarrhea


Genitourinary:  Reports: no symptoms


Neurologic:  Reports: no symptoms


Psychiatric:  Reports: no symptoms


Skin:  Reports: no symptoms


Endocrine:  Reports: no symptoms


Hematologic:  Reports: no symptoms


Musculoskeletal:  Reports: no symptoms


Allergies:  


Coded Allergies:  


     CODEINE (Verified  Allergy, Unknown, 1/5/18)


Uncoded Allergies:  


     PEANUTS (Adverse Reaction, Severe, Anaphylaxis, 1/9/18)


Subjective


he feels more comfortable today , with no SOB, no fever or chills





Objective


Vital Signs





Last 24 Hour Vital Signs








  Date Time  Temp Pulse Resp B/P (MAP) Pulse Ox O2 Delivery O2 Flow Rate FiO2


 


1/17/18 12:00 97.8 114 25 155/91 97 Nasal Cannula 3.0 


 


1/17/18 12:00  106      


 


1/17/18 11:41  115 23  99 Nasal Cannula 3.0 


 


1/17/18 11:30  110 22   Nasal Cannula 3.0 32


 


1/17/18 08:00  120      


 


1/17/18 08:00 98.9 117 19 138/82 97 Nasal Cannula 3.0 


 


1/17/18 07:13  117 23  99 Nasal Cannula 3.0 


 


1/17/18 07:11      Nasal Cannula 3.0 32


 


1/17/18 07:00  113 24  97 Nasal Cannula 3.0 32


 


1/17/18 06:59     97 Nasal Cannula 3.0 32


 


1/17/18 05:28  102 22  99 Nasal Cannula 4.0 


 


1/17/18 05:20  122 24  95 Nasal Cannula 5.0 40


 


1/17/18 04:00  111      


 


1/17/18 04:00 98.5 119 32 146/85 97 Nasal Cannula 3.0 


 


1/17/18 01:27  101 24  98 Nasal Cannula 4.0 36


 


1/17/18 01:22  114 24  97 Nasal Cannula 4.0 36


 


1/17/18 00:00  124      


 


1/17/18 00:00 99.7 124 32 142/78 97 Nasal Cannula 3.0 


 


1/16/18 23:00  115 20  98 Nasal Cannula 4.0 


 


1/16/18 22:56  133 24  95 Nasal Cannula 5.0 40


 


1/16/18 21:39  122 22  98 Nasal Cannula 4.0 


 


1/16/18 21:16  120 24  99 Nasal Cannula 5.0 40


 


1/16/18 21:16      Nasal Cannula 3.0 32


 


1/16/18 21:16     99 Nasal Cannula 3.0 32


 


1/16/18 20:00 99.0 115 28 146/86 99 Nasal Cannula 3.0 


 


1/16/18 20:00  114      


 


1/16/18 16:15 99.4       


 


1/16/18 16:00 98.4 123 26 157/96 96 Nasal Cannula 3.0 


 


1/16/18 15:49  119      


 


1/16/18 15:12  124 22  98 Nasal Cannula 5.0 40








Height (Feet):  5


Height (Inches):  10.00


Weight (Pounds):  138


General Appearance:  WD/WN, no acute distress


HEENT:  normocephalic, atraumatic, anicteric, mucous membranes moist


Respiratory/Chest:  normal breath sounds, no respiratory distress, no accessory 

muscle use, decreased breath sounds, crackles/rales


Cardiovascular:  normal peripheral pulses, normal rate, regular rhythm, no 

gallop/murmur, no JVD


Abdomen:  normal bowel sounds, soft, non tender, no organomegaly, non distended

, no mass, no scars


Extremities:  no cyanosis, no clubbing


Skin:  no rash, no lesions, no ulcers


Neurologic/Psychiatric:  abnormal gait, oriented x 3, responsive


Lymphatic:  no neck adenopathy, no groin adenopathy





Laboratory Tests








Test


  1/17/18


05:20


 


White Blood Count


  26.4 K/UL


(4.8-10.8)  *H


 


Red Blood Count


  2.74 M/UL


(4.70-6.10)  L


 


Hemoglobin


  7.7 G/DL


(14.2-18.0)  L


 


Hematocrit


  23.5 %


(42.0-52.0)  L


 


Mean Corpuscular Volume 86 FL (80-99)  


 


Mean Corpuscular Hemoglobin


  28.3 PG


(27.0-31.0)


 


Mean Corpuscular Hemoglobin


Concent 33.0 G/DL


(32.0-36.0)


 


Red Cell Distribution Width


  13.3 %


(11.6-14.8)


 


Platelet Count


  430 K/UL


(150-450)


 


Mean Platelet Volume


  8.5 FL


(6.5-10.1)


 


Neutrophils (%) (Auto)


  % (45.0-75.0)


 


 


Lymphocytes (%) (Auto)


  % (20.0-45.0)


 


 


Monocytes (%) (Auto)  % (1.0-10.0)  


 


Eosinophils (%) (Auto)  % (0.0-3.0)  


 


Basophils (%) (Auto)  % (0.0-2.0)  


 


Differential Total Cells


Counted 100  


 


 


Neutrophils % (Manual) 85 % (45-75)  H


 


Lymphocytes % (Manual) 3 % (20-45)  L


 


Monocytes % (Manual) 8 % (1-10)  


 


Eosinophils % (Manual) 1 % (0-3)  


 


Basophils % (Manual) 0 % (0-2)  


 


Band Neutrophils 3 % (0-8)  


 


Platelet Estimate Increased  H


 


Platelet Morphology Normal  


 


Hypochromasia 1+  


 


Activated Partial


Thromboplast Time 81 SEC (23-33)


H


 


Sodium Level


  138 MMOL/L


(136-145)


 


Potassium Level


  4.9 MMOL/L


(3.5-5.1)


 


Chloride Level


  111 MMOL/L


()  H


 


Carbon Dioxide Level


  16 MMOL/L


(21-32)  L


 


Anion Gap


  11 mmol/L


(5-15)


 


Blood Urea Nitrogen


  38 mg/dL


(7-18)  H


 


Creatinine


  3.3 MG/DL


(0.55-1.30)  H


 


Estimat Glomerular Filtration


Rate 23.0 mL/min


(>60)


 


Glucose Level


  137 MG/DL


()  H


 


Calcium Level


  8.9 MG/DL


(8.5-10.1)











Current Medications








 Medications


  (Trade)  Dose


 Ordered  Sig/Judie


 Route


 PRN Reason  Start Time


 Stop Time Status Last Admin


Dose Admin


 


 Acetaminophen


  (Tylenol)  650 mg  Q4H  PRN


 ORAL


 Mild Pain/Temp > 100.5  1/10/18 08:45


 2/5/18 00:44  1/14/18 20:35


 


 


 Clindamycin HCl/


 Dextrose  50 ml @ 


 100 mls/hr  Q8H


 IV


   1/15/18 18:00


 1/22/18 17:59  1/17/18 09:08


 


 


 Dextrose


  (Dextrose 50%)    STAT  PRN


 IV


 Hypoglycemia  1/10/18 08:30


 2/9/18 08:29   


 


 


 Diphenoxylate HCl/


 Atropine


  (Lomotil)  2.5 mg  Q4H  PRN


 ORAL


 Diarrhea  1/10/18 13:15


 2/9/18 13:14  1/12/18 14:13


 


 


 Folic Acid


  (Folate)  1 mg  DAILY


 ORAL


   1/12/18 15:00


 2/11/18 14:59  1/17/18 09:08


 


 


 Heparin Sodium/


 Dextrose  500 ml @ 


 32.55 mls/


 hr  adjust per protocol


 IV


   1/15/18 08:45


 2/14/18 08:44  1/17/18 05:45


 


 


 Ipratropium


 Bromide


  (Atrovent)  500 mcg  Q2H  PRN


 HHN


 Shortness of Breath  1/14/18 20:15


 1/19/18 20:14  1/17/18 01:26


 


 


 Ipratropium


 Bromide


  (Atrovent)  500 mcg  Q4HRT


 HHN


   1/14/18 23:00


 1/19/18 22:59  1/17/18 11:40


 


 


 Levalbuterol HCl


  (Xopenex)  1.25 mg  Q2H  PRN


 INH


 Shortness of Breath  1/14/18 20:15


 1/19/18 20:14  1/17/18 01:26


 


 


 Levalbuterol HCl


  (Xopenex)  1.25 mg  Q4HRT


 HHN


   1/14/18 23:00


 1/19/18 22:59  1/17/18 11:40


 


 


 Magnesium


 Hydroxide


  (Mom)  30 ml  BIDPRN  PRN


 ORAL


 constipation  1/10/18 08:15


 2/6/18 08:14   


 


 


 Morphine Sulfate


  (Morphine


 Sulfate)  2 mg  Q3H  PRN


 IVP


 Severe Pain  1/16/18 15:15


 1/23/18 15:14  1/17/18 11:37


 


 


 Ondansetron HCl


  (Zofran)  4 mg  Q6H  PRN


 IVP


 Nausea & Vomiting  1/10/18 08:15


 2/5/18 08:14   


 


 


 Pantoprazole


  (Protonix)  40 mg  DAILY


 ORAL


   1/14/18 09:00


 2/13/18 08:59  1/17/18 09:08


 


 


 Piperacillin Sod/


 Tazobactam Sod


 3.375 gm/Dextrose  55 ml @ 


 110 mls/hr  Q8HR


 IVPB


   1/14/18 14:00


 1/21/18 13:59  1/17/18 13:37


 


 


 Potassium Chloride


  (K-Dur)  20 meq  TWICE A  DAY


 ORAL


   1/12/18 13:15


 2/11/18 13:14  1/16/18 18:14


 


 


 Sodium Chloride  1,000 ml @ 


 75 mls/hr  [id number]


 IV


   1/11/18 14:00


 2/10/18 13:59  1/17/18 03:27


 


 


 Vitamin B Complex/


 Vit C/Folic Acid


  (Nephrovite)  1 tab  DAILY


 ORAL


   1/10/18 09:00


 2/8/18 08:59  1/17/18 09:08


 

















Pablo Talley M.D. Jan 17, 2018 15:01

## 2018-01-17 NOTE — NEPHROLOGY PROGRESS NOTE
Assessment/Plan


Problem List:  


(1) Sickle cell trait


(2) Abdominal pain


(3) Renal cyst, native, hemorrhage


(4) Sepsis


(5) Hematuria


(6) Shortness of breath


(7) Chest pain


(8) Severe anemia


(9) Pneumonia


(10) DVT (deep venous thrombosis)


(11) Tachycardia


(12) Sickle cell anemia


(13) HAP (hospital-acquired pneumonia)


Plan


Continue current treatment plan


Continue heparin drip per protocol


Monitor HR, cardio following


Continue o2 therapy


CT angio


Monitor H&H and transfuse prn


Hematology, cardiology GI, and ID and pulmo following, will f/u with recs


Monitor lytes, correct prn


Continue abx per ID


Monitor renal function, avoid nephrotoxins 


Pain management prn


Continue neb treatment


Daily PPI


AM labs





Subjective


Constitutional:  Denies: no symptoms, chills, diaphoresis, fever, malaise, 

weakness, other


HEENT:  Denies: no symptoms, eye pain, blurred vision, tearing, double vision, 

ear pain, ear discharge, nose pain, nose congestion, throat pain, throat 

swelling, mouth pain, mouth swelling, other


Genitourinary:  Denies: no symptoms, burning, discharge, frequency, flank pain, 

hematuria, incontinence, pain, urgency, other


Neurologic/Psychiatric:  Denies: no symptoms, anxiety, depressed, emotional 

problems, headache, numbness, paresthesia, pre-existing deficit, seizure, 

tingling, tremors, weakness, other


Subjective


In bed, in no apparent distress, asleep, arousable, on heparin drip





Objective


Objective





Last 24 Hour Vital Signs








  Date Time  Temp Pulse Resp B/P (MAP) Pulse Ox O2 Delivery O2 Flow Rate FiO2


 


1/17/18 17:21  108 22  98 Nasal Cannula 3.0 32


 


1/17/18 17:09  105 22   Nasal Cannula 3.0 32


 


1/17/18 16:00  110      


 


1/17/18 16:00 99.2 111 19 139/75 99 Nasal Cannula 3.0 


 


1/17/18 15:39  108 23  99 Nasal Cannula 3.0 


 


1/17/18 15:30  104 22   Nasal Cannula 3.0 32


 


1/17/18 12:00 97.8 114 25 155/91 97 Nasal Cannula 3.0 


 


1/17/18 12:00  106      


 


1/17/18 11:41  115 23  99 Nasal Cannula 3.0 


 


1/17/18 11:30  110 22   Nasal Cannula 3.0 32


 


1/17/18 08:00  120      


 


1/17/18 08:00 98.9 117 19 138/82 97 Nasal Cannula 3.0 


 


1/17/18 07:13  117 23  99 Nasal Cannula 3.0 


 


1/17/18 07:11      Nasal Cannula 3.0 32


 


1/17/18 07:00  113 24  97 Nasal Cannula 3.0 32


 


1/17/18 06:59     97 Nasal Cannula 3.0 32


 


1/17/18 05:28  102 22  99 Nasal Cannula 4.0 


 


1/17/18 05:20  122 24  95 Nasal Cannula 5.0 40


 


1/17/18 04:00  111      


 


1/17/18 04:00 98.5 119 32 146/85 97 Nasal Cannula 3.0 


 


1/17/18 01:27  101 24  98 Nasal Cannula 4.0 36


 


1/17/18 01:22  114 24  97 Nasal Cannula 4.0 36


 


1/17/18 00:00  124      


 


1/17/18 00:00 99.7 124 32 142/78 97 Nasal Cannula 3.0 


 


1/16/18 23:00  115 20  98 Nasal Cannula 4.0 


 


1/16/18 22:56  133 24  95 Nasal Cannula 5.0 40


 


1/16/18 21:39  122 22  98 Nasal Cannula 4.0 


 


1/16/18 21:16  120 24  99 Nasal Cannula 5.0 40


 


1/16/18 21:16      Nasal Cannula 3.0 32


 


1/16/18 21:16     99 Nasal Cannula 3.0 32


 


1/16/18 20:00 99.0 115 28 146/86 99 Nasal Cannula 3.0 


 


1/16/18 20:00  114      

















Intake and Output  


 


 1/16/18 1/17/18





 19:00 07:00


 


Intake Total 1798.15 ml 807.55 ml


 


Output Total 850 ml 600 ml


 


Balance 948.15 ml 207.55 ml


 


  


 


Intake Oral 1220 ml 700 ml


 


IV Total 578.15 ml 107.55 ml


 


Output Urine Total 850 ml 600 ml








Laboratory Tests


1/17/18 05:20: 


White Blood Count 26.4*H, Red Blood Count 2.74L, Hemoglobin 7.7L, Hematocrit 

23.5L, Mean Corpuscular Volume 86, Mean Corpuscular Hemoglobin 28.3, Mean 

Corpuscular Hemoglobin Concent 33.0, Red Cell Distribution Width 13.3, Platelet 

Count 430, Mean Platelet Volume 8.5, Neutrophils (%) (Auto) , Lymphocytes (%) (

Auto) , Monocytes (%) (Auto) , Eosinophils (%) (Auto) , Basophils (%) (Auto) , 

Differential Total Cells Counted 100, Neutrophils % (Manual) 85H, Lymphocytes % 

(Manual) 3L, Monocytes % (Manual) 8, Eosinophils % (Manual) 1, Basophils % (

Manual) 0, Band Neutrophils 3, Platelet Estimate IncreasedH, Platelet 

Morphology Normal, Hypochromasia 1+, Activated Partial Thromboplast Time 81H, 

Sodium Level 138, Potassium Level 4.9, Chloride Level 111H, Carbon Dioxide 

Level 16L, Anion Gap 11, Blood Urea Nitrogen 38H, Creatinine 3.3H, Estimat 

Glomerular Filtration Rate 23.0, Glucose Level 137H, Calcium Level 8.9


Height (Feet):  5


Height (Inches):  10.00


Weight (Pounds):  138


General Appearance:  no apparent distress, alert


EENT:  normal ENT inspection


Neck:  normal alignment, supple


Cardiovascular:  normal rate, regular rhythm


Respiratory/Chest:  no respiratory distress


Abdomen:  soft


Extremities:  calf tenderness


Neurologic:  alert, oriented x 3, responsive, normal mood/affect











Dayanara Hawley N.P. Jan 17, 2018 17:57

## 2018-01-17 NOTE — GI PROGRESS NOTE
Assessment/Plan


Problems:  


(1) Diarrhea


ICD Codes:  R19.7 - Diarrhea, unspecified


SNOMED:  67304163


(2) Sickle cell trait


ICD Codes:  D57.3 - Sickle-cell trait


SNOMED:  19847101


(3) Abdominal pain


ICD Codes:  R10.9 - Unspecified abdominal pain


SNOMED:  32708255


(4) Renal cyst, native, hemorrhage


ICD Codes:  N28.89 - Other specified disorders of kidney and ureter; N28.1 - 

Cyst of kidney, acquired


SNOMED:  51665990


Status:  not improved, unchanged


Status Narrative


Discussed with Dr. Gamez.


Assessment/Plan


OB stool r/o GI bleed  >> negative


stool cx >> negative


cdiff >> negative


O&P >> negative


diarrhea resolved >> lomotil prn





fu hematology, ID recs


monitor H&H, prn transfusions


renal diet, tolerating


ppi


abx


fu labs


outpatient GI procedures





Subjective


Subjective


generalized weakness


diarrhea resolved





Objective





Last 24 Hour Vital Signs








  Date Time  Temp Pulse Resp B/P (MAP) Pulse Ox O2 Delivery O2 Flow Rate FiO2


 


1/17/18 11:41  115 23  99 Nasal Cannula 3.0 


 


1/17/18 11:30  110 22   Nasal Cannula 3.0 32


 


1/17/18 08:00  120      


 


1/17/18 08:00 98.9 117 19 138/82 97 Nasal Cannula 3.0 


 


1/17/18 07:13  117 23  99 Nasal Cannula 3.0 


 


1/17/18 07:11      Nasal Cannula 3.0 32


 


1/17/18 07:00  113 24  97 Nasal Cannula 3.0 32


 


1/17/18 06:59     97 Nasal Cannula 3.0 32


 


1/17/18 05:28  102 22  99 Nasal Cannula 4.0 


 


1/17/18 05:20  122 24  95 Nasal Cannula 5.0 40


 


1/17/18 04:00  111      


 


1/17/18 04:00 98.5 119 32 146/85 97 Nasal Cannula 3.0 


 


1/17/18 01:27  101 24  98 Nasal Cannula 4.0 36


 


1/17/18 01:22  114 24  97 Nasal Cannula 4.0 36


 


1/17/18 00:00  124      


 


1/17/18 00:00 99.7 124 32 142/78 97 Nasal Cannula 3.0 


 


1/16/18 23:00  115 20  98 Nasal Cannula 4.0 


 


1/16/18 22:56  133 24  95 Nasal Cannula 5.0 40


 


1/16/18 21:39  122 22  98 Nasal Cannula 4.0 


 


1/16/18 21:16  120 24  99 Nasal Cannula 5.0 40


 


1/16/18 21:16      Nasal Cannula 3.0 32


 


1/16/18 21:16     99 Nasal Cannula 3.0 32


 


1/16/18 20:00 99.0 115 28 146/86 99 Nasal Cannula 3.0 


 


1/16/18 20:00  114      


 


1/16/18 16:15 99.4       


 


1/16/18 16:00 98.4 123 26 157/96 96 Nasal Cannula 3.0 


 


1/16/18 15:49  119      


 


1/16/18 15:12  124 22  98 Nasal Cannula 5.0 40


 


1/16/18 14:56  121 24  96 Nasal Cannula 5.0 40

















Intake and Output  


 


 1/16/18 1/17/18





 19:00 07:00


 


Intake Total 1798.15 ml 807.55 ml


 


Output Total 850 ml 600 ml


 


Balance 948.15 ml 207.55 ml


 


  


 


Intake Oral 1220 ml 700 ml


 


IV Total 578.15 ml 107.55 ml


 


Output Urine Total 850 ml 600 ml











Laboratory Tests








Test


  1/17/18


05:20


 


White Blood Count


  26.4 K/UL


(4.8-10.8)  *H


 


Red Blood Count


  2.74 M/UL


(4.70-6.10)  L


 


Hemoglobin


  7.7 G/DL


(14.2-18.0)  L


 


Hematocrit


  23.5 %


(42.0-52.0)  L


 


Mean Corpuscular Volume 86 FL (80-99)  


 


Mean Corpuscular Hemoglobin


  28.3 PG


(27.0-31.0)


 


Mean Corpuscular Hemoglobin


Concent 33.0 G/DL


(32.0-36.0)


 


Red Cell Distribution Width


  13.3 %


(11.6-14.8)


 


Platelet Count


  430 K/UL


(150-450)


 


Mean Platelet Volume


  8.5 FL


(6.5-10.1)


 


Neutrophils (%) (Auto)


  % (45.0-75.0)


 


 


Lymphocytes (%) (Auto)


  % (20.0-45.0)


 


 


Monocytes (%) (Auto)  % (1.0-10.0)  


 


Eosinophils (%) (Auto)  % (0.0-3.0)  


 


Basophils (%) (Auto)  % (0.0-2.0)  


 


Differential Total Cells


Counted 100  


 


 


Neutrophils % (Manual) 85 % (45-75)  H


 


Lymphocytes % (Manual) 3 % (20-45)  L


 


Monocytes % (Manual) 8 % (1-10)  


 


Eosinophils % (Manual) 1 % (0-3)  


 


Basophils % (Manual) 0 % (0-2)  


 


Band Neutrophils 3 % (0-8)  


 


Platelet Estimate Increased  H


 


Platelet Morphology Normal  


 


Hypochromasia 1+  


 


Activated Partial


Thromboplast Time 81 SEC (23-33)


H


 


Sodium Level


  138 MMOL/L


(136-145)


 


Potassium Level


  4.9 MMOL/L


(3.5-5.1)


 


Chloride Level


  111 MMOL/L


()  H


 


Carbon Dioxide Level


  16 MMOL/L


(21-32)  L


 


Anion Gap


  11 mmol/L


(5-15)


 


Blood Urea Nitrogen


  38 mg/dL


(7-18)  H


 


Creatinine


  3.3 MG/DL


(0.55-1.30)  H


 


Estimat Glomerular Filtration


Rate 23.0 mL/min


(>60)


 


Glucose Level


  137 MG/DL


()  H


 


Calcium Level


  8.9 MG/DL


(8.5-10.1)








Height (Feet):  5


Height (Inches):  10.00


Weight (Pounds):  138


General Appearance:  alert


Cardiovascular:  normal rate


Respiratory/Chest:  other - N/C


Abdominal Exam:  normal bowel sounds, non tender, soft


Extremities:  normal range of motion, non-tender











Elsy Lakhani N.P. Jan 17, 2018 12:40

## 2018-01-18 VITALS — DIASTOLIC BLOOD PRESSURE: 98 MMHG | SYSTOLIC BLOOD PRESSURE: 140 MMHG

## 2018-01-18 VITALS — SYSTOLIC BLOOD PRESSURE: 159 MMHG | DIASTOLIC BLOOD PRESSURE: 78 MMHG

## 2018-01-18 VITALS — SYSTOLIC BLOOD PRESSURE: 134 MMHG | DIASTOLIC BLOOD PRESSURE: 82 MMHG

## 2018-01-18 VITALS — DIASTOLIC BLOOD PRESSURE: 86 MMHG | SYSTOLIC BLOOD PRESSURE: 137 MMHG

## 2018-01-18 VITALS — SYSTOLIC BLOOD PRESSURE: 143 MMHG | DIASTOLIC BLOOD PRESSURE: 98 MMHG

## 2018-01-18 VITALS — SYSTOLIC BLOOD PRESSURE: 137 MMHG | DIASTOLIC BLOOD PRESSURE: 86 MMHG

## 2018-01-18 LAB
ADD MANUAL DIFF: YES
ALBUMIN SERPL-MCNC: 1.4 G/DL (ref 3.4–5)
ALBUMIN/GLOB SERPL: 0.3 {RATIO} (ref 1–2.7)
ALP SERPL-CCNC: 94 U/L (ref 46–116)
ALT SERPL-CCNC: 43 U/L (ref 12–78)
ANION GAP SERPL CALC-SCNC: 14 MMOL/L (ref 5–15)
AST SERPL-CCNC: 61 U/L (ref 15–37)
BILIRUB SERPL-MCNC: 0.7 MG/DL (ref 0.2–1)
BUN SERPL-MCNC: 40 MG/DL (ref 7–18)
CALCIUM SERPL-MCNC: 8.7 MG/DL (ref 8.5–10.1)
CHLORIDE SERPL-SCNC: 114 MMOL/L (ref 98–107)
CO2 SERPL-SCNC: 12 MMOL/L (ref 21–32)
CREAT SERPL-MCNC: 3.4 MG/DL (ref 0.55–1.3)
ERYTHROCYTE [DISTWIDTH] IN BLOOD BY AUTOMATED COUNT: 13.8 % (ref 11.6–14.8)
GLOBULIN SER-MCNC: 4.7 G/DL
HCT VFR BLD CALC: 23.5 % (ref 42–52)
HGB BLD-MCNC: 7.5 G/DL (ref 14.2–18)
MCV RBC AUTO: 88 FL (ref 80–99)
PLATELET # BLD: 460 K/UL (ref 150–450)
POTASSIUM SERPL-SCNC: 5 MMOL/L (ref 3.5–5.1)
RBC # BLD AUTO: 2.68 M/UL (ref 4.7–6.1)
SODIUM SERPL-SCNC: 140 MMOL/L (ref 136–145)
WBC # BLD AUTO: 21.7 K/UL (ref 4.8–10.8)

## 2018-01-18 RX ADMIN — CLINDAMYCIN PHOSPHATE SCH MLS/HR: 12 INJECTION, SOLUTION INTRAVENOUS at 17:54

## 2018-01-18 RX ADMIN — Medication SCH TAB: at 08:43

## 2018-01-18 RX ADMIN — CLINDAMYCIN PHOSPHATE SCH MLS/HR: 12 INJECTION, SOLUTION INTRAVENOUS at 09:31

## 2018-01-18 RX ADMIN — LEVALBUTEROL HYDROCHLORIDE SCH MG: 1.25 SOLUTION, CONCENTRATE RESPIRATORY (INHALATION) at 07:09

## 2018-01-18 RX ADMIN — Medication SCH MCG: at 20:09

## 2018-01-18 RX ADMIN — DEXTROSE MONOHYDRATE SCH MLS/HR: 50 INJECTION, SOLUTION INTRAVENOUS at 05:34

## 2018-01-18 RX ADMIN — MORPHINE SULFATE PRN MG: 2 INJECTION, SOLUTION INTRAMUSCULAR; INTRAVENOUS at 05:34

## 2018-01-18 RX ADMIN — HEPARIN SODIUM SCH MLS/HR: 5000 INJECTION, SOLUTION INTRAVENOUS at 13:52

## 2018-01-18 RX ADMIN — Medication SCH MCG: at 07:09

## 2018-01-18 RX ADMIN — LEVALBUTEROL HYDROCHLORIDE SCH MG: 1.25 SOLUTION, CONCENTRATE RESPIRATORY (INHALATION) at 16:16

## 2018-01-18 RX ADMIN — HEPARIN SODIUM SCH MLS/HR: 5000 INJECTION, SOLUTION INTRAVENOUS at 13:06

## 2018-01-18 RX ADMIN — LEVALBUTEROL HYDROCHLORIDE SCH MG: 1.25 SOLUTION, CONCENTRATE RESPIRATORY (INHALATION) at 10:47

## 2018-01-18 RX ADMIN — DEXTROSE MONOHYDRATE SCH MLS/HR: 50 INJECTION, SOLUTION INTRAVENOUS at 13:31

## 2018-01-18 RX ADMIN — LEVALBUTEROL HYDROCHLORIDE SCH MG: 1.25 SOLUTION, CONCENTRATE RESPIRATORY (INHALATION) at 20:09

## 2018-01-18 RX ADMIN — MORPHINE SULFATE PRN MG: 2 INJECTION, SOLUTION INTRAMUSCULAR; INTRAVENOUS at 13:51

## 2018-01-18 RX ADMIN — Medication SCH MCG: at 23:29

## 2018-01-18 RX ADMIN — Medication SCH MCG: at 03:25

## 2018-01-18 RX ADMIN — CLINDAMYCIN PHOSPHATE SCH MLS/HR: 12 INJECTION, SOLUTION INTRAVENOUS at 02:44

## 2018-01-18 RX ADMIN — LEVALBUTEROL HYDROCHLORIDE SCH MG: 1.25 SOLUTION, CONCENTRATE RESPIRATORY (INHALATION) at 03:25

## 2018-01-18 RX ADMIN — MORPHINE SULFATE PRN MG: 2 INJECTION, SOLUTION INTRAMUSCULAR; INTRAVENOUS at 21:09

## 2018-01-18 RX ADMIN — MORPHINE SULFATE PRN MG: 2 INJECTION, SOLUTION INTRAMUSCULAR; INTRAVENOUS at 08:43

## 2018-01-18 RX ADMIN — Medication SCH MCG: at 16:16

## 2018-01-18 RX ADMIN — Medication SCH MCG: at 10:47

## 2018-01-18 RX ADMIN — LEVALBUTEROL HYDROCHLORIDE SCH MG: 1.25 SOLUTION, CONCENTRATE RESPIRATORY (INHALATION) at 23:29

## 2018-01-18 NOTE — DIAGNOSTIC IMAGING REPORT
Indication: Dyspnea

 

Comparison:  1/14/2018

 

A single view chest radiograph was obtained.

 

Findings:

 

There is mild left basilar atelectasis. The heart is borderline in size. Bones are

slightly osteopenic. Suture anchors project over the right humeral head consistent

with prior rotator cuff repair.

 

IMPRESSION:

 

Mild right basilar atelectasis.

## 2018-01-18 NOTE — INFECTIOUS DISEASES PROG NOTE
Assessment/Plan


Problems:  


(1) HAP (hospital-acquired pneumonia)


Assessment & Plan:  await  sputum culture , continue  zosyn and  clindamycin to 

cover for MRSA empirically , monitor CXR 





(2) Sepsis


Assessment & Plan:  due to the above , await  blood culture, continue  zosyn  

and clindamycin empiric coverage 





(3) Renal cyst, native, hemorrhage


Assessment & Plan:  with no evidence of  pyelonephritis, blood culture remains 

negative ,  urine culture grew mixed contaminant , now on zosyn empiric 

coverage , had urology eval with no intervention, repeated CT scan of the 

abdomen showed recurrent bleeding . recommend urology eval again , since he is 

on heparin drip now 





(4) Abdominal pain


Assessment & Plan:  due to bleeding into the left renal cysts , recommend 

urology eval and better  pain management  





(5) Fever


Assessment & Plan:  due to the above , on wide spectrum  antibiotics empiric 

coverage , continue  tylenol prn 





(6) Diarrhea


Assessment & Plan:  no evidence of  C diff , continue antidiarrhea meds  





(7) DVT (deep venous thrombosis)


Assessment & Plan:  in both legs, with possible PE, on heparin drip , recommend 

close monitor of PT/PTT, high risk for bleeding in the renal cyst 








Subjective


Constitutional:  Reports: fatigue


HEENT:  Reports: no symptoms


Respiratory:  Reports: shortness of breath


Breasts:  Reports: no symptoms


Cardiovascular:  Reports: chest pain


Gastrointestinal/Abdominal:  Reports: no symptoms


Genitourinary:  Reports: no symptoms


Neurologic:  Reports: no symptoms


Psychiatric:  Reports: no symptoms


Skin:  Reports: no symptoms


Endocrine:  Reports: no symptoms


Hematologic:  Reports: no symptoms


Musculoskeletal:  Reports: no symptoms


Allergies:  


Coded Allergies:  


     CODEINE (Verified  Allergy, Unknown, 1/5/18)


Uncoded Allergies:  


     PEANUTS (Adverse Reaction, Severe, Anaphylaxis, 1/9/18)


Subjective


he feels more comfortable today , with no SOB, no fever or chills





Objective


Vital Signs





Last 24 Hour Vital Signs








  Date Time  Temp Pulse Resp B/P (MAP) Pulse Ox O2 Delivery O2 Flow Rate FiO2


 


1/18/18 12:00  107      


 


1/18/18 12:00 98.8 111 18 159/78 96 Nasal Cannula 3.0 


 


1/18/18 10:49  108 20  99 Nasal Cannula 3.0 32


 


1/18/18 10:45  102 22  99 Nasal Cannula 3.0 32


 


1/18/18 08:00  104      


 


1/18/18 08:00 99.8 103 19 134/82 99 Nasal Cannula 3.0 


 


1/18/18 07:00     99 Nasal Cannula 3.0 32


 


1/18/18 07:00  111 20  99 Nasal Cannula 3.0 32


 


1/18/18 07:00      Nasal Cannula 3.0 32


 


1/18/18 07:00  105 22  99 Nasal Cannula 3.0 32


 


1/18/18 06:04 98.9       


 


1/18/18 04:00 98.2 110 20 140/98 99 Nasal Cannula 3.0 


 


1/18/18 04:00  110      


 


1/18/18 03:36  107 20  99 Nasal Cannula 3.0 32


 


1/18/18 03:26  103 20  97 Nasal Cannula 3.0 32


 


1/18/18 01:21 98.9       


 


1/18/18 00:00 100.6 121 24 143/98 99 Nasal Cannula 3.0 


 


1/18/18 00:00  116      


 


1/17/18 23:27  122 22  99 Nasal Cannula 3.0 32


 


1/17/18 23:17  116 22  98 Nasal Cannula 3.0 32


 


1/17/18 20:00 98.4 115 24 152/80 99 Nasal Cannula 3.0 


 


1/17/18 19:10  111      


 


1/17/18 19:10  113 20  99 Nasal Cannula 3.0 32


 


1/17/18 19:01  114 20  98 Nasal Cannula 3.0 32


 


1/17/18 19:01     98 Nasal Cannula 3.0 32


 


1/17/18 19:01      Nasal Cannula 3.0 32


 


1/17/18 17:21  108 22  98 Nasal Cannula 3.0 32


 


1/17/18 17:09  105 22   Nasal Cannula 3.0 32


 


1/17/18 16:00  110      


 


1/17/18 16:00 99.2 111 19 139/75 99 Nasal Cannula 3.0 


 


1/17/18 15:39  108 23  99 Nasal Cannula 3.0 


 


1/17/18 15:30  104 22   Nasal Cannula 3.0 32








Height (Feet):  5


Height (Inches):  10.00


Weight (Pounds):  138


General Appearance:  WD/WN, no acute distress


HEENT:  normocephalic, atraumatic, anicteric, mucous membranes moist, EOMI, 

pharynx normal, supple


Respiratory/Chest:  chest wall non-tender, no respiratory distress, no 

accessory muscle use, decreased breath sounds, crackles/rales


Cardiovascular:  normal peripheral pulses, normal rate, regular rhythm, no 

gallop/murmur, no JVD


Abdomen:  normal bowel sounds, soft, non tender, no organomegaly, non distended

, no mass, no scars


Extremities:  no cyanosis, no clubbing


Skin:  no rash, no lesions, no ulcers


Neurologic/Psychiatric:  alert, oriented x 3, responsive


Lymphatic:  no neck adenopathy, no groin adenopathy





Microbiology








 Date/Time


Source Procedure


Growth Status


 


 


 1/17/18 21:00


Sputum Gram Stain - Final Resulted


 


 1/17/18 21:00


Sputum Sputum Culture


Pending Resulted











Laboratory Tests








Test


  1/18/18


12:20 1/18/18


12:22


 


Sodium Level


  140 MMOL/L


(136-145) 


 


 


Potassium Level


  5.0 MMOL/L


(3.5-5.1) 


 


 


Chloride Level


  114 MMOL/L


()  H 


 


 


Carbon Dioxide Level


  12 MMOL/L


(21-32)  L 


 


 


Anion Gap


  14 mmol/L


(5-15) 


 


 


Blood Urea Nitrogen


  40 mg/dL


(7-18)  H 


 


 


Creatinine


  3.4 MG/DL


(0.55-1.30)  H 


 


 


Estimat Glomerular Filtration


Rate 22.2 mL/min


(>60) 


 


 


Glucose Level


  120 MG/DL


()  H 


 


 


Calcium Level


  8.7 MG/DL


(8.5-10.1) 


 


 


Total Bilirubin


  0.7 MG/DL


(0.2-1.0) 


 


 


Aspartate Amino Transf


(AST/SGOT) 61 U/L (15-37)


H 


 


 


Alanine Aminotransferase


(ALT/SGPT) 43 U/L (12-78)


  


 


 


Alkaline Phosphatase


  94 U/L


() 


 


 


Total Protein


  6.1 G/DL


(6.4-8.2)  L 


 


 


Albumin


  1.4 G/DL


(3.4-5.0)  L 


 


 


Globulin 4.7 g/dL   


 


Albumin/Globulin Ratio


  0.3 (1.0-2.7)


L 


 


 


White Blood Count


  


  21.7 K/UL


(4.8-10.8)  H


 


Red Blood Count


  


  2.68 M/UL


(4.70-6.10)  L


 


Hemoglobin


  


  7.5 G/DL


(14.2-18.0)  L


 


Hematocrit


  


  23.5 %


(42.0-52.0)  L


 


Mean Corpuscular Volume  88 FL (80-99)  


 


Mean Corpuscular Hemoglobin


  


  28.0 PG


(27.0-31.0)


 


Mean Corpuscular Hemoglobin


Concent 


  31.9 G/DL


(32.0-36.0)  L


 


Red Cell Distribution Width


  


  13.8 %


(11.6-14.8)


 


Platelet Count


  


  460 K/UL


(150-450)  H


 


Mean Platelet Volume


  


  8.5 FL


(6.5-10.1)


 


Neutrophils (%) (Auto)


  


  % (45.0-75.0)


 


 


Lymphocytes (%) (Auto)


  


  % (20.0-45.0)


 


 


Monocytes (%) (Auto)   % (1.0-10.0)  


 


Eosinophils (%) (Auto)   % (0.0-3.0)  


 


Basophils (%) (Auto)   % (0.0-2.0)  


 


Differential Total Cells


Counted 


  100  


 


 


Neutrophils % (Manual)  90 % (45-75)  H


 


Lymphocytes % (Manual)  4 % (20-45)  L


 


Monocytes % (Manual)  4 % (1-10)  


 


Eosinophils % (Manual)  2 % (0-3)  


 


Basophils % (Manual)  0 % (0-2)  


 


Band Neutrophils  0 % (0-8)  


 


Platelet Estimate  Adequate  


 


Platelet Morphology  Normal  


 


Hypochromasia  1+  


 


Activated Partial


Thromboplast Time 


  62 SEC (23-33)


H











Current Medications








 Medications


  (Trade)  Dose


 Ordered  Sig/Judie


 Route


 PRN Reason  Start Time


 Stop Time Status Last Admin


Dose Admin


 


 Acetaminophen


  (Tylenol)  650 mg  Q4H  PRN


 ORAL


 Mild Pain/Temp > 100.5  1/10/18 08:45


 2/5/18 00:44  1/18/18 00:22


 


 


 Clindamycin HCl/


 Dextrose  50 ml @ 


 100 mls/hr  Q8H


 IV


   1/15/18 18:00


 1/22/18 17:59  1/18/18 09:31


 


 


 Dextrose


  (Dextrose 50%)    STAT  PRN


 IV


 Hypoglycemia  1/10/18 08:30


 2/9/18 08:29   


 


 


 Diphenoxylate HCl/


 Atropine


  (Lomotil)  2.5 mg  Q4H  PRN


 ORAL


 Diarrhea  1/10/18 13:15


 2/9/18 13:14  1/12/18 14:13


 


 


 Folic Acid


  (Folate)  1 mg  DAILY


 ORAL


   1/12/18 15:00


 2/11/18 14:59  1/18/18 08:43


 


 


 Heparin Sodium/


 Dextrose  500 ml @ 


 35.054 mls/


 hr  adjust per protocol


 IV


   1/15/18 08:45


 2/14/18 08:44  1/18/18 13:52


 


 


 Ipratropium


 Bromide


  (Atrovent)  500 mcg  Q2H  PRN


 HHN


 Shortness of Breath  1/14/18 20:15


 1/19/18 20:14  1/17/18 17:12


 


 


 Ipratropium


 Bromide


  (Atrovent)  500 mcg  Q4HRT


 HHN


   1/14/18 23:00


 1/19/18 22:59  1/18/18 10:47


 


 


 Levalbuterol HCl


  (Xopenex)  1.25 mg  Q2H  PRN


 INH


 Shortness of Breath  1/14/18 20:15


 1/19/18 20:14  1/17/18 17:12


 


 


 Levalbuterol HCl


  (Xopenex)  1.25 mg  Q4HRT


 HHN


   1/14/18 23:00


 1/19/18 22:59  1/18/18 10:47


 


 


 Magnesium


 Hydroxide


  (Mom)  30 ml  BIDPRN  PRN


 ORAL


 constipation  1/10/18 08:15


 2/6/18 08:14   


 


 


 Morphine Sulfate


  (Morphine


 Sulfate)  2 mg  Q3H  PRN


 IVP


 Severe Pain  1/16/18 15:15


 1/23/18 15:14  1/18/18 13:51


 


 


 Ondansetron HCl


  (Zofran)  4 mg  Q6H  PRN


 IVP


 Nausea & Vomiting  1/10/18 08:15


 2/5/18 08:14   


 


 


 Pantoprazole


  (Protonix)  40 mg  DAILY


 ORAL


   1/14/18 09:00


 2/13/18 08:59  1/18/18 08:43


 


 


 Piperacillin Sod/


 Tazobactam Sod


 3.375 gm/Dextrose  55 ml @ 


 13.75 mls/


 hr  Q12HR@0200,1400


 IVPB


   1/19/18 02:00


 1/26/18 01:59   


 


 


 Piperacillin Sod/


 Tazobactam Sod


 3.375 gm/Dextrose  55 ml @ 


 13.75 mls/


 hr  Q8HR


 IVPB


   1/17/18 22:00


 1/18/18 16:00  1/18/18 13:31


 


 


 Sodium Chloride  1,000 ml @ 


 100 mls/hr  Q10H


 IV


   1/17/18 19:00


 2/16/18 18:59  1/18/18 13:26


 


 


 Vitamin B Complex/


 Vit C/Folic Acid


  (Nephrovite)  1 tab  DAILY


 ORAL


   1/10/18 09:00


 2/8/18 08:59  1/18/18 08:43


 

















Pablo Talley M.D. Jan 18, 2018 14:23

## 2018-01-18 NOTE — NEPHROLOGY PROGRESS NOTE
Assessment/Plan


Problem List:  


(1) Abdominal pain


(2) Renal mass, left


(3) Sickle cell trait


Assessment:  with crisis? 





(4) Renal cyst, native, hemorrhage


(5) Fever


(6) Tachycardia


(7) DVT (deep venous thrombosis)


(8) Pneumonia


(9) Sepsis


(10) Hematuria


(11) Severe anemia


Plan


Dr. Sanches was called for hemeonc. 


cont IVF. cont pain management.


ID following. 


empiric cefepime and metronidazole. 


d/w Dr. Jim. 


D/w urology - no need for any intervention. 


now on heparin gtt. 


repeat CXR.





Subjective


Subjective


felt better yesterday but SOB again today.





Objective


Objective





Last 24 Hour Vital Signs








  Date Time  Temp Pulse Resp B/P (MAP) Pulse Ox O2 Delivery O2 Flow Rate FiO2


 


1/18/18 08:00 99.8 103 19 134/82 99 Nasal Cannula 3.0 


 


1/18/18 07:00     99 Nasal Cannula 3.0 32


 


1/18/18 07:00  111 20  99 Nasal Cannula 3.0 32


 


1/18/18 07:00      Nasal Cannula 3.0 32


 


1/18/18 07:00  105 22  99 Nasal Cannula 3.0 32


 


1/18/18 06:04 98.9       


 


1/18/18 04:00 98.2 110 20 140/98 99 Nasal Cannula 3.0 


 


1/18/18 04:00  110      


 


1/18/18 03:36  107 20  99 Nasal Cannula 3.0 32


 


1/18/18 03:26  103 20  97 Nasal Cannula 3.0 32


 


1/18/18 01:21 98.9       


 


1/18/18 00:00 100.6 121 24 143/98 99 Nasal Cannula 3.0 


 


1/18/18 00:00  116      


 


1/17/18 23:27  122 22  99 Nasal Cannula 3.0 32


 


1/17/18 23:17  116 22  98 Nasal Cannula 3.0 32


 


1/17/18 20:00 98.4 115 24 152/80 99 Nasal Cannula 3.0 


 


1/17/18 19:10  111      


 


1/17/18 19:10  113 20  99 Nasal Cannula 3.0 32


 


1/17/18 19:01  114 20  98 Nasal Cannula 3.0 32


 


1/17/18 19:01     98 Nasal Cannula 3.0 32


 


1/17/18 19:01      Nasal Cannula 3.0 32


 


1/17/18 17:21  108 22  98 Nasal Cannula 3.0 32


 


1/17/18 17:09  105 22   Nasal Cannula 3.0 32


 


1/17/18 16:00  110      


 


1/17/18 16:00 99.2 111 19 139/75 99 Nasal Cannula 3.0 


 


1/17/18 15:39  108 23  99 Nasal Cannula 3.0 


 


1/17/18 15:30  104 22   Nasal Cannula 3.0 32


 


1/17/18 12:00 97.8 114 25 155/91 97 Nasal Cannula 3.0 


 


1/17/18 12:00  106      


 


1/17/18 11:41  115 23  99 Nasal Cannula 3.0 


 


1/17/18 11:30  110 22   Nasal Cannula 3.0 32

















Intake and Output  


 


 1/17/18 1/18/18





 19:00 07:00


 


Intake Total 1679.05 ml 1764.755 ml


 


Output Total 800 ml 900 ml


 


Balance 879.05 ml 864.755 ml


 


  


 


Intake Oral 390 ml 150 ml


 


IV Total 1289.05 ml 1614.755 ml


 


Output Urine Total 800 ml 900 ml


 


# Voids 4 4








Height (Feet):  5


Height (Inches):  10.00


Weight (Pounds):  138


General Appearance:  WD/WN, moderate distress


Cardiovascular:  tachycardia


Respiratory/Chest:  respiratory distress, decreased breath sounds


Abdomen:  non tender, soft


Extremities:  non-pitting


Neurologic:  alert, oriented x 3











DELVIS SKELTON Jan 18, 2018 10:09

## 2018-01-18 NOTE — PULMONOLOGY PROGRESS NOTE
Assessment/Plan


Assessment/Plan


ASSESSMENT AND PLAN:


1. Tachycardia and tachypnea due to pain of sickle cell crisis. CXR shows 

streaky infiltrates; possible pneumonia; will treat as pulm embolism


2. Sepsis, on intravenous antibiotic per Dr. Talley.


3. Abdominal pain, likely due to renal cysts and hemorrhage.


4. Diarrhea.  


5. DVT.





continue pain control


Antibiotics


IV fluids


continue IV heparin. 


suggest to transition to Coumadin





Subjective


Interval Events:  latest chest x-ray shows atelectais.  Patient is less 

tachycardic than prio


Constitutional:  Reports: no symptoms


HEENT:  Repors: no symptoms


Respiratory:  Reports: no symptoms


Cardiovascular:  Reports: no symptoms


Gastrointestinal/Abdominal:  Reports: no symptoms


Allergies:  


Coded Allergies:  


     CODEINE (Verified  Allergy, Unknown, 1/5/18)


Uncoded Allergies:  


     PEANUTS (Adverse Reaction, Severe, Anaphylaxis, 1/9/18)





Objective





Last 24 Hour Vital Signs








  Date Time  Temp Pulse Resp B/P (MAP) Pulse Ox O2 Delivery O2 Flow Rate FiO2


 


1/18/18 10:49  108 20  99 Nasal Cannula 3.0 32


 


1/18/18 10:45  102 22  99 Nasal Cannula 3.0 32


 


1/18/18 08:00  104      


 


1/18/18 08:00 99.8 103 19 134/82 99 Nasal Cannula 3.0 


 


1/18/18 07:00     99 Nasal Cannula 3.0 32


 


1/18/18 07:00  111 20  99 Nasal Cannula 3.0 32


 


1/18/18 07:00      Nasal Cannula 3.0 32


 


1/18/18 07:00  105 22  99 Nasal Cannula 3.0 32


 


1/18/18 06:04 98.9       


 


1/18/18 04:00 98.2 110 20 140/98 99 Nasal Cannula 3.0 


 


1/18/18 04:00  110      


 


1/18/18 03:36  107 20  99 Nasal Cannula 3.0 32


 


1/18/18 03:26  103 20  97 Nasal Cannula 3.0 32


 


1/18/18 01:21 98.9       


 


1/18/18 00:00 100.6 121 24 143/98 99 Nasal Cannula 3.0 


 


1/18/18 00:00  116      


 


1/17/18 23:27  122 22  99 Nasal Cannula 3.0 32


 


1/17/18 23:17  116 22  98 Nasal Cannula 3.0 32


 


1/17/18 20:00 98.4 115 24 152/80 99 Nasal Cannula 3.0 


 


1/17/18 19:10  111      


 


1/17/18 19:10  113 20  99 Nasal Cannula 3.0 32


 


1/17/18 19:01  114 20  98 Nasal Cannula 3.0 32


 


1/17/18 19:01     98 Nasal Cannula 3.0 32


 


1/17/18 19:01      Nasal Cannula 3.0 32


 


1/17/18 17:21  108 22  98 Nasal Cannula 3.0 32


 


1/17/18 17:09  105 22   Nasal Cannula 3.0 32


 


1/17/18 16:00  110      


 


1/17/18 16:00 99.2 111 19 139/75 99 Nasal Cannula 3.0 


 


1/17/18 15:39  108 23  99 Nasal Cannula 3.0 


 


1/17/18 15:30  104 22   Nasal Cannula 3.0 32

















Intake and Output  


 


 1/17/18 1/18/18





 19:00 07:00


 


Intake Total 1679.05 ml 1764.755 ml


 


Output Total 800 ml 900 ml


 


Balance 879.05 ml 864.755 ml


 


  


 


Intake Oral 390 ml 150 ml


 


IV Total 1289.05 ml 1614.755 ml


 


Output Urine Total 800 ml 900 ml


 


# Voids 4 4








General Appearance:  no acute distress


HEENT:  normocephalic


Respiratory/Chest:  chest wall non-tender, lungs clear


Cardiovascular:  normal peripheral pulses, normal rate


Abdomen:  normal bowel sounds, soft, non tender


Extremities:  no cyanosis





Microbiology








 Date/Time


Source Procedure


Growth Status


 


 


 1/17/18 21:00


Sputum Gram Stain - Final Resulted


 


 1/17/18 21:00


Sputum Sputum Culture


Pending Resulted











Current Medications








 Medications


  (Trade)  Dose


 Ordered  Sig/Judie


 Route


 PRN Reason  Start Time


 Stop Time Status Last Admin


Dose Admin


 


 Acetaminophen


  (Tylenol)  650 mg  Q4H  PRN


 ORAL


 Mild Pain/Temp > 100.5  1/10/18 08:45


 2/5/18 00:44  1/18/18 00:22


 


 


 Clindamycin HCl/


 Dextrose  50 ml @ 


 100 mls/hr  Q8H


 IV


   1/15/18 18:00


 1/22/18 17:59  1/18/18 09:31


 


 


 Dextrose


  (Dextrose 50%)    STAT  PRN


 IV


 Hypoglycemia  1/10/18 08:30


 2/9/18 08:29   


 


 


 Diphenoxylate HCl/


 Atropine


  (Lomotil)  2.5 mg  Q4H  PRN


 ORAL


 Diarrhea  1/10/18 13:15


 2/9/18 13:14  1/12/18 14:13


 


 


 Folic Acid


  (Folate)  1 mg  DAILY


 ORAL


   1/12/18 15:00


 2/11/18 14:59  1/18/18 08:43


 


 


 Heparin Sodium/


 Dextrose  500 ml @ 


 32.55 mls/


 hr  adjust per protocol


 IV


   1/15/18 08:45


 2/14/18 08:44  1/17/18 22:18


 


 


 Ipratropium


 Bromide


  (Atrovent)  500 mcg  Q2H  PRN


 HHN


 Shortness of Breath  1/14/18 20:15


 1/19/18 20:14  1/17/18 17:12


 


 


 Ipratropium


 Bromide


  (Atrovent)  500 mcg  Q4HRT


 HHN


   1/14/18 23:00


 1/19/18 22:59  1/18/18 10:47


 


 


 Levalbuterol HCl


  (Xopenex)  1.25 mg  Q2H  PRN


 INH


 Shortness of Breath  1/14/18 20:15


 1/19/18 20:14  1/17/18 17:12


 


 


 Levalbuterol HCl


  (Xopenex)  1.25 mg  Q4HRT


 HHN


   1/14/18 23:00


 1/19/18 22:59  1/18/18 10:47


 


 


 Magnesium


 Hydroxide


  (Mom)  30 ml  BIDPRN  PRN


 ORAL


 constipation  1/10/18 08:15


 2/6/18 08:14   


 


 


 Morphine Sulfate


  (Morphine


 Sulfate)  2 mg  Q3H  PRN


 IVP


 Severe Pain  1/16/18 15:15


 1/23/18 15:14  1/18/18 08:43


 


 


 Ondansetron HCl


  (Zofran)  4 mg  Q6H  PRN


 IVP


 Nausea & Vomiting  1/10/18 08:15


 2/5/18 08:14   


 


 


 Pantoprazole


  (Protonix)  40 mg  DAILY


 ORAL


   1/14/18 09:00


 2/13/18 08:59  1/18/18 08:43


 


 


 Piperacillin Sod/


 Tazobactam Sod


 3.375 gm/Dextrose  55 ml @ 


 13.75 mls/


 hr  Q8HR


 IVPB


   1/17/18 22:00


 1/24/18 21:59  1/18/18 05:34


 


 


 Sodium Chloride  1,000 ml @ 


 100 mls/hr  Q10H


 IV


   1/17/18 19:00


 2/16/18 18:59  1/18/18 05:01


 


 


 Vitamin B Complex/


 Vit C/Folic Acid


  (Nephrovite)  1 tab  DAILY


 ORAL


   1/10/18 09:00


 2/8/18 08:59  1/18/18 08:43


 

















Ethan Tom MD Jan 18, 2018 12:25

## 2018-01-18 NOTE — CARDIAC ELECTROPHYSIOLOGY PN
Assessment/Plan


Assessment/Plan


1. Sinus Tachycardia due to  sickle cell anemia, fever   and possible PE in 

view of acute bilateral DVT. 


   Better after 1 unit PRBC and on heparin drip. Echocardiogram  EF 60%.  No 

SVT or VT.





2.  Sepsis, WBC of 26K. On intravenous antibiotic per Dr. Talley. 





3.  Abdominal pain, likely due to renal cysts and hemorrhage.





4.  Diarrhea. Clostridium difficile colitis has been ruled out.  


     Stool culture negative.





5. Hematuria and anemia. Hb stable in 7.5-8 range





6. Acute bilateral LE DVT. Has creatinine 2.5 and Chest CT angio put him at 

risk of going on HD.


    Continue with iv heparin.





SERENA RN





Subjective


Subjective


In NSR on heparin drip . RN at bedside. Gets tachycardic with movements





Objective





Last 24 Hour Vital Signs








  Date Time  Temp Pulse Resp B/P (MAP) Pulse Ox O2 Delivery O2 Flow Rate FiO2


 


1/18/18 16:00  111 20  99 Nasal Cannula 3.0 32


 


1/18/18 16:00  103 22  99 Nasal Cannula 3.0 32


 


1/18/18 16:00 100.8 106 20 137/86 97 Nasal Cannula 3.0 


 


1/18/18 16:00  102      


 


1/18/18 13:13  105 24   Nasal Cannula 3.0 32


 


1/18/18 12:00  107      


 


1/18/18 12:00 98.8 111 18 159/78 96 Nasal Cannula 3.0 


 


1/18/18 10:49  108 20  99 Nasal Cannula 3.0 32


 


1/18/18 10:45  102 22  99 Nasal Cannula 3.0 32


 


1/18/18 08:00  104      


 


1/18/18 08:00 99.8 103 19 134/82 99 Nasal Cannula 3.0 


 


1/18/18 07:00     99 Nasal Cannula 3.0 32


 


1/18/18 07:00  111 20  99 Nasal Cannula 3.0 32


 


1/18/18 07:00      Nasal Cannula 3.0 32


 


1/18/18 07:00  105 22  99 Nasal Cannula 3.0 32


 


1/18/18 06:04 98.9       


 


1/18/18 04:00 98.2 110 20 140/98 99 Nasal Cannula 3.0 


 


1/18/18 04:00  110      


 


1/18/18 03:36  107 20  99 Nasal Cannula 3.0 32


 


1/18/18 03:26  103 20  97 Nasal Cannula 3.0 32


 


1/18/18 01:21 98.9       


 


1/18/18 00:00 100.6 121 24 143/98 99 Nasal Cannula 3.0 


 


1/18/18 00:00  116      


 


1/17/18 23:27  122 22  99 Nasal Cannula 3.0 32


 


1/17/18 23:17  116 22  98 Nasal Cannula 3.0 32


 


1/17/18 20:00 98.4 115 24 152/80 99 Nasal Cannula 3.0 


 


1/17/18 19:10  111      


 


1/17/18 19:10  113 20  99 Nasal Cannula 3.0 32


 


1/17/18 19:01  114 20  98 Nasal Cannula 3.0 32


 


1/17/18 19:01     98 Nasal Cannula 3.0 32


 


1/17/18 19:01      Nasal Cannula 3.0 32


 


1/17/18 17:21  108 22  98 Nasal Cannula 3.0 32


 


1/17/18 17:09  105 22   Nasal Cannula 3.0 32

















Intake and Output  


 


 1/17/18 1/18/18





 19:00 07:00


 


Intake Total 1679.05 ml 1764.755 ml


 


Output Total 800 ml 900 ml


 


Balance 879.05 ml 864.755 ml


 


  


 


Intake Oral 390 ml 150 ml


 


IV Total 1289.05 ml 1614.755 ml


 


Output Urine Total 800 ml 900 ml


 


# Voids 4 4











Laboratory Tests








Test


  1/18/18


12:20 1/18/18


12:22


 


Sodium Level


  140 MMOL/L


(136-145) 


 


 


Potassium Level


  5.0 MMOL/L


(3.5-5.1) 


 


 


Chloride Level


  114 MMOL/L


()  H 


 


 


Carbon Dioxide Level


  12 MMOL/L


(21-32)  L 


 


 


Anion Gap


  14 mmol/L


(5-15) 


 


 


Blood Urea Nitrogen


  40 mg/dL


(7-18)  H 


 


 


Creatinine


  3.4 MG/DL


(0.55-1.30)  H 


 


 


Estimat Glomerular Filtration


Rate 22.2 mL/min


(>60) 


 


 


Glucose Level


  120 MG/DL


()  H 


 


 


Calcium Level


  8.7 MG/DL


(8.5-10.1) 


 


 


Total Bilirubin


  0.7 MG/DL


(0.2-1.0) 


 


 


Aspartate Amino Transf


(AST/SGOT) 61 U/L (15-37)


H 


 


 


Alanine Aminotransferase


(ALT/SGPT) 43 U/L (12-78)


  


 


 


Alkaline Phosphatase


  94 U/L


() 


 


 


Total Protein


  6.1 G/DL


(6.4-8.2)  L 


 


 


Albumin


  1.4 G/DL


(3.4-5.0)  L 


 


 


Globulin 4.7 g/dL   


 


Albumin/Globulin Ratio


  0.3 (1.0-2.7)


L 


 


 


White Blood Count


  


  21.7 K/UL


(4.8-10.8)  H


 


Red Blood Count


  


  2.68 M/UL


(4.70-6.10)  L


 


Hemoglobin


  


  7.5 G/DL


(14.2-18.0)  L


 


Hematocrit


  


  23.5 %


(42.0-52.0)  L


 


Mean Corpuscular Volume  88 FL (80-99)  


 


Mean Corpuscular Hemoglobin


  


  28.0 PG


(27.0-31.0)


 


Mean Corpuscular Hemoglobin


Concent 


  31.9 G/DL


(32.0-36.0)  L


 


Red Cell Distribution Width


  


  13.8 %


(11.6-14.8)


 


Platelet Count


  


  460 K/UL


(150-450)  H


 


Mean Platelet Volume


  


  8.5 FL


(6.5-10.1)


 


Neutrophils (%) (Auto)


  


  % (45.0-75.0)


 


 


Lymphocytes (%) (Auto)


  


  % (20.0-45.0)


 


 


Monocytes (%) (Auto)   % (1.0-10.0)  


 


Eosinophils (%) (Auto)   % (0.0-3.0)  


 


Basophils (%) (Auto)   % (0.0-2.0)  


 


Differential Total Cells


Counted 


  100  


 


 


Neutrophils % (Manual)  90 % (45-75)  H


 


Lymphocytes % (Manual)  4 % (20-45)  L


 


Monocytes % (Manual)  4 % (1-10)  


 


Eosinophils % (Manual)  2 % (0-3)  


 


Basophils % (Manual)  0 % (0-2)  


 


Band Neutrophils  0 % (0-8)  


 


Platelet Estimate  Adequate  


 


Platelet Morphology  Normal  


 


Hypochromasia  1+  


 


Activated Partial


Thromboplast Time 


  62 SEC (23-33)


H











Microbiology








 Date/Time


Source Procedure


Growth Status


 


 


 1/17/18 21:00


Sputum Gram Stain - Final Resulted


 


 1/17/18 21:00


Sputum Sputum Culture


Pending Resulted








Objective


HEAD AND NECK:  No JVD.


LUNGS:  Clear.


CARDIOVASCULAR:  Nl S1 and S2 with no gallop or murmur.


ABDOMEN:  Soft and nontender.


EXTREMITIES:  Bilateral pitting edema.











TOLUIE,KAYLEEN Jan 18, 2018 17:09

## 2018-01-18 NOTE — GI PROGRESS NOTE
Assessment/Plan


Problems:  


(1) Diarrhea


ICD Codes:  R19.7 - Diarrhea, unspecified


SNOMED:  58676243


(2) Sickle cell trait


ICD Codes:  D57.3 - Sickle-cell trait


SNOMED:  05363157


(3) Abdominal pain


ICD Codes:  R10.9 - Unspecified abdominal pain


SNOMED:  54208970


(4) Renal cyst, native, hemorrhage


ICD Codes:  N28.89 - Other specified disorders of kidney and ureter; N28.1 - 

Cyst of kidney, acquired


SNOMED:  11932815


Status:  not improved, unchanged


Status Narrative


Discussed with Dr. Gamez.


Assessment/Plan


OB stool r/o GI bleed  >> negative


stool cx >> negative


cdiff >> negative


O&P >> negative


diarrhea resolved >> lomotil prn





fu hematology, ID recs


monitor H&H, prn transfusions


renal diet, tolerating


ppi


abx


fu labs


outpatient GI procedures





Subjective


Subjective


generalized weakness


diarrhea resolved





Objective





Last 24 Hour Vital Signs








  Date Time  Temp Pulse Resp B/P (MAP) Pulse Ox O2 Delivery O2 Flow Rate FiO2


 


1/18/18 10:49  108 20  99 Nasal Cannula 3.0 32


 


1/18/18 10:45  102 22  99 Nasal Cannula 3.0 32


 


1/18/18 08:00  104      


 


1/18/18 08:00 99.8 103 19 134/82 99 Nasal Cannula 3.0 


 


1/18/18 07:00     99 Nasal Cannula 3.0 32


 


1/18/18 07:00  111 20  99 Nasal Cannula 3.0 32


 


1/18/18 07:00      Nasal Cannula 3.0 32


 


1/18/18 07:00  105 22  99 Nasal Cannula 3.0 32


 


1/18/18 06:04 98.9       


 


1/18/18 04:00 98.2 110 20 140/98 99 Nasal Cannula 3.0 


 


1/18/18 04:00  110      


 


1/18/18 03:36  107 20  99 Nasal Cannula 3.0 32


 


1/18/18 03:26  103 20  97 Nasal Cannula 3.0 32


 


1/18/18 01:21 98.9       


 


1/18/18 00:00 100.6 121 24 143/98 99 Nasal Cannula 3.0 


 


1/18/18 00:00  116      


 


1/17/18 23:27  122 22  99 Nasal Cannula 3.0 32


 


1/17/18 23:17  116 22  98 Nasal Cannula 3.0 32


 


1/17/18 20:00 98.4 115 24 152/80 99 Nasal Cannula 3.0 


 


1/17/18 19:10  111      


 


1/17/18 19:10  113 20  99 Nasal Cannula 3.0 32


 


1/17/18 19:01  114 20  98 Nasal Cannula 3.0 32


 


1/17/18 19:01     98 Nasal Cannula 3.0 32


 


1/17/18 19:01      Nasal Cannula 3.0 32


 


1/17/18 17:21  108 22  98 Nasal Cannula 3.0 32


 


1/17/18 17:09  105 22   Nasal Cannula 3.0 32


 


1/17/18 16:00  110      


 


1/17/18 16:00 99.2 111 19 139/75 99 Nasal Cannula 3.0 


 


1/17/18 15:39  108 23  99 Nasal Cannula 3.0 


 


1/17/18 15:30  104 22   Nasal Cannula 3.0 32


 


1/17/18 12:00 97.8 114 25 155/91 97 Nasal Cannula 3.0 


 


1/17/18 12:00  106      


 


1/17/18 11:41  115 23  99 Nasal Cannula 3.0 


 


1/17/18 11:30  110 22   Nasal Cannula 3.0 32

















Intake and Output  


 


 1/17/18 1/18/18





 19:00 07:00


 


Intake Total 1679.05 ml 1764.755 ml


 


Output Total 800 ml 900 ml


 


Balance 879.05 ml 864.755 ml


 


  


 


Intake Oral 390 ml 150 ml


 


IV Total 1289.05 ml 1614.755 ml


 


Output Urine Total 800 ml 900 ml


 


# Voids 4 4








Height (Feet):  5


Height (Inches):  10.00


Weight (Pounds):  138


General Appearance:  confused


Cardiovascular:  normal rate


Respiratory/Chest:  other - NC


Abdominal Exam:  soft











Elsy Lakhani NBRIGID Jan 18, 2018 11:06

## 2018-01-19 VITALS — DIASTOLIC BLOOD PRESSURE: 89 MMHG | SYSTOLIC BLOOD PRESSURE: 146 MMHG

## 2018-01-19 VITALS — SYSTOLIC BLOOD PRESSURE: 133 MMHG | DIASTOLIC BLOOD PRESSURE: 89 MMHG

## 2018-01-19 VITALS — SYSTOLIC BLOOD PRESSURE: 130 MMHG | DIASTOLIC BLOOD PRESSURE: 73 MMHG

## 2018-01-19 VITALS — DIASTOLIC BLOOD PRESSURE: 80 MMHG | SYSTOLIC BLOOD PRESSURE: 140 MMHG

## 2018-01-19 VITALS — DIASTOLIC BLOOD PRESSURE: 70 MMHG | SYSTOLIC BLOOD PRESSURE: 130 MMHG

## 2018-01-19 VITALS — SYSTOLIC BLOOD PRESSURE: 144 MMHG | DIASTOLIC BLOOD PRESSURE: 78 MMHG

## 2018-01-19 LAB
ADD MANUAL DIFF: YES
ANION GAP SERPL CALC-SCNC: 15 MMOL/L (ref 5–15)
BUN SERPL-MCNC: 36 MG/DL (ref 7–18)
CALCIUM SERPL-MCNC: 8.8 MG/DL (ref 8.5–10.1)
CHLORIDE SERPL-SCNC: 113 MMOL/L (ref 98–107)
CO2 SERPL-SCNC: 14 MMOL/L (ref 21–32)
CREAT SERPL-MCNC: 3.1 MG/DL (ref 0.55–1.3)
ERYTHROCYTE [DISTWIDTH] IN BLOOD BY AUTOMATED COUNT: 13.9 % (ref 11.6–14.8)
HCT VFR BLD CALC: 21.1 % (ref 42–52)
HGB BLD-MCNC: 7 G/DL (ref 14.2–18)
MCV RBC AUTO: 86 FL (ref 80–99)
PHOSPHATE SERPL-MCNC: 5.6 MG/DL (ref 2.5–4.9)
PLATELET # BLD: 459 K/UL (ref 150–450)
POTASSIUM SERPL-SCNC: 4.6 MMOL/L (ref 3.5–5.1)
RBC # BLD AUTO: 2.45 M/UL (ref 4.7–6.1)
SODIUM SERPL-SCNC: 142 MMOL/L (ref 136–145)
WBC # BLD AUTO: 20.2 K/UL (ref 4.8–10.8)

## 2018-01-19 RX ADMIN — MORPHINE SULFATE PRN MG: 2 INJECTION, SOLUTION INTRAMUSCULAR; INTRAVENOUS at 01:31

## 2018-01-19 RX ADMIN — MORPHINE SULFATE PRN MG: 2 INJECTION, SOLUTION INTRAMUSCULAR; INTRAVENOUS at 05:43

## 2018-01-19 RX ADMIN — MORPHINE SULFATE PRN MG: 2 INJECTION, SOLUTION INTRAMUSCULAR; INTRAVENOUS at 17:01

## 2018-01-19 RX ADMIN — LEVALBUTEROL HYDROCHLORIDE SCH MG: 1.25 SOLUTION, CONCENTRATE RESPIRATORY (INHALATION) at 18:54

## 2018-01-19 RX ADMIN — MORPHINE SULFATE PRN MG: 2 INJECTION, SOLUTION INTRAMUSCULAR; INTRAVENOUS at 10:42

## 2018-01-19 RX ADMIN — Medication SCH MCG: at 07:27

## 2018-01-19 RX ADMIN — Medication SCH TAB: at 09:49

## 2018-01-19 RX ADMIN — HEPARIN SODIUM SCH MLS/HR: 5000 INJECTION, SOLUTION INTRAVENOUS at 04:23

## 2018-01-19 RX ADMIN — Medication SCH MCG: at 03:55

## 2018-01-19 RX ADMIN — Medication SCH MCG: at 18:54

## 2018-01-19 RX ADMIN — Medication SCH MCG: at 14:38

## 2018-01-19 RX ADMIN — CLINDAMYCIN PHOSPHATE SCH MLS/HR: 12 INJECTION, SOLUTION INTRAVENOUS at 09:48

## 2018-01-19 RX ADMIN — MORPHINE SULFATE PRN MG: 2 INJECTION, SOLUTION INTRAMUSCULAR; INTRAVENOUS at 21:23

## 2018-01-19 RX ADMIN — HEPARIN SODIUM SCH MLS/HR: 5000 INJECTION, SOLUTION INTRAVENOUS at 16:54

## 2018-01-19 RX ADMIN — LEVALBUTEROL HYDROCHLORIDE SCH MG: 1.25 SOLUTION, CONCENTRATE RESPIRATORY (INHALATION) at 07:27

## 2018-01-19 RX ADMIN — LEVALBUTEROL HYDROCHLORIDE SCH MG: 1.25 SOLUTION, CONCENTRATE RESPIRATORY (INHALATION) at 11:07

## 2018-01-19 RX ADMIN — CLINDAMYCIN PHOSPHATE SCH MLS/HR: 12 INJECTION, SOLUTION INTRAVENOUS at 19:33

## 2018-01-19 RX ADMIN — HEPARIN SODIUM SCH MLS/HR: 5000 INJECTION, SOLUTION INTRAVENOUS at 21:22

## 2018-01-19 RX ADMIN — LEVALBUTEROL HYDROCHLORIDE SCH MG: 1.25 SOLUTION, CONCENTRATE RESPIRATORY (INHALATION) at 14:38

## 2018-01-19 RX ADMIN — CLINDAMYCIN PHOSPHATE SCH MLS/HR: 12 INJECTION, SOLUTION INTRAVENOUS at 01:30

## 2018-01-19 RX ADMIN — Medication SCH MCG: at 11:07

## 2018-01-19 RX ADMIN — DEXTROSE MONOHYDRATE SCH MLS/HR: 50 INJECTION, SOLUTION INTRAVENOUS at 02:30

## 2018-01-19 RX ADMIN — DEXTROSE MONOHYDRATE SCH MLS/HR: 50 INJECTION, SOLUTION INTRAVENOUS at 14:27

## 2018-01-19 RX ADMIN — LEVALBUTEROL HYDROCHLORIDE SCH MG: 1.25 SOLUTION, CONCENTRATE RESPIRATORY (INHALATION) at 03:55

## 2018-01-19 NOTE — CARDIAC ELECTROPHYSIOLOGY PN
Assessment/Plan


Assessment/Plan


1. Sinus Tachycardia due to sickle cell anemia, fever and possible PE in view 

of acute bilateral DVT. 


   Better after 1 unit PRBC and on heparin drip. Echocardiogram  EF 60%.  No 

SVT or VT.





2.  Sepsis, WBC of 26K. On intravenous antibiotic per Dr. Talley. 





3.  Abdominal pain, likely due to renal cysts and hemorrhage.





4.  Diarrhea. Clostridium difficile colitis has been ruled out.  





5. Hematuria and anemia. Hb stable in 7.5-8 range





6. Acute bilateral LE DVT. Has creatinine 2.5 . Continue with iv heparin.





DW RN





Subjective


Subjective


In NSR on heparin drip. SOB is better.





Objective





Last 24 Hour Vital Signs








  Date Time  Temp Pulse Resp B/P (MAP) Pulse Ox O2 Delivery O2 Flow Rate FiO2


 


1/19/18 12:00  106      


 


1/19/18 11:16  108 20  99 Nasal Cannula 3.0 32


 


1/19/18 11:08  101 22  99 Nasal Cannula 3.0 32


 


1/19/18 08:00 98.4 118 19 144/78 97 Nasal Cannula 3.0 


 


1/19/18 08:00  103      


 


1/19/18 07:34  110 22  99 Nasal Cannula 3.0 32


 


1/19/18 07:30      Nasal Cannula 3.0 32


 


1/19/18 07:30     99 Nasal Cannula 3.0 32


 


1/19/18 07:29  109 24  99 Nasal Cannula 3.0 32


 


1/19/18 06:13 98.9       


 


1/19/18 04:08  108 20  99 Nasal Cannula 3.0 32


 


1/19/18 04:00 99.0 111 19 133/89 97 Nasal Cannula 3.0 


 


1/19/18 04:00  111      


 


1/19/18 03:58  113 22  97 Nasal Cannula 3.0 32


 


1/19/18 00:00 99.0 112 19 146/89 97 Nasal Cannula 3.0 


 


1/19/18 00:00  116      


 


1/18/18 23:41  113 18  98 Nasal Cannula 3.0 32


 


1/18/18 23:33  111 24  96 Nasal Cannula 3.0 32


 


1/18/18 20:20  98 18  98 Nasal Cannula 3.0 32


 


1/18/18 20:12  102 20  97 Nasal Cannula 3.0 32


 


1/18/18 20:12      Nasal Cannula 3.0 32


 


1/18/18 20:11     97 Nasal Cannula 3.0 32


 


1/18/18 20:00  100      


 


1/18/18 20:00 100.8 106 20 137/86 97 Nasal Cannula 3.0 


 


1/18/18 17:31 98.9       


 


1/18/18 16:00  111 20  99 Nasal Cannula 3.0 32


 


1/18/18 16:00  103 22  99 Nasal Cannula 3.0 32


 


1/18/18 16:00 100.8 106 20 137/86 97 Nasal Cannula 3.0 


 


1/18/18 16:00  102      

















Intake and Output  


 


 1/18/18 1/19/18





 19:00 07:00


 


Intake Total 1970.930 ml 1909.168 ml


 


Output Total 1700 ml 1200 ml


 


Balance 270.930 ml 709.168 ml


 


  


 


Intake Oral 240 ml 320 ml


 


IV Total 1730.930 ml 1589.168 ml


 


Output Urine Total 1700 ml 1200 ml


 


# Voids 5 6











Laboratory Tests








Test


  1/18/18


19:25 1/19/18


04:20 1/19/18


13:15


 


Activated Partial


Thromboplast Time 91 SEC (23-33)


H 47 SEC (23-33)


H Pending  


 


 


White Blood Count


  


  20.2 K/UL


(4.8-10.8)  H 


 


 


Red Blood Count


  


  2.45 M/UL


(4.70-6.10)  L 


 


 


Hemoglobin


  


  7.0 G/DL


(14.2-18.0)  L 


 


 


Hematocrit


  


  21.1 %


(42.0-52.0)  L 


 


 


Mean Corpuscular Volume  86 FL (80-99)   


 


Mean Corpuscular Hemoglobin


  


  28.7 PG


(27.0-31.0) 


 


 


Mean Corpuscular Hemoglobin


Concent 


  33.3 G/DL


(32.0-36.0) 


 


 


Red Cell Distribution Width


  


  13.9 %


(11.6-14.8) 


 


 


Platelet Count


  


  459 K/UL


(150-450)  H 


 


 


Mean Platelet Volume


  


  8.1 FL


(6.5-10.1) 


 


 


Neutrophils (%) (Auto)


  


  % (45.0-75.0)


  


 


 


Lymphocytes (%) (Auto)


  


  % (20.0-45.0)


  


 


 


Monocytes (%) (Auto)   % (1.0-10.0)   


 


Eosinophils (%) (Auto)   % (0.0-3.0)   


 


Basophils (%) (Auto)   % (0.0-2.0)   


 


Differential Total Cells


Counted 


  100  


  


 


 


Neutrophils % (Manual)  85 % (45-75)  H 


 


Lymphocytes % (Manual)  9 % (20-45)  L 


 


Monocytes % (Manual)  4 % (1-10)   


 


Eosinophils % (Manual)  2 % (0-3)   


 


Basophils % (Manual)  0 % (0-2)   


 


Band Neutrophils  0 % (0-8)   


 


Platelet Estimate  Adequate   


 


Platelet Morphology  Normal   


 


Hypochromasia  1+   


 


Sodium Level


  


  142 MMOL/L


(136-145) 


 


 


Potassium Level


  


  4.6 MMOL/L


(3.5-5.1) 


 


 


Chloride Level


  


  113 MMOL/L


()  H 


 


 


Carbon Dioxide Level


  


  14 MMOL/L


(21-32)  L 


 


 


Anion Gap


  


  15 mmol/L


(5-15) 


 


 


Blood Urea Nitrogen


  


  36 mg/dL


(7-18)  H 


 


 


Creatinine


  


  3.1 MG/DL


(0.55-1.30)  H 


 


 


Estimat Glomerular Filtration


Rate 


  24.7 mL/min


(>60) 


 


 


Glucose Level


  


  114 MG/DL


()  H 


 


 


Calcium Level


  


  8.8 MG/DL


(8.5-10.1) 


 


 


Phosphorus Level


  


  5.6 MG/DL


(2.5-4.9)  H 


 


 


Magnesium Level


  


  2.1 MG/DL


(1.8-2.4) 


 











Microbiology








 Date/Time


Source Procedure


Growth Status


 


 


 1/17/18 21:00


Sputum Gram Stain - Final Resulted


 


 1/17/18 21:00 Sputum Culture - Preliminary


Candida Albicans Resulted








Objective


HEAD AND NECK:  No JVD.


LUNGS:  Clear.


CARDIOVASCULAR:  Nl S1 and S2 with no gallop or murmur.


ABDOMEN:  Soft and nontender.


EXTREMITIES:  Bilateral pitting edema.











KAYLEEN HAIR Jan 19, 2018 13:36

## 2018-01-19 NOTE — NEPHROLOGY PROGRESS NOTE
Assessment/Plan


Problem List:  


(1) Sickle cell trait


(2) Abdominal pain


(3) Renal cyst, native, hemorrhage


(4) Sepsis


(5) Hematuria


(6) Shortness of breath


(7) Chest pain


(8) Severe anemia


(9) Pneumonia


(10) DVT (deep venous thrombosis)


(11) Tachycardia


(12) Sickle cell anemia


(13) HAP (hospital-acquired pneumonia)


Plan


Continue current treatment plan


Continue heparin drip per protocol


Monitor HR, cardio following


Continue o2 therapy


CT angio


Monitor H&H and transfuse prn


Hematology, cardiology GI, and ID and pulmo following, will f/u with recs


Monitor lytes, correct prn


Continue abx per ID


Monitor renal function, avoid nephrotoxins 


Pain management prn


Continue neb treatment


Daily PPI


AM labs





Subjective


Constitutional:  Denies: no symptoms, chills, diaphoresis, fever, malaise, 

weakness, other


HEENT:  Denies: no symptoms, eye pain, blurred vision, tearing, double vision, 

ear pain, ear discharge, nose pain, nose congestion, throat pain, throat 

swelling, mouth pain, mouth swelling, other


Genitourinary:  Denies: no symptoms, burning, discharge, frequency, flank pain, 

hematuria, incontinence, pain, urgency, other


Neurologic/Psychiatric:  Denies: no symptoms, anxiety, depressed, emotional 

problems, headache, numbness, paresthesia, pre-existing deficit, seizure, 

tingling, tremors, weakness, other


Subjective


In bed, in no apparent distress, on heparin drip, blood transfusion ongoing





Objective


Objective





Last 24 Hour Vital Signs








  Date Time  Temp Pulse Resp B/P (MAP) Pulse Ox O2 Delivery O2 Flow Rate FiO2


 


1/19/18 19:03  106 22  100 Nasal Cannula 3.0 32


 


1/19/18 18:57      Nasal Cannula 3.0 32


 


1/19/18 18:57     98 Nasal Cannula 3.0 32


 


1/19/18 18:55  100 22  98 Nasal Cannula 3.0 32


 


1/19/18 18:04  105      


 


1/19/18 16:00 98.8 109 21 140/80 100 Nasal Cannula 3.0 


 


1/19/18 14:46  103 20  99 Nasal Cannula 3.0 32


 


1/19/18 14:40  104 20  99 Nasal Cannula 3.0 32


 


1/19/18 12:00  106      


 


1/19/18 12:00 97.3 98 26 130/70 100 Nasal Cannula 3.0 


 


1/19/18 11:16  108 20  99 Nasal Cannula 3.0 32


 


1/19/18 11:08  101 22  99 Nasal Cannula 3.0 32


 


1/19/18 08:00 98.4 118 19 144/78 97 Nasal Cannula 3.0 


 


1/19/18 08:00  103      


 


1/19/18 07:34  110 22  99 Nasal Cannula 3.0 32


 


1/19/18 07:30      Nasal Cannula 3.0 32


 


1/19/18 07:30     99 Nasal Cannula 3.0 32


 


1/19/18 07:29  109 24  99 Nasal Cannula 3.0 32


 


1/19/18 06:13 98.9       


 


1/19/18 04:08  108 20  99 Nasal Cannula 3.0 32


 


1/19/18 04:00 99.0 111 19 133/89 97 Nasal Cannula 3.0 


 


1/19/18 04:00  111      


 


1/19/18 03:58  113 22  97 Nasal Cannula 3.0 32


 


1/19/18 00:00 99.0 112 19 146/89 97 Nasal Cannula 3.0 


 


1/19/18 00:00  116      


 


1/18/18 23:41  113 18  98 Nasal Cannula 3.0 32


 


1/18/18 23:33  111 24  96 Nasal Cannula 3.0 32


 


1/18/18 20:20  98 18  98 Nasal Cannula 3.0 32


 


1/18/18 20:12  102 20  97 Nasal Cannula 3.0 32


 


1/18/18 20:12      Nasal Cannula 3.0 32


 


1/18/18 20:11     97 Nasal Cannula 3.0 32


 


1/18/18 20:00  100      


 


1/18/18 20:00 100.8 106 20 137/86 97 Nasal Cannula 3.0 

















Intake and Output  


 


 1/18/18 1/19/18





 19:00 07:00


 


Intake Total 1970.930 ml 1909.168 ml


 


Output Total 1700 ml 1200 ml


 


Balance 270.930 ml 709.168 ml


 


  


 


Intake Oral 240 ml 320 ml


 


IV Total 1730.930 ml 1589.168 ml


 


Output Urine Total 1700 ml 1200 ml


 


# Voids 5 6








Laboratory Tests


1/19/18 04:20: 


White Blood Count 20.2H, Red Blood Count 2.45L, Hemoglobin 7.0L, Hematocrit 

21.1L, Mean Corpuscular Volume 86, Mean Corpuscular Hemoglobin 28.7, Mean 

Corpuscular Hemoglobin Concent 33.3, Red Cell Distribution Width 13.9, Platelet 

Count 459H, Mean Platelet Volume 8.1, Neutrophils (%) (Auto) , Lymphocytes (%) (

Auto) , Monocytes (%) (Auto) , Eosinophils (%) (Auto) , Basophils (%) (Auto) , 

Differential Total Cells Counted 100, Neutrophils % (Manual) 85H, Lymphocytes % 

(Manual) 9L, Monocytes % (Manual) 4, Eosinophils % (Manual) 2, Basophils % (

Manual) 0, Band Neutrophils 0, Platelet Estimate Adequate, Platelet Morphology 

Normal, Hypochromasia 1+, Activated Partial Thromboplast Time 47H, Sodium Level 

142, Potassium Level 4.6, Chloride Level 113H, Carbon Dioxide Level 14L, Anion 

Gap 15, Blood Urea Nitrogen 36H, Creatinine 3.1H, Estimat Glomerular Filtration 

Rate 24.7, Glucose Level 114H, Calcium Level 8.8, Phosphorus Level 5.6H, 

Magnesium Level 2.1


1/19/18 13:15: Activated Partial Thromboplast Time > 150*H


Height (Feet):  5


Height (Inches):  10.00


Weight (Pounds):  138


General Appearance:  no apparent distress, alert


EENT:  normal ENT inspection


Neck:  normal alignment, supple


Cardiovascular:  normal rate, regular rhythm


Respiratory/Chest:  no respiratory distress


Abdomen:  soft, no organomegaly


Extremities:  non-tender, normal inspection


Neurologic:  alert, oriented x 3, responsive, normal mood/affect











Dayanara Hawley N.P. Jan 19, 2018 19:31

## 2018-01-19 NOTE — GI PROGRESS NOTE
Assessment/Plan


Problems:  


(1) Diarrhea


ICD Codes:  R19.7 - Diarrhea, unspecified


SNOMED:  82753193


(2) Sickle cell trait


ICD Codes:  D57.3 - Sickle-cell trait


SNOMED:  67117655


(3) Abdominal pain


ICD Codes:  R10.9 - Unspecified abdominal pain


SNOMED:  80629818


(4) Renal cyst, native, hemorrhage


ICD Codes:  N28.89 - Other specified disorders of kidney and ureter; N28.1 - 

Cyst of kidney, acquired


SNOMED:  30232764


Status:  unchanged


Status Narrative


Discussed with Dr. Gamez.


Assessment/Plan


OB stool r/o GI bleed  >> negative


stool cx >> negative


cdiff >> negative


O&P >> negative


diarrhea resolved >> lomotil prn





fu renal, hematology, ID recs


monitor H&H, prn transfusions


renal diet, tolerating


ppi


abx


fu labs


outpatient GI procedures





Subjective


Subjective


generalized weakness


diarrhea resolved





Objective





Last 24 Hour Vital Signs








  Date Time  Temp Pulse Resp B/P (MAP) Pulse Ox O2 Delivery O2 Flow Rate FiO2


 


1/19/18 08:00  103      


 


1/19/18 07:34  110 22  99 Nasal Cannula 3.0 32


 


1/19/18 07:30      Nasal Cannula 3.0 32


 


1/19/18 07:30     99 Nasal Cannula 3.0 32


 


1/19/18 07:29  109 24  99 Nasal Cannula 3.0 32


 


1/19/18 06:13 98.9       


 


1/19/18 04:08  108 20  99 Nasal Cannula 3.0 32


 


1/19/18 04:00 99.0 111 19 133/89 97 Nasal Cannula 3.0 


 


1/19/18 04:00  111      


 


1/19/18 03:58  113 22  97 Nasal Cannula 3.0 32


 


1/19/18 00:00 99.0 112 19 146/89 97 Nasal Cannula 3.0 


 


1/19/18 00:00  116      


 


1/18/18 23:41  113 18  98 Nasal Cannula 3.0 32


 


1/18/18 23:33  111 24  96 Nasal Cannula 3.0 32


 


1/18/18 20:20  98 18  98 Nasal Cannula 3.0 32


 


1/18/18 20:12  102 20  97 Nasal Cannula 3.0 32


 


1/18/18 20:12      Nasal Cannula 3.0 32


 


1/18/18 20:11     97 Nasal Cannula 3.0 32


 


1/18/18 20:00  100      


 


1/18/18 20:00 100.8 106 20 137/86 97 Nasal Cannula 3.0 


 


1/18/18 17:31 98.9       


 


1/18/18 16:00  111 20  99 Nasal Cannula 3.0 32


 


1/18/18 16:00  103 22  99 Nasal Cannula 3.0 32


 


1/18/18 16:00 100.8 106 20 137/86 97 Nasal Cannula 3.0 


 


1/18/18 16:00  102      


 


1/18/18 13:13  105 24   Nasal Cannula 3.0 32


 


1/18/18 12:00  107      


 


1/18/18 12:00 98.8 111 18 159/78 96 Nasal Cannula 3.0 


 


1/18/18 10:49  108 20  99 Nasal Cannula 3.0 32


 


1/18/18 10:45  102 22  99 Nasal Cannula 3.0 32

















Intake and Output  


 


 1/18/18 1/19/18





 19:00 07:00


 


Intake Total 1970.930 ml 1909.168 ml


 


Output Total 1700 ml 1200 ml


 


Balance 270.930 ml 709.168 ml


 


  


 


Intake Oral 240 ml 320 ml


 


IV Total 1730.930 ml 1589.168 ml


 


Output Urine Total 1700 ml 1200 ml


 


# Voids 5 6











Laboratory Tests








Test


  1/18/18


12:20 1/18/18


12:22 1/18/18


19:25 1/19/18


04:20


 


Sodium Level


  140 MMOL/L


(136-145) 


  


  142 MMOL/L


(136-145)


 


Potassium Level


  5.0 MMOL/L


(3.5-5.1) 


  


  4.6 MMOL/L


(3.5-5.1)


 


Chloride Level


  114 MMOL/L


()  H 


  


  113 MMOL/L


()  H


 


Carbon Dioxide Level


  12 MMOL/L


(21-32)  L 


  


  14 MMOL/L


(21-32)  L


 


Anion Gap


  14 mmol/L


(5-15) 


  


  15 mmol/L


(5-15)


 


Blood Urea Nitrogen


  40 mg/dL


(7-18)  H 


  


  36 mg/dL


(7-18)  H


 


Creatinine


  3.4 MG/DL


(0.55-1.30)  H 


  


  3.1 MG/DL


(0.55-1.30)  H


 


Estimat Glomerular Filtration


Rate 22.2 mL/min


(>60) 


  


  24.7 mL/min


(>60)


 


Glucose Level


  120 MG/DL


()  H 


  


  114 MG/DL


()  H


 


Calcium Level


  8.7 MG/DL


(8.5-10.1) 


  


  8.8 MG/DL


(8.5-10.1)


 


Total Bilirubin


  0.7 MG/DL


(0.2-1.0) 


  


  


 


 


Aspartate Amino Transf


(AST/SGOT) 61 U/L (15-37)


H 


  


  


 


 


Alanine Aminotransferase


(ALT/SGPT) 43 U/L (12-78)


  


  


  


 


 


Alkaline Phosphatase


  94 U/L


() 


  


  


 


 


Total Protein


  6.1 G/DL


(6.4-8.2)  L 


  


  


 


 


Albumin


  1.4 G/DL


(3.4-5.0)  L 


  


  


 


 


Globulin 4.7 g/dL     


 


Albumin/Globulin Ratio


  0.3 (1.0-2.7)


L 


  


  


 


 


White Blood Count


  


  21.7 K/UL


(4.8-10.8)  H 


  20.2 K/UL


(4.8-10.8)  H


 


Red Blood Count


  


  2.68 M/UL


(4.70-6.10)  L 


  2.45 M/UL


(4.70-6.10)  L


 


Hemoglobin


  


  7.5 G/DL


(14.2-18.0)  L 


  7.0 G/DL


(14.2-18.0)  L


 


Hematocrit


  


  23.5 %


(42.0-52.0)  L 


  21.1 %


(42.0-52.0)  L


 


Mean Corpuscular Volume  88 FL (80-99)    86 FL (80-99)  


 


Mean Corpuscular Hemoglobin


  


  28.0 PG


(27.0-31.0) 


  28.7 PG


(27.0-31.0)


 


Mean Corpuscular Hemoglobin


Concent 


  31.9 G/DL


(32.0-36.0)  L 


  33.3 G/DL


(32.0-36.0)


 


Red Cell Distribution Width


  


  13.8 %


(11.6-14.8) 


  13.9 %


(11.6-14.8)


 


Platelet Count


  


  460 K/UL


(150-450)  H 


  459 K/UL


(150-450)  H


 


Mean Platelet Volume


  


  8.5 FL


(6.5-10.1) 


  8.1 FL


(6.5-10.1)


 


Neutrophils (%) (Auto)


  


  % (45.0-75.0)


  


  % (45.0-75.0)


 


 


Lymphocytes (%) (Auto)


  


  % (20.0-45.0)


  


  % (20.0-45.0)


 


 


Monocytes (%) (Auto)   % (1.0-10.0)     % (1.0-10.0)  


 


Eosinophils (%) (Auto)   % (0.0-3.0)     % (0.0-3.0)  


 


Basophils (%) (Auto)   % (0.0-2.0)     % (0.0-2.0)  


 


Differential Total Cells


Counted 


  100  


  


  100  


 


 


Neutrophils % (Manual)  90 % (45-75)  H  85 % (45-75)  H


 


Lymphocytes % (Manual)  4 % (20-45)  L  9 % (20-45)  L


 


Monocytes % (Manual)  4 % (1-10)    4 % (1-10)  


 


Eosinophils % (Manual)  2 % (0-3)    2 % (0-3)  


 


Basophils % (Manual)  0 % (0-2)    0 % (0-2)  


 


Band Neutrophils  0 % (0-8)    0 % (0-8)  


 


Platelet Estimate  Adequate    Adequate  


 


Platelet Morphology  Normal    Normal  


 


Hypochromasia  1+    1+  


 


Activated Partial


Thromboplast Time 


  62 SEC (23-33)


H 91 SEC (23-33)


H 47 SEC (23-33)


H


 


Phosphorus Level


  


  


  


  5.6 MG/DL


(2.5-4.9)  H


 


Magnesium Level


  


  


  


  2.1 MG/DL


(1.8-2.4)








Height (Feet):  5


Height (Inches):  10.00


Weight (Pounds):  138


General Appearance:  WD/WN, no apparent distress, alert


Cardiovascular:  normal rate


Respiratory/Chest:  normal breath sounds, no respiratory distress


Abdominal Exam:  normal bowel sounds, non tender, soft


Extremities:  normal range of motion, non-tender











Elsy Lakhani N.P. Jan 19, 2018 10:40

## 2018-01-19 NOTE — INFECTIOUS DISEASES PROG NOTE
Assessment/Plan


Problems:  


(1) HAP (hospital-acquired pneumonia)


Assessment & Plan:  await  sputum culture , continue  zosyn and  clindamycin to 

cover for MRSA empirically , monitor CXR 





(2) Sepsis


Assessment & Plan:  due to the above , await  blood culture, continue  zosyn  

and clindamycin empiric coverage 





(3) Renal cyst, native, hemorrhage


Assessment & Plan:  with no evidence of  pyelonephritis, blood culture remains 

negative ,  urine culture grew mixed contaminant , now on zosyn empiric 

coverage , had urology eval with no intervention, repeated CT scan of the 

abdomen showed recurrent bleeding . recommend urology eval again , since he is 

on heparin drip now 





(4) Abdominal pain


Assessment & Plan:  due to bleeding into the left renal cysts , recommend 

urology eval and better  pain management  





(5) Fever


Assessment & Plan:  due to the above , on wide spectrum  antibiotics empiric 

coverage , continue  tylenol prn 





(6) Diarrhea


Assessment & Plan:  no evidence of  C diff , continue antidiarrhea meds  





(7) DVT (deep venous thrombosis)


Assessment & Plan:  in both legs, with possible PE, on heparin drip , recommend 

close monitor of PT/PTT, high risk for bleeding in the renal cyst 








Subjective


Constitutional:  Reports: fatigue


HEENT:  Reports: no symptoms


Respiratory:  Reports: productive cough


Breasts:  Reports: no symptoms


Cardiovascular:  Reports: chest pain


Gastrointestinal/Abdominal:  Reports: bloating


Genitourinary:  Reports: no symptoms


Neurologic:  Reports: no symptoms


Psychiatric:  Reports: no symptoms


Skin:  Reports: no symptoms


Endocrine:  Reports: no symptoms


Musculoskeletal:  Reports: pain


Allergies:  


Coded Allergies:  


     CODEINE (Verified  Allergy, Unknown, 1/5/18)


Uncoded Allergies:  


     PEANUTS (Adverse Reaction, Severe, Anaphylaxis, 1/9/18)


Subjective


he feels more comfortable today , with no SOB, no fever or chills





Objective


Vital Signs





Last 24 Hour Vital Signs








  Date Time  Temp Pulse Resp B/P (MAP) Pulse Ox O2 Delivery O2 Flow Rate FiO2


 


1/19/18 14:46  103 20  99 Nasal Cannula 3.0 32


 


1/19/18 14:40  104 20  99 Nasal Cannula 3.0 32


 


1/19/18 12:00  106      


 


1/19/18 12:00 97.3 98 26 130/70 100 Nasal Cannula 3.0 


 


1/19/18 11:16  108 20  99 Nasal Cannula 3.0 32


 


1/19/18 11:08  101 22  99 Nasal Cannula 3.0 32


 


1/19/18 08:00 98.4 118 19 144/78 97 Nasal Cannula 3.0 


 


1/19/18 08:00  103      


 


1/19/18 07:34  110 22  99 Nasal Cannula 3.0 32


 


1/19/18 07:30      Nasal Cannula 3.0 32


 


1/19/18 07:30     99 Nasal Cannula 3.0 32


 


1/19/18 07:29  109 24  99 Nasal Cannula 3.0 32


 


1/19/18 06:13 98.9       


 


1/19/18 04:08  108 20  99 Nasal Cannula 3.0 32


 


1/19/18 04:00 99.0 111 19 133/89 97 Nasal Cannula 3.0 


 


1/19/18 04:00  111      


 


1/19/18 03:58  113 22  97 Nasal Cannula 3.0 32


 


1/19/18 00:00 99.0 112 19 146/89 97 Nasal Cannula 3.0 


 


1/19/18 00:00  116      


 


1/18/18 23:41  113 18  98 Nasal Cannula 3.0 32


 


1/18/18 23:33  111 24  96 Nasal Cannula 3.0 32


 


1/18/18 20:20  98 18  98 Nasal Cannula 3.0 32


 


1/18/18 20:12  102 20  97 Nasal Cannula 3.0 32


 


1/18/18 20:12      Nasal Cannula 3.0 32


 


1/18/18 20:11     97 Nasal Cannula 3.0 32


 


1/18/18 20:00  100      


 


1/18/18 20:00 100.8 106 20 137/86 97 Nasal Cannula 3.0 


 


1/18/18 17:31 98.9       


 


1/18/18 16:00  111 20  99 Nasal Cannula 3.0 32


 


1/18/18 16:00  103 22  99 Nasal Cannula 3.0 32


 


1/18/18 16:00 100.8 106 20 137/86 97 Nasal Cannula 3.0 


 


1/18/18 16:00  102      








Height (Feet):  5


Height (Inches):  10.00


Weight (Pounds):  138


General Appearance:  WD/WN, no acute distress


HEENT:  normocephalic, atraumatic, anicteric, mucous membranes moist


Respiratory/Chest:  chest wall non-tender, no respiratory distress, no 

accessory muscle use, decreased breath sounds, crackles/rales


Cardiovascular:  normal peripheral pulses, normal rate, regular rhythm, no 

gallop/murmur, no JVD


Abdomen:  normal bowel sounds, soft, non tender, no organomegaly, non distended

, no mass, no scars


Extremities:  no cyanosis, no clubbing


Skin:  no rash, no lesions, no ulcers


Neurologic/Psychiatric:  alert, oriented x 3





Microbiology








 Date/Time


Source Procedure


Growth Status


 


 


 1/17/18 21:00


Sputum Gram Stain - Final Resulted


 


 1/17/18 21:00 Sputum Culture - Preliminary


Candida Albicans Resulted











Laboratory Tests








Test


  1/18/18


19:25 1/19/18


04:20 1/19/18


13:15


 


Activated Partial


Thromboplast Time 91 SEC (23-33)


H 47 SEC (23-33)


H > 150 SEC


(23-33)  *H


 


White Blood Count


  


  20.2 K/UL


(4.8-10.8)  H 


 


 


Red Blood Count


  


  2.45 M/UL


(4.70-6.10)  L 


 


 


Hemoglobin


  


  7.0 G/DL


(14.2-18.0)  L 


 


 


Hematocrit


  


  21.1 %


(42.0-52.0)  L 


 


 


Mean Corpuscular Volume  86 FL (80-99)   


 


Mean Corpuscular Hemoglobin


  


  28.7 PG


(27.0-31.0) 


 


 


Mean Corpuscular Hemoglobin


Concent 


  33.3 G/DL


(32.0-36.0) 


 


 


Red Cell Distribution Width


  


  13.9 %


(11.6-14.8) 


 


 


Platelet Count


  


  459 K/UL


(150-450)  H 


 


 


Mean Platelet Volume


  


  8.1 FL


(6.5-10.1) 


 


 


Neutrophils (%) (Auto)


  


  % (45.0-75.0)


  


 


 


Lymphocytes (%) (Auto)


  


  % (20.0-45.0)


  


 


 


Monocytes (%) (Auto)   % (1.0-10.0)   


 


Eosinophils (%) (Auto)   % (0.0-3.0)   


 


Basophils (%) (Auto)   % (0.0-2.0)   


 


Differential Total Cells


Counted 


  100  


  


 


 


Neutrophils % (Manual)  85 % (45-75)  H 


 


Lymphocytes % (Manual)  9 % (20-45)  L 


 


Monocytes % (Manual)  4 % (1-10)   


 


Eosinophils % (Manual)  2 % (0-3)   


 


Basophils % (Manual)  0 % (0-2)   


 


Band Neutrophils  0 % (0-8)   


 


Platelet Estimate  Adequate   


 


Platelet Morphology  Normal   


 


Hypochromasia  1+   


 


Sodium Level


  


  142 MMOL/L


(136-145) 


 


 


Potassium Level


  


  4.6 MMOL/L


(3.5-5.1) 


 


 


Chloride Level


  


  113 MMOL/L


()  H 


 


 


Carbon Dioxide Level


  


  14 MMOL/L


(21-32)  L 


 


 


Anion Gap


  


  15 mmol/L


(5-15) 


 


 


Blood Urea Nitrogen


  


  36 mg/dL


(7-18)  H 


 


 


Creatinine


  


  3.1 MG/DL


(0.55-1.30)  H 


 


 


Estimat Glomerular Filtration


Rate 


  24.7 mL/min


(>60) 


 


 


Glucose Level


  


  114 MG/DL


()  H 


 


 


Calcium Level


  


  8.8 MG/DL


(8.5-10.1) 


 


 


Phosphorus Level


  


  5.6 MG/DL


(2.5-4.9)  H 


 


 


Magnesium Level


  


  2.1 MG/DL


(1.8-2.4) 


 











Current Medications








 Medications


  (Trade)  Dose


 Ordered  Sig/Judie


 Route


 PRN Reason  Start Time


 Stop Time Status Last Admin


Dose Admin


 


 Acetaminophen


  (Tylenol)  650 mg  Q4H  PRN


 ORAL


 Mild Pain/Temp > 100.5  1/10/18 08:45


 2/5/18 00:44  1/18/18 16:32


 


 


 Clindamycin HCl/


 Dextrose  50 ml @ 


 100 mls/hr  Q8H


 IV


   1/15/18 18:00


 1/22/18 17:59  1/19/18 09:48


 


 


 Dextrose


  (Dextrose 50%)    STAT  PRN


 IV


 Hypoglycemia  1/10/18 08:30


 2/9/18 08:29   


 


 


 Diphenoxylate HCl/


 Atropine


  (Lomotil)  2.5 mg  Q4H  PRN


 ORAL


 Diarrhea  1/10/18 13:15


 2/9/18 13:14  1/12/18 14:13


 


 


 Folic Acid


  (Folate)  1 mg  DAILY


 ORAL


   1/12/18 15:00


 2/11/18 14:59  1/19/18 09:49


 


 


 Heparin Sodium/


 Dextrose  500 ml @ 


 35.054 mls/


 hr  adjust per protocol


 IV


   1/19/18 15:20


 2/18/18 15:19   


 


 


 Ipratropium


 Bromide


  (Atrovent)  500 mcg  Q2H  PRN


 HHN


 Shortness of Breath  1/14/18 20:15


 1/19/18 20:14  1/17/18 17:12


 


 


 Ipratropium


 Bromide


  (Atrovent)  500 mcg  Q4HRT


 HHN


   1/14/18 23:00


 1/19/18 22:59  1/19/18 14:38


 


 


 Levalbuterol HCl


  (Xopenex)  1.25 mg  Q2H  PRN


 INH


 Shortness of Breath  1/14/18 20:15


 1/19/18 20:14  1/17/18 17:12


 


 


 Levalbuterol HCl


  (Xopenex)  1.25 mg  Q4HRT


 N


   1/14/18 23:00


 1/19/18 22:59  1/19/18 14:38


 


 


 Magnesium


 Hydroxide


  (Mom)  30 ml  BIDPRN  PRN


 ORAL


 constipation  1/10/18 08:15


 2/6/18 08:14   


 


 


 Morphine Sulfate


  (Morphine


 Sulfate)  2 mg  Q3H  PRN


 IVP


 Severe Pain  1/16/18 15:15


 1/23/18 15:14  1/19/18 10:42


 


 


 Ondansetron HCl


  (Zofran)  4 mg  Q6H  PRN


 IVP


 Nausea & Vomiting  1/10/18 08:15


 2/5/18 08:14   


 


 


 Pantoprazole


  (Protonix)  40 mg  DAILY


 ORAL


   1/14/18 09:00


 2/13/18 08:59  1/19/18 09:49


 


 


 Piperacillin Sod/


 Tazobactam Sod


 3.375 gm/Dextrose  55 ml @ 


 13.75 mls/


 hr  Q12HR@0200,1400


 IVPB


   1/19/18 02:00


 1/26/18 01:59  1/19/18 14:27


 


 


 Sodium Chloride  1,000 ml @ 


 100 mls/hr  Q10H


 IV


   1/17/18 19:00


 2/16/18 18:59  1/19/18 12:08


 


 


 Vitamin B Complex/


 Vit C/Folic Acid


  (Nephrovite)  1 tab  DAILY


 ORAL


   1/10/18 09:00


 2/8/18 08:59  1/19/18 09:49


 

















Pablo Talley M.D. Jan 19, 2018 15:32

## 2018-01-19 NOTE — PULMONOLOGY PROGRESS NOTE
Assessment/Plan


Assessment/Plan


ASSESSMENT AND PLAN:


1. Tachycardia and tachypnea due to pain of sickle cell crisis. CXR shows 

streaky infiltrates; possible pneumonia; will treat as pulm embolism


2. Sepsis, on intravenous antibiotic per Dr. Talley.


3. Abdominal pain, likely due to renal cysts and hemorrhage.


4. Diarrhea.  


5. DVT.





continue pain control


Antibiotics


IV fluids


continue IV heparin. 


suggest to transition to Coumadin





Subjective


Interval Events:  feeling better.  Chest   Pain decreased


Constitutional:  Reports: no symptoms


HEENT:  Repors: no symptoms


Respiratory:  Reports: no symptoms


Cardiovascular:  Reports: no symptoms


Allergies:  


Coded Allergies:  


     CODEINE (Verified  Allergy, Unknown, 1/5/18)


Uncoded Allergies:  


     PEANUTS (Adverse Reaction, Severe, Anaphylaxis, 1/9/18)





Objective





Last 24 Hour Vital Signs








  Date Time  Temp Pulse Resp B/P (MAP) Pulse Ox O2 Delivery O2 Flow Rate FiO2


 


1/19/18 08:00  103      


 


1/19/18 07:34  110 22  99 Nasal Cannula 3.0 32


 


1/19/18 07:30      Nasal Cannula 3.0 32


 


1/19/18 07:30     99 Nasal Cannula 3.0 32


 


1/19/18 07:29  109 24  99 Nasal Cannula 3.0 32


 


1/19/18 06:13 98.9       


 


1/19/18 04:08  108 20  99 Nasal Cannula 3.0 32


 


1/19/18 04:00 99.0 111 19 133/89 97 Nasal Cannula 3.0 


 


1/19/18 04:00  111      


 


1/19/18 03:58  113 22  97 Nasal Cannula 3.0 32


 


1/19/18 00:00 99.0 112 19 146/89 97 Nasal Cannula 3.0 


 


1/19/18 00:00  116      


 


1/18/18 23:41  113 18  98 Nasal Cannula 3.0 32


 


1/18/18 23:33  111 24  96 Nasal Cannula 3.0 32


 


1/18/18 20:20  98 18  98 Nasal Cannula 3.0 32


 


1/18/18 20:12  102 20  97 Nasal Cannula 3.0 32


 


1/18/18 20:12      Nasal Cannula 3.0 32


 


1/18/18 20:11     97 Nasal Cannula 3.0 32


 


1/18/18 20:00  100      


 


1/18/18 20:00 100.8 106 20 137/86 97 Nasal Cannula 3.0 


 


1/18/18 17:31 98.9       


 


1/18/18 16:00  111 20  99 Nasal Cannula 3.0 32


 


1/18/18 16:00  103 22  99 Nasal Cannula 3.0 32


 


1/18/18 16:00 100.8 106 20 137/86 97 Nasal Cannula 3.0 


 


1/18/18 16:00  102      


 


1/18/18 13:13  105 24   Nasal Cannula 3.0 32


 


1/18/18 12:00  107      


 


1/18/18 12:00 98.8 111 18 159/78 96 Nasal Cannula 3.0 


 


1/18/18 10:49  108 20  99 Nasal Cannula 3.0 32


 


1/18/18 10:45  102 22  99 Nasal Cannula 3.0 32

















Intake and Output  


 


 1/18/18 1/19/18





 19:00 07:00


 


Intake Total 1970.930 ml 1909.168 ml


 


Output Total 1700 ml 1200 ml


 


Balance 270.930 ml 709.168 ml


 


  


 


Intake Oral 240 ml 320 ml


 


IV Total 1730.930 ml 1589.168 ml


 


Output Urine Total 1700 ml 1200 ml


 


# Voids 5 6








General Appearance:  no acute distress


HEENT:  normocephalic


Respiratory/Chest:  chest wall non-tender, lungs clear


Cardiovascular:  normal peripheral pulses


Abdomen:  normal bowel sounds





Microbiology








 Date/Time


Source Procedure


Growth Status


 


 


 1/17/18 21:00


Sputum Gram Stain - Final Resulted


 


 1/17/18 21:00 Sputum Culture - Preliminary


Candida Albicans Resulted








Laboratory Tests


1/18/18 12:20: 


Sodium Level 140, Potassium Level 5.0, Chloride Level 114H, Carbon Dioxide 

Level 12L, Anion Gap 14, Blood Urea Nitrogen 40H, Creatinine 3.4H, Estimat 

Glomerular Filtration Rate 22.2, Glucose Level 120H, Calcium Level 8.7, Total 

Bilirubin 0.7, Aspartate Amino Transf (AST/SGOT) 61H, Alanine Aminotransferase (

ALT/SGPT) 43, Alkaline Phosphatase 94, Total Protein 6.1L, Albumin 1.4L, 

Globulin 4.7, Albumin/Globulin Ratio 0.3L


1/18/18 12:22: 


White Blood Count 21.7H, Red Blood Count 2.68L, Hemoglobin 7.5L, Hematocrit 

23.5L, Mean Corpuscular Volume 88, Mean Corpuscular Hemoglobin 28.0, Mean 

Corpuscular Hemoglobin Concent 31.9L, Red Cell Distribution Width 13.8, 

Platelet Count 460H, Mean Platelet Volume 8.5, Neutrophils (%) (Auto) , 

Lymphocytes (%) (Auto) , Monocytes (%) (Auto) , Eosinophils (%) (Auto) , 

Basophils (%) (Auto) , Differential Total Cells Counted 100, Neutrophils % (

Manual) 90H, Lymphocytes % (Manual) 4L, Monocytes % (Manual) 4, Eosinophils % (

Manual) 2, Basophils % (Manual) 0, Band Neutrophils 0, Platelet Estimate 

Adequate, Platelet Morphology Normal, Hypochromasia 1+, Activated Partial 

Thromboplast Time 62H


1/18/18 19:25: Activated Partial Thromboplast Time 91H


1/19/18 04:20: 


Sodium Level 142, Potassium Level 4.6, Chloride Level 113H, Carbon Dioxide 

Level 14L, Anion Gap 15, Blood Urea Nitrogen 36H, Creatinine 3.1H, Estimat 

Glomerular Filtration Rate 24.7, Glucose Level 114H, Calcium Level 8.8, White 

Blood Count 20.2H, Red Blood Count 2.45L, Hemoglobin 7.0L, Hematocrit 21.1L, 

Mean Corpuscular Volume 86, Mean Corpuscular Hemoglobin 28.7, Mean Corpuscular 

Hemoglobin Concent 33.3, Red Cell Distribution Width 13.9, Platelet Count 459H, 

Mean Platelet Volume 8.1, Neutrophils (%) (Auto) , Lymphocytes (%) (Auto) , 

Monocytes (%) (Auto) , Eosinophils (%) (Auto) , Basophils (%) (Auto) , 

Differential Total Cells Counted 100, Neutrophils % (Manual) 85H, Lymphocytes % 

(Manual) 9L, Monocytes % (Manual) 4, Eosinophils % (Manual) 2, Basophils % (

Manual) 0, Band Neutrophils 0, Platelet Estimate Adequate, Platelet Morphology 

Normal, Hypochromasia 1+, Activated Partial Thromboplast Time 47H, Phosphorus 

Level 5.6H, Magnesium Level 2.1





Current Medications








 Medications


  (Trade)  Dose


 Ordered  Sig/Judie


 Route


 PRN Reason  Start Time


 Stop Time Status Last Admin


Dose Admin


 


 Acetaminophen


  (Tylenol)  650 mg  Q4H  PRN


 ORAL


 Mild Pain/Temp > 100.5  1/10/18 08:45


 2/5/18 00:44  1/18/18 16:32


 


 


 Clindamycin HCl/


 Dextrose  50 ml @ 


 100 mls/hr  Q8H


 IV


   1/15/18 18:00


 1/22/18 17:59  1/19/18 09:48


 


 


 Dextrose


  (Dextrose 50%)    STAT  PRN


 IV


 Hypoglycemia  1/10/18 08:30


 2/9/18 08:29   


 


 


 Diphenoxylate HCl/


 Atropine


  (Lomotil)  2.5 mg  Q4H  PRN


 ORAL


 Diarrhea  1/10/18 13:15


 2/9/18 13:14  1/12/18 14:13


 


 


 Folic Acid


  (Folate)  1 mg  DAILY


 ORAL


   1/12/18 15:00


 2/11/18 14:59  1/19/18 09:49


 


 


 Heparin Sodium/


 Dextrose  500 ml @ 


 40.061 mls/


 hr  adjust per protocol


 IV


   1/19/18 06:45


 2/18/18 06:44  1/19/18 06:56


 


 


 Ipratropium


 Bromide


  (Atrovent)  500 mcg  Q2H  PRN


 HHN


 Shortness of Breath  1/14/18 20:15


 1/19/18 20:14  1/17/18 17:12


 


 


 Ipratropium


 Bromide


  (Atrovent)  500 mcg  Q4HRT


 HHN


   1/14/18 23:00


 1/19/18 22:59  1/19/18 07:27


 


 


 Levalbuterol HCl


  (Xopenex)  1.25 mg  Q2H  PRN


 INH


 Shortness of Breath  1/14/18 20:15


 1/19/18 20:14  1/17/18 17:12


 


 


 Levalbuterol HCl


  (Xopenex)  1.25 mg  Q4HRT


 HHN


   1/14/18 23:00


 1/19/18 22:59  1/19/18 07:27


 


 


 Magnesium


 Hydroxide


  (Mom)  30 ml  BIDPRN  PRN


 ORAL


 constipation  1/10/18 08:15


 2/6/18 08:14   


 


 


 Morphine Sulfate


  (Morphine


 Sulfate)  2 mg  Q3H  PRN


 IVP


 Severe Pain  1/16/18 15:15


 1/23/18 15:14  1/19/18 05:43


 


 


 Ondansetron HCl


  (Zofran)  4 mg  Q6H  PRN


 IVP


 Nausea & Vomiting  1/10/18 08:15


 2/5/18 08:14   


 


 


 Pantoprazole


  (Protonix)  40 mg  DAILY


 ORAL


   1/14/18 09:00


 2/13/18 08:59  1/19/18 09:49


 


 


 Piperacillin Sod/


 Tazobactam Sod


 3.375 gm/Dextrose  55 ml @ 


 13.75 mls/


 hr  Q12HR@0200,1400


 IVPB


   1/19/18 02:00


 1/26/18 01:59  1/19/18 02:30


 


 


 Sodium Chloride  1,000 ml @ 


 100 mls/hr  Q10H


 IV


   1/17/18 19:00


 2/16/18 18:59  1/18/18 23:01


 


 


 Vitamin B Complex/


 Vit C/Folic Acid


  (Nephrovite)  1 tab  DAILY


 ORAL


   1/10/18 09:00


 2/8/18 08:59  1/19/18 09:49


 

















Ethan Tmo MD Jan 19, 2018 10:09

## 2018-01-20 VITALS — DIASTOLIC BLOOD PRESSURE: 87 MMHG | SYSTOLIC BLOOD PRESSURE: 156 MMHG

## 2018-01-20 VITALS — DIASTOLIC BLOOD PRESSURE: 81 MMHG | SYSTOLIC BLOOD PRESSURE: 148 MMHG

## 2018-01-20 VITALS — SYSTOLIC BLOOD PRESSURE: 152 MMHG | DIASTOLIC BLOOD PRESSURE: 92 MMHG

## 2018-01-20 VITALS — DIASTOLIC BLOOD PRESSURE: 83 MMHG | SYSTOLIC BLOOD PRESSURE: 152 MMHG

## 2018-01-20 VITALS — SYSTOLIC BLOOD PRESSURE: 136 MMHG | DIASTOLIC BLOOD PRESSURE: 85 MMHG

## 2018-01-20 VITALS — SYSTOLIC BLOOD PRESSURE: 140 MMHG | DIASTOLIC BLOOD PRESSURE: 81 MMHG

## 2018-01-20 LAB
ADD MANUAL DIFF: YES
ALBUMIN SERPL-MCNC: 1.5 G/DL (ref 3.4–5)
ALBUMIN/GLOB SERPL: 0.3 {RATIO} (ref 1–2.7)
ALP SERPL-CCNC: 105 U/L (ref 46–116)
ALT SERPL-CCNC: 62 U/L (ref 12–78)
ANION GAP SERPL CALC-SCNC: 14 MMOL/L (ref 5–15)
AST SERPL-CCNC: 81 U/L (ref 15–37)
BILIRUB SERPL-MCNC: 0.8 MG/DL (ref 0.2–1)
BUN SERPL-MCNC: 35 MG/DL (ref 7–18)
CALCIUM SERPL-MCNC: 8.7 MG/DL (ref 8.5–10.1)
CHLORIDE SERPL-SCNC: 113 MMOL/L (ref 98–107)
CO2 SERPL-SCNC: 13 MMOL/L (ref 21–32)
CREAT SERPL-MCNC: 2.9 MG/DL (ref 0.55–1.3)
ERYTHROCYTE [DISTWIDTH] IN BLOOD BY AUTOMATED COUNT: 14.4 % (ref 11.6–14.8)
GLOBULIN SER-MCNC: 4.8 G/DL
HCT VFR BLD CALC: 25.2 % (ref 42–52)
HGB BLD-MCNC: 8.4 G/DL (ref 14.2–18)
MCV RBC AUTO: 88 FL (ref 80–99)
PLATELET # BLD: 392 K/UL (ref 150–450)
POTASSIUM SERPL-SCNC: 5.6 MMOL/L (ref 3.5–5.1)
RBC # BLD AUTO: 2.87 M/UL (ref 4.7–6.1)
SODIUM SERPL-SCNC: 140 MMOL/L (ref 136–145)
WBC # BLD AUTO: 18.1 K/UL (ref 4.8–10.8)

## 2018-01-20 RX ADMIN — HEPARIN SODIUM SCH MLS/HR: 5000 INJECTION, SOLUTION INTRAVENOUS at 10:26

## 2018-01-20 RX ADMIN — LEVALBUTEROL HYDROCHLORIDE SCH MG: 1.25 SOLUTION, CONCENTRATE RESPIRATORY (INHALATION) at 06:36

## 2018-01-20 RX ADMIN — LEVALBUTEROL HYDROCHLORIDE SCH MG: 1.25 SOLUTION, CONCENTRATE RESPIRATORY (INHALATION) at 23:15

## 2018-01-20 RX ADMIN — CLINDAMYCIN PHOSPHATE SCH MLS/HR: 12 INJECTION, SOLUTION INTRAVENOUS at 03:00

## 2018-01-20 RX ADMIN — Medication SCH MCG: at 19:46

## 2018-01-20 RX ADMIN — LEVALBUTEROL HYDROCHLORIDE SCH MG: 1.25 SOLUTION, CONCENTRATE RESPIRATORY (INHALATION) at 19:45

## 2018-01-20 RX ADMIN — LEVALBUTEROL HYDROCHLORIDE SCH MG: 1.25 SOLUTION, CONCENTRATE RESPIRATORY (INHALATION) at 11:06

## 2018-01-20 RX ADMIN — LEVALBUTEROL HYDROCHLORIDE SCH MG: 1.25 SOLUTION, CONCENTRATE RESPIRATORY (INHALATION) at 03:37

## 2018-01-20 RX ADMIN — CLINDAMYCIN PHOSPHATE SCH MLS/HR: 12 INJECTION, SOLUTION INTRAVENOUS at 18:14

## 2018-01-20 RX ADMIN — Medication SCH TAB: at 09:03

## 2018-01-20 RX ADMIN — MORPHINE SULFATE PRN MG: 2 INJECTION, SOLUTION INTRAMUSCULAR; INTRAVENOUS at 04:35

## 2018-01-20 RX ADMIN — Medication SCH MCG: at 23:15

## 2018-01-20 RX ADMIN — HEPARIN SODIUM SCH MLS/HR: 5000 INJECTION, SOLUTION INTRAVENOUS at 15:33

## 2018-01-20 RX ADMIN — DOCUSATE SODIUM SCH MG: 100 CAPSULE, LIQUID FILLED ORAL at 16:38

## 2018-01-20 RX ADMIN — LEVALBUTEROL HYDROCHLORIDE SCH MG: 1.25 SOLUTION, CONCENTRATE RESPIRATORY (INHALATION) at 14:44

## 2018-01-20 RX ADMIN — Medication SCH MCG: at 03:36

## 2018-01-20 RX ADMIN — MORPHINE SULFATE PRN MG: 2 INJECTION, SOLUTION INTRAMUSCULAR; INTRAVENOUS at 00:46

## 2018-01-20 RX ADMIN — Medication SCH MCG: at 14:44

## 2018-01-20 RX ADMIN — DEXTROSE MONOHYDRATE SCH MLS/HR: 50 INJECTION, SOLUTION INTRAVENOUS at 22:29

## 2018-01-20 RX ADMIN — DEXTROSE MONOHYDRATE SCH MLS/HR: 50 INJECTION, SOLUTION INTRAVENOUS at 14:20

## 2018-01-20 RX ADMIN — Medication SCH MCG: at 11:06

## 2018-01-20 RX ADMIN — CLINDAMYCIN PHOSPHATE SCH MLS/HR: 12 INJECTION, SOLUTION INTRAVENOUS at 10:28

## 2018-01-20 RX ADMIN — MORPHINE SULFATE PRN MG: 2 INJECTION, SOLUTION INTRAMUSCULAR; INTRAVENOUS at 09:03

## 2018-01-20 RX ADMIN — Medication SCH MCG: at 06:36

## 2018-01-20 RX ADMIN — DEXTROSE MONOHYDRATE SCH MLS/HR: 50 INJECTION, SOLUTION INTRAVENOUS at 01:14

## 2018-01-20 RX ADMIN — HEPARIN SODIUM SCH MLS/HR: 5000 INJECTION, SOLUTION INTRAVENOUS at 06:28

## 2018-01-20 NOTE — CARDIAC ELECTROPHYSIOLOGY PN
Assessment/Plan


Assessment/Plan


1. Sinus Tachycardia due to sickle cell anemia, fever and possible PE in view 

of acute bilateral DVT. 


   Better after 1 unit PRBC and on heparin drip. Echocardiogram  EF 60%.  No 

SVT or VT.





2.  Sepsis, WBC of 26K. On intravenous antibiotic per Dr. Talley.Down to 18K 





3.  Abdominal pain, likely due to renal cysts and hemorrhage.





4.  Diarrhea. Clostridium difficile colitis has been ruled out.  





5. Hematuria and anemia. Hb stable in 7.5-8 range





6. Acute bilateral LE DVT. Continue with iv heparin.





7. Constipation. Colace 100 tid.





8. Hyperkalemia. Kayoxalae ordered





DW RN





Subjective


Subjective


In NSR on heparin drip. RN at bedside. No CP or SOB. Constipated





Objective





Last 24 Hour Vital Signs








  Date Time  Temp Pulse Resp B/P (MAP) Pulse Ox O2 Delivery O2 Flow Rate FiO2


 


1/20/18 12:00  102      


 


1/20/18 12:00 99.0 113 21 156/87 99 Nasal Cannula 3.0 


 


1/20/18 11:16  104 24  95 Nasal Cannula 2.0 28


 


1/20/18 11:06        28


 


1/20/18 11:06  100 24  96 Nasal Cannula 2.0 28


 


1/20/18 09:33 97.6       


 


1/20/18 08:00 97.6 104 20 159/85 99 Nasal Cannula 3.0 


 


1/20/18 08:00  110      


 


1/20/18 06:49  100 24  99 Nasal Cannula 2.0 28


 


1/20/18 06:49        28


 


1/20/18 06:37  100 24  98 Nasal Cannula 2.0 28


 


1/20/18 06:37      Nasal Cannula 2.0 28


 


1/20/18 06:37     98 Nasal Cannula 2.0 28


 


1/20/18 04:47  103 22  100 Nasal Cannula 3.0 32


 


1/20/18 04:00 98.8 103 22 152/83 100 Nasal Cannula 3.0 


 


1/20/18 04:00  99      


 


1/20/18 03:35        32


 


1/20/18 03:35  100 22  98 Nasal Cannula 3.0 32


 


1/20/18 00:07        32


 


1/20/18 00:06  100 22  100 Nasal Cannula 3.0 32


 


1/20/18 00:00 99.2 99 22 152/92 100 Nasal Cannula 3.0 


 


1/20/18 00:00  97      


 


1/19/18 20:00 99.0 99 22 130/73 100 Nasal Cannula 3.0 


 


1/19/18 20:00  101      


 


1/19/18 19:03  106 22  100 Nasal Cannula 3.0 32


 


1/19/18 18:57      Nasal Cannula 3.0 32


 


1/19/18 18:57     98 Nasal Cannula 3.0 32


 


1/19/18 18:55  100 22  98 Nasal Cannula 3.0 32


 


1/19/18 18:04  105      


 


1/19/18 16:00 98.8 109 21 140/80 100 Nasal Cannula 3.0 


 


1/19/18 14:46  103 20  99 Nasal Cannula 3.0 32


 


1/19/18 14:40  104 20  99 Nasal Cannula 3.0 32

















Intake and Output  


 


 1/19/18 1/20/18





 19:00 07:00


 


Intake Total 875.054 ml 2035.138 ml


 


Output Total 1300 ml 1700 ml


 


Balance -424.946 ml 335.138 ml


 


  


 


Intake Oral 740 ml 700 ml


 


IV Total 135.054 ml 1335.138 ml


 


Output Urine Total 1300 ml 1700 ml











Laboratory Tests








Test


  1/19/18


22:00 1/20/18


04:15 1/20/18


13:50


 


Activated Partial


Thromboplast Time 80 SEC (23-33)


H 63 SEC (23-33)


H Pending  


 


 


White Blood Count


  


  18.1 K/UL


(4.8-10.8)  H 


 


 


Red Blood Count


  


  2.87 M/UL


(4.70-6.10)  L 


 


 


Hemoglobin


  


  8.4 G/DL


(14.2-18.0)  L 


 


 


Hematocrit


  


  25.2 %


(42.0-52.0)  L 


 


 


Mean Corpuscular Volume  88 FL (80-99)   


 


Mean Corpuscular Hemoglobin


  


  29.2 PG


(27.0-31.0) 


 


 


Mean Corpuscular Hemoglobin


Concent 


  33.2 G/DL


(32.0-36.0) 


 


 


Red Cell Distribution Width


  


  14.4 %


(11.6-14.8) 


 


 


Platelet Count


  


  392 K/UL


(150-450) 


 


 


Mean Platelet Volume


  


  8.6 FL


(6.5-10.1) 


 


 


Neutrophils (%) (Auto)


  


  % (45.0-75.0)


  


 


 


Lymphocytes (%) (Auto)


  


  % (20.0-45.0)


  


 


 


Monocytes (%) (Auto)   % (1.0-10.0)   


 


Eosinophils (%) (Auto)   % (0.0-3.0)   


 


Basophils (%) (Auto)   % (0.0-2.0)   


 


Differential Total Cells


Counted 


  100  


  


 


 


Neutrophils % (Manual)  89 % (45-75)  H 


 


Lymphocytes % (Manual)  6 % (20-45)  L 


 


Monocytes % (Manual)  3 % (1-10)   


 


Eosinophils % (Manual)  2 % (0-3)   


 


Basophils % (Manual)  0 % (0-2)   


 


Band Neutrophils  0 % (0-8)   


 


Platelet Estimate  Adequate   


 


Platelet Morphology  Normal   


 


Sodium Level


  


  140 MMOL/L


(136-145) 


 


 


Potassium Level


  


  5.6 MMOL/L


(3.5-5.1)  H 


 


 


Chloride Level


  


  113 MMOL/L


()  H 


 


 


Carbon Dioxide Level


  


  13 MMOL/L


(21-32)  L 


 


 


Anion Gap


  


  14 mmol/L


(5-15) 


 


 


Blood Urea Nitrogen


  


  35 mg/dL


(7-18)  H 


 


 


Creatinine


  


  2.9 MG/DL


(0.55-1.30)  H 


 


 


Estimat Glomerular Filtration


Rate 


  26.7 mL/min


(>60) 


 


 


Glucose Level


  


  118 MG/DL


()  H 


 


 


Calcium Level


  


  8.7 MG/DL


(8.5-10.1) 


 


 


Total Bilirubin


  


  0.8 MG/DL


(0.2-1.0) 


 


 


Aspartate Amino Transf


(AST/SGOT) 


  81 U/L (15-37)


H 


 


 


Alanine Aminotransferase


(ALT/SGPT) 


  62 U/L (12-78)


  


 


 


Alkaline Phosphatase


  


  105 U/L


() 


 


 


Total Protein


  


  6.3 G/DL


(6.4-8.2)  L 


 


 


Albumin


  


  1.5 G/DL


(3.4-5.0)  L 


 


 


Globulin  4.8 g/dL   


 


Albumin/Globulin Ratio


  


  0.3 (1.0-2.7)


L 


 











Microbiology








 Date/Time


Source Procedure


Growth Status


 


 


 1/17/18 21:00


Sputum Gram Stain - Final Complete


 


 1/17/18 21:00 Sputum Culture - Final


Candida Albicans Complete








Objective


HEAD AND NECK:  No JVD.


LUNGS:  Clear.


CARDIOVASCULAR:  Nl S1 and S2 with no gallop or murmur.


ABDOMEN:  Soft and nontender.


EXTREMITIES:  Bilateral 1 plus pitting edema.











KAYLEEN HAIR Jan 20, 2018 14:35

## 2018-01-20 NOTE — GENERAL PROGRESS NOTE
Assessment/Plan


Assessment/Plan


#. Leukocytosis with fever.  


--> It is unlikely the patient has a sickle cell disease  MCV.  Peripheral 

smear has been reviewed. 


--> Monitor for resolution


#. DVT of the bilateral lower extremities with concern for PE but unable to 

undergo CT angio


--> agree to continue heparin gtt at this time, can take xarelto or eliquis as 

outpatient


--> if anemia worsens in future, consider ivc filter


#. Anemia, likely related to chronic disease in addition to kidney disease.  

Ferritin is elevated


--> Hemoglobin goal >7


--> Does not need transfusion at the moment. 


--> Continue to trend cbc daily 


#. Anemia secondary to sickle cell disease (hemoglobin electrophoresis confirms 

he has sickle trait)


#. Nausea, vomiting, and diarrhea.  Evaluated by GI Service.  Outpatient 

gastrointestinal procedure as needed.


#. Abdominal pain.  Imaging reviewed.


#. Sepsis.  Likely due to PNA, on broad specturm abx


#. Hypertension, currently better, is improving.  Continue to closely monitor.





Subjective


Allergies:  


Coded Allergies:  


     CODEINE (Verified  Allergy, Unknown, 1/5/18)


Uncoded Allergies:  


     PEANUTS (Adverse Reaction, Severe, Anaphylaxis, 1/9/18)


Subjective


Leukocytosis.On heparin gtt.





Objective





Last 24 Hour Vital Signs








  Date Time  Temp Pulse Resp B/P (MAP) Pulse Ox O2 Delivery O2 Flow Rate FiO2


 


1/20/18 20:02  110 20  99 Nasal Cannula 3.0 32


 


1/20/18 20:00 99.1 100 16 148/81 99 Nasal Cannula 3.0 


 


1/20/18 20:00  106      


 


1/20/18 19:45        32


 


1/20/18 19:44  110 20  99 Nasal Cannula 3.0 32


 


1/20/18 19:44      Nasal Cannula 3.0 32


 


1/20/18 19:43     99 Nasal Cannula 3.0 32


 


1/20/18 17:30 99.2       


 


1/20/18 16:00 100.6 111 20 140/81 99 Nasal Cannula 3.0 


 


1/20/18 16:00  111      


 


1/20/18 14:51  78 24  96 Nasal Cannula 2.0 28


 


1/20/18 14:51        28


 


1/20/18 14:44  73 24  98 Nasal Cannula 2.0 28


 


1/20/18 12:00  102      


 


1/20/18 12:00 99.0 113 21 156/87 99 Nasal Cannula 3.0 


 


1/20/18 11:16  104 24  95 Nasal Cannula 2.0 28


 


1/20/18 11:06        28


 


1/20/18 11:06  100 24  96 Nasal Cannula 2.0 28


 


1/20/18 09:33 97.6       


 


1/20/18 08:00 97.6 104 20 159/85 99 Nasal Cannula 3.0 


 


1/20/18 08:00  110      


 


1/20/18 06:49  100 24  99 Nasal Cannula 2.0 28


 


1/20/18 06:49        28


 


1/20/18 06:37  100 24  98 Nasal Cannula 2.0 28


 


1/20/18 06:37      Nasal Cannula 2.0 28


 


1/20/18 06:37     98 Nasal Cannula 2.0 28


 


1/20/18 04:47  103 22  100 Nasal Cannula 3.0 32


 


1/20/18 04:00 98.8 103 22 152/83 100 Nasal Cannula 3.0 


 


1/20/18 04:00  99      


 


1/20/18 03:35        32


 


1/20/18 03:35  100 22  98 Nasal Cannula 3.0 32


 


1/20/18 00:07        32


 


1/20/18 00:06  100 22  100 Nasal Cannula 3.0 32


 


1/20/18 00:00 99.2 99 22 152/92 100 Nasal Cannula 3.0 


 


1/20/18 00:00  97      

















Intake and Output  


 


 1/19/18 1/20/18





 19:00 07:00


 


Intake Total 875.054 ml 2285.138 ml


 


Output Total 1300 ml 1700 ml


 


Balance -424.946 ml 585.138 ml


 


  


 


Intake Oral 740 ml 950 ml


 


IV Total 135.054 ml 1335.138 ml


 


Output Urine Total 1300 ml 1700 ml








Laboratory Tests


1/20/18 04:15: 


White Blood Count 18.1H, Red Blood Count 2.87L, Hemoglobin 8.4L, Hematocrit 

25.2L, Mean Corpuscular Volume 88, Mean Corpuscular Hemoglobin 29.2, Mean 

Corpuscular Hemoglobin Concent 33.2, Red Cell Distribution Width 14.4, Platelet 

Count 392, Mean Platelet Volume 8.6, Neutrophils (%) (Auto) , Lymphocytes (%) (

Auto) , Monocytes (%) (Auto) , Eosinophils (%) (Auto) , Basophils (%) (Auto) , 

Differential Total Cells Counted 100, Neutrophils % (Manual) 89H, Lymphocytes % 

(Manual) 6L, Monocytes % (Manual) 3, Eosinophils % (Manual) 2, Basophils % (

Manual) 0, Band Neutrophils 0, Platelet Estimate Adequate, Platelet Morphology 

Normal, Activated Partial Thromboplast Time 63H, Sodium Level 140, Potassium 

Level 5.6H, Chloride Level 113H, Carbon Dioxide Level 13L, Anion Gap 14, Blood 

Urea Nitrogen 35H, Creatinine 2.9H, Estimat Glomerular Filtration Rate 26.7, 

Glucose Level 118H, Calcium Level 8.7, Total Bilirubin 0.8, Aspartate Amino 

Transf (AST/SGOT) 81H, Alanine Aminotransferase (ALT/SGPT) 62, Alkaline 

Phosphatase 105, Total Protein 6.3L, Albumin 1.5L, Globulin 4.8, Albumin/

Globulin Ratio 0.3L


1/20/18 13:50: Activated Partial Thromboplast Time 108H


1/20/18 21:45: Activated Partial Thromboplast Time 80H


Height (Feet):  5


Height (Inches):  10.00


Weight (Pounds):  138











Joe Sanches Jan 20, 2018 22:58

## 2018-01-20 NOTE — NEPHROLOGY PROGRESS NOTE
Assessment/Plan


Problem List:  


(1) Abdominal pain


(2) Renal mass, left


(3) Sickle cell trait


Assessment:  with crisis? 





(4) Renal cyst, native, hemorrhage


(5) Fever


(6) Tachycardia


(7) DVT (deep venous thrombosis)


(8) Pneumonia


(9) Sepsis


(10) Hematuria


(11) Severe anemia


Plan


Dr. Sanches was called for hemeonc. 


cont IVF. cont pain management.


ID following. 


empiric cefepime and metronidazole. 


d/w Dr. Jim. 


D/w urology - no need for any intervention. 


now on heparin gtt. 


repeat CXR.





Subjective


Subjective


feeling better today. appetite better.





Objective


Objective





Last 24 Hour Vital Signs








  Date Time  Temp Pulse Resp B/P (MAP) Pulse Ox O2 Delivery O2 Flow Rate FiO2


 


1/20/18 14:51  78 24  96 Nasal Cannula 2.0 28


 


1/20/18 14:51        28


 


1/20/18 14:44  73 24  98 Nasal Cannula 2.0 28


 


1/20/18 12:00  102      


 


1/20/18 12:00 99.0 113 21 156/87 99 Nasal Cannula 3.0 


 


1/20/18 11:16  104 24  95 Nasal Cannula 2.0 28


 


1/20/18 11:06        28


 


1/20/18 11:06  100 24  96 Nasal Cannula 2.0 28


 


1/20/18 09:33 97.6       


 


1/20/18 08:00 97.6 104 20 159/85 99 Nasal Cannula 3.0 


 


1/20/18 08:00  110      


 


1/20/18 06:49  100 24  99 Nasal Cannula 2.0 28


 


1/20/18 06:49        28


 


1/20/18 06:37  100 24  98 Nasal Cannula 2.0 28


 


1/20/18 06:37      Nasal Cannula 2.0 28


 


1/20/18 06:37     98 Nasal Cannula 2.0 28


 


1/20/18 04:47  103 22  100 Nasal Cannula 3.0 32


 


1/20/18 04:00 98.8 103 22 152/83 100 Nasal Cannula 3.0 


 


1/20/18 04:00  99      


 


1/20/18 03:35        32


 


1/20/18 03:35  100 22  98 Nasal Cannula 3.0 32


 


1/20/18 00:07        32


 


1/20/18 00:06  100 22  100 Nasal Cannula 3.0 32


 


1/20/18 00:00 99.2 99 22 152/92 100 Nasal Cannula 3.0 


 


1/20/18 00:00  97      


 


1/19/18 20:00 99.0 99 22 130/73 100 Nasal Cannula 3.0 


 


1/19/18 20:00  101      


 


1/19/18 19:03  106 22  100 Nasal Cannula 3.0 32


 


1/19/18 18:57      Nasal Cannula 3.0 32


 


1/19/18 18:57     98 Nasal Cannula 3.0 32


 


1/19/18 18:55  100 22  98 Nasal Cannula 3.0 32


 


1/19/18 18:04  105      


 


1/19/18 16:00 98.8 109 21 140/80 100 Nasal Cannula 3.0 

















Intake and Output  


 


 1/19/18 1/20/18





 19:00 07:00


 


Intake Total 875.054 ml 2035.138 ml


 


Output Total 1300 ml 1700 ml


 


Balance -424.946 ml 335.138 ml


 


  


 


Intake Oral 740 ml 700 ml


 


IV Total 135.054 ml 1335.138 ml


 


Output Urine Total 1300 ml 1700 ml








Laboratory Tests


1/19/18 22:00: Activated Partial Thromboplast Time 80H


1/20/18 04:15: 


Activated Partial Thromboplast Time 63H, White Blood Count 18.1H, Red Blood 

Count 2.87L, Hemoglobin 8.4L, Hematocrit 25.2L, Mean Corpuscular Volume 88, 

Mean Corpuscular Hemoglobin 29.2, Mean Corpuscular Hemoglobin Concent 33.2, Red 

Cell Distribution Width 14.4, Platelet Count 392, Mean Platelet Volume 8.6, 

Neutrophils (%) (Auto) , Lymphocytes (%) (Auto) , Monocytes (%) (Auto) , 

Eosinophils (%) (Auto) , Basophils (%) (Auto) , Differential Total Cells 

Counted 100, Neutrophils % (Manual) 89H, Lymphocytes % (Manual) 6L, Monocytes % 

(Manual) 3, Eosinophils % (Manual) 2, Basophils % (Manual) 0, Band Neutrophils 0

, Platelet Estimate Adequate, Platelet Morphology Normal, Sodium Level 140, 

Potassium Level 5.6H, Chloride Level 113H, Carbon Dioxide Level 13L, Anion Gap 

14, Blood Urea Nitrogen 35H, Creatinine 2.9H, Estimat Glomerular Filtration 

Rate 26.7, Glucose Level 118H, Calcium Level 8.7, Total Bilirubin 0.8, 

Aspartate Amino Transf (AST/SGOT) 81H, Alanine Aminotransferase (ALT/SGPT) 62, 

Alkaline Phosphatase 105, Total Protein 6.3L, Albumin 1.5L, Globulin 4.8, 

Albumin/Globulin Ratio 0.3L


1/20/18 13:50: Activated Partial Thromboplast Time 108H


Height (Feet):  5


Height (Inches):  10.00


Weight (Pounds):  138


General Appearance:  no apparent distress


Cardiovascular:  normal rate, regular rhythm


Respiratory/Chest:  lungs clear


Abdomen:  non tender, soft


Extremities:  non-pitting


Neurologic:  alert, oriented x 3











DELVIS SKELTON Jan 20, 2018 15:15

## 2018-01-20 NOTE — PULMONOLOGY PROGRESS NOTE
Assessment/Plan


Assessment/Plan


ASSESSMENT AND PLAN:


1. Tachycardia and tachypnea due to pain of sickle cell crisis. CXR shows 

streaky infiltrates; possible pneumonia; will treat as pulm embolism


2. Sepsis, on intravenous antibiotic per Dr. Talley.


3. Abdominal pain, likely due to renal cysts and hemorrhage.


4. Diarrhea.  


5. DVT.





continue pain control


Antibiotics


IV fluids


continue IV heparin. 


suggest to transition to Coumadin





Subjective


Interval Events:  none


Constitutional:  Reports: no symptoms


HEENT:  Repors: no symptoms


Respiratory:  Reports: no symptoms


Cardiovascular:  Reports: no symptoms


Gastrointestinal/Abdominal:  Reports: no symptoms


Allergies:  


Coded Allergies:  


     CODEINE (Verified  Allergy, Unknown, 1/5/18)


Uncoded Allergies:  


     PEANUTS (Adverse Reaction, Severe, Anaphylaxis, 1/9/18)





Objective





Last 24 Hour Vital Signs








  Date Time  Temp Pulse Resp B/P (MAP) Pulse Ox O2 Delivery O2 Flow Rate FiO2


 


1/20/18 06:49  100 24  99 Nasal Cannula 2.0 28


 


1/20/18 06:49        28


 


1/20/18 06:37  100 24  98 Nasal Cannula 2.0 28


 


1/20/18 06:37      Nasal Cannula 2.0 28


 


1/20/18 06:37     98 Nasal Cannula 2.0 28


 


1/20/18 05:05 98.8       


 


1/20/18 04:47  103 22  100 Nasal Cannula 3.0 32


 


1/20/18 04:00 98.8 103 22 152/83 100 Nasal Cannula 3.0 


 


1/20/18 04:00  99      


 


1/20/18 03:35        32


 


1/20/18 03:35  100 22  98 Nasal Cannula 3.0 32


 


1/20/18 00:07        32


 


1/20/18 00:06  100 22  100 Nasal Cannula 3.0 32


 


1/20/18 00:00 99.2 99 22 152/92 100 Nasal Cannula 3.0 


 


1/20/18 00:00  97      


 


1/19/18 20:00 99.0 99 22 130/73 100 Nasal Cannula 3.0 


 


1/19/18 20:00  101      


 


1/19/18 19:03  106 22  100 Nasal Cannula 3.0 32


 


1/19/18 18:57      Nasal Cannula 3.0 32


 


1/19/18 18:57     98 Nasal Cannula 3.0 32


 


1/19/18 18:55  100 22  98 Nasal Cannula 3.0 32


 


1/19/18 18:04  105      


 


1/19/18 16:00 98.8 109 21 140/80 100 Nasal Cannula 3.0 


 


1/19/18 14:46  103 20  99 Nasal Cannula 3.0 32


 


1/19/18 14:40  104 20  99 Nasal Cannula 3.0 32


 


1/19/18 12:00  106      


 


1/19/18 12:00 97.3 98 26 130/70 100 Nasal Cannula 3.0 


 


1/19/18 11:16  108 20  99 Nasal Cannula 3.0 32


 


1/19/18 11:08  101 22  99 Nasal Cannula 3.0 32


 


1/19/18 08:00 98.4 118 19 144/78 97 Nasal Cannula 3.0 


 


1/19/18 08:00  103      


 


1/19/18 07:34  110 22  99 Nasal Cannula 3.0 32


 


1/19/18 07:30      Nasal Cannula 3.0 32


 


1/19/18 07:30     99 Nasal Cannula 3.0 32


 


1/19/18 07:29  109 24  99 Nasal Cannula 3.0 32

















Intake and Output  


 


 1/19/18 1/20/18





 19:00 07:00


 


Intake Total 875.054 ml 1033.830 ml


 


Output Total 1300 ml 


 


Balance -424.946 ml 1033.830 ml


 


  


 


Intake Oral 740 ml 


 


IV Total 135.054 ml 1033.830 ml


 


Output Urine Total 1300 ml 








General Appearance:  no acute distress


HEENT:  normocephalic


Respiratory/Chest:  chest wall non-tender, lungs clear


Cardiovascular:  normal peripheral pulses, tachycardia





Microbiology








 Date/Time


Source Procedure


Growth Status


 


 


 1/17/18 21:00


Sputum Gram Stain - Final Resulted


 


 1/17/18 21:00 Sputum Culture - Preliminary


Candida Albicans Resulted








Laboratory Tests


1/19/18 13:15: Activated Partial Thromboplast Time > 150*H


1/19/18 22:00: Activated Partial Thromboplast Time 80H


1/20/18 04:15: 


Activated Partial Thromboplast Time 63H, White Blood Count 18.1H, Red Blood 

Count 2.87L, Hemoglobin 8.4L, Hematocrit 25.2L, Mean Corpuscular Volume 88, 

Mean Corpuscular Hemoglobin 29.2, Mean Corpuscular Hemoglobin Concent 33.2, Red 

Cell Distribution Width 14.4, Platelet Count 392, Mean Platelet Volume 8.6, 

Neutrophils (%) (Auto) , Lymphocytes (%) (Auto) , Monocytes (%) (Auto) , 

Eosinophils (%) (Auto) , Basophils (%) (Auto) , Differential Total Cells 

Counted 100, Neutrophils % (Manual) 89H, Lymphocytes % (Manual) 6L, Monocytes % 

(Manual) 3, Eosinophils % (Manual) 2, Basophils % (Manual) 0, Band Neutrophils 0

, Platelet Estimate Adequate, Platelet Morphology Normal, Sodium Level 140, 

Potassium Level 5.6H, Chloride Level 113H, Carbon Dioxide Level 13L, Anion Gap 

14, Blood Urea Nitrogen 35H, Creatinine 2.9H, Estimat Glomerular Filtration 

Rate 26.7, Glucose Level 118H, Calcium Level 8.7, Total Bilirubin 0.8, 

Aspartate Amino Transf (AST/SGOT) 81H, Alanine Aminotransferase (ALT/SGPT) 62, 

Alkaline Phosphatase 105, Total Protein 6.3L, Albumin 1.5L, Globulin 4.8, 

Albumin/Globulin Ratio 0.3L





Current Medications








 Medications


  (Trade)  Dose


 Ordered  Sig/Judie


 Route


 PRN Reason  Start Time


 Stop Time Status Last Admin


Dose Admin


 


 Acetaminophen


  (Tylenol)  650 mg  Q4H  PRN


 ORAL


 Mild Pain/Temp > 100.5  1/10/18 08:45


 2/5/18 00:44  1/19/18 16:31


 


 


 Clindamycin HCl/


 Dextrose  50 ml @ 


 100 mls/hr  Q8H


 IV


   1/15/18 18:00


 1/22/18 17:59  1/20/18 03:00


 


 


 Dextrose


  (Dextrose 50%)    STAT  PRN


 IV


 Hypoglycemia  1/10/18 08:30


 2/9/18 08:29   


 


 


 Diphenoxylate HCl/


 Atropine


  (Lomotil)  2.5 mg  Q4H  PRN


 ORAL


 Diarrhea  1/10/18 13:15


 2/9/18 13:14  1/12/18 14:13


 


 


 Folic Acid


  (Folate)  1 mg  DAILY


 ORAL


   1/12/18 15:00


 2/11/18 14:59  1/19/18 09:49


 


 


 Heparin Sodium/


 Dextrose  500 ml @ 


 37.558 mls/


 hr  adjust per protocol


 IV


   1/20/18 05:45


 2/18/18 15:19  1/20/18 06:28


 


 


 Ipratropium


 Bromide


  (Atrovent)  500 mcg  Q4HRT


 HHN


   1/20/18 03:00


 1/25/18 02:59  1/20/18 06:36


 


 


 Levalbuterol HCl


  (Xopenex)  1.25 mg  Q4HRT


 HHN


   1/20/18 03:00


 1/25/18 02:59  1/20/18 06:36


 


 


 Magnesium


 Hydroxide


  (Mom)  30 ml  BIDPRN  PRN


 ORAL


 constipation  1/10/18 08:15


 2/6/18 08:14   


 


 


 Morphine Sulfate


  (Morphine


 Sulfate)  2 mg  Q3H  PRN


 IVP


 Severe Pain  1/16/18 15:15


 1/23/18 15:14  1/20/18 04:35


 


 


 Ondansetron HCl


  (Zofran)  4 mg  Q6H  PRN


 IVP


 Nausea & Vomiting  1/10/18 08:15


 2/5/18 08:14   


 


 


 Pantoprazole


  (Protonix)  40 mg  DAILY


 ORAL


   1/14/18 09:00


 2/13/18 08:59  1/19/18 09:49


 


 


 Piperacillin Sod/


 Tazobactam Sod


 3.375 gm/Dextrose  55 ml @ 


 13.75 mls/


 hr  Q12HR@0200,1400


 IVPB


   1/19/18 02:00


 1/26/18 01:59  1/20/18 01:14


 


 


 Sodium Chloride  1,000 ml @ 


 100 mls/hr  Q10H


 IV


   1/17/18 19:00


 2/16/18 18:59  1/20/18 06:34


 


 


 Vitamin B Complex/


 Vit C/Folic Acid


  (Nephrovite)  1 tab  DAILY


 ORAL


   1/10/18 09:00


 2/8/18 08:59  1/19/18 09:49


 

















Ethan Tom MD Jan 20, 2018 07:23

## 2018-01-20 NOTE — INFECTIOUS DISEASES PROG NOTE
Assessment/Plan


Problems:  


(1) HAP (hospital-acquired pneumonia)


Assessment & Plan:  sputum culture grew candida most likely colonization , 

continue  zosyn and  clindamycin to cover for MRSA empirically , monitor CXR 





(2) Sepsis


Assessment & Plan:  due to the above , await  blood culture, continue  zosyn  

and clindamycin empiric coverage 





(3) Renal cyst, native, hemorrhage


Assessment & Plan:  urine culture grew mixed contaminant , now on zosyn empiric 

coverage , had urology eval with no intervention, repeated CT scan of the 

abdomen showed recurrent bleeding . recommend urology eval again , since he is 

on heparin drip now 





(4) Abdominal pain


Assessment & Plan:  due to bleeding into the left renal cysts , recommend 

urology eval and better  pain management  





(5) Fever


Assessment & Plan:  improved, due to the above , on wide spectrum  antibiotics 

empiric coverage , continue  tylenol prn 





(6) Diarrhea


Assessment & Plan:  no evidence of  C diff , continue antidiarrhea meds  





(7) DVT (deep venous thrombosis)


Assessment & Plan:  in both legs, with possible PE, on heparin drip , recommend 

close monitor of PT/PTT, high risk for bleeding in the renal cyst 








Subjective


Constitutional:  Reports: no symptoms


HEENT:  Reports: no symptoms


Respiratory:  Reports: productive cough


Breasts:  Reports: no symptoms


Cardiovascular:  Reports: no symptoms


Gastrointestinal/Abdominal:  Reports: no symptoms


Genitourinary:  Reports: other - penile swelling


Neurologic:  Reports: no symptoms


Psychiatric:  Reports: no symptoms


Skin:  Reports: no symptoms


Endocrine:  Reports: no symptoms


Hematologic:  Reports: no symptoms


Allergies:  


Coded Allergies:  


     CODEINE (Verified  Allergy, Unknown, 1/5/18)


Uncoded Allergies:  


     PEANUTS (Adverse Reaction, Severe, Anaphylaxis, 1/9/18)


Subjective


he feels more comfortable today , with no SOB, no fever or chills





Objective


Vital Signs





Last 24 Hour Vital Signs








  Date Time  Temp Pulse Resp B/P (MAP) Pulse Ox O2 Delivery O2 Flow Rate FiO2


 


1/20/18 14:51  78 24  96 Nasal Cannula 2.0 28


 


1/20/18 14:51        28


 


1/20/18 14:44  73 24  98 Nasal Cannula 2.0 28


 


1/20/18 12:00  102      


 


1/20/18 12:00 99.0 113 21 156/87 99 Nasal Cannula 3.0 


 


1/20/18 11:16  104 24  95 Nasal Cannula 2.0 28


 


1/20/18 11:06        28


 


1/20/18 11:06  100 24  96 Nasal Cannula 2.0 28


 


1/20/18 09:33 97.6       


 


1/20/18 08:00 97.6 104 20 159/85 99 Nasal Cannula 3.0 


 


1/20/18 08:00  110      


 


1/20/18 06:49  100 24  99 Nasal Cannula 2.0 28


 


1/20/18 06:49        28


 


1/20/18 06:37  100 24  98 Nasal Cannula 2.0 28


 


1/20/18 06:37      Nasal Cannula 2.0 28


 


1/20/18 06:37     98 Nasal Cannula 2.0 28


 


1/20/18 04:47  103 22  100 Nasal Cannula 3.0 32


 


1/20/18 04:00 98.8 103 22 152/83 100 Nasal Cannula 3.0 


 


1/20/18 04:00  99      


 


1/20/18 03:35        32


 


1/20/18 03:35  100 22  98 Nasal Cannula 3.0 32


 


1/20/18 00:07        32


 


1/20/18 00:06  100 22  100 Nasal Cannula 3.0 32


 


1/20/18 00:00 99.2 99 22 152/92 100 Nasal Cannula 3.0 


 


1/20/18 00:00  97      


 


1/19/18 20:00 99.0 99 22 130/73 100 Nasal Cannula 3.0 


 


1/19/18 20:00  101      


 


1/19/18 19:03  106 22  100 Nasal Cannula 3.0 32


 


1/19/18 18:57      Nasal Cannula 3.0 32


 


1/19/18 18:57     98 Nasal Cannula 3.0 32


 


1/19/18 18:55  100 22  98 Nasal Cannula 3.0 32


 


1/19/18 18:04  105      


 


1/19/18 16:00 98.8 109 21 140/80 100 Nasal Cannula 3.0 








Height (Feet):  5


Height (Inches):  10.00


Weight (Pounds):  138


General Appearance:  WD/WN, no acute distress


HEENT:  normocephalic, atraumatic, anicteric, mucous membranes moist


Respiratory/Chest:  chest wall non-tender, no respiratory distress, no 

accessory muscle use, decreased breath sounds, crackles/rales


Cardiovascular:  normal peripheral pulses, normal rate, regular rhythm, no 

gallop/murmur, no JVD


Abdomen:  normal bowel sounds, soft, non tender, no organomegaly, non distended

, no mass, no scars


Extremities:  no cyanosis, no clubbing


Skin:  no rash, no lesions, no ulcers





Microbiology








 Date/Time


Source Procedure


Growth Status


 


 


 1/17/18 21:00


Sputum Gram Stain - Final Complete


 


 1/17/18 21:00 Sputum Culture - Final


Candida Albicans Complete











Laboratory Tests








Test


  1/19/18


22:00 1/20/18


04:15 1/20/18


13:50


 


Activated Partial


Thromboplast Time 80 SEC (23-33)


H 63 SEC (23-33)


H 108 SEC


(23-33)  H


 


White Blood Count


  


  18.1 K/UL


(4.8-10.8)  H 


 


 


Red Blood Count


  


  2.87 M/UL


(4.70-6.10)  L 


 


 


Hemoglobin


  


  8.4 G/DL


(14.2-18.0)  L 


 


 


Hematocrit


  


  25.2 %


(42.0-52.0)  L 


 


 


Mean Corpuscular Volume  88 FL (80-99)   


 


Mean Corpuscular Hemoglobin


  


  29.2 PG


(27.0-31.0) 


 


 


Mean Corpuscular Hemoglobin


Concent 


  33.2 G/DL


(32.0-36.0) 


 


 


Red Cell Distribution Width


  


  14.4 %


(11.6-14.8) 


 


 


Platelet Count


  


  392 K/UL


(150-450) 


 


 


Mean Platelet Volume


  


  8.6 FL


(6.5-10.1) 


 


 


Neutrophils (%) (Auto)


  


  % (45.0-75.0)


  


 


 


Lymphocytes (%) (Auto)


  


  % (20.0-45.0)


  


 


 


Monocytes (%) (Auto)   % (1.0-10.0)   


 


Eosinophils (%) (Auto)   % (0.0-3.0)   


 


Basophils (%) (Auto)   % (0.0-2.0)   


 


Differential Total Cells


Counted 


  100  


  


 


 


Neutrophils % (Manual)  89 % (45-75)  H 


 


Lymphocytes % (Manual)  6 % (20-45)  L 


 


Monocytes % (Manual)  3 % (1-10)   


 


Eosinophils % (Manual)  2 % (0-3)   


 


Basophils % (Manual)  0 % (0-2)   


 


Band Neutrophils  0 % (0-8)   


 


Platelet Estimate  Adequate   


 


Platelet Morphology  Normal   


 


Sodium Level


  


  140 MMOL/L


(136-145) 


 


 


Potassium Level


  


  5.6 MMOL/L


(3.5-5.1)  H 


 


 


Chloride Level


  


  113 MMOL/L


()  H 


 


 


Carbon Dioxide Level


  


  13 MMOL/L


(21-32)  L 


 


 


Anion Gap


  


  14 mmol/L


(5-15) 


 


 


Blood Urea Nitrogen


  


  35 mg/dL


(7-18)  H 


 


 


Creatinine


  


  2.9 MG/DL


(0.55-1.30)  H 


 


 


Estimat Glomerular Filtration


Rate 


  26.7 mL/min


(>60) 


 


 


Glucose Level


  


  118 MG/DL


()  H 


 


 


Calcium Level


  


  8.7 MG/DL


(8.5-10.1) 


 


 


Total Bilirubin


  


  0.8 MG/DL


(0.2-1.0) 


 


 


Aspartate Amino Transf


(AST/SGOT) 


  81 U/L (15-37)


H 


 


 


Alanine Aminotransferase


(ALT/SGPT) 


  62 U/L (12-78)


  


 


 


Alkaline Phosphatase


  


  105 U/L


() 


 


 


Total Protein


  


  6.3 G/DL


(6.4-8.2)  L 


 


 


Albumin


  


  1.5 G/DL


(3.4-5.0)  L 


 


 


Globulin  4.8 g/dL   


 


Albumin/Globulin Ratio


  


  0.3 (1.0-2.7)


L 


 











Current Medications








 Medications


  (Trade)  Dose


 Ordered  Sig/Judie


 Route


 PRN Reason  Start Time


 Stop Time Status Last Admin


Dose Admin


 


 Acetaminophen


  (Tylenol)  650 mg  Q4H  PRN


 ORAL


 Mild Pain/Temp > 100.5  1/10/18 08:45


 2/5/18 00:44  1/19/18 16:31


 


 


 Clindamycin HCl/


 Dextrose  50 ml @ 


 100 mls/hr  Q8H


 IV


   1/15/18 18:00


 1/22/18 17:59  1/20/18 10:28


 


 


 Dextrose


  (Dextrose 50%)    STAT  PRN


 IV


 Hypoglycemia  1/10/18 08:30


 2/9/18 08:29   


 


 


 Diphenoxylate HCl/


 Atropine


  (Lomotil)  2.5 mg  Q4H  PRN


 ORAL


 Diarrhea  1/10/18 13:15


 2/9/18 13:14  1/12/18 14:13


 


 


 Docusate Sodium


  (Colace)  100 mg  THREE TIMES A  DAY


 ORAL


   1/20/18 18:00


 2/19/18 17:59   


 


 


 Folic Acid


  (Folate)  1 mg  DAILY


 ORAL


   1/12/18 15:00


 2/11/18 14:59  1/20/18 09:03


 


 


 Heparin Sodium/


 Dextrose  500 ml @ 


 33.802 mls/


 hr  adjust per protocol


 IV


   1/20/18 15:30


 2/19/18 15:29   


 


 


 Ipratropium


 Bromide


  (Atrovent)  500 mcg  Q4HRT


 N


   1/20/18 03:00


 1/25/18 02:59  1/20/18 14:44


 


 


 Levalbuterol HCl


  (Xopenex)  1.25 mg  Q4HRT


 N


   1/20/18 03:00


 1/25/18 02:59  1/20/18 14:44


 


 


 Magnesium


 Hydroxide


  (Mom)  30 ml  BIDPRN  PRN


 ORAL


 constipation  1/10/18 08:15


 2/6/18 08:14   


 


 


 Morphine Sulfate


  (Morphine


 Sulfate)  2 mg  Q3H  PRN


 IVP


 Severe Pain  1/16/18 15:15


 1/23/18 15:14  1/20/18 09:03


 


 


 Ondansetron HCl


  (Zofran)  4 mg  Q6H  PRN


 IVP


 Nausea & Vomiting  1/10/18 08:15


 2/5/18 08:14   


 


 


 Pantoprazole


  (Protonix)  40 mg  DAILY


 ORAL


   1/14/18 09:00


 2/13/18 08:59  1/20/18 09:03


 


 


 Piperacillin Sod/


 Tazobactam Sod


 3.375 gm/Dextrose  55 ml @ 


 13.75 mls/


 hr  Q12HR@0200,1400


 IVPB


   1/19/18 02:00


 1/20/18 19:00  1/20/18 14:20


 


 


 Piperacillin Sod/


 Tazobactam Sod


 3.375 gm/Sodium


 Chloride  110 ml @ 


 27.5 mls/hr  EVERY 8  HOURS


 IVPB


   1/20/18 22:00


 1/25/18 21:59   


 


 


 Sodium Chloride  1,000 ml @ 


 100 mls/hr  Q10H


 IV


   1/17/18 19:00


 2/16/18 18:59  1/20/18 06:34


 


 


 Vitamin B Complex/


 Vit C/Folic Acid


  (Nephrovite)  1 tab  DAILY


 ORAL


   1/10/18 09:00


 2/8/18 08:59  1/20/18 09:03


 

















[email or web address] Talley M.D. Jan 20, 2018 15:09

## 2018-01-21 VITALS — DIASTOLIC BLOOD PRESSURE: 77 MMHG | SYSTOLIC BLOOD PRESSURE: 139 MMHG

## 2018-01-21 VITALS — SYSTOLIC BLOOD PRESSURE: 160 MMHG | DIASTOLIC BLOOD PRESSURE: 85 MMHG

## 2018-01-21 VITALS — SYSTOLIC BLOOD PRESSURE: 150 MMHG | DIASTOLIC BLOOD PRESSURE: 84 MMHG

## 2018-01-21 VITALS — DIASTOLIC BLOOD PRESSURE: 62 MMHG | SYSTOLIC BLOOD PRESSURE: 124 MMHG

## 2018-01-21 VITALS — SYSTOLIC BLOOD PRESSURE: 148 MMHG | DIASTOLIC BLOOD PRESSURE: 86 MMHG

## 2018-01-21 VITALS — SYSTOLIC BLOOD PRESSURE: 153 MMHG | DIASTOLIC BLOOD PRESSURE: 83 MMHG

## 2018-01-21 LAB
ANION GAP SERPL CALC-SCNC: 15 MMOL/L (ref 5–15)
BUN SERPL-MCNC: 27 MG/DL (ref 7–18)
CALCIUM SERPL-MCNC: 8.3 MG/DL (ref 8.5–10.1)
CHLORIDE SERPL-SCNC: 115 MMOL/L (ref 98–107)
CO2 SERPL-SCNC: 15 MMOL/L (ref 21–32)
CREAT SERPL-MCNC: 2.7 MG/DL (ref 0.55–1.3)
POTASSIUM SERPL-SCNC: 4.1 MMOL/L (ref 3.5–5.1)
SODIUM SERPL-SCNC: 144 MMOL/L (ref 136–145)

## 2018-01-21 RX ADMIN — CLINDAMYCIN PHOSPHATE SCH MLS/HR: 12 INJECTION, SOLUTION INTRAVENOUS at 01:10

## 2018-01-21 RX ADMIN — DOCUSATE SODIUM SCH MG: 100 CAPSULE, LIQUID FILLED ORAL at 18:00

## 2018-01-21 RX ADMIN — LEVALBUTEROL HYDROCHLORIDE SCH MG: 1.25 SOLUTION, CONCENTRATE RESPIRATORY (INHALATION) at 15:10

## 2018-01-21 RX ADMIN — Medication SCH MCG: at 02:50

## 2018-01-21 RX ADMIN — HEPARIN SODIUM SCH MLS/HR: 5000 INJECTION, SOLUTION INTRAVENOUS at 16:02

## 2018-01-21 RX ADMIN — LEVALBUTEROL HYDROCHLORIDE SCH MG: 1.25 SOLUTION, CONCENTRATE RESPIRATORY (INHALATION) at 02:50

## 2018-01-21 RX ADMIN — LEVALBUTEROL HYDROCHLORIDE SCH MG: 1.25 SOLUTION, CONCENTRATE RESPIRATORY (INHALATION) at 06:44

## 2018-01-21 RX ADMIN — DEXTROSE MONOHYDRATE SCH MLS/HR: 50 INJECTION, SOLUTION INTRAVENOUS at 06:00

## 2018-01-21 RX ADMIN — Medication SCH MCG: at 06:45

## 2018-01-21 RX ADMIN — DOCUSATE SODIUM SCH MG: 100 CAPSULE, LIQUID FILLED ORAL at 13:00

## 2018-01-21 RX ADMIN — DEXTROSE MONOHYDRATE SCH MLS/HR: 50 INJECTION, SOLUTION INTRAVENOUS at 22:22

## 2018-01-21 RX ADMIN — Medication SCH MCG: at 10:37

## 2018-01-21 RX ADMIN — CLINDAMYCIN PHOSPHATE SCH MLS/HR: 12 INJECTION, SOLUTION INTRAVENOUS at 09:33

## 2018-01-21 RX ADMIN — LEVALBUTEROL HYDROCHLORIDE SCH MG: 1.25 SOLUTION, CONCENTRATE RESPIRATORY (INHALATION) at 10:37

## 2018-01-21 RX ADMIN — DOCUSATE SODIUM SCH MG: 100 CAPSULE, LIQUID FILLED ORAL at 09:34

## 2018-01-21 RX ADMIN — Medication SCH TAB: at 09:33

## 2018-01-21 RX ADMIN — HEPARIN SODIUM SCH MLS/HR: 5000 INJECTION, SOLUTION INTRAVENOUS at 01:18

## 2018-01-21 RX ADMIN — DEXTROSE MONOHYDRATE SCH MLS/HR: 50 INJECTION, SOLUTION INTRAVENOUS at 14:18

## 2018-01-21 RX ADMIN — LEVALBUTEROL HYDROCHLORIDE SCH MG: 1.25 SOLUTION, CONCENTRATE RESPIRATORY (INHALATION) at 19:00

## 2018-01-21 RX ADMIN — CLINDAMYCIN PHOSPHATE SCH MLS/HR: 12 INJECTION, SOLUTION INTRAVENOUS at 17:30

## 2018-01-21 RX ADMIN — Medication SCH MCG: at 15:10

## 2018-01-21 RX ADMIN — Medication SCH MCG: at 19:46

## 2018-01-21 RX ADMIN — LEVALBUTEROL HYDROCHLORIDE SCH MG: 1.25 SOLUTION, CONCENTRATE RESPIRATORY (INHALATION) at 23:03

## 2018-01-21 RX ADMIN — MORPHINE SULFATE PRN MG: 2 INJECTION, SOLUTION INTRAMUSCULAR; INTRAVENOUS at 06:30

## 2018-01-21 RX ADMIN — Medication SCH MCG: at 23:04

## 2018-01-21 NOTE — CARDIAC ELECTROPHYSIOLOGY PN
Assessment/Plan


Assessment/Plan


1. Sinus Tachycardia due to sickle cell anemia, fever and possible PE in view 

of acute bilateral DVT. 


   Better now. Echocardiogram  EF 60%.  No SVT or VT.





2.  Sepsis, WBC of 26K. On intravenous antibiotic per Dr. Talley. Down to 18K 





3.  Abdominal pain, likely due to renal cysts and hemorrhage.





4.  Diarrhea. Clostridium difficile colitis has been ruled out.  





5. Hematuria and anemia. Hb stable in 7.5-8 range





6. Acute bilateral LE DVT. Continue with iv heparin.





7. Constipation. Colace 100 tid.





8. Hyperkalemia. Kayoxalae given. Repeat labs today





DW RN





Subjective


Subjective


In NSR on heparin drip. No CP or SOB.





Objective





Last 24 Hour Vital Signs








  Date Time  Temp Pulse Resp B/P (MAP) Pulse Ox O2 Delivery O2 Flow Rate FiO2


 


1/21/18 10:46  101 20  99 Nasal Cannula 3.0 32


 


1/21/18 10:37  102 24  98 Nasal Cannula 3.0 32


 


1/21/18 08:00  100      


 


1/21/18 08:00 99.5 100 20 153/83 95 Nasal Cannula 3.0 


 


1/21/18 07:00 98.5       


 


1/21/18 06:56  106 18  99 Nasal Cannula 3.0 32


 


1/21/18 06:45      Nasal Cannula 3.0 32


 


1/21/18 06:45     98 Nasal Cannula 3.0 32


 


1/21/18 06:45  105 20  98 Nasal Cannula 3.0 32


 


1/21/18 04:00 98.5 99 14 124/62 99 Nasal Cannula 3.0 


 


1/21/18 04:00  109      


 


1/21/18 03:06  106 16  97 Nasal Cannula 3.0 32


 


1/21/18 02:48        32


 


1/21/18 02:47  99 16  99 Nasal Cannula 3.0 32


 


1/21/18 00:00  99      


 


1/21/18 00:00 98.9 99 16 139/77 99 Nasal Cannula 3.0 


 


1/20/18 23:31  106 20  94 Nasal Cannula 3.0 32


 


1/20/18 23:14        32


 


1/20/18 23:13  106 20  94 Nasal Cannula 3.0 32


 


1/20/18 20:02  110 20  99 Nasal Cannula 3.0 32


 


1/20/18 20:00 99.1 100 16 148/81 99 Nasal Cannula 3.0 


 


1/20/18 20:00  106      


 


1/20/18 19:45        32


 


1/20/18 19:44  110 20  99 Nasal Cannula 3.0 32


 


1/20/18 19:44      Nasal Cannula 3.0 32


 


1/20/18 19:43     99 Nasal Cannula 3.0 32


 


1/20/18 17:30 99.2       


 


1/20/18 16:00 100.6 111 20 140/81 99 Nasal Cannula 3.0 


 


1/20/18 16:00  111      


 


1/20/18 14:51  78 24  96 Nasal Cannula 2.0 28


 


1/20/18 14:51        28


 


1/20/18 14:44  73 24  98 Nasal Cannula 2.0 28

















Intake and Output  


 


 1/20/18 1/21/18





 19:00 07:00


 


Intake Total 2448.108 ml 2232.982 ml


 


Output Total 1300 ml 2000 ml


 


Balance 1148.108 ml 232.982 ml


 


  


 


Intake Oral 1250 ml 450 ml


 


IV Total 1198.108 ml 1782.982 ml


 


Output Urine Total 1300 ml 2000 ml


 


# Voids  6


 


# Bowel Movements  2











Laboratory Tests








Test


  1/20/18


21:45 1/21/18


03:55


 


Activated Partial


Thromboplast Time 80 SEC (23-33)


H 86 SEC (23-33)


H








Objective


HEAD AND NECK:  No JVD.


LUNGS:  Clear.


CARDIOVASCULAR:  Nl S1 and S2 with no gallop or murmur.


ABDOMEN:  Soft and nontender.


EXTREMITIES:  Bilateral 1 plus pitting edema.











KAYLEEN HAIR Jan 21, 2018 14:12

## 2018-01-21 NOTE — INFECTIOUS DISEASES PROG NOTE
Assessment/Plan


Problems:  


(1) HAP (hospital-acquired pneumonia)


Assessment & Plan:  sputum culture grew candida most likely colonization , 

continue  zosyn and  clindamycin to cover for MRSA empirically , monitor CXR 





(2) Sepsis


Assessment & Plan:  due to the above , await  blood culture, continue  zosyn  

and clindamycin empiric coverage 





(3) Renal cyst, native, hemorrhage


Assessment & Plan:  urine culture grew mixed contaminant , now on zosyn empiric 

coverage , had urology eval with no intervention, repeated CT scan of the 

abdomen showed recurrent bleeding . recommend urology eval again , since he is 

on heparin drip now 





(4) Abdominal pain


Assessment & Plan:  due to bleeding into the left renal cysts , recommend 

urology eval and better  pain management  





(5) Fever


Assessment & Plan:  improved, due to the above , on wide spectrum  antibiotics 

empiric coverage , continue  tylenol prn 





(6) Diarrhea


Assessment & Plan:  no evidence of  C diff , continue antidiarrhea meds  





(7) DVT (deep venous thrombosis)


Assessment & Plan:  in both legs, with possible PE, on heparin drip , recommend 

close monitor of PT/PTT, high risk for bleeding in the renal cyst 








Subjective


Constitutional:  Reports: no symptoms


HEENT:  Reports: no symptoms


Respiratory:  Reports: productive cough


Breasts:  Reports: no symptoms


Cardiovascular:  Reports: chest pain, palpitations


Gastrointestinal/Abdominal:  Reports: no symptoms


Genitourinary:  Reports: other - penile swelling


Neurologic:  Reports: no symptoms


Psychiatric:  Reports: no symptoms


Skin:  Reports: no symptoms


Endocrine:  Reports: no symptoms


Hematologic:  Reports: bleeding


Musculoskeletal:  Reports: pain


Allergies:  


Coded Allergies:  


     CODEINE (Verified  Allergy, Unknown, 1/5/18)


Uncoded Allergies:  


     PEANUTS (Adverse Reaction, Severe, Anaphylaxis, 1/9/18)


Subjective


he feels more comfortable today , with no SOB, no fever or chills





Objective


Vital Signs





Last 24 Hour Vital Signs








  Date Time  Temp Pulse Resp B/P (MAP) Pulse Ox O2 Delivery O2 Flow Rate FiO2


 


1/21/18 15:19  101 20  99 Nasal Cannula 3.0 32


 


1/21/18 15:10  100 22  99 Nasal Cannula 3.0 32


 


1/21/18 12:00  109      


 


1/21/18 12:00 98.3 108 20 148/86 95 Nasal Cannula 3.0 


 


1/21/18 10:46  101 20  99 Nasal Cannula 3.0 32


 


1/21/18 10:37  102 24  98 Nasal Cannula 3.0 32


 


1/21/18 08:00  100      


 


1/21/18 08:00 99.5 100 20 153/83 95 Nasal Cannula 3.0 


 


1/21/18 07:00 98.5       


 


1/21/18 06:56  106 18  99 Nasal Cannula 3.0 32


 


1/21/18 06:45      Nasal Cannula 3.0 32


 


1/21/18 06:45     98 Nasal Cannula 3.0 32


 


1/21/18 06:45  105 20  98 Nasal Cannula 3.0 32


 


1/21/18 04:00 98.5 99 14 124/62 99 Nasal Cannula 3.0 


 


1/21/18 04:00  109      


 


1/21/18 03:06  106 16  97 Nasal Cannula 3.0 32


 


1/21/18 02:48        32


 


1/21/18 02:47  99 16  99 Nasal Cannula 3.0 32


 


1/21/18 00:00  99      


 


1/21/18 00:00 98.9 99 16 139/77 99 Nasal Cannula 3.0 


 


1/20/18 23:31  106 20  94 Nasal Cannula 3.0 32


 


1/20/18 23:14        32


 


1/20/18 23:13  106 20  94 Nasal Cannula 3.0 32


 


1/20/18 20:02  110 20  99 Nasal Cannula 3.0 32


 


1/20/18 20:00 99.1 100 16 148/81 99 Nasal Cannula 3.0 


 


1/20/18 20:00  106      


 


1/20/18 19:45        32


 


1/20/18 19:44  110 20  99 Nasal Cannula 3.0 32


 


1/20/18 19:44      Nasal Cannula 3.0 32


 


1/20/18 19:43     99 Nasal Cannula 3.0 32


 


1/20/18 17:30 99.2       








Height (Feet):  5


Height (Inches):  10.00


Weight (Pounds):  138


General Appearance:  WD/WN, no acute distress


HEENT:  normocephalic, atraumatic, anicteric, mucous membranes moist, PERRL


Respiratory/Chest:  chest wall non-tender, normal breath sounds, no respiratory 

distress, no accessory muscle use, decreased breath sounds, crackles/rales


Cardiovascular:  normal peripheral pulses, normal rate, regular rhythm, no 

gallop/murmur, no JVD


Abdomen:  normal bowel sounds, soft, non tender, no organomegaly, non distended

, no mass, no scars


Genitourinary:  other - peripenile swelling


Extremities:  no cyanosis, no clubbing


Skin:  no rash, no lesions, no ulcers


Neurologic/Psychiatric:  alert, oriented x 3, responsive





Laboratory Tests








Test


  1/20/18


21:45 1/21/18


03:55 1/21/18


14:40


 


Activated Partial


Thromboplast Time 80 SEC (23-33)


H 86 SEC (23-33)


H 


 


 


Sodium Level


  


  


  144 MMOL/L


(136-145)


 


Potassium Level


  


  


  4.1 MMOL/L


(3.5-5.1)


 


Chloride Level


  


  


  115 MMOL/L


()  H


 


Carbon Dioxide Level


  


  


  15 MMOL/L


(21-32)  L


 


Anion Gap


  


  


  15 mmol/L


(5-15)


 


Blood Urea Nitrogen


  


  


  27 mg/dL


(7-18)  H


 


Creatinine


  


  


  2.7 MG/DL


(0.55-1.30)  H


 


Estimat Glomerular Filtration


Rate 


  


  29.0 mL/min


(>60)


 


Glucose Level


  


  


  120 MG/DL


()  H


 


Calcium Level


  


  


  8.3 MG/DL


(8.5-10.1)  L











Current Medications








 Medications


  (Trade)  Dose


 Ordered  Sig/Judie


 Route


 PRN Reason  Start Time


 Stop Time Status Last Admin


Dose Admin


 


 Acetaminophen


  (Tylenol)  650 mg  Q4H  PRN


 ORAL


 Mild Pain/Temp > 100.5  1/10/18 08:45


 2/5/18 00:44  1/20/18 16:39


 


 


 Clindamycin HCl/


 Dextrose  50 ml @ 


 100 mls/hr  Q8H


 IV


   1/15/18 18:00


 1/22/18 17:59  1/21/18 09:33


 


 


 Dextrose


  (Dextrose 50%)    STAT  PRN


 IV


 Hypoglycemia  1/10/18 08:30


 2/9/18 08:29   


 


 


 Diphenoxylate HCl/


 Atropine


  (Lomotil)  2.5 mg  Q4H  PRN


 ORAL


 Diarrhea  1/10/18 13:15


 2/9/18 13:14  1/12/18 14:13


 


 


 Docusate Sodium


  (Colace)  100 mg  THREE TIMES A  DAY


 ORAL


   1/20/18 18:00


 2/19/18 17:59  1/20/18 16:38


 


 


 Folic Acid


  (Folate)  1 mg  DAILY


 ORAL


   1/12/18 15:00


 2/11/18 14:59  1/21/18 09:33


 


 


 Heparin Sodium/


 Dextrose  500 ml @ 


 33.802 mls/


 hr  adjust per protocol


 IV


   1/20/18 15:30


 2/19/18 15:29  1/21/18 16:02


 


 


 Ipratropium


 Bromide


  (Atrovent)  500 mcg  Q4HRT


 N


   1/20/18 03:00


 1/25/18 02:59  1/21/18 15:10


 


 


 Levalbuterol HCl


  (Xopenex)  1.25 mg  Q4HRT


 N


   1/20/18 03:00


 1/25/18 02:59  1/21/18 15:10


 


 


 Magnesium


 Hydroxide


  (Mom)  30 ml  BIDPRN  PRN


 ORAL


 constipation  1/10/18 08:15


 2/6/18 08:14   


 


 


 Morphine Sulfate


  (Morphine


 Sulfate)  2 mg  Q3H  PRN


 IVP


 Severe Pain  1/16/18 15:15


 1/23/18 15:14  1/21/18 06:30


 


 


 Ondansetron HCl


  (Zofran)  4 mg  Q6H  PRN


 IVP


 Nausea & Vomiting  1/10/18 08:15


 2/5/18 08:14   


 


 


 Pantoprazole


  (Protonix)  40 mg  DAILY


 ORAL


   1/14/18 09:00


 2/13/18 08:59  1/21/18 09:33


 


 


 Piperacillin Sod/


 Tazobactam Sod


 3.375 gm/Sodium


 Chloride  110 ml @ 


 27.5 mls/hr  EVERY 8  HOURS


 IVPB


   1/20/18 22:00


 1/25/18 21:59  1/21/18 14:18


 


 


 Sodium Chloride  1,000 ml @ 


 100 mls/hr  Q10H


 IV


   1/17/18 19:00


 2/16/18 18:59  1/21/18 12:57


 


 


 Vitamin B Complex/


 Vit C/Folic Acid


  (Nephrovite)  1 tab  DAILY


 ORAL


   1/10/18 09:00


 2/8/18 08:59  1/21/18 09:33


 

















Pablo Talley M.D. Jan 21, 2018 16:22

## 2018-01-21 NOTE — PULMONOLOGY PROGRESS NOTE
Assessment/Plan


Assessment/Plan


ASSESSMENT AND PLAN:


1. Sinus Tachycardia due to sickle cell anemia, fever and possible PE in view 

of acute bilateral DVT. 


   Better after 1 unit PRBC and on heparin drip. Echocardiogram  EF 60%.  No 

SVT or VT.





2.  Sepsis, WBC of 26K. On intravenous antibiotic per Dr. Talley.Down to 18K 





3.  Abdominal pain, likely due to renal cysts and hemorrhage.





4.  Diarrhea. Clostridium difficile colitis has been ruled out.  





5. Hematuria and anemia. Hb stable in 7.5-8 range





6. Acute bilateral LE DVT. Continue with iv heparin.





7. Constipation. Colace 100 tid.





8. Hyperkalemia.





continue pain control


Antibiotics


IV fluids


continue IV heparin. 


suggest to transition to Coumadin





Subjective


Interval Events:  No new events


Constitutional:  Reports: no symptoms


HEENT:  Repors: no symptoms


Respiratory:  Reports: no symptoms


Cardiovascular:  Reports: no symptoms


Gastrointestinal/Abdominal:  Reports: no symptoms


Allergies:  


Coded Allergies:  


     CODEINE (Verified  Allergy, Unknown, 1/5/18)


Uncoded Allergies:  


     PEANUTS (Adverse Reaction, Severe, Anaphylaxis, 1/9/18)





Objective





Last 24 Hour Vital Signs








  Date Time  Temp Pulse Resp B/P (MAP) Pulse Ox O2 Delivery O2 Flow Rate FiO2


 


1/21/18 07:00 98.5       


 


1/21/18 06:56  106 18  99 Nasal Cannula 3.0 32


 


1/21/18 06:45      Nasal Cannula 3.0 32


 


1/21/18 06:45     98 Nasal Cannula 3.0 32


 


1/21/18 06:45  105 20  98 Nasal Cannula 32.0 32


 


1/21/18 04:00 98.5 99 14 124/62 99 Nasal Cannula 3.0 


 


1/21/18 04:00  109      


 


1/21/18 03:06  106 16  97 Nasal Cannula 3.0 32


 


1/21/18 02:48        32


 


1/21/18 02:47  99 16  99 Nasal Cannula 3.0 32


 


1/21/18 00:00  99      


 


1/21/18 00:00 98.9 99 16 139/77 99 Nasal Cannula 3.0 


 


1/20/18 23:31  106 20  94 Nasal Cannula 3.0 32


 


1/20/18 23:14        32


 


1/20/18 23:13  106 20  94 Nasal Cannula 3.0 32


 


1/20/18 20:02  110 20  99 Nasal Cannula 3.0 32


 


1/20/18 20:00 99.1 100 16 148/81 99 Nasal Cannula 3.0 


 


1/20/18 20:00  106      


 


1/20/18 19:45        32


 


1/20/18 19:44  110 20  99 Nasal Cannula 3.0 32


 


1/20/18 19:44      Nasal Cannula 3.0 32


 


1/20/18 19:43     99 Nasal Cannula 3.0 32


 


1/20/18 17:30 99.2       


 


1/20/18 16:00 100.6 111 20 140/81 99 Nasal Cannula 3.0 


 


1/20/18 16:00  111      


 


1/20/18 14:51  78 24  96 Nasal Cannula 2.0 28


 


1/20/18 14:51        28


 


1/20/18 14:44  73 24  98 Nasal Cannula 2.0 28


 


1/20/18 12:00  102      


 


1/20/18 12:00 99.0 113 21 156/87 99 Nasal Cannula 3.0 


 


1/20/18 11:16  104 24  95 Nasal Cannula 2.0 28


 


1/20/18 11:06        28


 


1/20/18 11:06  100 24  96 Nasal Cannula 2.0 28

















Intake and Output  


 


 1/20/18 1/21/18





 19:00 07:00


 


Intake Total 2448.108 ml 2071.680 ml


 


Output Total 1300 ml 2000 ml


 


Balance 1148.108 ml 71.680 ml


 


  


 


Intake Oral 1250 ml 450 ml


 


IV Total 1198.108 ml 1621.680 ml


 


Output Urine Total 1300 ml 2000 ml


 


# Voids  6


 


# Bowel Movements  2








General Appearance:  no acute distress


HEENT:  normocephalic


Respiratory/Chest:  chest wall non-tender, lungs clear


Cardiovascular:  normal peripheral pulses, tachycardia


Abdomen:  normal bowel sounds


Laboratory Tests


1/20/18 13:50: Activated Partial Thromboplast Time 108H


1/20/18 21:45: Activated Partial Thromboplast Time 80H


1/21/18 03:55: Activated Partial Thromboplast Time 86H





Current Medications








 Medications


  (Trade)  Dose


 Ordered  Sig/Judie


 Route


 PRN Reason  Start Time


 Stop Time Status Last Admin


Dose Admin


 


 Acetaminophen


  (Tylenol)  650 mg  Q4H  PRN


 ORAL


 Mild Pain/Temp > 100.5  1/10/18 08:45


 2/5/18 00:44  1/20/18 16:39


 


 


 Clindamycin HCl/


 Dextrose  50 ml @ 


 100 mls/hr  Q8H


 IV


   1/15/18 18:00


 1/22/18 17:59  1/21/18 09:33


 


 


 Dextrose


  (Dextrose 50%)    STAT  PRN


 IV


 Hypoglycemia  1/10/18 08:30


 2/9/18 08:29   


 


 


 Diphenoxylate HCl/


 Atropine


  (Lomotil)  2.5 mg  Q4H  PRN


 ORAL


 Diarrhea  1/10/18 13:15


 2/9/18 13:14  1/12/18 14:13


 


 


 Docusate Sodium


  (Colace)  100 mg  THREE TIMES A  DAY


 ORAL


   1/20/18 18:00


 2/19/18 17:59  1/20/18 16:38


 


 


 Folic Acid


  (Folate)  1 mg  DAILY


 ORAL


   1/12/18 15:00


 2/11/18 14:59  1/21/18 09:33


 


 


 Heparin Sodium/


 Dextrose  500 ml @ 


 33.802 mls/


 hr  adjust per protocol


 IV


   1/20/18 15:30


 2/19/18 15:29  1/21/18 01:18


 


 


 Ipratropium


 Bromide


  (Atrovent)  500 mcg  Q4HRT


 N


   1/20/18 03:00


 1/25/18 02:59  1/21/18 06:45


 


 


 Levalbuterol HCl


  (Xopenex)  1.25 mg  Q4HRT


 HHN


   1/20/18 03:00


 1/25/18 02:59  1/21/18 06:44


 


 


 Magnesium


 Hydroxide


  (Mom)  30 ml  BIDPRN  PRN


 ORAL


 constipation  1/10/18 08:15


 2/6/18 08:14   


 


 


 Morphine Sulfate


  (Morphine


 Sulfate)  2 mg  Q3H  PRN


 IVP


 Severe Pain  1/16/18 15:15


 1/23/18 15:14  1/21/18 06:30


 


 


 Ondansetron HCl


  (Zofran)  4 mg  Q6H  PRN


 IVP


 Nausea & Vomiting  1/10/18 08:15


 2/5/18 08:14   


 


 


 Pantoprazole


  (Protonix)  40 mg  DAILY


 ORAL


   1/14/18 09:00


 2/13/18 08:59  1/21/18 09:33


 


 


 Piperacillin Sod/


 Tazobactam Sod


 3.375 gm/Sodium


 Chloride  110 ml @ 


 27.5 mls/hr  EVERY 8  HOURS


 IVPB


   1/20/18 22:00


 1/25/18 21:59  1/21/18 06:00


 


 


 Sodium Chloride  1,000 ml @ 


 100 mls/hr  Q10H


 IV


   1/17/18 19:00


 2/16/18 18:59  1/21/18 03:01


 


 


 Vitamin B Complex/


 Vit C/Folic Acid


  (Nephrovite)  1 tab  DAILY


 ORAL


   1/10/18 09:00


 2/8/18 08:59  1/21/18 09:33


 

















Ethan Tom MD Jan 21, 2018 09:51

## 2018-01-22 VITALS — DIASTOLIC BLOOD PRESSURE: 79 MMHG | SYSTOLIC BLOOD PRESSURE: 147 MMHG

## 2018-01-22 VITALS — SYSTOLIC BLOOD PRESSURE: 153 MMHG | DIASTOLIC BLOOD PRESSURE: 87 MMHG

## 2018-01-22 VITALS — SYSTOLIC BLOOD PRESSURE: 141 MMHG | DIASTOLIC BLOOD PRESSURE: 81 MMHG

## 2018-01-22 VITALS — DIASTOLIC BLOOD PRESSURE: 68 MMHG | SYSTOLIC BLOOD PRESSURE: 121 MMHG

## 2018-01-22 VITALS — SYSTOLIC BLOOD PRESSURE: 141 MMHG | DIASTOLIC BLOOD PRESSURE: 79 MMHG

## 2018-01-22 VITALS — DIASTOLIC BLOOD PRESSURE: 78 MMHG | SYSTOLIC BLOOD PRESSURE: 156 MMHG

## 2018-01-22 RX ADMIN — Medication SCH MCG: at 03:41

## 2018-01-22 RX ADMIN — LEVALBUTEROL HYDROCHLORIDE SCH MG: 1.25 SOLUTION, CONCENTRATE RESPIRATORY (INHALATION) at 19:33

## 2018-01-22 RX ADMIN — HEPARIN SODIUM SCH MLS/HR: 5000 INJECTION, SOLUTION INTRAVENOUS at 11:11

## 2018-01-22 RX ADMIN — HEPARIN SODIUM SCH MLS/HR: 5000 INJECTION, SOLUTION INTRAVENOUS at 06:40

## 2018-01-22 RX ADMIN — LEVALBUTEROL HYDROCHLORIDE SCH MG: 1.25 SOLUTION, CONCENTRATE RESPIRATORY (INHALATION) at 11:44

## 2018-01-22 RX ADMIN — Medication SCH MCG: at 07:26

## 2018-01-22 RX ADMIN — Medication SCH MCG: at 11:44

## 2018-01-22 RX ADMIN — DOCUSATE SODIUM SCH MG: 100 CAPSULE, LIQUID FILLED ORAL at 08:58

## 2018-01-22 RX ADMIN — DEXTROSE MONOHYDRATE SCH MLS/HR: 50 INJECTION, SOLUTION INTRAVENOUS at 05:40

## 2018-01-22 RX ADMIN — Medication SCH MCG: at 19:33

## 2018-01-22 RX ADMIN — CLINDAMYCIN PHOSPHATE SCH MLS/HR: 12 INJECTION, SOLUTION INTRAVENOUS at 17:54

## 2018-01-22 RX ADMIN — MORPHINE SULFATE PRN MG: 2 INJECTION, SOLUTION INTRAMUSCULAR; INTRAVENOUS at 04:01

## 2018-01-22 RX ADMIN — DEXTROSE MONOHYDRATE SCH MLS/HR: 50 INJECTION, SOLUTION INTRAVENOUS at 22:20

## 2018-01-22 RX ADMIN — Medication SCH TAB: at 08:58

## 2018-01-22 RX ADMIN — CLINDAMYCIN PHOSPHATE SCH MLS/HR: 12 INJECTION, SOLUTION INTRAVENOUS at 02:05

## 2018-01-22 RX ADMIN — HEPARIN SODIUM SCH MLS/HR: 5000 INJECTION, SOLUTION INTRAVENOUS at 19:43

## 2018-01-22 RX ADMIN — DOCUSATE SODIUM SCH MG: 100 CAPSULE, LIQUID FILLED ORAL at 13:00

## 2018-01-22 RX ADMIN — LEVALBUTEROL HYDROCHLORIDE SCH MG: 1.25 SOLUTION, CONCENTRATE RESPIRATORY (INHALATION) at 15:44

## 2018-01-22 RX ADMIN — DOCUSATE SODIUM SCH MG: 100 CAPSULE, LIQUID FILLED ORAL at 17:55

## 2018-01-22 RX ADMIN — Medication SCH MCG: at 15:44

## 2018-01-22 RX ADMIN — DEXTROSE MONOHYDRATE SCH MLS/HR: 50 INJECTION, SOLUTION INTRAVENOUS at 14:46

## 2018-01-22 RX ADMIN — MORPHINE SULFATE PRN MG: 2 INJECTION, SOLUTION INTRAMUSCULAR; INTRAVENOUS at 15:39

## 2018-01-22 RX ADMIN — LEVALBUTEROL HYDROCHLORIDE SCH MG: 1.25 SOLUTION, CONCENTRATE RESPIRATORY (INHALATION) at 07:26

## 2018-01-22 RX ADMIN — LEVALBUTEROL HYDROCHLORIDE SCH MG: 1.25 SOLUTION, CONCENTRATE RESPIRATORY (INHALATION) at 03:41

## 2018-01-22 RX ADMIN — MORPHINE SULFATE PRN MG: 2 INJECTION, SOLUTION INTRAMUSCULAR; INTRAVENOUS at 22:36

## 2018-01-22 RX ADMIN — CLINDAMYCIN PHOSPHATE SCH MLS/HR: 12 INJECTION, SOLUTION INTRAVENOUS at 11:09

## 2018-01-22 NOTE — PULMONOLOGY PROGRESS NOTE
Assessment/Plan


Assessment/Plan


ASSESSMENT AND PLAN:


1. Sinus Tachycardia due to sickle cell anemia, fever and possible PE in view 

of acute bilateral DVT. 


   Better after 1 unit PRBC and on heparin drip. Echocardiogram  EF 60%.  No 

SVT or VT.





2.  Sepsis, WBC of 26K. On intravenous antibiotic per Dr. Talley.Down to 18K 





3.  Abdominal pain, likely due to renal cysts and hemorrhage.





4.  Diarrhea. Clostridium difficile colitis has been ruled out.  





5. Hematuria and anemia. Hb stable in 7.5-8 range





6. Acute bilateral LE DVT. Continue with iv heparin.





7. Constipation. Colace 100 tid.





8. Hyperkalemia.





continue pain control


Antibiotics


IV fluids


continue IV heparin. 


suggest to transition to Coumadin





Subjective


Interval Events:  no changes reported.


Constitutional:  Reports: no symptoms


HEENT:  Repors: no symptoms


Respiratory:  Reports: no symptoms


Cardiovascular:  Reports: no symptoms


Allergies:  


Coded Allergies:  


     CODEINE (Verified  Allergy, Unknown, 1/5/18)


Uncoded Allergies:  


     PEANUTS (Adverse Reaction, Severe, Anaphylaxis, 1/9/18)





Objective





Last 24 Hour Vital Signs








  Date Time  Temp Pulse Resp B/P (MAP) Pulse Ox O2 Delivery O2 Flow Rate FiO2


 


1/22/18 11:39  94 20  98 Nasal Cannula 3.0 32


 


1/22/18 08:00 96.1 95 20 141/79 97 Nasal Cannula 4.0 


 


1/22/18 07:29  103 22  100 Nasal Cannula 3.0 32


 


1/22/18 07:29      Nasal Cannula 3.0 32


 


1/22/18 07:20  91 24  98 Nasal Cannula 3.0 32


 


1/22/18 07:19     98 Nasal Cannula 3.0 32


 


1/22/18 04:00 97.2 75 20 121/68 94 Nasal Cannula 4.0 


 


1/22/18 04:00  94      


 


1/22/18 03:51  101 20  99 Nasal Cannula 2.0 28


 


1/22/18 03:40        28


 


1/22/18 03:40  98 20  99 Nasal Cannula 2.0 28


 


1/22/18 00:00 98.5 104 22 153/87 97 Nasal Cannula 3.0 


 


1/22/18 00:00  103      


 


1/21/18 23:11  105 24  99 Room Air  21


 


1/21/18 23:03        21


 


1/21/18 23:03  107 22  94 Room Air  21


 


1/21/18 20:00 98.0 96 24 160/85 99 Nasal Cannula 3.0 


 


1/21/18 20:00  98      


 


1/21/18 19:55  103 22  99 Nasal Cannula 2.0 28


 


1/21/18 19:45     99 Nasal Cannula 3.0 32


 


1/21/18 19:45      Nasal Cannula 3.0 32


 


1/21/18 19:45        32


 


1/21/18 19:45  97 22  99 Nasal Cannula 3.0 32


 


1/21/18 16:00  103      


 


1/21/18 16:00 99.0 103 20 150/84 96 Nasal Cannula 3.0 


 


1/21/18 15:19  101 20  99 Nasal Cannula 3.0 32


 


1/21/18 15:10  100 22  99 Nasal Cannula 3.0 32


 


1/21/18 12:00  109      


 


1/21/18 12:00 98.3 108 20 148/86 95 Nasal Cannula 3.0 

















Intake and Output  


 


 1/21/18 1/22/18





 19:00 07:00


 


Intake Total 1653.100 ml 120 ml


 


Output Total 2250 ml 220 ml


 


Balance -596.900 ml -100 ml


 


  


 


Intake Oral 100 ml 120 ml


 


IV Total 1553.100 ml 


 


Output Urine Total 2250 ml 220 ml








General Appearance:  no acute distress


HEENT:  normocephalic


Respiratory/Chest:  chest wall non-tender, lungs clear


Cardiovascular:  normal peripheral pulses, tachycardia


Abdomen:  normal bowel sounds


Laboratory Tests


1/21/18 14:40: 


Sodium Level 144, Potassium Level 4.1, Chloride Level 115H, Carbon Dioxide 

Level 15L, Anion Gap 15, Blood Urea Nitrogen 27H, Creatinine 2.7H, Estimat 

Glomerular Filtration Rate 29.0, Glucose Level 120H, Calcium Level 8.3L


1/22/18 05:00: Activated Partial Thromboplast Time 98H





Current Medications








 Medications


  (Trade)  Dose


 Ordered  Sig/Judie


 Route


 PRN Reason  Start Time


 Stop Time Status Last Admin


Dose Admin


 


 Acetaminophen


  (Tylenol)  650 mg  Q4H  PRN


 ORAL


 Mild Pain/Temp > 100.5  1/21/18 19:45


 2/5/18 19:44   


 


 


 Clindamycin HCl/


 Dextrose  50 ml @ 


 100 mls/hr  Q8H


 IV


   1/22/18 02:00


 1/29/18 23:59  1/22/18 11:09


 


 


 Dextrose


  (Dextrose 50%)    STAT  PRN


 IV


 Hypoglycemia  1/21/18 19:45


 2/20/18 19:44   


 


 


 Diphenoxylate HCl/


 Atropine


  (Lomotil)  2.5 mg  Q4H  PRN


 ORAL


 Diarrhea  1/21/18 19:45


 2/9/18 19:44  1/22/18 04:00


 


 


 Docusate Sodium


  (Colace)  100 mg  THREE TIMES A  DAY


 ORAL


   1/21/18 18:00


 2/19/18 17:59  1/22/18 08:58


 


 


 Folic Acid


  (Folate)  1 mg  DAILY


 ORAL


   1/22/18 09:00


 2/11/18 14:59  1/22/18 08:58


 


 


 Heparin Sodium/


 Dextrose  500 ml @ 


 31.298 mls/


 hr  adjust per protocol


 IV


   1/22/18 06:30


 2/21/18 06:29  1/22/18 11:11


 


 


 Ipratropium


 Bromide


  (Atrovent)  500 mcg  Q4HRT


 HHN


   1/21/18 19:00


 1/25/18 02:59  1/22/18 11:44


 


 


 Levalbuterol HCl


  (Xopenex)  1.25 mg  Q4HRT


 HHN


   1/21/18 19:00


 1/25/18 02:59  1/22/18 11:44


 


 


 Magnesium


 Hydroxide


  (Mom)  30 ml  BIDPRN  PRN


 ORAL


 constipation  1/21/18 19:46


 2/20/18 19:45   


 


 


 Morphine Sulfate


  (Morphine


 Sulfate)  2 mg  Q3H  PRN


 IVP


 Severe Pain  1/21/18 18:15


 1/23/18 15:14  1/22/18 04:01


 


 


 Ondansetron HCl


  (Zofran)  4 mg  Q6H  PRN


 IVP


 Nausea & Vomiting  1/21/18 19:46


 2/5/18 19:45   


 


 


 Pantoprazole


  (Protonix)  40 mg  DAILY


 ORAL


   1/22/18 09:00


 2/13/18 08:59  1/22/18 08:58


 


 


 Piperacillin Sod/


 Tazobactam Sod


 3.375 gm/Sodium


 Chloride  110 ml @ 


 27.5 mls/hr  EVERY 8  HOURS


 IVPB


   1/21/18 22:00


 1/25/18 23:59  1/22/18 05:40


 


 


 Sodium Chloride  1,000 ml @ 


 100 mls/hr  Q10H


 IV


   1/21/18 18:00


 2/16/18 18:59  1/22/18 11:10


 


 


 Vitamin B Complex/


 Vit C/Folic Acid


  (Nephrovite)  1 tab  DAILY


 ORAL


   1/22/18 09:00


 2/8/18 08:59  1/22/18 08:58


 

















Ethan Tom MD Jan 22, 2018 11:46

## 2018-01-22 NOTE — INFECTIOUS DISEASES PROG NOTE
Assessment/Plan


Problems:  


(1) HAP (hospital-acquired pneumonia)


Assessment & Plan:  sputum culture grew candida most likely colonization , 

continue  zosyn and  clindamycin to cover for MRSA empirically , monitor CXR 





(2) Sepsis


Assessment & Plan:  due to the above , await  blood culture, continue  zosyn  

and clindamycin empiric coverage 





(3) Renal cyst, native, hemorrhage


Assessment & Plan:  urine culture grew mixed contaminant , now on zosyn empiric 

coverage , had urology eval with no intervention, repeated CT scan of the 

abdomen showed recurrent bleeding . recommend urology eval again , since he is 

on heparin drip now 





(4) Abdominal pain


Assessment & Plan:  due to bleeding into the left renal cysts , recommend 

urology eval and better  pain management  





(5) Fever


Assessment & Plan:  improved, due to the above , on wide spectrum  antibiotics 

empiric coverage , continue  tylenol prn 





(6) Diarrhea


Assessment & Plan:  no evidence of  C diff , continue antidiarrhea meds  





(7) DVT (deep venous thrombosis)


Assessment & Plan:  in both legs, with possible PE, on heparin drip , recommend 

close monitor of PT/PTT, high risk for bleeding in the renal cyst 








Subjective


Constitutional:  Reports: fatigue


HEENT:  Reports: no symptoms


Respiratory:  Reports: productive cough


Breasts:  Reports: no symptoms


Cardiovascular:  Reports: no symptoms


Gastrointestinal/Abdominal:  Reports: no symptoms


Genitourinary:  Reports: no symptoms


Neurologic:  Reports: no symptoms


Psychiatric:  Reports: no symptoms


Skin:  Reports: no symptoms


Endocrine:  Reports: no symptoms


Hematologic:  Reports: no symptoms


Musculoskeletal:  Reports: no symptoms


Allergies:  


Coded Allergies:  


     CODEINE (Verified  Allergy, Unknown, 1/5/18)


Uncoded Allergies:  


     PEANUTS (Adverse Reaction, Severe, Anaphylaxis, 1/9/18)


Subjective


he feels more comfortable today , with no SOB, no fever or chills





Objective


Vital Signs





Last 24 Hour Vital Signs








  Date Time  Temp Pulse Resp B/P (MAP) Pulse Ox O2 Delivery O2 Flow Rate FiO2


 


1/22/18 12:00 97.5 88 18 141/81 99 Nasal Cannula 4.0 


 


1/22/18 11:49  100 22  100 Nasal Cannula 3.0 32


 


1/22/18 11:39  94 20  98 Nasal Cannula 3.0 32


 


1/22/18 08:00 96.1 95 20 141/79 97 Nasal Cannula 4.0 


 


1/22/18 08:00  100      


 


1/22/18 07:29  103 22  100 Nasal Cannula 3.0 32


 


1/22/18 07:29      Nasal Cannula 3.0 32


 


1/22/18 07:20  91 24  98 Nasal Cannula 3.0 32


 


1/22/18 07:19     98 Nasal Cannula 3.0 32


 


1/22/18 04:00 97.2 75 20 121/68 94 Nasal Cannula 4.0 


 


1/22/18 04:00  94      


 


1/22/18 03:51  101 20  99 Nasal Cannula 2.0 28


 


1/22/18 03:40        28


 


1/22/18 03:40  98 20  99 Nasal Cannula 2.0 28


 


1/22/18 00:00 98.5 104 22 153/87 97 Nasal Cannula 3.0 


 


1/22/18 00:00  103      


 


1/21/18 23:11  105 24  99 Room Air  21


 


1/21/18 23:03        21


 


1/21/18 23:03  107 22  94 Room Air  21


 


1/21/18 20:00 98.0 96 24 160/85 99 Nasal Cannula 3.0 


 


1/21/18 20:00  98      


 


1/21/18 19:55  103 22  99 Nasal Cannula 2.0 28


 


1/21/18 19:45     99 Nasal Cannula 3.0 32


 


1/21/18 19:45      Nasal Cannula 3.0 32


 


1/21/18 19:45        32


 


1/21/18 19:45  97 22  99 Nasal Cannula 3.0 32


 


1/21/18 16:00  103      


 


1/21/18 16:00 99.0 103 20 150/84 96 Nasal Cannula 3.0 








Height (Feet):  5


Height (Inches):  10.00


Weight (Pounds):  138


General Appearance:  WD/WN, no acute distress


HEENT:  normocephalic, atraumatic, anicteric, mucous membranes moist, PERRL


Respiratory/Chest:  chest wall non-tender, no respiratory distress, no 

accessory muscle use, decreased breath sounds, crackles/rales


Cardiovascular:  normal peripheral pulses, normal rate, regular rhythm, no 

gallop/murmur, no JVD


Abdomen:  normal bowel sounds, soft, non tender, no organomegaly, non distended

, no mass, no scars


Extremities:  no cyanosis, no clubbing


Skin:  no rash, no lesions, no ulcers


Neurologic/Psychiatric:  alert, oriented x 3





Laboratory Tests








Test


  1/22/18


05:00 1/22/18


12:30


 


Activated Partial


Thromboplast Time 98 SEC (23-33)


H 46 SEC (23-33)


H











Current Medications








 Medications


  (Trade)  Dose


 Ordered  Sig/Judie


 Route


 PRN Reason  Start Time


 Stop Time Status Last Admin


Dose Admin


 


 Acetaminophen


  (Tylenol)  650 mg  Q4H  PRN


 ORAL


 Mild Pain/Temp > 100.5  1/21/18 19:45


 2/5/18 19:44   


 


 


 Clindamycin HCl/


 Dextrose  50 ml @ 


 100 mls/hr  Q8H


 IV


   1/22/18 02:00


 1/29/18 23:59  1/22/18 11:09


 


 


 Dextrose


  (Dextrose 50%)    STAT  PRN


 IV


 Hypoglycemia  1/21/18 19:45


 2/20/18 19:44   


 


 


 Diphenoxylate HCl/


 Atropine


  (Lomotil)  2.5 mg  Q4H  PRN


 ORAL


 Diarrhea  1/21/18 19:45


 2/9/18 19:44  1/22/18 04:00


 


 


 Docusate Sodium


  (Colace)  100 mg  THREE TIMES A  DAY


 ORAL


   1/21/18 18:00


 2/19/18 17:59  1/22/18 08:58


 


 


 Folic Acid


  (Folate)  1 mg  DAILY


 ORAL


   1/22/18 09:00


 2/11/18 14:59  1/22/18 08:58


 


 


 Heparin Sodium/


 Dextrose  500 ml @ 


 36.306 mls/


 hr  adjust per protocol


 IV


   1/22/18 13:00


 2/21/18 12:59  1/22/18 13:09


 


 


 Ipratropium


 Bromide


  (Atrovent)  500 mcg  Q4HRT


 N


   1/21/18 19:00


 1/25/18 02:59  1/22/18 11:44


 


 


 Levalbuterol HCl


  (Xopenex)  1.25 mg  Q4HRT


 HHN


   1/21/18 19:00


 1/25/18 02:59  1/22/18 11:44


 


 


 Magnesium


 Hydroxide


  (Mom)  30 ml  BIDPRN  PRN


 ORAL


 constipation  1/21/18 19:46


 2/20/18 19:45   


 


 


 Morphine Sulfate


  (Morphine


 Sulfate)  2 mg  Q3H  PRN


 IVP


 Severe Pain  1/21/18 18:15


 1/23/18 15:14  1/22/18 04:01


 


 


 Ondansetron HCl


  (Zofran)  4 mg  Q6H  PRN


 IVP


 Nausea & Vomiting  1/21/18 19:46


 2/5/18 19:45   


 


 


 Pantoprazole


  (Protonix)  40 mg  DAILY


 ORAL


   1/22/18 09:00


 2/13/18 08:59  1/22/18 08:58


 


 


 Piperacillin Sod/


 Tazobactam Sod


 3.375 gm/Sodium


 Chloride  110 ml @ 


 27.5 mls/hr  EVERY 8  HOURS


 IVPB


   1/21/18 22:00


 1/25/18 23:59  1/22/18 14:46


 


 


 Sodium Chloride  1,000 ml @ 


 100 mls/hr  Q10H


 IV


   1/21/18 18:00


 2/16/18 18:59  1/22/18 11:10


 


 


 Vitamin B Complex/


 Vit C/Folic Acid


  (Nephrovite)  1 tab  DAILY


 ORAL


   1/22/18 09:00


 2/8/18 08:59  1/22/18 08:58


 

















Pablo Talley M.D. Jan 22, 2018 15:39

## 2018-01-22 NOTE — GI PROGRESS NOTE
Assessment/Plan


Problems:  


(1) Diarrhea


ICD Codes:  R19.7 - Diarrhea, unspecified


SNOMED:  46504597


(2) Sickle cell trait


ICD Codes:  D57.3 - Sickle-cell trait


SNOMED:  89397370


(3) Abdominal pain


ICD Codes:  R10.9 - Unspecified abdominal pain


SNOMED:  26383337


(4) Renal cyst, native, hemorrhage


ICD Codes:  N28.89 - Other specified disorders of kidney and ureter; N28.1 - 

Cyst of kidney, acquired


SNOMED:  54434295


Status:  progressing


Status Narrative


Discussed with Dr. Gamez.


Assessment/Plan


OB stool r/o GI bleed  >> negative


stool cx >> negative


cdiff >> negative


O&P >> negative


diarrhea resolved >> lomotil prn





fu renal, hematology, ID recs


monitor H&H, prn transfusions


renal diet, tolerating


ppi


abx


fu labs


outpatient GI procedures





Subjective


Subjective


generalized weakness


diarrhea resolved





Objective





Last 24 Hour Vital Signs








  Date Time  Temp Pulse Resp B/P (MAP) Pulse Ox O2 Delivery O2 Flow Rate FiO2


 


1/22/18 11:49  100 22  100 Nasal Cannula 3.0 32


 


1/22/18 11:39  94 20  98 Nasal Cannula 3.0 32


 


1/22/18 08:00 96.1 95 20 141/79 97 Nasal Cannula 4.0 


 


1/22/18 08:00  100      


 


1/22/18 07:29  103 22  100 Nasal Cannula 3.0 32


 


1/22/18 07:29      Nasal Cannula 3.0 32


 


1/22/18 07:20  91 24  98 Nasal Cannula 3.0 32


 


1/22/18 07:19     98 Nasal Cannula 3.0 32


 


1/22/18 04:00 97.2 75 20 121/68 94 Nasal Cannula 4.0 


 


1/22/18 04:00  94      


 


1/22/18 03:51  101 20  99 Nasal Cannula 2.0 28


 


1/22/18 03:40        28


 


1/22/18 03:40  98 20  99 Nasal Cannula 2.0 28


 


1/22/18 00:00 98.5 104 22 153/87 97 Nasal Cannula 3.0 


 


1/22/18 00:00  103      


 


1/21/18 23:11  105 24  99 Room Air  21


 


1/21/18 23:03        21


 


1/21/18 23:03  107 22  94 Room Air  21


 


1/21/18 20:00 98.0 96 24 160/85 99 Nasal Cannula 3.0 


 


1/21/18 20:00  98      


 


1/21/18 19:55  103 22  99 Nasal Cannula 2.0 28


 


1/21/18 19:45     99 Nasal Cannula 3.0 32


 


1/21/18 19:45      Nasal Cannula 3.0 32


 


1/21/18 19:45        32


 


1/21/18 19:45  97 22  99 Nasal Cannula 3.0 32


 


1/21/18 16:00  103      


 


1/21/18 16:00 99.0 103 20 150/84 96 Nasal Cannula 3.0 


 


1/21/18 15:19  101 20  99 Nasal Cannula 3.0 32


 


1/21/18 15:10  100 22  99 Nasal Cannula 3.0 32

















Intake and Output  


 


 1/21/18 1/22/18





 19:00 07:00


 


Intake Total 1653.100 ml 120 ml


 


Output Total 2250 ml 220 ml


 


Balance -596.900 ml -100 ml


 


  


 


Intake Oral 100 ml 120 ml


 


IV Total 1553.100 ml 


 


Output Urine Total 2250 ml 220 ml











Laboratory Tests








Test


  1/21/18


14:40 1/22/18


05:00 1/22/18


12:30


 


Sodium Level


  144 MMOL/L


(136-145) 


  


 


 


Potassium Level


  4.1 MMOL/L


(3.5-5.1) 


  


 


 


Chloride Level


  115 MMOL/L


()  H 


  


 


 


Carbon Dioxide Level


  15 MMOL/L


(21-32)  L 


  


 


 


Anion Gap


  15 mmol/L


(5-15) 


  


 


 


Blood Urea Nitrogen


  27 mg/dL


(7-18)  H 


  


 


 


Creatinine


  2.7 MG/DL


(0.55-1.30)  H 


  


 


 


Estimat Glomerular Filtration


Rate 29.0 mL/min


(>60) 


  


 


 


Glucose Level


  120 MG/DL


()  H 


  


 


 


Calcium Level


  8.3 MG/DL


(8.5-10.1)  L 


  


 


 


Activated Partial


Thromboplast Time 


  98 SEC (23-33)


H Pending  


 








Height (Feet):  5


Height (Inches):  10.00


Weight (Pounds):  138


General Appearance:  WD/WN, no apparent distress, alert


Cardiovascular:  normal rate


Respiratory/Chest:  normal breath sounds, no respiratory distress


Abdominal Exam:  normal bowel sounds, non tender, soft


Extremities:  normal range of motion, non-tender











Elsy Lakhani N.PMari Jan 22, 2018 12:36

## 2018-01-22 NOTE — CARDIAC ELECTROPHYSIOLOGY PN
Assessment/Plan


Assessment/Plan


1. Sinus Tachycardia due to sickle cell anemia, fever and possible PE in view 

of acute bilateral DVT. 


     Echocardiogram  EF 60%.  No SVT or VT.





2.  Sepsis, WBC of 26K. On intravenous antibiotic per Dr. Talley. Down to 18K 





3.  Abdominal pain, likely due to renal cysts and hemorrhage.





4.  Diarrhea. Clostridium difficile colitis has been ruled out.  





5. Hematuria and anemia. Hb stable in 7.5-8 range





6. Acute bilateral LE DVT. Continue with iv heparin.





7. Constipation. Colace 100 tid.





8. Hyperkalemia.Got Kayoxalate given. K 4.1





DW RN





Subjective


Subjective


In NSR on heparin drip. No CP or SOB.On telemetry unit





Objective





Last 24 Hour Vital Signs








  Date Time  Temp Pulse Resp B/P (MAP) Pulse Ox O2 Delivery O2 Flow Rate FiO2


 


1/22/18 12:00 97.5 88 18 141/81 99 Nasal Cannula 4.0 


 


1/22/18 11:49  100 22  100 Nasal Cannula 3.0 32


 


1/22/18 11:39  94 20  98 Nasal Cannula 3.0 32


 


1/22/18 08:00 96.1 95 20 141/79 97 Nasal Cannula 4.0 


 


1/22/18 08:00  100      


 


1/22/18 07:29  103 22  100 Nasal Cannula 3.0 32


 


1/22/18 07:29      Nasal Cannula 3.0 32


 


1/22/18 07:20  91 24  98 Nasal Cannula 3.0 32


 


1/22/18 07:19     98 Nasal Cannula 3.0 32


 


1/22/18 04:00 97.2 75 20 121/68 94 Nasal Cannula 4.0 


 


1/22/18 04:00  94      


 


1/22/18 03:51  101 20  99 Nasal Cannula 2.0 28


 


1/22/18 03:40        28


 


1/22/18 03:40  98 20  99 Nasal Cannula 2.0 28


 


1/22/18 00:00 98.5 104 22 153/87 97 Nasal Cannula 3.0 


 


1/22/18 00:00  103      


 


1/21/18 23:11  105 24  99 Room Air  21


 


1/21/18 23:03        21


 


1/21/18 23:03  107 22  94 Room Air  21


 


1/21/18 20:00 98.0 96 24 160/85 99 Nasal Cannula 3.0 


 


1/21/18 20:00  98      


 


1/21/18 19:55  103 22  99 Nasal Cannula 2.0 28


 


1/21/18 19:45     99 Nasal Cannula 3.0 32


 


1/21/18 19:45      Nasal Cannula 3.0 32


 


1/21/18 19:45        32


 


1/21/18 19:45  97 22  99 Nasal Cannula 3.0 32


 


1/21/18 16:00  103      


 


1/21/18 16:00 99.0 103 20 150/84 96 Nasal Cannula 3.0 


 


1/21/18 15:19  101 20  99 Nasal Cannula 3.0 32


 


1/21/18 15:10  100 22  99 Nasal Cannula 3.0 32

















Intake and Output  


 


 1/21/18 1/22/18





 19:00 07:00


 


Intake Total 1653.100 ml 120 ml


 


Output Total 2250 ml 220 ml


 


Balance -596.900 ml -100 ml


 


  


 


Intake Oral 100 ml 120 ml


 


IV Total 1553.100 ml 


 


Output Urine Total 2250 ml 220 ml











Laboratory Tests








Test


  1/21/18


14:40 1/22/18


05:00 1/22/18


12:30


 


Sodium Level


  144 MMOL/L


(136-145) 


  


 


 


Potassium Level


  4.1 MMOL/L


(3.5-5.1) 


  


 


 


Chloride Level


  115 MMOL/L


()  H 


  


 


 


Carbon Dioxide Level


  15 MMOL/L


(21-32)  L 


  


 


 


Anion Gap


  15 mmol/L


(5-15) 


  


 


 


Blood Urea Nitrogen


  27 mg/dL


(7-18)  H 


  


 


 


Creatinine


  2.7 MG/DL


(0.55-1.30)  H 


  


 


 


Estimat Glomerular Filtration


Rate 29.0 mL/min


(>60) 


  


 


 


Glucose Level


  120 MG/DL


()  H 


  


 


 


Calcium Level


  8.3 MG/DL


(8.5-10.1)  L 


  


 


 


Activated Partial


Thromboplast Time 


  98 SEC (23-33)


H 46 SEC (23-33)


H








Objective


HEAD AND NECK:  No JVD.


LUNGS:  Clear.


CARDIOVASCULAR:  Nl S1 and S2 with no gallop or murmur.


ABDOMEN:  Soft and nontender.


EXTREMITIES:  Bilateral 1 plus pitting edema.











KAYLEEN HAIR Jan 22, 2018 13:23

## 2018-01-22 NOTE — NEPHROLOGY PROGRESS NOTE
Assessment/Plan


Problem List:  


(1) Sickle cell trait


(2) Abdominal pain


(3) Renal cyst, native, hemorrhage


(4) Sepsis


(5) Hematuria


(6) Shortness of breath


(7) Chest pain


(8) Severe anemia


(9) Pneumonia


(10) DVT (deep venous thrombosis)


(11) Tachycardia


(12) Sickle cell anemia


(13) HAP (hospital-acquired pneumonia)


Plan


Continue current treatment plan


Continue heparin drip per protocol


Monitor HR, cardio following


Continue o2 therapy


Monitor H&H and transfuse prn


Hematology, cardiology GI, and ID and pulmo following, will f/u with recs


Monitor lytes, correct prn


Continue abx per ID


Monitor renal function, avoid nephrotoxins 


Pain management prn


Continue neb treatment


Daily PPI


AM labs





Subjective


Constitutional:  Denies: no symptoms, chills, diaphoresis, fever, malaise, 

weakness, other


HEENT:  Denies: no symptoms, eye pain, blurred vision, tearing, double vision, 

ear pain, ear discharge, nose pain, nose congestion, throat pain, throat 

swelling, mouth pain, mouth swelling, other


Genitourinary:  Denies: no symptoms, burning, discharge, frequency, flank pain, 

hematuria, incontinence, pain, urgency, other


Neurologic/Psychiatric:  Denies: no symptoms, anxiety, depressed, emotional 

problems, headache, numbness, paresthesia, pre-existing deficit, seizure, 

tingling, tremors, weakness, other


Subjective


In bed, in no apparent distress, on heparin drip





Objective


Objective





Last 24 Hour Vital Signs








  Date Time  Temp Pulse Resp B/P (MAP) Pulse Ox O2 Delivery O2 Flow Rate FiO2


 


1/22/18 15:48  103 20  100 Nasal Cannula 3.0 32


 


1/22/18 15:39  100 20  98 Nasal Cannula 3.0 32


 


1/22/18 12:00  87      


 


1/22/18 12:00 97.5 88 18 141/81 99 Nasal Cannula 4.0 


 


1/22/18 11:49  100 22  100 Nasal Cannula 3.0 32


 


1/22/18 11:39  94 20  98 Nasal Cannula 3.0 32


 


1/22/18 08:00 96.1 95 20 141/79 97 Nasal Cannula 4.0 


 


1/22/18 08:00  100      


 


1/22/18 07:29  103 22  100 Nasal Cannula 3.0 32


 


1/22/18 07:29      Nasal Cannula 3.0 32


 


1/22/18 07:20  91 24  98 Nasal Cannula 3.0 32


 


1/22/18 07:19     98 Nasal Cannula 3.0 32


 


1/22/18 04:00 97.2 75 20 121/68 94 Nasal Cannula 4.0 


 


1/22/18 04:00  94      


 


1/22/18 03:51  101 20  99 Nasal Cannula 2.0 28


 


1/22/18 03:40        28


 


1/22/18 03:40  98 20  99 Nasal Cannula 2.0 28


 


1/22/18 00:00 98.5 104 22 153/87 97 Nasal Cannula 3.0 


 


1/22/18 00:00  103      


 


1/21/18 23:11  105 24  99 Room Air  21


 


1/21/18 23:03        21


 


1/21/18 23:03  107 22  94 Room Air  21


 


1/21/18 20:00 98.0 96 24 160/85 99 Nasal Cannula 3.0 


 


1/21/18 20:00  98      


 


1/21/18 19:55  103 22  99 Nasal Cannula 2.0 28


 


1/21/18 19:45     99 Nasal Cannula 3.0 32


 


1/21/18 19:45      Nasal Cannula 3.0 32


 


1/21/18 19:45        32


 


1/21/18 19:45  97 22  99 Nasal Cannula 3.0 32

















Intake and Output  


 


 1/21/18 1/22/18





 19:00 07:00


 


Intake Total 1653.100 ml 120 ml


 


Output Total 2250 ml 220 ml


 


Balance -596.900 ml -100 ml


 


  


 


Intake Oral 100 ml 120 ml


 


IV Total 1553.100 ml 


 


Output Urine Total 2250 ml 220 ml








Laboratory Tests


1/22/18 05:00: Activated Partial Thromboplast Time 98H


1/22/18 12:30: Activated Partial Thromboplast Time 46H


Height (Feet):  5


Height (Inches):  10.00


Weight (Pounds):  138


General Appearance:  no apparent distress, alert


EENT:  normal ENT inspection


Neck:  normal alignment


Cardiovascular:  normal rate


Respiratory/Chest:  no respiratory distress


Abdomen:  soft, no organomegaly


Extremities:  trace edema


Neurologic:  alert, oriented x 3, responsive, normal mood/affect











Dayanara Hawley N.P. Jan 22, 2018 17:06

## 2018-01-23 VITALS — DIASTOLIC BLOOD PRESSURE: 77 MMHG | SYSTOLIC BLOOD PRESSURE: 143 MMHG

## 2018-01-23 VITALS — SYSTOLIC BLOOD PRESSURE: 151 MMHG | DIASTOLIC BLOOD PRESSURE: 76 MMHG

## 2018-01-23 VITALS — DIASTOLIC BLOOD PRESSURE: 80 MMHG | SYSTOLIC BLOOD PRESSURE: 137 MMHG

## 2018-01-23 VITALS — SYSTOLIC BLOOD PRESSURE: 149 MMHG | DIASTOLIC BLOOD PRESSURE: 78 MMHG

## 2018-01-23 VITALS — DIASTOLIC BLOOD PRESSURE: 76 MMHG | SYSTOLIC BLOOD PRESSURE: 146 MMHG

## 2018-01-23 VITALS — SYSTOLIC BLOOD PRESSURE: 155 MMHG | DIASTOLIC BLOOD PRESSURE: 81 MMHG

## 2018-01-23 LAB
ADD MANUAL DIFF: NO
ALBUMIN SERPL-MCNC: 1.3 G/DL (ref 3.4–5)
ALBUMIN/GLOB SERPL: 0.3 {RATIO} (ref 1–2.7)
ALP SERPL-CCNC: 89 U/L (ref 46–116)
ALT SERPL-CCNC: 66 U/L (ref 12–78)
ANION GAP SERPL CALC-SCNC: 13 MMOL/L (ref 5–15)
AST SERPL-CCNC: 47 U/L (ref 15–37)
BILIRUB SERPL-MCNC: 0.4 MG/DL (ref 0.2–1)
BUN SERPL-MCNC: 19 MG/DL (ref 7–18)
CALCIUM SERPL-MCNC: 8.3 MG/DL (ref 8.5–10.1)
CHLORIDE SERPL-SCNC: 111 MMOL/L (ref 98–107)
CO2 SERPL-SCNC: 16 MMOL/L (ref 21–32)
CREAT SERPL-MCNC: 2.5 MG/DL (ref 0.55–1.3)
ERYTHROCYTE [DISTWIDTH] IN BLOOD BY AUTOMATED COUNT: 14 % (ref 11.6–14.8)
GLOBULIN SER-MCNC: 4.5 G/DL
HCT VFR BLD CALC: 24 % (ref 42–52)
HGB BLD-MCNC: 7.7 G/DL (ref 14.2–18)
MCV RBC AUTO: 86 FL (ref 80–99)
PLATELET # BLD: 301 K/UL (ref 150–450)
POTASSIUM SERPL-SCNC: 3.5 MMOL/L (ref 3.5–5.1)
RBC # BLD AUTO: 2.78 M/UL (ref 4.7–6.1)
SODIUM SERPL-SCNC: 140 MMOL/L (ref 136–145)
WBC # BLD AUTO: 16.5 K/UL (ref 4.8–10.8)

## 2018-01-23 RX ADMIN — Medication SCH MCG: at 19:44

## 2018-01-23 RX ADMIN — Medication SCH MCG: at 23:44

## 2018-01-23 RX ADMIN — LEVALBUTEROL HYDROCHLORIDE SCH MG: 1.25 SOLUTION, CONCENTRATE RESPIRATORY (INHALATION) at 07:23

## 2018-01-23 RX ADMIN — LEVALBUTEROL HYDROCHLORIDE SCH MG: 1.25 SOLUTION, CONCENTRATE RESPIRATORY (INHALATION) at 03:28

## 2018-01-23 RX ADMIN — DEXTROSE MONOHYDRATE SCH MLS/HR: 50 INJECTION, SOLUTION INTRAVENOUS at 22:39

## 2018-01-23 RX ADMIN — Medication SCH MCG: at 14:43

## 2018-01-23 RX ADMIN — DEXTROSE MONOHYDRATE SCH MLS/HR: 50 INJECTION, SOLUTION INTRAVENOUS at 14:24

## 2018-01-23 RX ADMIN — HEPARIN SODIUM SCH MLS/HR: 5000 INJECTION, SOLUTION INTRAVENOUS at 03:04

## 2018-01-23 RX ADMIN — Medication SCH MCG: at 00:07

## 2018-01-23 RX ADMIN — Medication SCH MCG: at 11:18

## 2018-01-23 RX ADMIN — DEXTROSE MONOHYDRATE SCH MLS/HR: 50 INJECTION, SOLUTION INTRAVENOUS at 06:26

## 2018-01-23 RX ADMIN — DOCUSATE SODIUM SCH MG: 100 CAPSULE, LIQUID FILLED ORAL at 17:41

## 2018-01-23 RX ADMIN — LEVALBUTEROL HYDROCHLORIDE SCH MG: 1.25 SOLUTION, CONCENTRATE RESPIRATORY (INHALATION) at 00:07

## 2018-01-23 RX ADMIN — CLINDAMYCIN PHOSPHATE SCH MLS/HR: 12 INJECTION, SOLUTION INTRAVENOUS at 09:21

## 2018-01-23 RX ADMIN — DOCUSATE SODIUM SCH MG: 100 CAPSULE, LIQUID FILLED ORAL at 09:00

## 2018-01-23 RX ADMIN — Medication SCH MCG: at 07:23

## 2018-01-23 RX ADMIN — CLINDAMYCIN PHOSPHATE SCH MLS/HR: 12 INJECTION, SOLUTION INTRAVENOUS at 02:29

## 2018-01-23 RX ADMIN — Medication SCH TAB: at 09:20

## 2018-01-23 RX ADMIN — LEVALBUTEROL HYDROCHLORIDE SCH MG: 1.25 SOLUTION, CONCENTRATE RESPIRATORY (INHALATION) at 14:43

## 2018-01-23 RX ADMIN — LEVALBUTEROL HYDROCHLORIDE SCH MG: 1.25 SOLUTION, CONCENTRATE RESPIRATORY (INHALATION) at 23:44

## 2018-01-23 RX ADMIN — Medication SCH MCG: at 03:28

## 2018-01-23 RX ADMIN — LEVALBUTEROL HYDROCHLORIDE SCH MG: 1.25 SOLUTION, CONCENTRATE RESPIRATORY (INHALATION) at 11:17

## 2018-01-23 RX ADMIN — DOCUSATE SODIUM SCH MG: 100 CAPSULE, LIQUID FILLED ORAL at 13:00

## 2018-01-23 RX ADMIN — HEPARIN SODIUM SCH MLS/HR: 5000 INJECTION, SOLUTION INTRAVENOUS at 18:48

## 2018-01-23 RX ADMIN — LEVALBUTEROL HYDROCHLORIDE SCH MG: 1.25 SOLUTION, CONCENTRATE RESPIRATORY (INHALATION) at 19:44

## 2018-01-23 NOTE — CARDIAC ELECTROPHYSIOLOGY PN
Assessment/Plan


Assessment/Plan


1. Sinus Tachycardia due to sickle cell anemia, fever and  acute bilateral DVT. 


     Echocardiogram  EF 60%.  No SVT or VT.





2.  Sepsis, WBC of 26K. On intravenous antibiotic per Dr. Talley. Down to 18K 





3.  Abdominal pain, likely due to renal cysts and hemorrhage.





4.  Diarrhea. Clostridium difficile colitis has been ruled out.  





5. Hematuria and anemia. Hb stable in 7.5-8 range





6. Acute bilateral LE DVT. Continue with iv heparin.





7. Constipation. Colace 100 tid.





8. Hyperkalemia.Got Kayoxalate  





DW RN





Subjective


Subjective


In NSR on heparin drip. On telemetry unit. Leg edema better





Objective





Last 24 Hour Vital Signs








  Date Time  Temp Pulse Resp B/P (MAP) Pulse Ox O2 Delivery O2 Flow Rate FiO2


 


1/23/18 12:00  92      


 


1/23/18 11:48 97.6 98 19 149/78 98 Nasal Cannula 2.0 


 


1/23/18 11:17  98 20  96 Nasal Cannula 2.0 


 


1/23/18 08:00  104      


 


1/23/18 08:00 97.7 94 22 143/77 99 Nasal Cannula 2.0 


 


1/23/18 07:33  90 20  99 Nasal Cannula 2.0 


 


1/23/18 07:23     98 Nasal Cannula 2.0 


 


1/23/18 07:23  91 19  97 Nasal Cannula 2.0 


 


1/23/18 07:23      Nasal Cannula 2.0 


 


1/23/18 04:00  93      


 


1/23/18 04:00      Nasal Cannula 2.0 


 


1/23/18 04:00 99.1 100 20 146/76 100 Nasal Cannula  


 


1/23/18 03:29  98 20  99 Nasal Cannula 2.0 28 1/23/18 03:28  94 20  98 Nasal Cannula 2.0 28 1/23/18 00:00  96 20  99 Nasal Cannula 2.0 28


 


1/23/18 00:00  90      


 


1/23/18 00:00 99.0 95 20 155/81 98 Nasal Cannula  


 


1/23/18 00:00      Nasal Cannula 2.0 


 


1/23/18 00:00  102 20  100 Nasal Cannula 2.0 28 1/22/18 20:00 98.4 93 20 156/78 99   


 


1/22/18 20:00      Nasal Cannula 2.0 


 


1/22/18 20:00  86      


 


1/22/18 19:35  94 20  99 Nasal Cannula 2.0 28


 


1/22/18 19:34  91 20  97 Nasal Cannula 2.0 28


 


1/22/18 19:34      Nasal Cannula 2.0 28


 


1/22/18 19:33     97 Nasal Cannula 2.0 28


 


1/22/18 16:09 97.0       


 


1/22/18 16:00 97.0 91 18 147/79 97 Nasal Cannula 4.0 


 


1/22/18 16:00  15      


 


1/22/18 15:48  103 20  100 Nasal Cannula 3.0 32


 


1/22/18 15:39  100 20  98 Nasal Cannula 3.0 32

















Intake and Output  


 


 1/22/18 1/23/18





 19:00 07:00


 


Intake Total 1646.722 ml 1959.287 ml


 


Output Total 240 ml 2000 ml


 


Balance 1406.722 ml -40.713 ml


 


  


 


Intake Oral 240 ml 250 ml


 


IV Total 1406.722 ml 1709.287 ml


 


Output Urine Total 240 ml 2000 ml











Laboratory Tests








Test


  1/22/18


18:55 1/23/18


01:45


 


Activated Partial


Thromboplast Time 100 SEC


(23-33)  H 87 SEC (23-33)


H


 


White Blood Count


  


  16.5 K/UL


(4.8-10.8)  H


 


Red Blood Count


  


  2.78 M/UL


(4.70-6.10)  L


 


Hemoglobin


  


  7.7 G/DL


(14.2-18.0)  L


 


Hematocrit


  


  24.0 %


(42.0-52.0)  L


 


Mean Corpuscular Volume  86 FL (80-99)  


 


Mean Corpuscular Hemoglobin


  


  27.7 PG


(27.0-31.0)


 


Mean Corpuscular Hemoglobin


Concent 


  32.1 G/DL


(32.0-36.0)


 


Red Cell Distribution Width


  


  14.0 %


(11.6-14.8)


 


Platelet Count


  


  301 K/UL


(150-450)


 


Mean Platelet Volume


  


  8.4 FL


(6.5-10.1)


 


Neutrophils (%) (Auto)


  


  % (45.0-75.0)


 


 


Lymphocytes (%) (Auto)


  


  % (20.0-45.0)


 


 


Monocytes (%) (Auto)   % (1.0-10.0)  


 


Eosinophils (%) (Auto)   % (0.0-3.0)  


 


Basophils (%) (Auto)   % (0.0-2.0)  


 


Sodium Level


  


  140 MMOL/L


(136-145)


 


Potassium Level


  


  3.5 MMOL/L


(3.5-5.1)


 


Chloride Level


  


  111 MMOL/L


()  H


 


Carbon Dioxide Level


  


  16 MMOL/L


(21-32)  L


 


Anion Gap


  


  13 mmol/L


(5-15)


 


Blood Urea Nitrogen


  


  19 mg/dL


(7-18)  H


 


Creatinine


  


  2.5 MG/DL


(0.55-1.30)  H


 


Estimat Glomerular Filtration


Rate 


  31.6 mL/min


(>60)


 


Glucose Level


  


  108 MG/DL


()  H


 


Calcium Level


  


  8.3 MG/DL


(8.5-10.1)  L


 


Total Bilirubin


  


  0.4 MG/DL


(0.2-1.0)


 


Aspartate Amino Transf


(AST/SGOT) 


  47 U/L (15-37)


H


 


Alanine Aminotransferase


(ALT/SGPT) 


  66 U/L (12-78)


 


 


Alkaline Phosphatase


  


  89 U/L


()


 


Total Protein


  


  5.8 G/DL


(6.4-8.2)  L


 


Albumin


  


  1.3 G/DL


(3.4-5.0)  L


 


Globulin  4.5 g/dL  


 


Albumin/Globulin Ratio


  


  0.3 (1.0-2.7)


L








Objective


HEAD AND NECK:  No JVD.


LUNGS:  Clear.


CARDIOVASCULAR:  Nl S1 and S2 with no gallop or murmur.


ABDOMEN:  Soft and nontender.


EXTREMITIES:  Bilateral 1 plus pitting edema.











KAYLEEN HAIR Jan 23, 2018 15:29

## 2018-01-23 NOTE — INFECTIOUS DISEASES PROG NOTE
Assessment/Plan


Problems:  


(1) HAP (hospital-acquired pneumonia)


Assessment & Plan:  sputum culture grew candida most likely colonization , 

continue  zosyn and  stop clindamycin since no evidence of  MRSA , monitor CXR 





(2) Sepsis


Assessment & Plan:  ruled out with negative blood culture, continue  zosyn , 

stop  clindamycin empiric coverage 





(3) Renal cyst, native, hemorrhage


Assessment & Plan:  urine culture grew mixed contaminant , now on zosyn empiric 

coverage , had urology eval with no intervention, repeated CT scan of the 

abdomen showed recurrent bleeding . recommend urology eval again , since he is 

on heparin drip now 





(4) Abdominal pain


Assessment & Plan:  due to bleeding into the left renal cysts , recommend 

urology eval and better  pain management  





(5) Fever


Assessment & Plan:  improved, due to the above , on wide spectrum  antibiotics 

empiric coverage , continue  tylenol prn 





(6) Diarrhea


Assessment & Plan:  no evidence of  C diff , continue antidiarrhea meds  





(7) DVT (deep venous thrombosis)


Assessment & Plan:  in both legs, with possible PE, on heparin drip , recommend 

close monitor of PT/PTT, high risk for bleeding in the renal cyst 








Subjective


Constitutional:  Reports: no symptoms


HEENT:  Reports: no symptoms


Respiratory:  Reports: no symptoms


Breasts:  Reports: no symptoms


Cardiovascular:  Reports: no symptoms


Gastrointestinal/Abdominal:  Reports: no symptoms


Genitourinary:  Reports: no symptoms


Neurologic:  Reports: no symptoms


Psychiatric:  Reports: no symptoms


Skin:  Reports: no symptoms


Endocrine:  Reports: no symptoms


Hematologic:  Reports: no symptoms


Allergies:  


Coded Allergies:  


     CODEINE (Verified  Allergy, Unknown, 1/5/18)


Uncoded Allergies:  


     PEANUTS (Adverse Reaction, Severe, Anaphylaxis, 1/9/18)


Subjective


he feels more comfortable today , with no SOB, no fever or chills





Objective


Vital Signs





Last 24 Hour Vital Signs








  Date Time  Temp Pulse Resp B/P (MAP) Pulse Ox O2 Delivery O2 Flow Rate FiO2


 


1/23/18 16:00 99.5 98 22 137/80 98 Nasal Cannula 2.0 


 


1/23/18 12:00  92      


 


1/23/18 11:48 97.6 98 19 149/78 98 Nasal Cannula 2.0 


 


1/23/18 11:17  98 20  96 Nasal Cannula 2.0 


 


1/23/18 08:00  104      


 


1/23/18 08:00 97.7 94 22 143/77 99 Nasal Cannula 2.0 


 


1/23/18 07:33  90 20  99 Nasal Cannula 2.0 


 


1/23/18 07:23     98 Nasal Cannula 2.0 


 


1/23/18 07:23  91 19  97 Nasal Cannula 2.0 


 


1/23/18 07:23      Nasal Cannula 2.0 


 


1/23/18 04:00  93      


 


1/23/18 04:00      Nasal Cannula 2.0 


 


1/23/18 04:00 99.1 100 20 146/76 100 Nasal Cannula  


 


1/23/18 03:29  98 20  99 Nasal Cannula 2.0 28


 


1/23/18 03:28  94 20  98 Nasal Cannula 2.0 28


 


1/23/18 00:00  96 20  99 Nasal Cannula 2.0 28


 


1/23/18 00:00  90      


 


1/23/18 00:00 99.0 95 20 155/81 98 Nasal Cannula  


 


1/23/18 00:00      Nasal Cannula 2.0 


 


1/23/18 00:00  102 20  100 Nasal Cannula 2.0 28


 


1/22/18 20:00 98.4 93 20 156/78 99   


 


1/22/18 20:00      Nasal Cannula 2.0 


 


1/22/18 20:00  86      


 


1/22/18 19:35  94 20  99 Nasal Cannula 2.0 28


 


1/22/18 19:34  91 20  97 Nasal Cannula 2.0 28 1/22/18 19:34      Nasal Cannula 2.0 28 1/22/18 19:33     97 Nasal Cannula 2.0 28








Height (Feet):  5


Height (Inches):  10.00


Weight (Pounds):  138


General Appearance:  WD/WN, no acute distress


HEENT:  normocephalic, atraumatic, anicteric, mucous membranes moist, PERRL, 

EOMI, pharynx normal, supple, no JVD


Respiratory/Chest:  chest wall non-tender, no respiratory distress, no 

accessory muscle use, decreased breath sounds, crackles/rales


Cardiovascular:  normal peripheral pulses, normal rate, regular rhythm, no 

gallop/murmur, no JVD


Abdomen:  normal bowel sounds, soft, non tender, no organomegaly, non distended

, no mass


Extremities:  no cyanosis, no clubbing


Skin:  no rash, no lesions, no ulcers





Laboratory Tests








Test


  1/22/18


18:55 1/23/18


01:45


 


Activated Partial


Thromboplast Time 100 SEC


(23-33)  H 87 SEC (23-33)


H


 


White Blood Count


  


  16.5 K/UL


(4.8-10.8)  H


 


Red Blood Count


  


  2.78 M/UL


(4.70-6.10)  L


 


Hemoglobin


  


  7.7 G/DL


(14.2-18.0)  L


 


Hematocrit


  


  24.0 %


(42.0-52.0)  L


 


Mean Corpuscular Volume  86 FL (80-99)  


 


Mean Corpuscular Hemoglobin


  


  27.7 PG


(27.0-31.0)


 


Mean Corpuscular Hemoglobin


Concent 


  32.1 G/DL


(32.0-36.0)


 


Red Cell Distribution Width


  


  14.0 %


(11.6-14.8)


 


Platelet Count


  


  301 K/UL


(150-450)


 


Mean Platelet Volume


  


  8.4 FL


(6.5-10.1)


 


Neutrophils (%) (Auto)


  


  % (45.0-75.0)


 


 


Lymphocytes (%) (Auto)


  


  % (20.0-45.0)


 


 


Monocytes (%) (Auto)   % (1.0-10.0)  


 


Eosinophils (%) (Auto)   % (0.0-3.0)  


 


Basophils (%) (Auto)   % (0.0-2.0)  


 


Sodium Level


  


  140 MMOL/L


(136-145)


 


Potassium Level


  


  3.5 MMOL/L


(3.5-5.1)


 


Chloride Level


  


  111 MMOL/L


()  H


 


Carbon Dioxide Level


  


  16 MMOL/L


(21-32)  L


 


Anion Gap


  


  13 mmol/L


(5-15)


 


Blood Urea Nitrogen


  


  19 mg/dL


(7-18)  H


 


Creatinine


  


  2.5 MG/DL


(0.55-1.30)  H


 


Estimat Glomerular Filtration


Rate 


  31.6 mL/min


(>60)


 


Glucose Level


  


  108 MG/DL


()  H


 


Calcium Level


  


  8.3 MG/DL


(8.5-10.1)  L


 


Total Bilirubin


  


  0.4 MG/DL


(0.2-1.0)


 


Aspartate Amino Transf


(AST/SGOT) 


  47 U/L (15-37)


H


 


Alanine Aminotransferase


(ALT/SGPT) 


  66 U/L (12-78)


 


 


Alkaline Phosphatase


  


  89 U/L


()


 


Total Protein


  


  5.8 G/DL


(6.4-8.2)  L


 


Albumin


  


  1.3 G/DL


(3.4-5.0)  L


 


Globulin  4.5 g/dL  


 


Albumin/Globulin Ratio


  


  0.3 (1.0-2.7)


L











Current Medications








 Medications


  (Trade)  Dose


 Ordered  Sig/Judie


 Route


 PRN Reason  Start Time


 Stop Time Status Last Admin


Dose Admin


 


 Acetaminophen


  (Tylenol)  650 mg  Q4H  PRN


 ORAL


 Mild Pain/Temp > 100.5  1/21/18 19:45


 2/5/18 19:44   


 


 


 Clindamycin HCl/


 Dextrose  50 ml @ 


 100 mls/hr  Q8H


 IV


   1/22/18 02:00


 1/29/18 23:59  1/23/18 09:21


 


 


 Dextrose


  (Dextrose 50%)    STAT  PRN


 IV


 Hypoglycemia  1/21/18 19:45


 2/20/18 19:44   


 


 


 Diphenoxylate HCl/


 Atropine


  (Lomotil)  2.5 mg  Q4H  PRN


 ORAL


 Diarrhea  1/21/18 19:45


 2/9/18 19:44  1/22/18 04:00


 


 


 Docusate Sodium


  (Colace)  100 mg  THREE TIMES A  DAY


 ORAL


   1/21/18 18:00


 2/19/18 17:59  1/22/18 08:58


 


 


 Folic Acid


  (Folate)  1 mg  DAILY


 ORAL


   1/22/18 09:00


 2/11/18 14:59  1/23/18 09:20


 


 


 Heparin Sodium/


 Dextrose  500 ml @ 


 33.802 mls/


 hr  adjust per protocol


 IV


   1/22/18 19:45


 2/21/18 19:44  1/23/18 03:04


 


 


 Ipratropium


 Bromide


  (Atrovent)  500 mcg  Q4HRT


 N


   1/21/18 19:00


 1/25/18 02:59  1/23/18 14:43


 


 


 Levalbuterol HCl


  (Xopenex)  1.25 mg  Q4HRT


 HHN


   1/21/18 19:00


 1/25/18 02:59  1/23/18 14:43


 


 


 Magnesium


 Hydroxide


  (Mom)  30 ml  BIDPRN  PRN


 ORAL


 constipation  1/21/18 19:46


 2/20/18 19:45   


 


 


 Ondansetron HCl


  (Zofran)  4 mg  Q6H  PRN


 IVP


 Nausea & Vomiting  1/21/18 19:46


 2/5/18 19:45   


 


 


 Pantoprazole


  (Protonix)  40 mg  DAILY


 ORAL


   1/22/18 09:00


 2/13/18 08:59  1/23/18 09:20


 


 


 Piperacillin Sod/


 Tazobactam Sod


 3.375 gm/Sodium


 Chloride  110 ml @ 


 27.5 mls/hr  EVERY 8  HOURS


 IVPB


   1/21/18 22:00


 1/25/18 23:59  1/23/18 14:24


 


 


 Sodium Chloride  1,000 ml @ 


 100 mls/hr  Q10H


 IV


   1/21/18 18:00


 2/16/18 18:59  1/23/18 14:23


 


 


 Vitamin B Complex/


 Vit C/Folic Acid


  (Nephrovite)  1 tab  DAILY


 ORAL


   1/22/18 09:00


 2/8/18 08:59  1/23/18 09:20


 

















Pablo Talley M.D. Jan 23, 2018 17:03

## 2018-01-23 NOTE — GI PROGRESS NOTE
Assessment/Plan


Problems:  


(1) Diarrhea


ICD Codes:  R19.7 - Diarrhea, unspecified


SNOMED:  72811478


(2) Sickle cell trait


ICD Codes:  D57.3 - Sickle-cell trait


SNOMED:  59306696


(3) Abdominal pain


ICD Codes:  R10.9 - Unspecified abdominal pain


SNOMED:  52041765


(4) Renal cyst, native, hemorrhage


ICD Codes:  N28.89 - Other specified disorders of kidney and ureter; N28.1 - 

Cyst of kidney, acquired


SNOMED:  70531665


Status:  progressing


Status Narrative


Discussed with Dr. Gamez.


Assessment/Plan


OB stool r/o GI bleed  >> negative


stool cx >> negative


cdiff >> negative


O&P >> negative


diarrhea resolved >> lomotil prn





fu renal, hematology, ID recs


monitor H&H, prn transfusions


renal diet, tolerating


ppi


abx


fu labs


outpatient GI procedures





Subjective


Subjective


generalized weakness


diarrhea resolved





Objective





Last 24 Hour Vital Signs








  Date Time  Temp Pulse Resp B/P (MAP) Pulse Ox O2 Delivery O2 Flow Rate FiO2


 


1/23/18 08:00  104      


 


1/23/18 08:00 97.7 94 22 143/77 99 Nasal Cannula 2.0 


 


1/23/18 04:00  93      


 


1/23/18 04:00      Nasal Cannula 2.0 


 


1/23/18 04:00 99.1 100 20 146/76 100 Nasal Cannula  


 


1/23/18 03:29  98 20  99 Nasal Cannula 2.0 28


 


1/23/18 03:28  94 20  98 Nasal Cannula 2.0 28


 


1/23/18 00:00  96 20  99 Nasal Cannula 2.0 28


 


1/23/18 00:00  90      


 


1/23/18 00:00 99.0 95 20 155/81 98 Nasal Cannula  


 


1/23/18 00:00      Nasal Cannula 2.0 


 


1/23/18 00:00  102 20  100 Nasal Cannula 2.0 28


 


1/22/18 20:00 98.4 93 20 156/78 99   


 


1/22/18 20:00      Nasal Cannula 2.0 


 


1/22/18 20:00  86      


 


1/22/18 19:35  94 20  99 Nasal Cannula 2.0 28


 


1/22/18 19:34  91 20  97 Nasal Cannula 2.0 28


 


1/22/18 19:34      Nasal Cannula 2.0 28


 


1/22/18 19:33     97 Nasal Cannula 2.0 28


 


1/22/18 16:09 97.0       


 


1/22/18 16:00 97.0 91 18 147/79 97 Nasal Cannula 4.0 


 


1/22/18 16:00  15      


 


1/22/18 15:48  103 20  100 Nasal Cannula 3.0 32


 


1/22/18 15:39  100 20  98 Nasal Cannula 3.0 32


 


1/22/18 12:00  87      


 


1/22/18 12:00 97.5 88 18 141/81 99 Nasal Cannula 4.0 


 


1/22/18 11:49  100 22  100 Nasal Cannula 3.0 32


 


1/22/18 11:39  94 20  98 Nasal Cannula 3.0 32

















Intake and Output  


 


 1/22/18 1/23/18





 19:00 07:00


 


Intake Total 1646.722 ml 1959.287 ml


 


Output Total 240 ml 2000 ml


 


Balance 1406.722 ml -40.713 ml


 


  


 


Intake Oral 240 ml 250 ml


 


IV Total 1406.722 ml 1709.287 ml


 


Output Urine Total 240 ml 2000 ml











Laboratory Tests








Test


  1/22/18


12:30 1/22/18


18:55 1/23/18


01:45


 


Activated Partial


Thromboplast Time 46 SEC (23-33)


H 100 SEC


(23-33)  H 87 SEC (23-33)


H


 


White Blood Count


  


  


  16.5 K/UL


(4.8-10.8)  H


 


Red Blood Count


  


  


  2.78 M/UL


(4.70-6.10)  L


 


Hemoglobin


  


  


  7.7 G/DL


(14.2-18.0)  L


 


Hematocrit


  


  


  24.0 %


(42.0-52.0)  L


 


Mean Corpuscular Volume   86 FL (80-99)  


 


Mean Corpuscular Hemoglobin


  


  


  27.7 PG


(27.0-31.0)


 


Mean Corpuscular Hemoglobin


Concent 


  


  32.1 G/DL


(32.0-36.0)


 


Red Cell Distribution Width


  


  


  14.0 %


(11.6-14.8)


 


Platelet Count


  


  


  301 K/UL


(150-450)


 


Mean Platelet Volume


  


  


  8.4 FL


(6.5-10.1)


 


Neutrophils (%) (Auto)


  


  


  % (45.0-75.0)


 


 


Lymphocytes (%) (Auto)


  


  


  % (20.0-45.0)


 


 


Monocytes (%) (Auto)    % (1.0-10.0)  


 


Eosinophils (%) (Auto)    % (0.0-3.0)  


 


Basophils (%) (Auto)    % (0.0-2.0)  


 


Sodium Level


  


  


  140 MMOL/L


(136-145)


 


Potassium Level


  


  


  3.5 MMOL/L


(3.5-5.1)


 


Chloride Level


  


  


  111 MMOL/L


()  H


 


Carbon Dioxide Level


  


  


  16 MMOL/L


(21-32)  L


 


Anion Gap


  


  


  13 mmol/L


(5-15)


 


Blood Urea Nitrogen


  


  


  19 mg/dL


(7-18)  H


 


Creatinine


  


  


  2.5 MG/DL


(0.55-1.30)  H


 


Estimat Glomerular Filtration


Rate 


  


  31.6 mL/min


(>60)


 


Glucose Level


  


  


  108 MG/DL


()  H


 


Calcium Level


  


  


  8.3 MG/DL


(8.5-10.1)  L


 


Total Bilirubin


  


  


  0.4 MG/DL


(0.2-1.0)


 


Aspartate Amino Transf


(AST/SGOT) 


  


  47 U/L (15-37)


H


 


Alanine Aminotransferase


(ALT/SGPT) 


  


  66 U/L (12-78)


 


 


Alkaline Phosphatase


  


  


  89 U/L


()


 


Total Protein


  


  


  5.8 G/DL


(6.4-8.2)  L


 


Albumin


  


  


  1.3 G/DL


(3.4-5.0)  L


 


Globulin   4.5 g/dL  


 


Albumin/Globulin Ratio


  


  


  0.3 (1.0-2.7)


L








Height (Feet):  5


Height (Inches):  10.00


Weight (Pounds):  138


General Appearance:  WD/WN, no apparent distress, alert


Cardiovascular:  normal rate


Respiratory/Chest:  normal breath sounds, no respiratory distress


Abdominal Exam:  normal bowel sounds, non tender, soft


Extremities:  normal range of motion, non-tender











Elsy Lakhani NBRIGID Jan 23, 2018 10:46

## 2018-01-23 NOTE — PULMONOLOGY PROGRESS NOTE
Assessment/Plan


Assessment/Plan


ASSESSMENT AND PLAN:


1. Sinus Tachycardia due to sickle cell anemia, fever and possible PE in view 

of acute bilateral DVT. 


   Better after 1 unit PRBC and on heparin drip. Echocardiogram  EF 60%.  No 

SVT or VT.





2.  Sepsis, WBC of 26K. On intravenous antibiotic per Dr. Talley.Down to 18K 





3.  Abdominal pain, likely due to renal cysts and hemorrhage.





4.  Diarrhea. Clostridium difficile colitis has been ruled out.  





5. Hematuria and anemia. Hb stable in 7.5-8 range





6. Acute bilateral LE DVT. Continue with iv heparin.





7. Constipation. Colace 100 tid.





8. Hyperkalemia.





continue pain control


Antibiotics


IV fluids


continue IV heparin. 


suggest to transition to Coumadin





Subjective


Interval Events:  None


Constitutional:  Reports: no symptoms


HEENT:  Repors: no symptoms


Respiratory:  Reports: no symptoms


Cardiovascular:  Reports: no symptoms


Gastrointestinal/Abdominal:  Reports: no symptoms


Allergies:  


Coded Allergies:  


     CODEINE (Verified  Allergy, Unknown, 1/5/18)


Uncoded Allergies:  


     PEANUTS (Adverse Reaction, Severe, Anaphylaxis, 1/9/18)





Objective





Last 24 Hour Vital Signs








  Date Time  Temp Pulse Resp B/P (MAP) Pulse Ox O2 Delivery O2 Flow Rate FiO2


 


1/23/18 12:00  92      


 


1/23/18 11:48 97.6 98 19 149/78 98 Nasal Cannula 2.0 


 


1/23/18 11:17  98 20  96 Nasal Cannula 2.0 


 


1/23/18 08:00  104      


 


1/23/18 08:00 97.7 94 22 143/77 99 Nasal Cannula 2.0 


 


1/23/18 07:33  90 20  99 Nasal Cannula 2.0 


 


1/23/18 07:23     98 Nasal Cannula 2.0 


 


1/23/18 07:23  91 19  97 Nasal Cannula 2.0 


 


1/23/18 07:23      Nasal Cannula 2.0 


 


1/23/18 04:00  93      


 


1/23/18 04:00      Nasal Cannula 2.0 


 


1/23/18 04:00 99.1 100 20 146/76 100 Nasal Cannula  


 


1/23/18 03:29  98 20  99 Nasal Cannula 2.0 28


 


1/23/18 03:28  94 20  98 Nasal Cannula 2.0 28


 


1/23/18 00:00  96 20  99 Nasal Cannula 2.0 28


 


1/23/18 00:00  90      


 


1/23/18 00:00 99.0 95 20 155/81 98 Nasal Cannula  


 


1/23/18 00:00      Nasal Cannula 2.0 


 


1/23/18 00:00  102 20  100 Nasal Cannula 2.0 28


 


1/22/18 20:00 98.4 93 20 156/78 99   


 


1/22/18 20:00      Nasal Cannula 2.0 


 


1/22/18 20:00  86      


 


1/22/18 19:35  94 20  99 Nasal Cannula 2.0 28


 


1/22/18 19:34  91 20  97 Nasal Cannula 2.0 28


 


1/22/18 19:34      Nasal Cannula 2.0 28


 


1/22/18 19:33     97 Nasal Cannula 2.0 28


 


1/22/18 16:09 97.0       


 


1/22/18 16:00 97.0 91 18 147/79 97 Nasal Cannula 4.0 


 


1/22/18 16:00  15      


 


1/22/18 15:48  103 20  100 Nasal Cannula 3.0 32

















Intake and Output  


 


 1/22/18 1/23/18





 19:00 07:00


 


Intake Total 1646.722 ml 1959.287 ml


 


Output Total 240 ml 2000 ml


 


Balance 1406.722 ml -40.713 ml


 


  


 


Intake Oral 240 ml 250 ml


 


IV Total 1406.722 ml 1709.287 ml


 


Output Urine Total 240 ml 2000 ml








General Appearance:  no acute distress


HEENT:  normocephalic


Respiratory/Chest:  chest wall non-tender, lungs clear


Cardiovascular:  normal peripheral pulses


Abdomen:  normal bowel sounds


Laboratory Tests


1/22/18 18:55: Activated Partial Thromboplast Time 100H


1/23/18 01:45: 


Activated Partial Thromboplast Time 87H, White Blood Count 16.5H, Red Blood 

Count 2.78L, Hemoglobin 7.7L, Hematocrit 24.0L, Mean Corpuscular Volume 86, 

Mean Corpuscular Hemoglobin 27.7, Mean Corpuscular Hemoglobin Concent 32.1, Red 

Cell Distribution Width 14.0, Platelet Count 301, Mean Platelet Volume 8.4, 

Neutrophils (%) (Auto) , Lymphocytes (%) (Auto) , Monocytes (%) (Auto) , 

Eosinophils (%) (Auto) , Basophils (%) (Auto) , Sodium Level 140, Potassium 

Level 3.5, Chloride Level 111H, Carbon Dioxide Level 16L, Anion Gap 13, Blood 

Urea Nitrogen 19H, Creatinine 2.5H, Estimat Glomerular Filtration Rate 31.6, 

Glucose Level 108H, Calcium Level 8.3L, Total Bilirubin 0.4, Aspartate Amino 

Transf (AST/SGOT) 47H, Alanine Aminotransferase (ALT/SGPT) 66, Alkaline 

Phosphatase 89, Total Protein 5.8L, Albumin 1.3L, Globulin 4.5, Albumin/

Globulin Ratio 0.3L





Current Medications








 Medications


  (Trade)  Dose


 Ordered  Sig/Judie


 Route


 PRN Reason  Start Time


 Stop Time Status Last Admin


Dose Admin


 


 Acetaminophen


  (Tylenol)  650 mg  Q4H  PRN


 ORAL


 Mild Pain/Temp > 100.5  1/21/18 19:45


 2/5/18 19:44   


 


 


 Clindamycin HCl/


 Dextrose  50 ml @ 


 100 mls/hr  Q8H


 IV


   1/22/18 02:00


 1/29/18 23:59  1/23/18 09:21


 


 


 Dextrose


  (Dextrose 50%)    STAT  PRN


 IV


 Hypoglycemia  1/21/18 19:45


 2/20/18 19:44   


 


 


 Diphenoxylate HCl/


 Atropine


  (Lomotil)  2.5 mg  Q4H  PRN


 ORAL


 Diarrhea  1/21/18 19:45


 2/9/18 19:44  1/22/18 04:00


 


 


 Docusate Sodium


  (Colace)  100 mg  THREE TIMES A  DAY


 ORAL


   1/21/18 18:00


 2/19/18 17:59  1/22/18 08:58


 


 


 Folic Acid


  (Folate)  1 mg  DAILY


 ORAL


   1/22/18 09:00


 2/11/18 14:59  1/23/18 09:20


 


 


 Heparin Sodium/


 Dextrose  500 ml @ 


 33.802 mls/


 hr  adjust per protocol


 IV


   1/22/18 19:45


 2/21/18 19:44  1/23/18 03:04


 


 


 Ipratropium


 Bromide


  (Atrovent)  500 mcg  Q4HRT


 HHN


   1/21/18 19:00


 1/25/18 02:59  1/23/18 14:43


 


 


 Levalbuterol HCl


  (Xopenex)  1.25 mg  Q4HRT


 HHN


   1/21/18 19:00


 1/25/18 02:59  1/23/18 14:43


 


 


 Magnesium


 Hydroxide


  (Mom)  30 ml  BIDPRN  PRN


 ORAL


 constipation  1/21/18 19:46


 2/20/18 19:45   


 


 


 Ondansetron HCl


  (Zofran)  4 mg  Q6H  PRN


 IVP


 Nausea & Vomiting  1/21/18 19:46


 2/5/18 19:45   


 


 


 Pantoprazole


  (Protonix)  40 mg  DAILY


 ORAL


   1/22/18 09:00


 2/13/18 08:59  1/23/18 09:20


 


 


 Piperacillin Sod/


 Tazobactam Sod


 3.375 gm/Sodium


 Chloride  110 ml @ 


 27.5 mls/hr  EVERY 8  HOURS


 IVPB


   1/21/18 22:00


 1/25/18 23:59  1/23/18 14:24


 


 


 Sodium Chloride  1,000 ml @ 


 100 mls/hr  Q10H


 IV


   1/21/18 18:00


 2/16/18 18:59  1/23/18 14:23


 


 


 Vitamin B Complex/


 Vit C/Folic Acid


  (Nephrovite)  1 tab  DAILY


 ORAL


   1/22/18 09:00


 2/8/18 08:59  1/23/18 09:20


 

















Ethan Tom MD Jan 23, 2018 15:43

## 2018-01-23 NOTE — NEPHROLOGY PROGRESS NOTE
Assessment/Plan


Problem List:  


(1) Abdominal pain


(2) Renal mass, left


(3) Sickle cell trait


Assessment:  with crisis? 





(4) Renal cyst, native, hemorrhage


(5) Fever


(6) Tachycardia


(7) DVT (deep venous thrombosis)


(8) Pneumonia


(9) Sepsis


(10) Hematuria


(11) Severe anemia


Plan


Dr. Sanches was called for hemeonc. 


cont IVF. cont pain management.


ID following. 


empiric cefepime and metronidazole. 


d/w Dr. Jim. 


D/w urology - no need for any intervention. 


now on heparin gtt. 


repeat CXR.





Subjective


Subjective


Better. No fever.





Objective


Objective





Last 24 Hour Vital Signs








  Date Time  Temp Pulse Resp B/P (MAP) Pulse Ox O2 Delivery O2 Flow Rate FiO2


 


1/23/18 16:00 99.5 98 22 137/80 98 Nasal Cannula 2.0 


 


1/23/18 14:53  100 20  99 Nasal Cannula 2.0 


 


1/23/18 14:43  89 18  96 Nasal Cannula 2.0 


 


1/23/18 13:00  96 21   Nasal Cannula 3.0 


 


1/23/18 12:00  92      


 


1/23/18 11:48 97.6 98 19 149/78 98 Nasal Cannula 2.0 


 


1/23/18 11:27  86 18  99 Nasal Cannula 2.0 


 


1/23/18 11:17  98 20  96 Nasal Cannula 2.0 


 


1/23/18 08:00  104      


 


1/23/18 08:00 97.7 94 22 143/77 99 Nasal Cannula 2.0 


 


1/23/18 07:33  90 20  99 Nasal Cannula 2.0 


 


1/23/18 07:23     98 Nasal Cannula 2.0 


 


1/23/18 07:23  91 19  97 Nasal Cannula 2.0 


 


1/23/18 07:23      Nasal Cannula 2.0 


 


1/23/18 04:00  93      


 


1/23/18 04:00      Nasal Cannula 2.0 


 


1/23/18 04:00 99.1 100 20 146/76 100 Nasal Cannula  


 


1/23/18 03:29  98 20  99 Nasal Cannula 2.0 28 1/23/18 03:28  94 20  98 Nasal Cannula 2.0 28 1/23/18 00:00  96 20  99 Nasal Cannula 2.0 28 1/23/18 00:00  90      


 


1/23/18 00:00 99.0 95 20 155/81 98 Nasal Cannula  


 


1/23/18 00:00      Nasal Cannula 2.0 


 


1/23/18 00:00  102 20  100 Nasal Cannula 2.0 28


 


1/22/18 20:00 98.4 93 20 156/78 99   


 


1/22/18 20:00      Nasal Cannula 2.0 


 


1/22/18 20:00  86      


 


1/22/18 19:35  94 20  99 Nasal Cannula 2.0 28


 


1/22/18 19:34  91 20  97 Nasal Cannula 2.0 28


 


1/22/18 19:34      Nasal Cannula 2.0 28


 


1/22/18 19:33     97 Nasal Cannula 2.0 28

















Intake and Output  


 


 1/22/18 1/23/18





 19:00 07:00


 


Intake Total 1646.722 ml 1959.287 ml


 


Output Total 240 ml 2000 ml


 


Balance 1406.722 ml -40.713 ml


 


  


 


Intake Oral 240 ml 250 ml


 


IV Total 1406.722 ml 1709.287 ml


 


Output Urine Total 240 ml 2000 ml








Laboratory Tests


1/22/18 18:55: Activated Partial Thromboplast Time 100H


1/23/18 01:45: 


Activated Partial Thromboplast Time 87H, White Blood Count 16.5H, Red Blood 

Count 2.78L, Hemoglobin 7.7L, Hematocrit 24.0L, Mean Corpuscular Volume 86, 

Mean Corpuscular Hemoglobin 27.7, Mean Corpuscular Hemoglobin Concent 32.1, Red 

Cell Distribution Width 14.0, Platelet Count 301, Mean Platelet Volume 8.4, 

Neutrophils (%) (Auto) , Lymphocytes (%) (Auto) , Monocytes (%) (Auto) , 

Eosinophils (%) (Auto) , Basophils (%) (Auto) , Sodium Level 140, Potassium 

Level 3.5, Chloride Level 111H, Carbon Dioxide Level 16L, Anion Gap 13, Blood 

Urea Nitrogen 19H, Creatinine 2.5H, Estimat Glomerular Filtration Rate 31.6, 

Glucose Level 108H, Calcium Level 8.3L, Total Bilirubin 0.4, Aspartate Amino 

Transf (AST/SGOT) 47H, Alanine Aminotransferase (ALT/SGPT) 66, Alkaline 

Phosphatase 89, Total Protein 5.8L, Albumin 1.3L, Globulin 4.5, Albumin/

Globulin Ratio 0.3L


Height (Feet):  5


Height (Inches):  10.00


Weight (Pounds):  138


General Appearance:  no apparent distress


Cardiovascular:  normal rate, regular rhythm


Respiratory/Chest:  decreased breath sounds


Abdomen:  non tender, soft


Extremities:  trace edema


Neurologic:  alert, oriented x 3











DELVIS SKELTON Jan 23, 2018 18:41

## 2018-01-24 VITALS — SYSTOLIC BLOOD PRESSURE: 150 MMHG | DIASTOLIC BLOOD PRESSURE: 81 MMHG

## 2018-01-24 VITALS — DIASTOLIC BLOOD PRESSURE: 76 MMHG | SYSTOLIC BLOOD PRESSURE: 146 MMHG

## 2018-01-24 VITALS — SYSTOLIC BLOOD PRESSURE: 139 MMHG | DIASTOLIC BLOOD PRESSURE: 79 MMHG

## 2018-01-24 VITALS — SYSTOLIC BLOOD PRESSURE: 146 MMHG | DIASTOLIC BLOOD PRESSURE: 79 MMHG

## 2018-01-24 VITALS — SYSTOLIC BLOOD PRESSURE: 153 MMHG | DIASTOLIC BLOOD PRESSURE: 83 MMHG

## 2018-01-24 VITALS — DIASTOLIC BLOOD PRESSURE: 88 MMHG | SYSTOLIC BLOOD PRESSURE: 150 MMHG

## 2018-01-24 LAB
ADD MANUAL DIFF: YES
ANION GAP SERPL CALC-SCNC: 15 MMOL/L (ref 5–15)
BUN SERPL-MCNC: 18 MG/DL (ref 7–18)
CALCIUM SERPL-MCNC: 8.5 MG/DL (ref 8.5–10.1)
CHLORIDE SERPL-SCNC: 111 MMOL/L (ref 98–107)
CO2 SERPL-SCNC: 15 MMOL/L (ref 21–32)
CREAT SERPL-MCNC: 2.5 MG/DL (ref 0.55–1.3)
ERYTHROCYTE [DISTWIDTH] IN BLOOD BY AUTOMATED COUNT: 13.9 % (ref 11.6–14.8)
HCT VFR BLD CALC: 28.7 % (ref 42–52)
HGB BLD-MCNC: 9.3 G/DL (ref 14.2–18)
MCV RBC AUTO: 86 FL (ref 80–99)
PHOSPHATE SERPL-MCNC: 4.4 MG/DL (ref 2.5–4.9)
PLATELET # BLD: 155 K/UL (ref 150–450)
POTASSIUM SERPL-SCNC: 3.3 MMOL/L (ref 3.5–5.1)
RBC # BLD AUTO: 3.32 M/UL (ref 4.7–6.1)
SODIUM SERPL-SCNC: 141 MMOL/L (ref 136–145)
WBC # BLD AUTO: 16.9 K/UL (ref 4.8–10.8)

## 2018-01-24 RX ADMIN — HEPARIN SODIUM SCH MLS/HR: 5000 INJECTION, SOLUTION INTRAVENOUS at 09:39

## 2018-01-24 RX ADMIN — DOCUSATE SODIUM SCH MG: 100 CAPSULE, LIQUID FILLED ORAL at 18:00

## 2018-01-24 RX ADMIN — DEXTROSE MONOHYDRATE SCH MLS/HR: 50 INJECTION, SOLUTION INTRAVENOUS at 08:27

## 2018-01-24 RX ADMIN — MORPHINE SULFATE PRN MG: 2 INJECTION, SOLUTION INTRAMUSCULAR; INTRAVENOUS at 19:24

## 2018-01-24 RX ADMIN — DOCUSATE SODIUM SCH MG: 100 CAPSULE, LIQUID FILLED ORAL at 13:00

## 2018-01-24 RX ADMIN — Medication SCH MCG: at 22:46

## 2018-01-24 RX ADMIN — Medication SCH MCG: at 11:19

## 2018-01-24 RX ADMIN — LEVALBUTEROL HYDROCHLORIDE SCH MG: 1.25 SOLUTION, CONCENTRATE RESPIRATORY (INHALATION) at 22:46

## 2018-01-24 RX ADMIN — Medication SCH MCG: at 15:10

## 2018-01-24 RX ADMIN — LEVALBUTEROL HYDROCHLORIDE SCH MG: 1.25 SOLUTION, CONCENTRATE RESPIRATORY (INHALATION) at 18:59

## 2018-01-24 RX ADMIN — LEVALBUTEROL HYDROCHLORIDE SCH MG: 1.25 SOLUTION, CONCENTRATE RESPIRATORY (INHALATION) at 07:52

## 2018-01-24 RX ADMIN — Medication SCH TAB: at 08:26

## 2018-01-24 RX ADMIN — LEVALBUTEROL HYDROCHLORIDE SCH MG: 1.25 SOLUTION, CONCENTRATE RESPIRATORY (INHALATION) at 15:10

## 2018-01-24 RX ADMIN — Medication SCH MCG: at 18:59

## 2018-01-24 RX ADMIN — DEXTROSE MONOHYDRATE SCH MLS/HR: 50 INJECTION, SOLUTION INTRAVENOUS at 16:08

## 2018-01-24 RX ADMIN — DOCUSATE SODIUM SCH MG: 100 CAPSULE, LIQUID FILLED ORAL at 08:27

## 2018-01-24 RX ADMIN — Medication SCH MCG: at 03:55

## 2018-01-24 RX ADMIN — LEVALBUTEROL HYDROCHLORIDE SCH MG: 1.25 SOLUTION, CONCENTRATE RESPIRATORY (INHALATION) at 03:55

## 2018-01-24 RX ADMIN — Medication SCH MCG: at 07:52

## 2018-01-24 RX ADMIN — LEVALBUTEROL HYDROCHLORIDE SCH MG: 1.25 SOLUTION, CONCENTRATE RESPIRATORY (INHALATION) at 11:19

## 2018-01-24 NOTE — CARDIAC ELECTROPHYSIOLOGY PN
Assessment/Plan


Assessment/Plan


1. Sinus Tachycardia due to sickle cell anemia, fever and acute bilateral DVT. 


     Echocardiogram  EF 60%.  No SVT or VT. Resolved.





2.  Sepsis, WBC of 26K. On intravenous antibiotic per Dr. Talley. Down to 16K 





3.  Abdominal pain, likely due to renal cysts and hemorrhage.





4.  Diarrhea. Clostridium difficile colitis has been ruled out.  





5. Hematuria and anemia. Hb stable in 7.5-8 range





6. Acute bilateral LE DVT. Continue with iv heparin.





7. Constipation. Colace 100 tid.





8. Hyperkalemia. resolved K 3.3 today





DW RN





Subjective


Subjective


In NSR on heparin drip.No chest pain or SOB. Leg edema better





Objective





Last 24 Hour Vital Signs








  Date Time  Temp Pulse Resp B/P (MAP) Pulse Ox O2 Delivery O2 Flow Rate FiO2


 


1/24/18 09:00 97.9 87 19 150/88 98 Nasal Cannula 2.0 


 


1/24/18 08:02  90 20  98 Nasal Cannula 2.0 28


 


1/24/18 07:53  83 18  98 Nasal Cannula 2.0 28


 


1/24/18 07:53      Nasal Cannula 2.0 28


 


1/24/18 07:53        28


 


1/24/18 07:53     98 Nasal Cannula 2.0 28


 


1/24/18 04:18 98.2       


 


1/24/18 04:00 98.2  20 150/81 86 Nasal Cannula 2.0 


 


1/24/18 04:00  88      


 


1/24/18 03:57  94 20  98 Nasal Cannula 2.0 28


 


1/24/18 03:56  92 20  97 Nasal Cannula 2.0 28


 


1/24/18 00:00  91      


 


1/24/18 00:00 98.2  20 153/83 97 Nasal Cannula 2.0 


 


1/23/18 23:45  92 20  98 Nasal Cannula 2.0 28


 


1/23/18 23:44  90 20  95 Nasal Cannula 2.0 28


 


1/23/18 20:49 98.4 92 19 151/76 98 Nasal Cannula  


 


1/23/18 20:00  96      


 


1/23/18 19:54  95 20  97 Nasal Cannula 2.0 28


 


1/23/18 19:53     96 Nasal Cannula 2.0 28


 


1/23/18 19:53  93 20  96 Nasal Cannula 2.0 28


 


1/23/18 19:53      Nasal Cannula 2.0 28


 


1/23/18 16:00  86      


 


1/23/18 16:00 99.5 98 22 137/80 98 Nasal Cannula 2.0 


 


1/23/18 14:53  100 20  99 Nasal Cannula 2.0 


 


1/23/18 14:43  89 18  96 Nasal Cannula 2.0 


 


1/23/18 13:00  96 21   Nasal Cannula 3.0 


 


1/23/18 12:00  92      


 


1/23/18 11:48 97.6 98 19 149/78 98 Nasal Cannula 2.0 


 


1/23/18 11:27  86 18  99 Nasal Cannula 2.0 


 


1/23/18 11:17  98 20  96 Nasal Cannula 2.0 

















Intake and Output  


 


 1/23/18 1/24/18





 19:00 07:00


 


Intake Total 1933.124 ml 


 


Output Total 1240 ml 1200 ml


 


Balance 693.124 ml -1200 ml


 


  


 


Intake Oral 700 ml 


 


IV Total 1233.124 ml 


 


Output Urine Total 1240 ml 1200 ml


 


# Voids 4 


 


# Bowel Movements  1











Laboratory Tests








Test


  1/24/18


04:15


 


Activated Partial


Thromboplast Time 92 SEC (23-33)


H


 


Sodium Level


  141 MMOL/L


(136-145)


 


Potassium Level


  3.3 MMOL/L


(3.5-5.1)  L


 


Chloride Level


  111 MMOL/L


()  H


 


Carbon Dioxide Level


  15 MMOL/L


(21-32)  L


 


Anion Gap


  15 mmol/L


(5-15)


 


Blood Urea Nitrogen


  18 mg/dL


(7-18)


 


Creatinine


  2.5 MG/DL


(0.55-1.30)  H


 


Estimat Glomerular Filtration


Rate 31.6 mL/min


(>60)


 


Glucose Level


  116 MG/DL


()  H


 


Calcium Level


  8.5 MG/DL


(8.5-10.1)


 


Phosphorus Level


  4.4 MG/DL


(2.5-4.9)


 


Magnesium Level


  1.5 MG/DL


(1.8-2.4)  L








Objective


HEAD AND NECK:  No JVD.


LUNGS:  Clear.


CARDIOVASCULAR:  Nl S1 and S2 with no gallop or murmur.


ABDOMEN:  Soft and nontender.


EXTREMITIES:  Bilateral 1 plus pitting edema.











KAYLEEN HAIR Jan 24, 2018 11:18

## 2018-01-24 NOTE — NEPHROLOGY PROGRESS NOTE
Assessment/Plan


Problem List:  


(1) Sickle cell trait


(2) Abdominal pain


(3) Renal cyst, native, hemorrhage


(4) Sepsis


(5) Hematuria


(6) Shortness of breath


(7) Chest pain


(8) Severe anemia


(9) Pneumonia


(10) DVT (deep venous thrombosis)


(11) Tachycardia


(12) Sickle cell anemia


(13) HAP (hospital-acquired pneumonia)


Plan


Continue current treatment plan


Replete K


Continue heparin drip per protocol


Monitor HR, cardio following


Continue o2 therapy


Monitor H&H and transfuse prn


Hematology, cardiology GI, and ID and pulmo following, will f/u with recs


Monitor lytes, correct prn


Continue abx per ID


Monitor renal function, avoid nephrotoxins 


Pain management prn


Continue neb treatment


Daily PPI


AM labs





Subjective


Constitutional:  Denies: no symptoms, chills, diaphoresis, fever, malaise, 

weakness, other


HEENT:  Denies: no symptoms, eye pain, blurred vision, tearing, double vision, 

ear pain, ear discharge, nose pain, nose congestion, throat pain, throat 

swelling, mouth pain, mouth swelling, other


Genitourinary:  Denies: no symptoms, burning, discharge, frequency, flank pain, 

hematuria, incontinence, pain, urgency, other


Neurologic/Psychiatric:  Denies: no symptoms, anxiety, depressed, emotional 

problems, headache, numbness, paresthesia, pre-existing deficit, seizure, 

tingling, tremors, weakness, other


Subjective


In bed, in no apparent distress, on heparin drip, complained that he has not 

been getting pain med





Objective


Objective





Last 24 Hour Vital Signs








  Date Time  Temp Pulse Resp B/P (MAP) Pulse Ox O2 Delivery O2 Flow Rate FiO2


 


1/24/18 16:00  124      


 


1/24/18 16:00 98.8 92 19 146/76 98 Nasal Cannula 2.0 


 


1/24/18 15:20  82 16  99 Nasal Cannula 2.0 28


 


1/24/18 15:10  84 18  98 Nasal Cannula 2.0 28


 


1/24/18 15:10        28


 


1/24/18 12:00 98.2 93 18 139/79 100 Nasal Cannula 2.0 


 


1/24/18 12:00  93      


 


1/24/18 11:26  85 16  99 Nasal Cannula 2.0 28


 


1/24/18 11:17        28


 


1/24/18 11:17  80 18  98 Nasal Cannula 2.0 28


 


1/24/18 09:00 97.9 87 19 150/88 98 Nasal Cannula 2.0 


 


1/24/18 08:02  90 20  98 Nasal Cannula 2.0 28


 


1/24/18 08:00  83      


 


1/24/18 07:53  83 18  98 Nasal Cannula 2.0 28


 


1/24/18 07:53      Nasal Cannula 2.0 28


 


1/24/18 07:53        28


 


1/24/18 07:53     98 Nasal Cannula 2.0 28


 


1/24/18 04:18 98.2       


 


1/24/18 04:00 98.2  20 150/81 86 Nasal Cannula 2.0 


 


1/24/18 04:00  88      


 


1/24/18 03:57  94 20  98 Nasal Cannula 2.0 28


 


1/24/18 03:56  92 20  97 Nasal Cannula 2.0 28


 


1/24/18 00:00  91      


 


1/24/18 00:00 98.2  20 153/83 97 Nasal Cannula 2.0 


 


1/23/18 23:45  92 20  98 Nasal Cannula 2.0 28


 


1/23/18 23:44  90 20  95 Nasal Cannula 2.0 28


 


1/23/18 20:49 98.4 92 19 151/76 98 Nasal Cannula  


 


1/23/18 20:00  96      


 


1/23/18 19:54  95 20  97 Nasal Cannula 2.0 28


 


1/23/18 19:53     96 Nasal Cannula 2.0 28


 


1/23/18 19:53  93 20  96 Nasal Cannula 2.0 28


 


1/23/18 19:53      Nasal Cannula 2.0 28

















Intake and Output  


 


 1/23/18 1/24/18





 19:00 07:00


 


Intake Total 1933.124 ml 133.802 ml


 


Output Total 1240 ml 1200 ml


 


Balance 693.124 ml -1066.198 ml


 


  


 


Intake Oral 700 ml 


 


IV Total 1233.124 ml 133.802 ml


 


Output Urine Total 1240 ml 1200 ml


 


# Voids 4 


 


# Bowel Movements  1








Laboratory Tests


1/24/18 04:15: 


Activated Partial Thromboplast Time 92H, Sodium Level 141, Potassium Level 3.3L

, Chloride Level 111H, Carbon Dioxide Level 15L, Anion Gap 15, Blood Urea 

Nitrogen 18, Creatinine 2.5H, Estimat Glomerular Filtration Rate 31.6, Glucose 

Level 116H, Calcium Level 8.5, Phosphorus Level 4.4, Magnesium Level 1.5L


1/24/18 15:15: 


White Blood Count 16.9H, Red Blood Count 3.32L, Hemoglobin 9.3L, Hematocrit 

28.7L, Mean Corpuscular Volume 86, Mean Corpuscular Hemoglobin 28.0, Mean 

Corpuscular Hemoglobin Concent 32.4, Red Cell Distribution Width 13.9, Platelet 

Count 155, Mean Platelet Volume 8.9, Neutrophils (%) (Auto) , Lymphocytes (%) (

Auto) , Monocytes (%) (Auto) , Eosinophils (%) (Auto) , Basophils (%) (Auto) , 

Differential Total Cells Counted 100, Neutrophils % (Manual) 81H, Lymphocytes % 

(Manual) 7L, Monocytes % (Manual) 7, Eosinophils % (Manual) 2, Basophils % (

Manual) 1, Band Neutrophils 2, Platelet Estimate Adequate, Platelet Morphology 

Normal, Hypochromasia 1+, Anisocytosis 1+


Height (Feet):  5


Height (Inches):  10.00


Weight (Pounds):  138


General Appearance:  no apparent distress, alert


EENT:  normal ENT inspection


Neck:  normal alignment


Cardiovascular:  normal rate


Respiratory/Chest:  no respiratory distress


Abdomen:  soft


Extremities:  no calf tenderness


Neurologic:  alert, oriented x 3, responsive, normal mood/affect











Dayanara Hawley N.P. Jan 24, 2018 18:29

## 2018-01-24 NOTE — INFECTIOUS DISEASES PROG NOTE
Assessment/Plan


Problems:  


(1) HAP (hospital-acquired pneumonia)


Assessment & Plan:  sputum culture grew candida most likely colonization , 

continue  zosyn for two weeks total  , monitor CXR 





(2) Sepsis


Assessment & Plan:  ruled out with negative blood culture, continue  zosyn for 

two weeks total 





(3) Renal cyst, native, hemorrhage


Assessment & Plan:  urine culture grew mixed contaminant , now on zosyn empiric 

coverage , had urology eval with no intervention, repeated CT scan of the 

abdomen showed recurrent bleeding . recommend urology eval again , since he is 

on heparin drip now 





(4) Abdominal pain


Assessment & Plan:  due to bleeding into the left renal cysts , recommend 

urology eval and better  pain management  





(5) Fever


Assessment & Plan:  improved, due to the above , on wide spectrum  antibiotics 

empiric coverage , continue  tylenol prn 





(6) Diarrhea


Assessment & Plan:  no evidence of  C diff , continue antidiarrhea meds  





(7) DVT (deep venous thrombosis)


Assessment & Plan:  in both legs, with possible PE, on heparin drip , recommend 

close monitor of PT/PTT, high risk for bleeding in the renal cyst 








Subjective


Constitutional:  Reports: no symptoms


HEENT:  Reports: no symptoms


Respiratory:  Reports: no symptoms


Breasts:  Reports: no symptoms


Cardiovascular:  Reports: no symptoms


Gastrointestinal/Abdominal:  Reports: no symptoms


Genitourinary:  Reports: no symptoms


Neurologic:  Reports: no symptoms


Psychiatric:  Reports: no symptoms


Skin:  Reports: no symptoms


Endocrine:  Reports: no symptoms


Hematologic:  Reports: no symptoms


Musculoskeletal:  Reports: no symptoms


Allergies:  


Coded Allergies:  


     CODEINE (Verified  Allergy, Unknown, 1/5/18)


Uncoded Allergies:  


     PEANUTS (Adverse Reaction, Severe, Anaphylaxis, 1/9/18)


Subjective


he feels more comfortable today , with no SOB, no fever or chills





Objective


Vital Signs





Last 24 Hour Vital Signs








  Date Time  Temp Pulse Resp B/P (MAP) Pulse Ox O2 Delivery O2 Flow Rate FiO2


 


1/24/18 12:00 98.2 93 18 139/79 100 Nasal Cannula 2.0 


 


1/24/18 11:26  85 16  99 Nasal Cannula 2.0 28


 


1/24/18 11:17        28


 


1/24/18 11:17  80 18  98 Nasal Cannula 2.0 28


 


1/24/18 09:00 97.9 87 19 150/88 98 Nasal Cannula 2.0 


 


1/24/18 08:02  90 20  98 Nasal Cannula 2.0 28


 


1/24/18 08:00  83      


 


1/24/18 07:53  83 18  98 Nasal Cannula 2.0 28


 


1/24/18 07:53      Nasal Cannula 2.0 28


 


1/24/18 07:53        28


 


1/24/18 07:53     98 Nasal Cannula 2.0 28


 


1/24/18 04:18 98.2       


 


1/24/18 04:00 98.2  20 150/81 86 Nasal Cannula 2.0 


 


1/24/18 04:00  88      


 


1/24/18 03:57  94 20  98 Nasal Cannula 2.0 28


 


1/24/18 03:56  92 20  97 Nasal Cannula 2.0 28


 


1/24/18 00:00  91      


 


1/24/18 00:00 98.2  20 153/83 97 Nasal Cannula 2.0 


 


1/23/18 23:45  92 20  98 Nasal Cannula 2.0 28


 


1/23/18 23:44  90 20  95 Nasal Cannula 2.0 28


 


1/23/18 20:49 98.4 92 19 151/76 98 Nasal Cannula  


 


1/23/18 20:00  96      


 


1/23/18 19:54  95 20  97 Nasal Cannula 2.0 28


 


1/23/18 19:53     96 Nasal Cannula 2.0 28


 


1/23/18 19:53  93 20  96 Nasal Cannula 2.0 28


 


1/23/18 19:53      Nasal Cannula 2.0 28


 


1/23/18 16:00  86      


 


1/23/18 16:00 99.5 98 22 137/80 98 Nasal Cannula 2.0 


 


1/23/18 14:53  100 20  99 Nasal Cannula 2.0 


 


1/23/18 14:43  89 18  96 Nasal Cannula 2.0 








Height (Feet):  5


Height (Inches):  10.00


Weight (Pounds):  138


General Appearance:  WD/WN, no acute distress


HEENT:  normocephalic, atraumatic, anicteric, mucous membranes moist, PERRL


Respiratory/Chest:  chest wall non-tender, lungs clear, normal breath sounds, 

no respiratory distress, no accessory muscle use


Cardiovascular:  normal peripheral pulses, normal rate, regular rhythm, no 

gallop/murmur, no JVD


Abdomen:  normal bowel sounds, soft, non tender, no organomegaly, non distended

, no mass, no scars


Extremities:  no cyanosis, no clubbing


Skin:  no rash, no lesions


Neurologic/Psychiatric:  alert, oriented x 3





Laboratory Tests








Test


  1/24/18


04:15


 


Activated Partial


Thromboplast Time 92 SEC (23-33)


H


 


Sodium Level


  141 MMOL/L


(136-145)


 


Potassium Level


  3.3 MMOL/L


(3.5-5.1)  L


 


Chloride Level


  111 MMOL/L


()  H


 


Carbon Dioxide Level


  15 MMOL/L


(21-32)  L


 


Anion Gap


  15 mmol/L


(5-15)


 


Blood Urea Nitrogen


  18 mg/dL


(7-18)


 


Creatinine


  2.5 MG/DL


(0.55-1.30)  H


 


Estimat Glomerular Filtration


Rate 31.6 mL/min


(>60)


 


Glucose Level


  116 MG/DL


()  H


 


Calcium Level


  8.5 MG/DL


(8.5-10.1)


 


Phosphorus Level


  4.4 MG/DL


(2.5-4.9)


 


Magnesium Level


  1.5 MG/DL


(1.8-2.4)  L











Current Medications








 Medications


  (Trade)  Dose


 Ordered  Sig/Judie


 Route


 PRN Reason  Start Time


 Stop Time Status Last Admin


Dose Admin


 


 Acetaminophen


  (Tylenol)  650 mg  Q4H  PRN


 ORAL


 Mild Pain/Temp > 100.5  1/21/18 19:45


 2/5/18 19:44  1/24/18 03:19


 


 


 Dextrose


  (Dextrose 50%)    STAT  PRN


 IV


 Hypoglycemia  1/21/18 19:45


 2/20/18 19:44   


 


 


 Diphenoxylate HCl/


 Atropine


  (Lomotil)  2.5 mg  Q4H  PRN


 ORAL


 Diarrhea  1/21/18 19:45


 2/9/18 19:44  1/22/18 04:00


 


 


 Docusate Sodium


  (Colace)  100 mg  THREE TIMES A  DAY


 ORAL


   1/21/18 18:00


 2/19/18 17:59  1/22/18 08:58


 


 


 Folic Acid


  (Folate)  1 mg  DAILY


 ORAL


   1/22/18 09:00


 2/11/18 14:59  1/24/18 08:26


 


 


 Heparin Sodium/


 Dextrose  500 ml @ 


 33.802 mls/


 hr  adjust per protocol


 IV


   1/22/18 19:45


 2/21/18 19:44  1/24/18 09:39


 


 


 Ipratropium


 Bromide


  (Atrovent)  500 mcg  Q4HRT


 HHN


   1/21/18 19:00


 1/25/18 02:59  1/24/18 11:19


 


 


 Levalbuterol HCl


  (Xopenex)  1.25 mg  Q4HRT


 HHN


   1/21/18 19:00


 1/25/18 02:59  1/24/18 11:19


 


 


 Magnesium


 Hydroxide


  (Mom)  30 ml  BIDPRN  PRN


 ORAL


 constipation  1/21/18 19:46


 2/20/18 19:45   


 


 


 Ondansetron HCl


  (Zofran)  4 mg  Q6H  PRN


 IVP


 Nausea & Vomiting  1/21/18 19:46


 2/5/18 19:45   


 


 


 Pantoprazole


  (Protonix)  40 mg  DAILY


 ORAL


   1/22/18 09:00


 2/13/18 08:59  1/24/18 08:26


 


 


 Piperacillin Sod/


 Tazobactam Sod


 3.375 gm/Sodium


 Chloride  110 ml @ 


 27.5 mls/hr  Q8H


 IVPB


   1/24/18 08:00


 1/31/18 07:59  1/24/18 08:27


 


 


 Sodium Chloride  1,000 ml @ 


 100 mls/hr  Q10H


 IV


   1/24/18 08:00


 2/23/18 07:59   


 


 


 Vitamin B Complex/


 Vit C/Folic Acid


  (Nephrovite)  1 tab  DAILY


 ORAL


   1/22/18 09:00


 2/8/18 08:59  1/24/18 08:26


 

















Pablo Talley M.D. Jan 24, 2018 13:36

## 2018-01-24 NOTE — PULMONOLOGY PROGRESS NOTE
Assessment/Plan


Assessment/Plan


ASSESSMENT AND PLAN:


1. Sinus Tachycardia due to sickle cell anemia, fever and possible PE in view 

of acute bilateral DVT. 


   Better after 1 unit PRBC and on heparin drip. Echocardiogram  EF 60%.  No 

SVT or VT.





2.  Sepsis, WBC of 26K. On intravenous antibiotic per Dr. Talley.Down to 18K 





3.  Abdominal pain, likely due to renal cysts and hemorrhage.





4.  Diarrhea. Clostridium difficile colitis has been ruled out.  





5. Hematuria and anemia. Hb stable in 7.5-8 range





6. Acute bilateral LE DVT. Continue with iv heparin.





7. Constipation. Colace 100 tid.





8. Hyperkalemia.





continue pain control


Antibiotics


IV fluids


continue IV heparin. 


suggest to transition to Coumadin





Will see prn only


Please call if needed





Subjective


Interval Events:  none


Constitutional:  Reports: no symptoms


HEENT:  Repors: no symptoms


Respiratory:  Reports: no symptoms


Cardiovascular:  Reports: no symptoms


Allergies:  


Coded Allergies:  


     CODEINE (Verified  Allergy, Unknown, 1/5/18)


Uncoded Allergies:  


     PEANUTS (Adverse Reaction, Severe, Anaphylaxis, 1/9/18)





Objective





Last 24 Hour Vital Signs








  Date Time  Temp Pulse Resp B/P (MAP) Pulse Ox O2 Delivery O2 Flow Rate FiO2


 


1/24/18 04:18 98.2       


 


1/24/18 04:00 98.2  20 150/81 86 Nasal Cannula 2.0 


 


1/24/18 04:00  88      


 


1/24/18 03:57  94 20  98 Nasal Cannula 2.0 28


 


1/24/18 03:56  92 20  97 Nasal Cannula 2.0 28


 


1/24/18 00:00  91      


 


1/24/18 00:00 98.2  20 153/83 97 Nasal Cannula 2.0 


 


1/23/18 23:45  92 20  98 Nasal Cannula 2.0 28


 


1/23/18 23:44  90 20  95 Nasal Cannula 2.0 28


 


1/23/18 20:49 98.4 92 19 151/76 98 Nasal Cannula  


 


1/23/18 20:00  96      


 


1/23/18 19:54  95 20  97 Nasal Cannula 2.0 28


 


1/23/18 19:53     96 Nasal Cannula 2.0 28


 


1/23/18 19:53  93 20  96 Nasal Cannula 2.0 28


 


1/23/18 19:53      Nasal Cannula 2.0 28


 


1/23/18 16:00  86      


 


1/23/18 16:00 99.5 98 22 137/80 98 Nasal Cannula 2.0 


 


1/23/18 14:53  100 20  99 Nasal Cannula 2.0 


 


1/23/18 14:43  89 18  96 Nasal Cannula 2.0 


 


1/23/18 13:00  96 21   Nasal Cannula 3.0 


 


1/23/18 12:00  92      


 


1/23/18 11:48 97.6 98 19 149/78 98 Nasal Cannula 2.0 


 


1/23/18 11:27  86 18  99 Nasal Cannula 2.0 


 


1/23/18 11:17  98 20  96 Nasal Cannula 2.0 


 


1/23/18 08:00  104      


 


1/23/18 08:00 97.7 94 22 143/77 99 Nasal Cannula 2.0 

















Intake and Output  


 


 1/23/18 1/24/18





 19:00 07:00


 


Intake Total 1933.124 ml 


 


Output Total 1240 ml 1200 ml


 


Balance 693.124 ml -1200 ml


 


  


 


Intake Oral 700 ml 


 


IV Total 1233.124 ml 


 


Output Urine Total 1240 ml 1200 ml


 


# Voids 4 


 


# Bowel Movements  1








General Appearance:  no acute distress


HEENT:  normocephalic


Respiratory/Chest:  chest wall non-tender, lungs clear


Cardiovascular:  normal peripheral pulses, tachycardia


Abdomen:  normal bowel sounds


Laboratory Tests


1/24/18 04:15: 


Activated Partial Thromboplast Time 92H, Sodium Level 141, Potassium Level 3.3L

, Chloride Level 111H, Carbon Dioxide Level 15L, Anion Gap 15, Blood Urea 

Nitrogen 18, Creatinine 2.5H, Estimat Glomerular Filtration Rate 31.6, Glucose 

Level 116H, Calcium Level 8.5, Phosphorus Level 4.4, Magnesium Level 1.5L





Current Medications








 Medications


  (Trade)  Dose


 Ordered  Sig/Judie


 Route


 PRN Reason  Start Time


 Stop Time Status Last Admin


Dose Admin


 


 Acetaminophen


  (Tylenol)  650 mg  Q4H  PRN


 ORAL


 Mild Pain/Temp > 100.5  1/21/18 19:45


 2/5/18 19:44  1/24/18 03:19


 


 


 Dextrose


  (Dextrose 50%)    STAT  PRN


 IV


 Hypoglycemia  1/21/18 19:45


 2/20/18 19:44   


 


 


 Diphenoxylate HCl/


 Atropine


  (Lomotil)  2.5 mg  Q4H  PRN


 ORAL


 Diarrhea  1/21/18 19:45


 2/9/18 19:44  1/22/18 04:00


 


 


 Docusate Sodium


  (Colace)  100 mg  THREE TIMES A  DAY


 ORAL


   1/21/18 18:00


 2/19/18 17:59  1/22/18 08:58


 


 


 Folic Acid


  (Folate)  1 mg  DAILY


 ORAL


   1/22/18 09:00


 2/11/18 14:59  1/23/18 09:20


 


 


 Heparin Sodium/


 Dextrose  500 ml @ 


 33.802 mls/


 hr  adjust per protocol


 IV


   1/22/18 19:45


 2/21/18 19:44  1/23/18 18:48


 


 


 Ipratropium


 Bromide


  (Atrovent)  500 mcg  Q4HRT


 HHN


   1/21/18 19:00


 1/25/18 02:59  1/24/18 03:55


 


 


 Levalbuterol HCl


  (Xopenex)  1.25 mg  Q4HRT


 HHN


   1/21/18 19:00


 1/25/18 02:59  1/24/18 03:55


 


 


 Magnesium


 Hydroxide


  (Mom)  30 ml  BIDPRN  PRN


 ORAL


 constipation  1/21/18 19:46


 2/20/18 19:45   


 


 


 Ondansetron HCl


  (Zofran)  4 mg  Q6H  PRN


 IVP


 Nausea & Vomiting  1/21/18 19:46


 2/5/18 19:45   


 


 


 Pantoprazole


  (Protonix)  40 mg  DAILY


 ORAL


   1/22/18 09:00


 2/13/18 08:59  1/23/18 09:20


 


 


 Piperacillin Sod/


 Tazobactam Sod


 3.375 gm/Sodium


 Chloride  110 ml @ 


 27.5 mls/hr  Q8H


 IVPB


   1/24/18 08:00


 1/31/18 07:59   


 


 


 Sodium Chloride  1,000 ml @ 


 100 mls/hr  Q10H


 IV


   1/24/18 08:00


 2/23/18 07:59   


 


 


 Vitamin B Complex/


 Vit C/Folic Acid


  (Nephrovite)  1 tab  DAILY


 ORAL


   1/22/18 09:00


 2/8/18 08:59  1/23/18 09:20


 

















Ethan Tom MD Jan 24, 2018 07:45

## 2018-01-24 NOTE — GI PROGRESS NOTE
Assessment/Plan


Problems:  


(1) Diarrhea


ICD Codes:  R19.7 - Diarrhea, unspecified


SNOMED:  02040810


(2) Sickle cell trait


ICD Codes:  D57.3 - Sickle-cell trait


SNOMED:  94405361


(3) Abdominal pain


ICD Codes:  R10.9 - Unspecified abdominal pain


SNOMED:  98971868


(4) Renal cyst, native, hemorrhage


ICD Codes:  N28.89 - Other specified disorders of kidney and ureter; N28.1 - 

Cyst of kidney, acquired


SNOMED:  [id number]


Status:  unchanged


Status Narrative


Discussed with Dr. Gamez.


Assessment/Plan


OB stool r/o GI bleed  >> negative


stool cx >> negative


cdiff >> negative


O&P >> negative


diarrhea resolved >> lomotil prn





fu renal, hematology, ID recs


monitor H&H, prn transfusions


renal diet, tolerating


ppi


abx


fu labs





Subjective


Subjective


generalized weakness


diarrhea resolved





Objective





Last 24 Hour Vital Signs








  Date Time  Temp Pulse Resp B/P (MAP) Pulse Ox O2 Delivery O2 Flow Rate FiO2


 


1/24/18 09:00 97.9 87 19 150/88 98 Nasal Cannula 2.0 


 


1/24/18 08:02  90 20  98 Nasal Cannula 2.0 28


 


1/24/18 07:53  83 18  98 Nasal Cannula 2.0 28


 


1/24/18 07:53      Nasal Cannula 2.0 28


 


1/24/18 07:53        28


 


1/24/18 07:53     98 Nasal Cannula 2.0 28


 


1/24/18 04:18 98.2       


 


1/24/18 04:00 98.2  20 150/81 86 Nasal Cannula 2.0 


 


1/24/18 04:00  88      


 


1/24/18 03:57  94 20  98 Nasal Cannula 2.0 28


 


1/24/18 03:56  92 20  97 Nasal Cannula 2.0 28


 


1/24/18 00:00  91      


 


1/24/18 00:00 98.2  20 153/83 97 Nasal Cannula 2.0 


 


1/23/18 23:45  92 20  98 Nasal Cannula 2.0 28


 


1/23/18 23:44  90 20  95 Nasal Cannula 2.0 28


 


1/23/18 20:49 98.4 92 19 151/76 98 Nasal Cannula  


 


1/23/18 20:00  96      


 


1/23/18 19:54  95 20  97 Nasal Cannula 2.0 28


 


1/23/18 19:53     96 Nasal Cannula 2.0 28


 


1/23/18 19:53  93 20  96 Nasal Cannula 2.0 28


 


1/23/18 19:53      Nasal Cannula 2.0 28


 


1/23/18 16:00  86      


 


1/23/18 16:00 99.5 98 22 137/80 98 Nasal Cannula 2.0 


 


1/23/18 14:53  100 20  99 Nasal Cannula 2.0 


 


1/23/18 14:43  89 18  96 Nasal Cannula 2.0 


 


1/23/18 13:00  96 21   Nasal Cannula 3.0 


 


1/23/18 12:00  92      


 


1/23/18 11:48 97.6 98 19 149/78 98 Nasal Cannula 2.0 


 


1/23/18 11:27  86 18  99 Nasal Cannula 2.0 


 


1/23/18 11:17  98 20  96 Nasal Cannula 2.0 

















Intake and Output  


 


 1/23/18 1/24/18





 19:00 07:00


 


Intake Total 1933.124 ml 


 


Output Total 1240 ml 1200 ml


 


Balance 693.124 ml -1200 ml


 


  


 


Intake Oral 700 ml 


 


IV Total 1233.124 ml 


 


Output Urine Total 1240 ml 1200 ml


 


# Voids 4 


 


# Bowel Movements  1











Laboratory Tests








Test


  1/24/18


04:15


 


Activated Partial


Thromboplast Time 92 SEC (23-33)


H


 


Sodium Level


  141 MMOL/L


(136-145)


 


Potassium Level


  3.3 MMOL/L


(3.5-5.1)  L


 


Chloride Level


  111 MMOL/L


()  H


 


Carbon Dioxide Level


  15 MMOL/L


(21-32)  L


 


Anion Gap


  15 mmol/L


(5-15)


 


Blood Urea Nitrogen


  18 mg/dL


(7-18)


 


Creatinine


  2.5 MG/DL


(0.55-1.30)  H


 


Estimat Glomerular Filtration


Rate 31.6 mL/min


(>60)


 


Glucose Level


  116 MG/DL


()  H


 


Calcium Level


  8.5 MG/DL


(8.5-10.1)


 


Phosphorus Level


  4.4 MG/DL


(2.5-4.9)


 


Magnesium Level


  1.5 MG/DL


(1.8-2.4)  L








Height (Feet):  5


Height (Inches):  10.00


Weight (Pounds):  138


General Appearance:  WD/WN, no apparent distress, alert


Cardiovascular:  normal rate


Respiratory/Chest:  normal breath sounds, no respiratory distress, other - NC


Abdominal Exam:  normal bowel sounds, non tender, soft


Extremities:  normal range of motion, non-tender











Elsy Lakhani N.P. Jan 24, 2018 10:00

## 2018-01-25 VITALS — SYSTOLIC BLOOD PRESSURE: 146 MMHG | DIASTOLIC BLOOD PRESSURE: 86 MMHG

## 2018-01-25 VITALS — DIASTOLIC BLOOD PRESSURE: 78 MMHG | SYSTOLIC BLOOD PRESSURE: 145 MMHG

## 2018-01-25 VITALS — DIASTOLIC BLOOD PRESSURE: 78 MMHG | SYSTOLIC BLOOD PRESSURE: 149 MMHG

## 2018-01-25 VITALS — DIASTOLIC BLOOD PRESSURE: 91 MMHG | SYSTOLIC BLOOD PRESSURE: 156 MMHG

## 2018-01-25 VITALS — SYSTOLIC BLOOD PRESSURE: 147 MMHG | DIASTOLIC BLOOD PRESSURE: 88 MMHG

## 2018-01-25 VITALS — SYSTOLIC BLOOD PRESSURE: 147 MMHG | DIASTOLIC BLOOD PRESSURE: 78 MMHG

## 2018-01-25 LAB
ADD MANUAL DIFF: NO
ANION GAP SERPL CALC-SCNC: 14 MMOL/L (ref 5–15)
BASOPHILS NFR BLD AUTO: 1.3 % (ref 0–2)
BUN SERPL-MCNC: 15 MG/DL (ref 7–18)
CALCIUM SERPL-MCNC: 8.4 MG/DL (ref 8.5–10.1)
CHLORIDE SERPL-SCNC: 111 MMOL/L (ref 98–107)
CO2 SERPL-SCNC: 16 MMOL/L (ref 21–32)
CREAT SERPL-MCNC: 2.3 MG/DL (ref 0.55–1.3)
EOSINOPHIL NFR BLD AUTO: 2.9 % (ref 0–3)
ERYTHROCYTE [DISTWIDTH] IN BLOOD BY AUTOMATED COUNT: 13.8 % (ref 11.6–14.8)
HCT VFR BLD CALC: 26.7 % (ref 42–52)
HGB BLD-MCNC: 8.9 G/DL (ref 14.2–18)
LYMPHOCYTES NFR BLD AUTO: 4.9 % (ref 20–45)
MCV RBC AUTO: 86 FL (ref 80–99)
MONOCYTES NFR BLD AUTO: 8 % (ref 1–10)
NEUTROPHILS NFR BLD AUTO: 83 % (ref 45–75)
PLATELET # BLD: 114 K/UL (ref 150–450)
POTASSIUM SERPL-SCNC: 3.4 MMOL/L (ref 3.5–5.1)
RBC # BLD AUTO: 3.11 M/UL (ref 4.7–6.1)
SODIUM SERPL-SCNC: 141 MMOL/L (ref 136–145)
WBC # BLD AUTO: 14.9 K/UL (ref 4.8–10.8)

## 2018-01-25 RX ADMIN — DOCUSATE SODIUM SCH MG: 100 CAPSULE, LIQUID FILLED ORAL at 18:00

## 2018-01-25 RX ADMIN — DEXTROSE MONOHYDRATE SCH MLS/HR: 50 INJECTION, SOLUTION INTRAVENOUS at 00:16

## 2018-01-25 RX ADMIN — DEXTROSE MONOHYDRATE SCH MLS/HR: 50 INJECTION, SOLUTION INTRAVENOUS at 08:26

## 2018-01-25 RX ADMIN — Medication SCH MCG: at 07:39

## 2018-01-25 RX ADMIN — LEVALBUTEROL HYDROCHLORIDE SCH MG: 1.25 SOLUTION, CONCENTRATE RESPIRATORY (INHALATION) at 22:41

## 2018-01-25 RX ADMIN — Medication SCH MCG: at 15:05

## 2018-01-25 RX ADMIN — HEPARIN SODIUM SCH MLS/HR: 5000 INJECTION, SOLUTION INTRAVENOUS at 06:40

## 2018-01-25 RX ADMIN — Medication SCH MCG: at 10:50

## 2018-01-25 RX ADMIN — HEPARIN SODIUM SCH MLS/HR: 5000 INJECTION, SOLUTION INTRAVENOUS at 18:15

## 2018-01-25 RX ADMIN — HEPARIN SODIUM SCH MLS/HR: 5000 INJECTION, SOLUTION INTRAVENOUS at 21:41

## 2018-01-25 RX ADMIN — Medication SCH TAB: at 08:26

## 2018-01-25 RX ADMIN — DOCUSATE SODIUM SCH MG: 100 CAPSULE, LIQUID FILLED ORAL at 12:55

## 2018-01-25 RX ADMIN — DOCUSATE SODIUM SCH MG: 100 CAPSULE, LIQUID FILLED ORAL at 08:26

## 2018-01-25 RX ADMIN — HEPARIN SODIUM SCH MLS/HR: 5000 INJECTION, SOLUTION INTRAVENOUS at 04:43

## 2018-01-25 RX ADMIN — LEVALBUTEROL HYDROCHLORIDE SCH MG: 1.25 SOLUTION, CONCENTRATE RESPIRATORY (INHALATION) at 15:05

## 2018-01-25 RX ADMIN — Medication SCH MCG: at 19:00

## 2018-01-25 RX ADMIN — LEVALBUTEROL HYDROCHLORIDE SCH MG: 1.25 SOLUTION, CONCENTRATE RESPIRATORY (INHALATION) at 19:00

## 2018-01-25 RX ADMIN — MORPHINE SULFATE PRN MG: 2 INJECTION, SOLUTION INTRAMUSCULAR; INTRAVENOUS at 18:17

## 2018-01-25 RX ADMIN — Medication SCH MCG: at 22:41

## 2018-01-25 RX ADMIN — DEXTROSE MONOHYDRATE SCH MLS/HR: 50 INJECTION, SOLUTION INTRAVENOUS at 16:40

## 2018-01-25 RX ADMIN — LEVALBUTEROL HYDROCHLORIDE SCH MG: 1.25 SOLUTION, CONCENTRATE RESPIRATORY (INHALATION) at 10:50

## 2018-01-25 RX ADMIN — HEPARIN SODIUM SCH MLS/HR: 5000 INJECTION, SOLUTION INTRAVENOUS at 14:20

## 2018-01-25 RX ADMIN — MORPHINE SULFATE PRN MG: 2 INJECTION, SOLUTION INTRAMUSCULAR; INTRAVENOUS at 08:28

## 2018-01-25 RX ADMIN — LEVALBUTEROL HYDROCHLORIDE SCH MG: 1.25 SOLUTION, CONCENTRATE RESPIRATORY (INHALATION) at 07:39

## 2018-01-25 RX ADMIN — DEXTROSE MONOHYDRATE SCH MLS/HR: 50 INJECTION, SOLUTION INTRAVENOUS at 23:49

## 2018-01-25 NOTE — DIAGNOSTIC IMAGING REPORT
Indication: Shortness of breath

 

Technique: One view of the chest

 

Comparison: 1/18/2018

 

Findings: There is persistent elevation right hemidiaphragm. There is increased

pleural fluid, slightly increased parenchymal opacity of the right lung base. Left

lung and pleural space grossly clear. Heart size is normal. There is evidence of

prior right shoulder surgery

 

Impression: Increasing right basilar pleural fluid, stable or slightly increased

basilar parenchymal disease, over 7 days

## 2018-01-25 NOTE — CARDIAC ELECTROPHYSIOLOGY PN
Assessment/Plan


Assessment/Plan


1. Sinus Tachycardia due to sickle cell anemia, fever and acute bilateral DVT. 


     Echocardiogram  EF 60%.  No SVT or VT. Resolved.





2.  Sepsis, WBC of 26K. On intravenous antibiotic per Dr. Talley. Down to 16K 





3.  Abdominal pain, likely due to renal cysts and hemorrhage.





4.  Diarrhea. Clostridium difficile colitis has been ruled out.  





5. Hematuria and anemia. Hb stable in 7.5-8 range





6. Acute bilateral LE DVT. Continue with iv heparin.





7. Constipation. Colace 100 tid.





8. Hyperkalemia.





SERENA RN





Subjective


Subjective


In NSR on heparin drip.No chest pain or SOB. Heparin was resumed now as was 

DCed due to High PTT and nose bleed.





Objective





Last 24 Hour Vital Signs








  Date Time  Temp Pulse Resp B/P (MAP) Pulse Ox O2 Delivery O2 Flow Rate FiO2


 


1/25/18 12:00 97.8 90 20 146/86 95 Nasal Cannula 2.0 


 


1/25/18 11:05  90 22  99 Nasal Cannula 2.0 28


 


1/25/18 10:50  89 20  97 Nasal Cannula 28.0 28


 


1/25/18 08:00  90      


 


1/25/18 08:00 97.3 90 22 147/88 94 Nasal Cannula 2.0 


 


1/25/18 07:39  96 28  93 Nasal Cannula 28.0 28


 


1/25/18 07:39     93 Nasal Cannula 2.0 28


 


1/25/18 07:39      Nasal Cannula 2.0 28


 


1/25/18 07:35  95 28   Nasal Cannula 2.0 28


 


1/25/18 04:05  90      


 


1/25/18 04:00 97.7 82 20 149/78 99 Nasal Cannula 2.0 


 


1/25/18 02:54      Nasal Cannula 2.0 28


 


1/25/18 02:54      Nasal Cannula 2.0 28


 


1/25/18 00:03  100      


 


1/25/18 00:00 99.1 91 22 147/78 97 Nasal Cannula 2.0 


 


1/24/18 22:56  90 18  98 Nasal Cannula 2.0 28


 


1/24/18 22:46  90 18  97 Nasal Cannula 2.0 28


 


1/24/18 20:24  100      


 


1/24/18 20:00 98.6 98 22 146/79 97 Nasal Cannula 2.0 


 


1/24/18 19:08  95 20  99 Nasal Cannula 2.0 28


 


1/24/18 18:59  94 20  98 Nasal Cannula 2.0 28


 


1/24/18 18:59     98 Nasal Cannula 2.0 28


 


1/24/18 18:59      Nasal Cannula 2.0 28


 


1/24/18 16:00  124      


 


1/24/18 16:00 98.8 92 19 146/76 98 Nasal Cannula 2.0 


 


1/24/18 15:20  82 16  99 Nasal Cannula 2.0 28


 


1/24/18 15:10  84 18  98 Nasal Cannula 2.0 28


 


1/24/18 15:10        28

















Intake and Output  


 


 1/24/18 1/25/18





 19:00 07:00


 


Intake Total 2010.020 ml 1546.0 ml


 


Output Total 400 ml 1975 ml


 


Balance 1610.020 ml -429.0 ml


 


  


 


Intake Oral 472 ml 736 ml


 


IV Total 1538.020 ml 810.0 ml


 


Output Urine Total 400 ml 1975 ml


 


# Voids 3 1











Laboratory Tests








Test


  1/24/18


15:15 1/25/18


04:15 1/25/18


12:02


 


White Blood Count


  16.9 K/UL


(4.8-10.8)  H 14.9 K/UL


(4.8-10.8)  H 


 


 


Red Blood Count


  3.32 M/UL


(4.70-6.10)  L 3.11 M/UL


(4.70-6.10)  L 


 


 


Hemoglobin


  9.3 G/DL


(14.2-18.0)  L 8.9 G/DL


(14.2-18.0)  L 


 


 


Hematocrit


  28.7 %


(42.0-52.0)  L 26.7 %


(42.0-52.0)  L 


 


 


Mean Corpuscular Volume 86 FL (80-99)   86 FL (80-99)   


 


Mean Corpuscular Hemoglobin


  28.0 PG


(27.0-31.0) 28.7 PG


(27.0-31.0) 


 


 


Mean Corpuscular Hemoglobin


Concent 32.4 G/DL


(32.0-36.0) 33.5 G/DL


(32.0-36.0) 


 


 


Red Cell Distribution Width


  13.9 %


(11.6-14.8) 13.8 %


(11.6-14.8) 


 


 


Platelet Count


  155 K/UL


(150-450) 114 K/UL


(150-450)  L 


 


 


Mean Platelet Volume


  8.9 FL


(6.5-10.1) 7.7 FL


(6.5-10.1) 


 


 


Neutrophils (%) (Auto)


  % (45.0-75.0)


  83.0 %


(45.0-75.0)  H 


 


 


Lymphocytes (%) (Auto)


  % (20.0-45.0)


  4.9 %


(20.0-45.0)  L 


 


 


Monocytes (%) (Auto)


   % (1.0-10.0)  


  8.0 %


(1.0-10.0) 


 


 


Eosinophils (%) (Auto)


   % (0.0-3.0)  


  2.9 %


(0.0-3.0) 


 


 


Basophils (%) (Auto)


   % (0.0-2.0)  


  1.3 %


(0.0-2.0) 


 


 


Differential Total Cells


Counted 100  


  


  


 


 


Neutrophils % (Manual) 81 % (45-75)  H  


 


Lymphocytes % (Manual) 7 % (20-45)  L  


 


Monocytes % (Manual) 7 % (1-10)    


 


Eosinophils % (Manual) 2 % (0-3)    


 


Basophils % (Manual) 1 % (0-2)    


 


Band Neutrophils 2 % (0-8)    


 


Platelet Estimate Adequate    


 


Platelet Morphology Normal    


 


Hypochromasia 1+    


 


Anisocytosis 1+    


 


Activated Partial


Thromboplast Time 


  46 SEC (23-33)


H > 150 SEC


(23-33)  *H


 


Sodium Level


  


  141 MMOL/L


(136-145) 


 


 


Potassium Level


  


  3.4 MMOL/L


(3.5-5.1)  L 


 


 


Chloride Level


  


  111 MMOL/L


()  H 


 


 


Carbon Dioxide Level


  


  16 MMOL/L


(21-32)  L 


 


 


Anion Gap


  


  14 mmol/L


(5-15) 


 


 


Blood Urea Nitrogen


  


  15 mg/dL


(7-18) 


 


 


Creatinine


  


  2.3 MG/DL


(0.55-1.30)  H 


 


 


Estimat Glomerular Filtration


Rate 


  34.9 mL/min


(>60) 


 


 


Glucose Level


  


  128 MG/DL


()  H 


 


 


Calcium Level


  


  8.4 MG/DL


(8.5-10.1)  L 


 


 


Magnesium Level


  


  1.8 MG/DL


(1.8-2.4) 


 








Objective


HEAD AND NECK:  No JVD.


LUNGS:  Clear.


CARDIOVASCULAR:  Nl S1 and S2 with no gallop or murmur.


ABDOMEN:  Soft and nontender.


EXTREMITIES:  Bilateral 1 plus pitting edema.











KAYLEEN HAIR Jan 25, 2018 14:27

## 2018-01-25 NOTE — NEPHROLOGY PROGRESS NOTE
Assessment/Plan


Problem List:  


(1) Sickle cell trait


Assessment:  ??





(2) Fever


(3) Sepsis


(4) Diarrhea


(5) Abdominal pain


(6) Renal mass, left


(7) Renal cyst, native, hemorrhage


(8) Shortness of breath


(9) Hematuria


(10) Chest pain


(11) Severe anemia


Plan


f/u hemeonc recs. 


monitor h/h. 


transfuse prn. 


abx per ID. 


cont heparin gtt. 


d/c plan soon.





Subjective


Subjective


still having fever. no more diarrhea.





Objective


Objective





Last 24 Hour Vital Signs








  Date Time  Temp Pulse Resp B/P (MAP) Pulse Ox O2 Delivery O2 Flow Rate FiO2


 


1/25/18 22:50  87 20  99 Nasal Cannula 2.0 28


 


1/25/18 22:41  87 20  98 Nasal Cannula 2.0 28


 


1/25/18 20:06 99.7 102 24 145/78 95   


 


1/25/18 20:02  103      


 


1/25/18 19:10  92 20  99 Nasal Cannula 2.0 28


 


1/25/18 19:00  90 20  97 Nasal Cannula 2.0 28


 


1/25/18 19:00     97 Nasal Cannula 2.0 28


 


1/25/18 19:00      Nasal Cannula 2.0 28


 


1/25/18 16:00 97.9 99 21 156/91 99 Nasal Cannula 2.0 


 


1/25/18 16:00  92      


 


1/25/18 15:17  89 20  99 Nasal Cannula 2.0 28


 


1/25/18 15:05  90 20  98 Nasal Cannula 28.0 28


 


1/25/18 12:00 97.8 90 20 146/86 95 Nasal Cannula 2.0 


 


1/25/18 12:00  87      


 


1/25/18 11:05  90 22  99 Nasal Cannula 2.0 28


 


1/25/18 10:50  89 20  97 Nasal Cannula 28.0 28


 


1/25/18 08:00  90      


 


1/25/18 08:00 97.3 90 22 147/88 94 Nasal Cannula 2.0 


 


1/25/18 07:39  96 28  93 Nasal Cannula 28.0 28


 


1/25/18 07:39     93 Nasal Cannula 2.0 28


 


1/25/18 07:39      Nasal Cannula 2.0 28


 


1/25/18 07:35  95 28   Nasal Cannula 2.0 28


 


1/25/18 04:05  90      


 


1/25/18 04:00 97.7 82 20 149/78 99 Nasal Cannula 2.0 


 


1/25/18 02:54      Nasal Cannula 2.0 28


 


1/25/18 02:54      Nasal Cannula 2.0 28


 


1/25/18 00:03  100      


 


1/25/18 00:00 99.1 91 22 147/78 97 Nasal Cannula 2.0 

















Intake and Output  


 


 1/24/18 1/25/18





 19:00 07:00


 


Intake Total 2010.020 ml 1546.0 ml


 


Output Total 400 ml 1975 ml


 


Balance 1610.020 ml -429.0 ml


 


  


 


Intake Oral 472 ml 736 ml


 


IV Total 1538.020 ml 810.0 ml


 


Output Urine Total 400 ml 1975 ml


 


# Voids 3 1








Laboratory Tests


1/25/18 04:15: 


White Blood Count 14.9H, Red Blood Count 3.11L, Hemoglobin 8.9L, Hematocrit 

26.7L, Mean Corpuscular Volume 86, Mean Corpuscular Hemoglobin 28.7, Mean 

Corpuscular Hemoglobin Concent 33.5, Red Cell Distribution Width 13.8, Platelet 

Count 114L, Mean Platelet Volume 7.7, Neutrophils (%) (Auto) 83.0H, Lymphocytes 

(%) (Auto) 4.9L, Monocytes (%) (Auto) 8.0, Eosinophils (%) (Auto) 2.9, 

Basophils (%) (Auto) 1.3, Activated Partial Thromboplast Time 46H, Sodium Level 

141, Potassium Level 3.4L, Chloride Level 111H, Carbon Dioxide Level 16L, Anion 

Gap 14, Blood Urea Nitrogen 15, Creatinine 2.3H, Estimat Glomerular Filtration 

Rate 34.9, Glucose Level 128H, Calcium Level 8.4L, Magnesium Level 1.8


1/25/18 12:02: Activated Partial Thromboplast Time > 150*H


1/25/18 20:15: Activated Partial Thromboplast Time 98H


Height (Feet):  5


Height (Inches):  10.00


Weight (Pounds):  138


General Appearance:  no apparent distress


Cardiovascular:  normal rate, regular rhythm


Respiratory/Chest:  lungs clear


Abdomen:  non tender, soft


Extremities:  non-pitting


Neurologic:  alert, oriented x 3











SCARLETT ELIZABETH Jan 25, 2018 23:30

## 2018-01-25 NOTE — GI PROGRESS NOTE
Assessment/Plan


Problems:  


(1) Diarrhea


ICD Codes:  R19.7 - Diarrhea, unspecified


SNOMED:  82122520


(2) Sickle cell trait


ICD Codes:  D57.3 - Sickle-cell trait


SNOMED:  67074342


(3) Abdominal pain


ICD Codes:  R10.9 - Unspecified abdominal pain


SNOMED:  28558425


(4) Renal cyst, native, hemorrhage


ICD Codes:  N28.89 - Other specified disorders of kidney and ureter; N28.1 - 

Cyst of kidney, acquired


SNOMED:  87416392


Status:  progressing


Status Narrative


Discussed with Dr. Gamez.


Assessment/Plan


OB stool r/o GI bleed  >> negative


stool cx >> negative


cdiff >> negative


O&P >> negative


diarrhea resolved >> lomotil prn





fu renal, hematology, ID recs


monitor H&H, prn transfusions


renal diet, tolerating


ppi


abx


fu labs





Subjective


Subjective


generalized weakness


diarrhea resolved





Objective





Last 24 Hour Vital Signs








  Date Time  Temp Pulse Resp B/P (MAP) Pulse Ox O2 Delivery O2 Flow Rate FiO2


 


1/25/18 16:00 97.9 99 21 156/91 99 Nasal Cannula 2.0 


 


1/25/18 15:17  89 20  99 Nasal Cannula 2.0 28


 


1/25/18 15:05  90 20  98 Nasal Cannula 28.0 28


 


1/25/18 12:00 97.8 90 20 146/86 95 Nasal Cannula 2.0 


 


1/25/18 12:00  87      


 


1/25/18 11:05  90 22  99 Nasal Cannula 2.0 28


 


1/25/18 10:50  89 20  97 Nasal Cannula 28.0 28


 


1/25/18 08:00  90      


 


1/25/18 08:00 97.3 90 22 147/88 94 Nasal Cannula 2.0 


 


1/25/18 07:39  96 28  93 Nasal Cannula 28.0 28


 


1/25/18 07:39     93 Nasal Cannula 2.0 28


 


1/25/18 07:39      Nasal Cannula 2.0 28


 


1/25/18 07:35  95 28   Nasal Cannula 2.0 28


 


1/25/18 04:05  90      


 


1/25/18 04:00 97.7 82 20 149/78 99 Nasal Cannula 2.0 


 


1/25/18 02:54      Nasal Cannula 2.0 28


 


1/25/18 02:54      Nasal Cannula 2.0 28


 


1/25/18 00:03  100      


 


1/25/18 00:00 99.1 91 22 147/78 97 Nasal Cannula 2.0 


 


1/24/18 22:56  90 18  98 Nasal Cannula 2.0 28


 


1/24/18 22:46  90 18  97 Nasal Cannula 2.0 28


 


1/24/18 20:24  100      


 


1/24/18 20:00 98.6 98 22 146/79 97 Nasal Cannula 2.0 


 


1/24/18 19:08  95 20  99 Nasal Cannula 2.0 28


 


1/24/18 18:59  94 20  98 Nasal Cannula 2.0 28


 


1/24/18 18:59     98 Nasal Cannula 2.0 28


 


1/24/18 18:59      Nasal Cannula 2.0 28

















Intake and Output  


 


 1/24/18 1/25/18





 19:00 07:00


 


Intake Total 2010.020 ml 1546.0 ml


 


Output Total 400 ml 1975 ml


 


Balance 1610.020 ml -429.0 ml


 


  


 


Intake Oral 472 ml 736 ml


 


IV Total 1538.020 ml 810.0 ml


 


Output Urine Total 400 ml 1975 ml


 


# Voids 3 1











Laboratory Tests








Test


  1/25/18


04:15 1/25/18


12:02


 


White Blood Count


  14.9 K/UL


(4.8-10.8)  H 


 


 


Red Blood Count


  3.11 M/UL


(4.70-6.10)  L 


 


 


Hemoglobin


  8.9 G/DL


(14.2-18.0)  L 


 


 


Hematocrit


  26.7 %


(42.0-52.0)  L 


 


 


Mean Corpuscular Volume 86 FL (80-99)   


 


Mean Corpuscular Hemoglobin


  28.7 PG


(27.0-31.0) 


 


 


Mean Corpuscular Hemoglobin


Concent 33.5 G/DL


(32.0-36.0) 


 


 


Red Cell Distribution Width


  13.8 %


(11.6-14.8) 


 


 


Platelet Count


  114 K/UL


(150-450)  L 


 


 


Mean Platelet Volume


  7.7 FL


(6.5-10.1) 


 


 


Neutrophils (%) (Auto)


  83.0 %


(45.0-75.0)  H 


 


 


Lymphocytes (%) (Auto)


  4.9 %


(20.0-45.0)  L 


 


 


Monocytes (%) (Auto)


  8.0 %


(1.0-10.0) 


 


 


Eosinophils (%) (Auto)


  2.9 %


(0.0-3.0) 


 


 


Basophils (%) (Auto)


  1.3 %


(0.0-2.0) 


 


 


Activated Partial


Thromboplast Time 46 SEC (23-33)


H > 150 SEC


(23-33)  *H


 


Sodium Level


  141 MMOL/L


(136-145) 


 


 


Potassium Level


  3.4 MMOL/L


(3.5-5.1)  L 


 


 


Chloride Level


  111 MMOL/L


()  H 


 


 


Carbon Dioxide Level


  16 MMOL/L


(21-32)  L 


 


 


Anion Gap


  14 mmol/L


(5-15) 


 


 


Blood Urea Nitrogen


  15 mg/dL


(7-18) 


 


 


Creatinine


  2.3 MG/DL


(0.55-1.30)  H 


 


 


Estimat Glomerular Filtration


Rate 34.9 mL/min


(>60) 


 


 


Glucose Level


  128 MG/DL


()  H 


 


 


Calcium Level


  8.4 MG/DL


(8.5-10.1)  L 


 


 


Magnesium Level


  1.8 MG/DL


(1.8-2.4) 


 








Height (Feet):  5


Height (Inches):  10.00


Weight (Pounds):  138


General Appearance:  WD/WN, no apparent distress, alert


Cardiovascular:  normal rate


Respiratory/Chest:  normal breath sounds, no respiratory distress


Abdominal Exam:  normal bowel sounds, non tender, soft


Extremities:  normal range of motion, non-tender











Elsy Lakhani N.P. Jan 25, 2018 16:30

## 2018-01-26 VITALS — DIASTOLIC BLOOD PRESSURE: 80 MMHG | SYSTOLIC BLOOD PRESSURE: 140 MMHG

## 2018-01-26 VITALS — DIASTOLIC BLOOD PRESSURE: 85 MMHG | SYSTOLIC BLOOD PRESSURE: 142 MMHG

## 2018-01-26 VITALS — SYSTOLIC BLOOD PRESSURE: 136 MMHG | DIASTOLIC BLOOD PRESSURE: 82 MMHG

## 2018-01-26 VITALS — SYSTOLIC BLOOD PRESSURE: 152 MMHG | DIASTOLIC BLOOD PRESSURE: 82 MMHG

## 2018-01-26 VITALS — SYSTOLIC BLOOD PRESSURE: 159 MMHG | DIASTOLIC BLOOD PRESSURE: 88 MMHG

## 2018-01-26 VITALS — DIASTOLIC BLOOD PRESSURE: 80 MMHG | SYSTOLIC BLOOD PRESSURE: 146 MMHG

## 2018-01-26 LAB
ADD MANUAL DIFF: YES
ALBUMIN SERPL-MCNC: 1.2 G/DL (ref 3.4–5)
ALBUMIN/GLOB SERPL: 0.3 {RATIO} (ref 1–2.7)
ALP SERPL-CCNC: 80 U/L (ref 46–116)
ALT SERPL-CCNC: 51 U/L (ref 12–78)
ANION GAP SERPL CALC-SCNC: 13 MMOL/L (ref 5–15)
AST SERPL-CCNC: 42 U/L (ref 15–37)
BILIRUB SERPL-MCNC: 0.3 MG/DL (ref 0.2–1)
BUN SERPL-MCNC: 15 MG/DL (ref 7–18)
CALCIUM SERPL-MCNC: 7.9 MG/DL (ref 8.5–10.1)
CHLORIDE SERPL-SCNC: 113 MMOL/L (ref 98–107)
CO2 SERPL-SCNC: 17 MMOL/L (ref 21–32)
CREAT SERPL-MCNC: 2.3 MG/DL (ref 0.55–1.3)
ERYTHROCYTE [DISTWIDTH] IN BLOOD BY AUTOMATED COUNT: 13.9 % (ref 11.6–14.8)
GLOBULIN SER-MCNC: 4.4 G/DL
HCT VFR BLD CALC: 27.4 % (ref 42–52)
HGB BLD-MCNC: 9 G/DL (ref 14.2–18)
MCV RBC AUTO: 86 FL (ref 80–99)
PLATELET # BLD: 67 K/UL (ref 150–450)
POTASSIUM SERPL-SCNC: 3.8 MMOL/L (ref 3.5–5.1)
RBC # BLD AUTO: 3.2 M/UL (ref 4.7–6.1)
SODIUM SERPL-SCNC: 143 MMOL/L (ref 136–145)
WBC # BLD AUTO: 14.3 K/UL (ref 4.8–10.8)

## 2018-01-26 RX ADMIN — LEVALBUTEROL HYDROCHLORIDE SCH MG: 1.25 SOLUTION, CONCENTRATE RESPIRATORY (INHALATION) at 10:42

## 2018-01-26 RX ADMIN — LEVALBUTEROL HYDROCHLORIDE SCH MG: 1.25 SOLUTION, CONCENTRATE RESPIRATORY (INHALATION) at 19:26

## 2018-01-26 RX ADMIN — Medication SCH MCG: at 14:42

## 2018-01-26 RX ADMIN — LEVALBUTEROL HYDROCHLORIDE SCH MG: 1.25 SOLUTION, CONCENTRATE RESPIRATORY (INHALATION) at 14:42

## 2018-01-26 RX ADMIN — DOCUSATE SODIUM SCH MG: 100 CAPSULE, LIQUID FILLED ORAL at 08:36

## 2018-01-26 RX ADMIN — DEXTROSE MONOHYDRATE SCH MLS/HR: 50 INJECTION, SOLUTION INTRAVENOUS at 08:35

## 2018-01-26 RX ADMIN — MORPHINE SULFATE PRN MG: 2 INJECTION, SOLUTION INTRAMUSCULAR; INTRAVENOUS at 21:51

## 2018-01-26 RX ADMIN — Medication SCH TAB: at 08:36

## 2018-01-26 RX ADMIN — Medication SCH MCG: at 23:55

## 2018-01-26 RX ADMIN — DEXTROSE MONOHYDRATE SCH MLS/HR: 50 INJECTION, SOLUTION INTRAVENOUS at 23:50

## 2018-01-26 RX ADMIN — Medication SCH MCG: at 19:26

## 2018-01-26 RX ADMIN — LEVALBUTEROL HYDROCHLORIDE SCH MG: 1.25 SOLUTION, CONCENTRATE RESPIRATORY (INHALATION) at 23:55

## 2018-01-26 RX ADMIN — Medication SCH MCG: at 03:10

## 2018-01-26 RX ADMIN — LEVALBUTEROL HYDROCHLORIDE SCH MG: 1.25 SOLUTION, CONCENTRATE RESPIRATORY (INHALATION) at 06:42

## 2018-01-26 RX ADMIN — DEXTROSE MONOHYDRATE SCH MLS/HR: 50 INJECTION, SOLUTION INTRAVENOUS at 16:58

## 2018-01-26 RX ADMIN — Medication SCH MCG: at 06:42

## 2018-01-26 RX ADMIN — MORPHINE SULFATE PRN MG: 2 INJECTION, SOLUTION INTRAMUSCULAR; INTRAVENOUS at 01:43

## 2018-01-26 RX ADMIN — DOCUSATE SODIUM SCH MG: 100 CAPSULE, LIQUID FILLED ORAL at 18:00

## 2018-01-26 RX ADMIN — LEVALBUTEROL HYDROCHLORIDE SCH MG: 1.25 SOLUTION, CONCENTRATE RESPIRATORY (INHALATION) at 03:10

## 2018-01-26 RX ADMIN — DOCUSATE SODIUM SCH MG: 100 CAPSULE, LIQUID FILLED ORAL at 13:00

## 2018-01-26 RX ADMIN — Medication SCH MCG: at 10:42

## 2018-01-26 NOTE — GI PROGRESS NOTE
Assessment/Plan


Problems:  


(1) Diarrhea


ICD Codes:  R19.7 - Diarrhea, unspecified


SNOMED:  37171114


(2) Sickle cell trait


ICD Codes:  D57.3 - Sickle-cell trait


SNOMED:  07572974


(3) Abdominal pain


ICD Codes:  R10.9 - Unspecified abdominal pain


SNOMED:  81504340


(4) Renal cyst, native, hemorrhage


ICD Codes:  N28.89 - Other specified disorders of kidney and ureter; N28.1 - 

Cyst of kidney, acquired


SNOMED:  49920304


Status:  stable


Status Narrative


Discussed with Dr. Gamez.


Assessment/Plan


OB stool r/o GI bleed  >> negative


stool cx >> negative


cdiff >> negative


O&P >> negative


diarrhea resolved >> lomotil prn





fu renal, hematology, ID recs


monitor H&H, prn transfusions


renal diet, tolerating


ppi


abx


fu labs





Subjective


Subjective


generalized weakness


diarrhea resolved, has had formed BM





Objective





Last 24 Hour Vital Signs








  Date Time  Temp Pulse Resp B/P (MAP) Pulse Ox O2 Delivery O2 Flow Rate FiO2


 


1/26/18 10:46  90 18  99 Nasal Cannula 2.0 28


 


1/26/18 10:43  91 18  97 Nasal Cannula 2.0 28


 


1/26/18 08:00  97      


 


1/26/18 08:00 98.1 94 20 146/80 99   


 


1/26/18 06:45  89 18  99 Nasal Cannula 2.0 28


 


1/26/18 06:44      Nasal Cannula 2.0 28


 


1/26/18 06:44  86 18  98 Nasal Cannula 2.0 28


 


1/26/18 06:42     98 Nasal Cannula 2.0 28


 


1/26/18 04:00    140/80    


 


1/26/18 04:00  93      


 


1/26/18 03:20  94 20  98 Nasal Cannula 2.0 28


 


1/26/18 03:10  89 18  97 Nasal Cannula 2.0 28


 


1/26/18 00:00  99      


 


1/26/18 00:00 98.1  21 142/85 99 Nasal Cannula 2.0 


 


1/25/18 22:50  87 20  99 Nasal Cannula 2.0 28


 


1/25/18 22:41  87 20  98 Nasal Cannula 2.0 28


 


1/25/18 20:06 99.7 102 24 145/78 95   


 


1/25/18 20:02  103      


 


1/25/18 19:10  92 20  99 Nasal Cannula 2.0 28


 


1/25/18 19:00  90 20  97 Nasal Cannula 2.0 28


 


1/25/18 19:00     97 Nasal Cannula 2.0 28


 


1/25/18 19:00      Nasal Cannula 2.0 28


 


1/25/18 16:00 97.9 99 21 156/91 99 Nasal Cannula 2.0 


 


1/25/18 16:00  92      


 


1/25/18 15:17  89 20  99 Nasal Cannula 2.0 28


 


1/25/18 15:05  90 20  98 Nasal Cannula 28.0 28


 


1/25/18 12:00 97.8 90 20 146/86 95 Nasal Cannula 2.0 


 


1/25/18 12:00  87      

















Intake and Output  


 


 1/25/18 1/26/18





 19:00 07:00


 


Intake Total 300 ml 1119.086 ml


 


Output Total 1000 ml 300 ml


 


Balance -700 ml 819.086 ml


 


  


 


IV Total 300 ml 1119.086 ml


 


Output Urine Total 1000 ml 300 ml


 


# Voids  2











Laboratory Tests








Test


  1/25/18


12:02 1/25/18


20:15 1/26/18


04:05


 


Activated Partial


Thromboplast Time > 150 SEC


(23-33)  *H 98 SEC (23-33)


H 103 SEC


(23-33)  H


 


White Blood Count


  


  


  14.3 K/UL


(4.8-10.8)  H


 


Red Blood Count


  


  


  3.20 M/UL


(4.70-6.10)  L


 


Hemoglobin


  


  


  9.0 G/DL


(14.2-18.0)  L


 


Hematocrit


  


  


  27.4 %


(42.0-52.0)  L


 


Mean Corpuscular Volume   86 FL (80-99)  


 


Mean Corpuscular Hemoglobin


  


  


  28.0 PG


(27.0-31.0)


 


Mean Corpuscular Hemoglobin


Concent 


  


  32.8 G/DL


(32.0-36.0)


 


Red Cell Distribution Width


  


  


  13.9 %


(11.6-14.8)


 


Platelet Count


  


  


  67 K/UL


(150-450)  L


 


Mean Platelet Volume


  


  


  8.8 FL


(6.5-10.1)


 


Neutrophils (%) (Auto)


  


  


  % (45.0-75.0)


 


 


Lymphocytes (%) (Auto)


  


  


  % (20.0-45.0)


 


 


Monocytes (%) (Auto)    % (1.0-10.0)  


 


Eosinophils (%) (Auto)    % (0.0-3.0)  


 


Basophils (%) (Auto)    % (0.0-2.0)  


 


Differential Total Cells


Counted 


  


  100  


 


 


Neutrophils % (Manual)   84 % (45-75)  H


 


Lymphocytes % (Manual)   9 % (20-45)  L


 


Monocytes % (Manual)   4 % (1-10)  


 


Eosinophils % (Manual)   3 % (0-3)  


 


Basophils % (Manual)   0 % (0-2)  


 


Band Neutrophils   0 % (0-8)  


 


Platelet Estimate   Decreased  L


 


Platelet Morphology   Normal  


 


Hypochromasia   1+  


 


Sodium Level


  


  


  143 MMOL/L


(136-145)


 


Potassium Level


  


  


  3.8 MMOL/L


(3.5-5.1)


 


Chloride Level


  


  


  113 MMOL/L


()  H


 


Carbon Dioxide Level


  


  


  17 MMOL/L


(21-32)  L


 


Anion Gap


  


  


  13 mmol/L


(5-15)


 


Blood Urea Nitrogen


  


  


  15 mg/dL


(7-18)


 


Creatinine


  


  


  2.3 MG/DL


(0.55-1.30)  H


 


Estimat Glomerular Filtration


Rate 


  


  34.9 mL/min


(>60)


 


Glucose Level


  


  


  105 MG/DL


()


 


Calcium Level


  


  


  7.9 MG/DL


(8.5-10.1)  L


 


Total Bilirubin


  


  


  0.3 MG/DL


(0.2-1.0)


 


Aspartate Amino Transf


(AST/SGOT) 


  


  42 U/L (15-37)


H


 


Alanine Aminotransferase


(ALT/SGPT) 


  


  51 U/L (12-78)


 


 


Alkaline Phosphatase


  


  


  80 U/L


()


 


Total Protein


  


  


  5.6 G/DL


(6.4-8.2)  L


 


Albumin


  


  


  1.2 G/DL


(3.4-5.0)  L


 


Globulin   4.4 g/dL  


 


Albumin/Globulin Ratio


  


  


  0.3 (1.0-2.7)


L








Height (Feet):  5


Height (Inches):  10.00


Weight (Pounds):  137


General Appearance:  WD/WN, no apparent distress, alert


Cardiovascular:  normal rate


Respiratory/Chest:  normal breath sounds, no respiratory distress, other - NC


Abdominal Exam:  normal bowel sounds, non tender, soft


Extremities:  normal range of motion, non-tender











Elsy Lakhani N.PMari Jan 26, 2018 11:22

## 2018-01-26 NOTE — INFECTIOUS DISEASES PROG NOTE
Assessment/Plan


Problems:  


(1) HAP (hospital-acquired pneumonia)


Assessment & Plan:  sputum culture grew candida most likely colonization , 

continue  zosyn for two weeks total  , monitor CXR 





(2) Sepsis


Assessment & Plan:  ruled out with negative blood culture, continue  zosyn for 

two weeks total 





(3) Renal cyst, native, hemorrhage


Assessment & Plan:  urine culture grew mixed contaminant , now on zosyn empiric 

coverage , had urology eval with no intervention, repeated CT scan of the 

abdomen showed recurrent bleeding . recommend urology eval again , since he is 

on heparin drip now 





(4) Abdominal pain


Assessment & Plan:  due to bleeding into the left renal cysts , recommend 

urology eval and better  pain management  





(5) Fever


Assessment & Plan:  improved, due to the above , on wide spectrum  antibiotics 

empiric coverage , continue  tylenol prn 





(6) Diarrhea


Assessment & Plan:  no evidence of  C diff , continue antidiarrhea meds  





(7) DVT (deep venous thrombosis)


Assessment & Plan:  in both legs, with possible PE, off  heparin drip due to 

thrombocytopenia , recommend close monitor of PT/PTT, he is high risk for 

bleeding in the renal cyst , await HIT assay 








Subjective


Constitutional:  Reports: no symptoms


HEENT:  Reports: no symptoms


Respiratory:  Reports: no symptoms


Breasts:  Reports: no symptoms


Cardiovascular:  Reports: no symptoms


Gastrointestinal/Abdominal:  Reports: no symptoms


Genitourinary:  Reports: no symptoms


Neurologic:  Reports: no symptoms


Psychiatric:  Reports: no symptoms


Skin:  Reports: no symptoms


Endocrine:  Reports: no symptoms


Allergies:  


Coded Allergies:  


     CODEINE (Verified  Allergy, Unknown, 1/5/18)


Uncoded Allergies:  


     PEANUTS (Adverse Reaction, Severe, Anaphylaxis, 1/9/18)


Subjective


he feels more comfortable today , with no SOB, no fever or chills





Objective


Vital Signs





Last 24 Hour Vital Signs








  Date Time  Temp Pulse Resp B/P (MAP) Pulse Ox O2 Delivery O2 Flow Rate FiO2


 


1/26/18 12:00  105      


 


1/26/18 12:00 97.9 103 20 136/82 96   


 


1/26/18 10:46  90 18  99 Nasal Cannula 2.0 28


 


1/26/18 10:43  91 18  97 Nasal Cannula 2.0 28


 


1/26/18 08:00  97      


 


1/26/18 08:00 98.1 94 20 146/80 99   


 


1/26/18 06:45  89 18  99 Nasal Cannula 2.0 28


 


1/26/18 06:44      Nasal Cannula 2.0 28


 


1/26/18 06:44  86 18  98 Nasal Cannula 2.0 28


 


1/26/18 06:42     98 Nasal Cannula 2.0 28


 


1/26/18 04:00    140/80    


 


1/26/18 04:00  93      


 


1/26/18 03:20  94 20  98 Nasal Cannula 2.0 28


 


1/26/18 03:10  89 18  97 Nasal Cannula 2.0 28


 


1/26/18 00:00  99      


 


1/26/18 00:00 98.1  21 142/85 99 Nasal Cannula 2.0 


 


1/25/18 22:50  87 20  99 Nasal Cannula 2.0 28


 


1/25/18 22:41  87 20  98 Nasal Cannula 2.0 28


 


1/25/18 20:06 99.7 102 24 145/78 95   


 


1/25/18 20:02  103      


 


1/25/18 19:10  92 20  99 Nasal Cannula 2.0 28


 


1/25/18 19:00  90 20  97 Nasal Cannula 2.0 28


 


1/25/18 19:00     97 Nasal Cannula 2.0 28


 


1/25/18 19:00      Nasal Cannula 2.0 28


 


1/25/18 16:00 97.9 99 21 156/91 99 Nasal Cannula 2.0 


 


1/25/18 16:00  92      


 


1/25/18 15:17  89 20  99 Nasal Cannula 2.0 28


 


1/25/18 15:05  90 20  98 Nasal Cannula 28.0 28








Height (Feet):  5


Height (Inches):  10.00


Weight (Pounds):  137


General Appearance:  WD/WN, no acute distress


HEENT:  normocephalic, atraumatic, anicteric, mucous membranes moist, PERRL


Respiratory/Chest:  chest wall non-tender, no respiratory distress, no 

accessory muscle use, decreased breath sounds, crackles/rales


Cardiovascular:  normal peripheral pulses, normal rate, regular rhythm, no 

gallop/murmur, no JVD


Abdomen:  normal bowel sounds, soft, non tender, no organomegaly, non distended

, no mass, no scars


Extremities:  no cyanosis, no clubbing


Skin:  no rash, no lesions


Neurologic/Psychiatric:  alert, oriented x 3, responsive





Laboratory Tests








Test


  1/25/18


20:15 1/26/18


04:05


 


Activated Partial


Thromboplast Time 98 SEC (23-33)


H 103 SEC


(23-33)  H


 


White Blood Count


  


  14.3 K/UL


(4.8-10.8)  H


 


Red Blood Count


  


  3.20 M/UL


(4.70-6.10)  L


 


Hemoglobin


  


  9.0 G/DL


(14.2-18.0)  L


 


Hematocrit


  


  27.4 %


(42.0-52.0)  L


 


Mean Corpuscular Volume  86 FL (80-99)  


 


Mean Corpuscular Hemoglobin


  


  28.0 PG


(27.0-31.0)


 


Mean Corpuscular Hemoglobin


Concent 


  32.8 G/DL


(32.0-36.0)


 


Red Cell Distribution Width


  


  13.9 %


(11.6-14.8)


 


Platelet Count


  


  67 K/UL


(150-450)  L


 


Mean Platelet Volume


  


  8.8 FL


(6.5-10.1)


 


Neutrophils (%) (Auto)


  


  % (45.0-75.0)


 


 


Lymphocytes (%) (Auto)


  


  % (20.0-45.0)


 


 


Monocytes (%) (Auto)   % (1.0-10.0)  


 


Eosinophils (%) (Auto)   % (0.0-3.0)  


 


Basophils (%) (Auto)   % (0.0-2.0)  


 


Differential Total Cells


Counted 


  100  


 


 


Neutrophils % (Manual)  84 % (45-75)  H


 


Lymphocytes % (Manual)  9 % (20-45)  L


 


Monocytes % (Manual)  4 % (1-10)  


 


Eosinophils % (Manual)  3 % (0-3)  


 


Basophils % (Manual)  0 % (0-2)  


 


Band Neutrophils  0 % (0-8)  


 


Platelet Estimate  Decreased  L


 


Platelet Morphology  Normal  


 


Hypochromasia  1+  


 


Sodium Level


  


  143 MMOL/L


(136-145)


 


Potassium Level


  


  3.8 MMOL/L


(3.5-5.1)


 


Chloride Level


  


  113 MMOL/L


()  H


 


Carbon Dioxide Level


  


  17 MMOL/L


(21-32)  L


 


Anion Gap


  


  13 mmol/L


(5-15)


 


Blood Urea Nitrogen


  


  15 mg/dL


(7-18)


 


Creatinine


  


  2.3 MG/DL


(0.55-1.30)  H


 


Estimat Glomerular Filtration


Rate 


  34.9 mL/min


(>60)


 


Glucose Level


  


  105 MG/DL


()


 


Calcium Level


  


  7.9 MG/DL


(8.5-10.1)  L


 


Total Bilirubin


  


  0.3 MG/DL


(0.2-1.0)


 


Aspartate Amino Transf


(AST/SGOT) 


  42 U/L (15-37)


H


 


Alanine Aminotransferase


(ALT/SGPT) 


  51 U/L (12-78)


 


 


Alkaline Phosphatase


  


  80 U/L


()


 


Total Protein


  


  5.6 G/DL


(6.4-8.2)  L


 


Albumin


  


  1.2 G/DL


(3.4-5.0)  L


 


Globulin  4.4 g/dL  


 


Albumin/Globulin Ratio


  


  0.3 (1.0-2.7)


L











Current Medications








 Medications


  (Trade)  Dose


 Ordered  Sig/Judie


 Route


 PRN Reason  Start Time


 Stop Time Status Last Admin


Dose Admin


 


 Acetaminophen


  (Tylenol)  650 mg  Q4H  PRN


 ORAL


 Mild Pain/Temp > 100.5  1/21/18 19:45


 2/5/18 19:44  1/24/18 03:19


 


 


 Dextrose


  (Dextrose 50%)    STAT  PRN


 IV


 Hypoglycemia  1/21/18 19:45


 2/20/18 19:44   


 


 


 Diphenoxylate HCl/


 Atropine


  (Lomotil)  2.5 mg  Q4H  PRN


 ORAL


 Diarrhea  1/21/18 19:45


 2/9/18 19:44  1/22/18 04:00


 


 


 Docusate Sodium


  (Colace)  100 mg  THREE TIMES A  DAY


 ORAL


   1/21/18 18:00


 2/19/18 17:59  1/22/18 08:58


 


 


 Folic Acid


  (Folate)  1 mg  DAILY


 ORAL


   1/22/18 09:00


 2/11/18 14:59  1/26/18 08:36


 


 


 Ipratropium


 Bromide


  (Atrovent)  500 mcg  Q4HRT


 N


   1/25/18 07:45


 1/30/18 07:44  1/26/18 14:42


 


 


 Levalbuterol HCl


  (Xopenex)  1.25 mg  Q4HRT


 N


   1/25/18 07:45


 1/30/18 07:44  1/26/18 14:42


 


 


 Magnesium


 Hydroxide


  (Mom)  30 ml  BIDPRN  PRN


 ORAL


 constipation  1/21/18 19:46


 2/20/18 19:45   


 


 


 Morphine Sulfate


  (Morphine


 Sulfate)  2 mg  Q4H  PRN


 IVP


 Moderate to Severe Pain  1/24/18 18:45


 1/31/18 18:44  1/26/18 01:43


 


 


 Ondansetron HCl


  (Zofran)  4 mg  Q6H  PRN


 IVP


 Nausea & Vomiting  1/21/18 19:46


 2/5/18 19:45   


 


 


 Pantoprazole


  (Protonix)  40 mg  DAILY


 ORAL


   1/22/18 09:00


 2/13/18 08:59  1/26/18 08:36


 


 


 Piperacillin Sod/


 Tazobactam Sod


 3.375 gm/Sodium


 Chloride  110 ml @ 


 27.5 mls/hr  Q8H


 IVPB


   1/24/18 08:00


 1/31/18 07:59  1/26/18 08:35


 


 


 Sodium Chloride  1,000 ml @ 


 100 mls/hr  Q10H


 IV


   1/24/18 08:00


 2/23/18 07:59  1/26/18 00:18


 


 


 Vitamin B Complex/


 Vit C/Folic Acid


  (Nephrovite)  1 tab  DAILY


 ORAL


   1/22/18 09:00


 2/8/18 08:59  1/26/18 08:36


 

















Pablo Talley M.D. Jan 26, 2018 14:46

## 2018-01-26 NOTE — NEPHROLOGY PROGRESS NOTE
Assessment/Plan


Problem List:  


(1) Sickle cell trait


(2) Abdominal pain


(3) Renal cyst, native, hemorrhage


(4) Sepsis


(5) Hematuria


(6) Shortness of breath


(7) Chest pain


(8) Severe anemia


(9) Pneumonia


(10) DVT (deep venous thrombosis)


(11) Tachycardia


(12) Sickle cell anemia


(13) HAP (hospital-acquired pneumonia)


Plan


Continue current treatment plan


Off  heparin drip due to thrombocytopenia , 


Monitor PT/PTT, high risk for bleeding in the renal cyst , await HIT assay 


Monitor HR, cardio following


Continue o2 therapy prn


Monitor H&H and transfuse prn


Hematology, cardiology GI, and ID and pulmo following, will f/u with recs


Monitor lytes, correct prn


Continue abx per ID


Monitor renal function, avoid nephrotoxins 


Pain management prn


Continue neb treatment


Daily PPI


AM labs





Subjective


Constitutional:  Denies: no symptoms, chills, diaphoresis, fever, malaise, 

weakness, other


HEENT:  Denies: no symptoms, eye pain, blurred vision, tearing, double vision, 

ear pain, ear discharge, nose pain, nose congestion, throat pain, throat 

swelling, mouth pain, mouth swelling, other


Genitourinary:  Denies: no symptoms, burning, discharge, frequency, flank pain, 

hematuria, incontinence, pain, urgency, other


Neurologic/Psychiatric:  Denies: no symptoms, anxiety, depressed, emotional 

problems, headache, numbness, paresthesia, pre-existing deficit, seizure, 

tingling, tremors, weakness, other


Subjective


In bed, in no apparent distress, off heparin drip





Objective


Objective





Last 24 Hour Vital Signs








  Date Time  Temp Pulse Resp B/P (MAP) Pulse Ox O2 Delivery O2 Flow Rate FiO2


 


1/26/18 16:00 98.4 93 20 152/82 98   


 


1/26/18 16:00  96      


 


1/26/18 14:46  88 18  99 Nasal Cannula 2.0 28


 


1/26/18 14:42  89 18  96 Nasal Cannula 2.0 28


 


1/26/18 12:00  105      


 


1/26/18 12:00 97.9 103 20 136/82 96   


 


1/26/18 10:46  90 18  99 Nasal Cannula 2.0 28


 


1/26/18 10:43  91 18  97 Nasal Cannula 2.0 28


 


1/26/18 08:00  97      


 


1/26/18 08:00 98.1 94 20 146/80 99   


 


1/26/18 06:45  89 18  99 Nasal Cannula 2.0 28


 


1/26/18 06:44      Nasal Cannula 2.0 28


 


1/26/18 06:44  86 18  98 Nasal Cannula 2.0 28


 


1/26/18 06:42     98 Nasal Cannula 2.0 28


 


1/26/18 04:00    140/80    


 


1/26/18 04:00  93      


 


1/26/18 03:20  94 20  98 Nasal Cannula 2.0 28


 


1/26/18 03:10  89 18  97 Nasal Cannula 2.0 28


 


1/26/18 00:00  99      


 


1/26/18 00:00 98.1  21 142/85 99 Nasal Cannula 2.0 


 


1/25/18 22:50  87 20  99 Nasal Cannula 2.0 28


 


1/25/18 22:41  87 20  98 Nasal Cannula 2.0 28


 


1/25/18 20:06 99.7 102 24 145/78 95   


 


1/25/18 20:02  103      

















Intake and Output  


 


 1/25/18 1/26/18





 19:00 07:00


 


Intake Total 300 ml 1119.086 ml


 


Output Total 1000 ml 300 ml


 


Balance -700 ml 819.086 ml


 


  


 


IV Total 300 ml 1119.086 ml


 


Output Urine Total 1000 ml 300 ml


 


# Voids  2








Laboratory Tests


1/25/18 20:15: Activated Partial Thromboplast Time 98H


1/26/18 04:05: 


Activated Partial Thromboplast Time 103H, White Blood Count 14.3H, Red Blood 

Count 3.20L, Hemoglobin 9.0L, Hematocrit 27.4L, Mean Corpuscular Volume 86, 

Mean Corpuscular Hemoglobin 28.0, Mean Corpuscular Hemoglobin Concent 32.8, Red 

Cell Distribution Width 13.9, Platelet Count 67L, Mean Platelet Volume 8.8, 

Neutrophils (%) (Auto) , Lymphocytes (%) (Auto) , Monocytes (%) (Auto) , 

Eosinophils (%) (Auto) , Basophils (%) (Auto) , Differential Total Cells 

Counted 100, Neutrophils % (Manual) 84H, Lymphocytes % (Manual) 9L, Monocytes % 

(Manual) 4, Eosinophils % (Manual) 3, Basophils % (Manual) 0, Band Neutrophils 0

, Platelet Estimate DecreasedL, Platelet Morphology Normal, Hypochromasia 1+, 

Sodium Level 143, Potassium Level 3.8, Chloride Level 113H, Carbon Dioxide 

Level 17L, Anion Gap 13, Blood Urea Nitrogen 15, Creatinine 2.3H, Estimat 

Glomerular Filtration Rate 34.9, Glucose Level 105, Calcium Level 7.9L, Total 

Bilirubin 0.3, Aspartate Amino Transf (AST/SGOT) 42H, Alanine Aminotransferase (

ALT/SGPT) 51, Alkaline Phosphatase 80, Total Protein 5.6L, Albumin 1.2L, 

Globulin 4.4, Albumin/Globulin Ratio 0.3L


1/26/18 10:00: Heparin-PF4 Antibody Screen [Pending]


Height (Feet):  5


Height (Inches):  10.00


Weight (Pounds):  137


General Appearance:  no apparent distress, alert


EENT:  normal ENT inspection


Neck:  normal alignment


Cardiovascular:  normal rate


Respiratory/Chest:  no respiratory distress, decreased breath sounds


Abdomen:  soft


Extremities:  calf tenderness


Neurologic:  alert, oriented x 3, responsive, normal mood/affect











Dayanara Hawley N.P. Jan 26, 2018 19:15

## 2018-01-26 NOTE — PULMONOLOGY PROGRESS NOTE
Assessment/Plan


Assessment/Plan


1. sickle cell anemia


2. Sepsis, pneumonia


3. Acute bilateral LE DVT, probable PE with hemoptysis


4. Diarrhea. Clostridium difficile colitis has been ruled out.  


5. Hematuria and anemia. Hb stable in 7.5-8 range





called to see re hemoptysis, resolved now


CXR slightly increased R effusion


continue heparin, oral AC at dc


Antibiotics





Will see prn only


Please call if needed





Subjective


Respiratory:  Reports: hemoptysis, shortness of breath


Allergies:  


Coded Allergies:  


     CODEINE (Verified  Allergy, Unknown, 1/5/18)


Uncoded Allergies:  


     PEANUTS (Adverse Reaction, Severe, Anaphylaxis, 1/9/18)





Objective





Last 24 Hour Vital Signs








  Date Time  Temp Pulse Resp B/P (MAP) Pulse Ox O2 Delivery O2 Flow Rate FiO2


 


1/26/18 08:00 98.1 94 20 146/80 99   


 


1/26/18 06:45  89 18  99 Nasal Cannula 2.0 28


 


1/26/18 06:44      Nasal Cannula 2.0 28


 


1/26/18 06:44  86 18  98 Nasal Cannula 2.0 28


 


1/26/18 06:42     98 Nasal Cannula 2.0 28


 


1/26/18 04:00    140/80    


 


1/26/18 04:00  93      


 


1/26/18 03:20  94 20  98 Nasal Cannula 2.0 28


 


1/26/18 03:10  89 18  97 Nasal Cannula 2.0 28


 


1/26/18 00:00  99      


 


1/26/18 00:00 98.1  21 142/85 99 Nasal Cannula 2.0 


 


1/25/18 22:50  87 20  99 Nasal Cannula 2.0 28


 


1/25/18 22:41  87 20  98 Nasal Cannula 2.0 28


 


1/25/18 20:06 99.7 102 24 145/78 95   


 


1/25/18 20:02  103      


 


1/25/18 19:10  92 20  99 Nasal Cannula 2.0 28


 


1/25/18 19:00  90 20  97 Nasal Cannula 2.0 28


 


1/25/18 19:00     97 Nasal Cannula 2.0 28


 


1/25/18 19:00      Nasal Cannula 2.0 28


 


1/25/18 16:00 97.9 99 21 156/91 99 Nasal Cannula 2.0 


 


1/25/18 16:00  92      


 


1/25/18 15:17  89 20  99 Nasal Cannula 2.0 28


 


1/25/18 15:05  90 20  98 Nasal Cannula 28.0 28


 


1/25/18 12:00 97.8 90 20 146/86 95 Nasal Cannula 2.0 


 


1/25/18 12:00  87      


 


1/25/18 11:05  90 22  99 Nasal Cannula 2.0 28


 


1/25/18 10:50  89 20  97 Nasal Cannula 28.0 28

















Intake and Output  


 


 1/25/18 1/26/18





 19:00 07:00


 


Intake Total 300 ml 1119.086 ml


 


Output Total 1000 ml 300 ml


 


Balance -700 ml 819.086 ml


 


  


 


IV Total 300 ml 1119.086 ml


 


Output Urine Total 1000 ml 300 ml


 


# Voids  2








General Appearance:  no acute distress


HEENT:  atraumatic


Respiratory/Chest:  other - egophony R base


Laboratory Tests


1/25/18 12:02: Activated Partial Thromboplast Time > 150*H


1/25/18 20:15: Activated Partial Thromboplast Time 98H


1/26/18 04:05: 


Activated Partial Thromboplast Time 103H, White Blood Count 14.3H, Red Blood 

Count 3.20L, Hemoglobin 9.0L, Hematocrit 27.4L, Mean Corpuscular Volume 86, 

Mean Corpuscular Hemoglobin 28.0, Mean Corpuscular Hemoglobin Concent 32.8, Red 

Cell Distribution Width 13.9, Platelet Count 67L, Mean Platelet Volume 8.8, 

Neutrophils (%) (Auto) , Lymphocytes (%) (Auto) , Monocytes (%) (Auto) , 

Eosinophils (%) (Auto) , Basophils (%) (Auto) , Neutrophils % (Manual) [Pending]

, Lymphocytes % (Manual) [Pending], Platelet Estimate [Pending], Platelet 

Morphology [Pending], Sodium Level 143, Potassium Level 3.8, Chloride Level 113H

, Carbon Dioxide Level 17L, Anion Gap 13, Blood Urea Nitrogen 15, Creatinine 

2.3H, Estimat Glomerular Filtration Rate 34.9, Glucose Level 105, Calcium Level 

7.9L, Total Bilirubin 0.3, Aspartate Amino Transf (AST/SGOT) 42H, Alanine 

Aminotransferase (ALT/SGPT) 51, Alkaline Phosphatase 80, Total Protein 5.6L, 

Albumin 1.2L, Globulin 4.4, Albumin/Globulin Ratio 0.3L





Current Medications








 Medications


  (Trade)  Dose


 Ordered  Sig/Judie


 Route


 PRN Reason  Start Time


 Stop Time Status Last Admin


Dose Admin


 


 Acetaminophen


  (Tylenol)  650 mg  Q4H  PRN


 ORAL


 Mild Pain/Temp > 100.5  1/21/18 19:45


 2/5/18 19:44  1/24/18 03:19


 


 


 Dextrose


  (Dextrose 50%)    STAT  PRN


 IV


 Hypoglycemia  1/21/18 19:45


 2/20/18 19:44   


 


 


 Diphenoxylate HCl/


 Atropine


  (Lomotil)  2.5 mg  Q4H  PRN


 ORAL


 Diarrhea  1/21/18 19:45


 2/9/18 19:44  1/22/18 04:00


 


 


 Docusate Sodium


  (Colace)  100 mg  THREE TIMES A  DAY


 ORAL


   1/21/18 18:00


 2/19/18 17:59  1/22/18 08:58


 


 


 Folic Acid


  (Folate)  1 mg  DAILY


 ORAL


   1/22/18 09:00


 2/11/18 14:59  1/26/18 08:36


 


 


 Heparin Sodium/


 Dextrose  500 ml @ 


 28.794 mls/


 hr  adjust per protocol


 IV


   1/26/18 05:30


 2/24/18 05:44  1/26/18 05:37


 


 


 Ipratropium


 Bromide


  (Atrovent)  500 mcg  Q4HRT


 N


   1/25/18 07:45


 1/30/18 07:44  1/26/18 06:42


 


 


 Levalbuterol HCl


  (Xopenex)  1.25 mg  Q4HRT


 N


   1/25/18 07:45


 1/30/18 07:44  1/26/18 06:42


 


 


 Magnesium


 Hydroxide


  (Mom)  30 ml  BIDPRN  PRN


 ORAL


 constipation  1/21/18 19:46


 2/20/18 19:45   


 


 


 Morphine Sulfate


  (Morphine


 Sulfate)  2 mg  Q4H  PRN


 IVP


 Moderate to Severe Pain  1/24/18 18:45


 1/31/18 18:44  1/26/18 01:43


 


 


 Ondansetron HCl


  (Zofran)  4 mg  Q6H  PRN


 IVP


 Nausea & Vomiting  1/21/18 19:46


 2/5/18 19:45   


 


 


 Pantoprazole


  (Protonix)  40 mg  DAILY


 ORAL


   1/22/18 09:00


 2/13/18 08:59  1/26/18 08:36


 


 


 Piperacillin Sod/


 Tazobactam Sod


 3.375 gm/Sodium


 Chloride  110 ml @ 


 27.5 mls/hr  Q8H


 IVPB


   1/24/18 08:00


 1/31/18 07:59  1/26/18 08:35


 


 


 Sodium Chloride  1,000 ml @ 


 100 mls/hr  Q10H


 IV


   1/24/18 08:00


 2/23/18 07:59  1/26/18 00:18


 


 


 Vitamin B Complex/


 Vit C/Folic Acid


  (Nephrovite)  1 tab  DAILY


 ORAL


   1/22/18 09:00


 2/8/18 08:59  1/26/18 08:36


 

















ELLIE VALDEZ Jan 26, 2018 09:01

## 2018-01-26 NOTE — CARDIAC ELECTROPHYSIOLOGY PN
Assessment/Plan


Assessment/Plan


1. Sinus Tachycardia due to sickle cell anemia, fever and acute bilateral DVT. 


     Echocardiogram  EF 60%.  No SVT or VT. Resolved.





2.  Sepsis, WBC of 26K. On intravenous antibiotic per Dr. Talley. Down to 16K 





3.  Abdominal pain, likely due to renal cysts and hemorrhage.





4.  Diarrhea. Clostridium difficile colitis has been ruled out.  





5. Hematuria and anemia. Hb stable in 7.5-8 range





6. Acute bilateral LE DVT.  Heparin DCed for low platelet. HIT level pending





7. Constipation.  





8. Hyperkalemia.





9. Renal failure Cr 2.3





DW RN





Subjective


Subjective


In NSR. Heparin drip DCed for low platelet. No chest pain or SOB.HIT level 

ordered by Dr Vences





Objective





Last 24 Hour Vital Signs








  Date Time  Temp Pulse Resp B/P (MAP) Pulse Ox O2 Delivery O2 Flow Rate FiO2


 


1/26/18 12:00  105      


 


1/26/18 12:00 97.9 103 20 136/82 96   


 


1/26/18 10:46  90 18  99 Nasal Cannula 2.0 28


 


1/26/18 10:43  91 18  97 Nasal Cannula 2.0 28


 


1/26/18 08:00  97      


 


1/26/18 08:00 98.1 94 20 146/80 99   


 


1/26/18 06:45  89 18  99 Nasal Cannula 2.0 28


 


1/26/18 06:44      Nasal Cannula 2.0 28


 


1/26/18 06:44  86 18  98 Nasal Cannula 2.0 28


 


1/26/18 06:42     98 Nasal Cannula 2.0 28


 


1/26/18 04:00    140/80    


 


1/26/18 04:00  93      


 


1/26/18 03:20  94 20  98 Nasal Cannula 2.0 28


 


1/26/18 03:10  89 18  97 Nasal Cannula 2.0 28


 


1/26/18 00:00  99      


 


1/26/18 00:00 98.1  21 142/85 99 Nasal Cannula 2.0 


 


1/25/18 22:50  87 20  99 Nasal Cannula 2.0 28


 


1/25/18 22:41  87 20  98 Nasal Cannula 2.0 28


 


1/25/18 20:06 99.7 102 24 145/78 95   


 


1/25/18 20:02  103      


 


1/25/18 19:10  92 20  99 Nasal Cannula 2.0 28


 


1/25/18 19:00  90 20  97 Nasal Cannula 2.0 28


 


1/25/18 19:00     97 Nasal Cannula 2.0 28


 


1/25/18 19:00      Nasal Cannula 2.0 28


 


1/25/18 16:00 97.9 99 21 156/91 99 Nasal Cannula 2.0 


 


1/25/18 16:00  92      


 


1/25/18 15:17  89 20  99 Nasal Cannula 2.0 28


 


1/25/18 15:05  90 20  98 Nasal Cannula 28.0 28

















Intake and Output  


 


 1/25/18 1/26/18





 19:00 07:00


 


Intake Total 300 ml 1119.086 ml


 


Output Total 1000 ml 300 ml


 


Balance -700 ml 819.086 ml


 


  


 


IV Total 300 ml 1119.086 ml


 


Output Urine Total 1000 ml 300 ml


 


# Voids  2











Laboratory Tests








Test


  1/25/18


20:15 1/26/18


04:05


 


Activated Partial


Thromboplast Time 98 SEC (23-33)


H 103 SEC


(23-33)  H


 


White Blood Count


  


  14.3 K/UL


(4.8-10.8)  H


 


Red Blood Count


  


  3.20 M/UL


(4.70-6.10)  L


 


Hemoglobin


  


  9.0 G/DL


(14.2-18.0)  L


 


Hematocrit


  


  27.4 %


(42.0-52.0)  L


 


Mean Corpuscular Volume  86 FL (80-99)  


 


Mean Corpuscular Hemoglobin


  


  28.0 PG


(27.0-31.0)


 


Mean Corpuscular Hemoglobin


Concent 


  32.8 G/DL


(32.0-36.0)


 


Red Cell Distribution Width


  


  13.9 %


(11.6-14.8)


 


Platelet Count


  


  67 K/UL


(150-450)  L


 


Mean Platelet Volume


  


  8.8 FL


(6.5-10.1)


 


Neutrophils (%) (Auto)


  


  % (45.0-75.0)


 


 


Lymphocytes (%) (Auto)


  


  % (20.0-45.0)


 


 


Monocytes (%) (Auto)   % (1.0-10.0)  


 


Eosinophils (%) (Auto)   % (0.0-3.0)  


 


Basophils (%) (Auto)   % (0.0-2.0)  


 


Differential Total Cells


Counted 


  100  


 


 


Neutrophils % (Manual)  84 % (45-75)  H


 


Lymphocytes % (Manual)  9 % (20-45)  L


 


Monocytes % (Manual)  4 % (1-10)  


 


Eosinophils % (Manual)  3 % (0-3)  


 


Basophils % (Manual)  0 % (0-2)  


 


Band Neutrophils  0 % (0-8)  


 


Platelet Estimate  Decreased  L


 


Platelet Morphology  Normal  


 


Hypochromasia  1+  


 


Sodium Level


  


  143 MMOL/L


(136-145)


 


Potassium Level


  


  3.8 MMOL/L


(3.5-5.1)


 


Chloride Level


  


  113 MMOL/L


()  H


 


Carbon Dioxide Level


  


  17 MMOL/L


(21-32)  L


 


Anion Gap


  


  13 mmol/L


(5-15)


 


Blood Urea Nitrogen


  


  15 mg/dL


(7-18)


 


Creatinine


  


  2.3 MG/DL


(0.55-1.30)  H


 


Estimat Glomerular Filtration


Rate 


  34.9 mL/min


(>60)


 


Glucose Level


  


  105 MG/DL


()


 


Calcium Level


  


  7.9 MG/DL


(8.5-10.1)  L


 


Total Bilirubin


  


  0.3 MG/DL


(0.2-1.0)


 


Aspartate Amino Transf


(AST/SGOT) 


  42 U/L (15-37)


H


 


Alanine Aminotransferase


(ALT/SGPT) 


  51 U/L (12-78)


 


 


Alkaline Phosphatase


  


  80 U/L


()


 


Total Protein


  


  5.6 G/DL


(6.4-8.2)  L


 


Albumin


  


  1.2 G/DL


(3.4-5.0)  L


 


Globulin  4.4 g/dL  


 


Albumin/Globulin Ratio


  


  0.3 (1.0-2.7)


L








Objective


HEAD AND NECK:  No JVD.


LUNGS:  Clear.


CARDIOVASCULAR:  Nl S1 and S2 with no gallop or murmur.


ABDOMEN:  Soft and nontender.


EXTREMITIES:  Bilateral 1 plus pitting edema.











KAYLEEN HAIR Jan 26, 2018 14:03

## 2018-01-27 VITALS — SYSTOLIC BLOOD PRESSURE: 155 MMHG | DIASTOLIC BLOOD PRESSURE: 87 MMHG

## 2018-01-27 VITALS — DIASTOLIC BLOOD PRESSURE: 78 MMHG | SYSTOLIC BLOOD PRESSURE: 148 MMHG

## 2018-01-27 VITALS — SYSTOLIC BLOOD PRESSURE: 156 MMHG | DIASTOLIC BLOOD PRESSURE: 85 MMHG

## 2018-01-27 VITALS — DIASTOLIC BLOOD PRESSURE: 89 MMHG | SYSTOLIC BLOOD PRESSURE: 155 MMHG

## 2018-01-27 VITALS — SYSTOLIC BLOOD PRESSURE: 144 MMHG | DIASTOLIC BLOOD PRESSURE: 77 MMHG

## 2018-01-27 VITALS — SYSTOLIC BLOOD PRESSURE: 154 MMHG | DIASTOLIC BLOOD PRESSURE: 86 MMHG

## 2018-01-27 LAB
ADD MANUAL DIFF: YES
ANION GAP SERPL CALC-SCNC: 12 MMOL/L (ref 5–15)
BUN SERPL-MCNC: 16 MG/DL (ref 7–18)
CALCIUM SERPL-MCNC: 8.5 MG/DL (ref 8.5–10.1)
CHLORIDE SERPL-SCNC: 112 MMOL/L (ref 98–107)
CO2 SERPL-SCNC: 17 MMOL/L (ref 21–32)
CREAT SERPL-MCNC: 2.3 MG/DL (ref 0.55–1.3)
ERYTHROCYTE [DISTWIDTH] IN BLOOD BY AUTOMATED COUNT: 13.9 % (ref 11.6–14.8)
HCT VFR BLD CALC: 27 % (ref 42–52)
HGB BLD-MCNC: 8.8 G/DL (ref 14.2–18)
MCV RBC AUTO: 86 FL (ref 80–99)
PLATELET # BLD: 71 K/UL (ref 150–450)
POTASSIUM SERPL-SCNC: 3.5 MMOL/L (ref 3.5–5.1)
RBC # BLD AUTO: 3.13 M/UL (ref 4.7–6.1)
SODIUM SERPL-SCNC: 141 MMOL/L (ref 136–145)
WBC # BLD AUTO: 12.7 K/UL (ref 4.8–10.8)

## 2018-01-27 RX ADMIN — APIXABAN SCH MG: 2.5 TABLET, FILM COATED ORAL at 18:11

## 2018-01-27 RX ADMIN — DEXTROSE MONOHYDRATE SCH MLS/HR: 50 INJECTION, SOLUTION INTRAVENOUS at 15:17

## 2018-01-27 RX ADMIN — MORPHINE SULFATE PRN MG: 2 INJECTION, SOLUTION INTRAMUSCULAR; INTRAVENOUS at 12:49

## 2018-01-27 RX ADMIN — DEXTROSE MONOHYDRATE SCH MLS/HR: 50 INJECTION, SOLUTION INTRAVENOUS at 08:43

## 2018-01-27 RX ADMIN — LEVALBUTEROL HYDROCHLORIDE SCH MG: 1.25 SOLUTION, CONCENTRATE RESPIRATORY (INHALATION) at 20:06

## 2018-01-27 RX ADMIN — Medication SCH MCG: at 14:31

## 2018-01-27 RX ADMIN — LEVALBUTEROL HYDROCHLORIDE SCH MG: 1.25 SOLUTION, CONCENTRATE RESPIRATORY (INHALATION) at 07:40

## 2018-01-27 RX ADMIN — Medication SCH TAB: at 08:43

## 2018-01-27 RX ADMIN — DOCUSATE SODIUM SCH MG: 100 CAPSULE, LIQUID FILLED ORAL at 12:39

## 2018-01-27 RX ADMIN — DOCUSATE SODIUM SCH MG: 100 CAPSULE, LIQUID FILLED ORAL at 08:43

## 2018-01-27 RX ADMIN — MORPHINE SULFATE PRN MG: 2 INJECTION, SOLUTION INTRAMUSCULAR; INTRAVENOUS at 01:51

## 2018-01-27 RX ADMIN — LEVALBUTEROL HYDROCHLORIDE SCH MG: 1.25 SOLUTION, CONCENTRATE RESPIRATORY (INHALATION) at 10:30

## 2018-01-27 RX ADMIN — Medication SCH MCG: at 10:30

## 2018-01-27 RX ADMIN — LEVALBUTEROL HYDROCHLORIDE SCH MG: 1.25 SOLUTION, CONCENTRATE RESPIRATORY (INHALATION) at 03:27

## 2018-01-27 RX ADMIN — DOCUSATE SODIUM SCH MG: 100 CAPSULE, LIQUID FILLED ORAL at 18:00

## 2018-01-27 RX ADMIN — Medication SCH MCG: at 20:06

## 2018-01-27 RX ADMIN — LEVALBUTEROL HYDROCHLORIDE SCH MG: 1.25 SOLUTION, CONCENTRATE RESPIRATORY (INHALATION) at 14:31

## 2018-01-27 RX ADMIN — Medication SCH MCG: at 03:27

## 2018-01-27 RX ADMIN — Medication SCH MCG: at 07:40

## 2018-01-27 RX ADMIN — MORPHINE SULFATE PRN MG: 4 INJECTION INTRAVENOUS at 20:01

## 2018-01-27 NOTE — INFECTIOUS DISEASES PROG NOTE
Assessment/Plan


Problems:  


(1) HAP (hospital-acquired pneumonia)


Assessment & Plan:  sputum culture grew candida most likely colonization , 

continue  zosyn for two weeks total . EOT 2/1/2018  





(2) Sepsis


Assessment & Plan:  ruled out with negative blood culture, continue  zosyn for 

two weeks total 





(3) Renal cyst, native, hemorrhage


Assessment & Plan:  urine culture grew mixed contaminant , now on zosyn empiric 

coverage , had urology eval with no intervention, repeated CT scan of the 

abdomen showed recurrent bleeding . recommend urology eval again , since he is 

on heparin drip now 





(4) Abdominal pain


Assessment & Plan:  due to bleeding into the left renal cysts , recommend 

urology eval and better  pain management  





(5) Fever


Assessment & Plan:  improved, due to the above , on wide spectrum  antibiotics 

empiric coverage , continue  tylenol prn 





(6) Diarrhea


Assessment & Plan:  no evidence of  C diff , continue antidiarrhea meds  





(7) DVT (deep venous thrombosis)


Assessment & Plan:  in both legs, with possible PE, off  heparin drip due to 

thrombocytopenia , recommend close monitor of PT/PTT, he is high risk for 

bleeding in the renal cyst , await HIT assay 





(8) Thrombocytopenia


Assessment & Plan:  rule out HIT , await antibodies screening, hematology is 

following 








Subjective


Constitutional:  Reports: no symptoms


HEENT:  Reports: no symptoms


Respiratory:  Reports: productive cough


Breasts:  Reports: no symptoms


Cardiovascular:  Reports: no symptoms


Gastrointestinal/Abdominal:  Reports: no symptoms


Genitourinary:  Reports: no symptoms


Neurologic:  Reports: no symptoms


Psychiatric:  Reports: no symptoms


Skin:  Reports: no symptoms


Endocrine:  Reports: no symptoms


Hematologic:  Reports: no symptoms


Allergies:  


Coded Allergies:  


     CODEINE (Verified  Allergy, Unknown, 1/5/18)


Uncoded Allergies:  


     PEANUTS (Adverse Reaction, Severe, Anaphylaxis, 1/9/18)


Subjective


he feels more comfortable today , with no SOB, no fever or chills





Objective


Vital Signs





Last 24 Hour Vital Signs








  Date Time  Temp Pulse Resp B/P (MAP) Pulse Ox O2 Delivery O2 Flow Rate FiO2


 


1/27/18 14:37  85 20  99 Nasal Cannula 2.0 28


 


1/27/18 14:31  83 20  99 Nasal Cannula 2.0 28


 


1/27/18 12:00 98.1 89 20 144/77 99 Nasal Cannula 2.0 


 


1/27/18 12:00  89      


 


1/27/18 10:37  103 22  99 Nasal Cannula 2.0 28


 


1/27/18 10:30  101 22  98 Nasal Cannula 2.0 28


 


1/27/18 08:00 97.7 99 18 154/86 96 Nasal Cannula 2.0 


 


1/27/18 08:00  102      


 


1/27/18 07:46      Nasal Cannula 2.0 28


 


1/27/18 07:45      Room Air  21


 


1/27/18 07:44     96 Room Air  21


 


1/27/18 07:44      Nasal Cannula 2.0 28


 


1/27/18 04:00  86      


 


1/27/18 04:00 97.5 86 19 155/87 96 Nasal Cannula 2.0 


 


1/27/18 03:35  98 18  99 Nasal Cannula 2.0 28


 


1/27/18 03:28  83 18  98 Nasal Cannula 2.0 28


 


1/27/18 00:00 98.1 91 19 156/85 96 Nasal Cannula 2.0 


 


1/27/18 00:00  91      


 


1/26/18 23:57  94 18  99 Nasal Cannula 2.0 28


 


1/26/18 23:44  91 20  97 Nasal Cannula 2.0 28


 


1/26/18 22:21 97.3       


 


1/26/18 21:00  89      


 


1/26/18 20:00 97.3 109 19 159/88 96 Nasal Cannula 2.0 


 


1/26/18 19:33  92 18  98 Nasal Cannula 2.0 28


 


1/26/18 19:29     96 Nasal Cannula 2.0 28


 


1/26/18 19:29      Nasal Cannula 2.0 28


 


1/26/18 19:26  88 20  95 Nasal Cannula 2.0 28


 


1/26/18 16:00 98.4 93 20 152/82 98   


 


1/26/18 16:00  96      








Height (Feet):  5


Height (Inches):  10.00


Weight (Pounds):  137


General Appearance:  WD/WN, no acute distress


HEENT:  normocephalic, atraumatic, anicteric, mucous membranes moist, PERRL


Respiratory/Chest:  chest wall non-tender, lungs clear, normal breath sounds, 

no respiratory distress, no accessory muscle use


Cardiovascular:  normal peripheral pulses, normal rate, regular rhythm, no 

gallop/murmur, no JVD


Abdomen:  normal bowel sounds, soft, non tender, no organomegaly, non distended

, no mass, no scars


Extremities:  no cyanosis, no clubbing


Skin:  no rash, no lesions


Neurologic/Psychiatric:  alert, oriented x 3, responsive





Laboratory Tests








Test


  1/27/18


07:00


 


White Blood Count


  12.7 K/UL


(4.8-10.8)  H


 


Red Blood Count


  3.13 M/UL


(4.70-6.10)  L


 


Hemoglobin


  8.8 G/DL


(14.2-18.0)  L


 


Hematocrit


  27.0 %


(42.0-52.0)  L


 


Mean Corpuscular Volume 86 FL (80-99)  


 


Mean Corpuscular Hemoglobin


  28.1 PG


(27.0-31.0)


 


Mean Corpuscular Hemoglobin


Concent 32.6 G/DL


(32.0-36.0)


 


Red Cell Distribution Width


  13.9 %


(11.6-14.8)


 


Platelet Count


  71 K/UL


(150-450)  L


 


Mean Platelet Volume


  9.0 FL


(6.5-10.1)


 


Neutrophils (%) (Auto)


  % (45.0-75.0)


 


 


Lymphocytes (%) (Auto)


  % (20.0-45.0)


 


 


Monocytes (%) (Auto)  % (1.0-10.0)  


 


Eosinophils (%) (Auto)  % (0.0-3.0)  


 


Basophils (%) (Auto)  % (0.0-2.0)  


 


Differential Total Cells


Counted 100  


 


 


Neutrophils % (Manual) 78 % (45-75)  H


 


Lymphocytes % (Manual) 6 % (20-45)  L


 


Monocytes % (Manual) 9 % (1-10)  


 


Eosinophils % (Manual) 6 % (0-3)  H


 


Basophils % (Manual) 1 % (0-2)  


 


Band Neutrophils 0 % (0-8)  


 


Platelet Estimate Decreased  L


 


Platelet Morphology Normal  


 


Hypochromasia 1+  


 


Sodium Level


  141 MMOL/L


(136-145)


 


Potassium Level


  3.5 MMOL/L


(3.5-5.1)


 


Chloride Level


  112 MMOL/L


()  H


 


Carbon Dioxide Level


  17 MMOL/L


(21-32)  L


 


Anion Gap


  12 mmol/L


(5-15)


 


Blood Urea Nitrogen


  16 mg/dL


(7-18)


 


Creatinine


  2.3 MG/DL


(0.55-1.30)  H


 


Estimat Glomerular Filtration


Rate 34.9 mL/min


(>60)


 


Glucose Level


  96 MG/DL


()


 


Calcium Level


  8.5 MG/DL


(8.5-10.1)











Current Medications








 Medications


  (Trade)  Dose


 Ordered  Sig/Judie


 Route


 PRN Reason  Start Time


 Stop Time Status Last Admin


Dose Admin


 


 Acetaminophen


  (Tylenol)  650 mg  Q4H  PRN


 ORAL


 Mild Pain/Temp > 100.5  1/21/18 19:45


 2/5/18 19:44  1/24/18 03:19


 


 


 Apixaban


  (Eliquis)  5 mg  BID


 ORAL


   2/3/18 18:00


 2/17/18 17:59   


 


 


 Apixaban


  (Eliquis)  10 mg  BID


 ORAL


   1/27/18 18:00


 2/3/18 17:59   


 


 


 Dextrose


  (Dextrose 50%)    STAT  PRN


 IV


 Hypoglycemia  1/21/18 19:45


 2/20/18 19:44   


 


 


 Diphenoxylate HCl/


 Atropine


  (Lomotil)  2.5 mg  Q4H  PRN


 ORAL


 Diarrhea  1/21/18 19:45


 2/9/18 19:44  1/22/18 04:00


 


 


 Docusate Sodium


  (Colace)  100 mg  THREE TIMES A  DAY


 ORAL


   1/21/18 18:00


 2/19/18 17:59  1/22/18 08:58


 


 


 Folic Acid


  (Folate)  1 mg  DAILY


 ORAL


   1/22/18 09:00


 2/11/18 14:59  1/27/18 08:43


 


 


 Ipratropium


 Bromide


  (Atrovent)  500 mcg  Q4HRT


 N


   1/25/18 07:45


 1/30/18 07:44  1/27/18 14:31


 


 


 Levalbuterol HCl


  (Xopenex)  1.25 mg  Q4HRT


 N


   1/25/18 07:45


 1/30/18 07:44  1/27/18 14:31


 


 


 Magnesium


 Hydroxide


  (Mom)  30 ml  BIDPRN  PRN


 ORAL


 constipation  1/21/18 19:46


 2/20/18 19:45   


 


 


 Morphine Sulfate


  (Morphine


 Sulfate)  2 mg  Q4H  PRN


 IVP


 Moderate to Severe Pain  1/24/18 18:45


 1/31/18 18:44  1/27/18 12:49


 


 


 Ondansetron HCl


  (Zofran)  4 mg  Q6H  PRN


 IVP


 Nausea & Vomiting  1/21/18 19:46


 2/5/18 19:45   


 


 


 Pantoprazole


  (Protonix)  40 mg  DAILY


 ORAL


   1/22/18 09:00


 2/13/18 08:59  1/27/18 08:43


 


 


 Piperacillin Sod/


 Tazobactam Sod


 3.375 gm/Sodium


 Chloride  110 ml @ 


 27.5 mls/hr  Q8H


 IVPB


   1/24/18 08:00


 1/31/18 07:59  1/27/18 15:17


 


 


 Sodium Chloride  1,000 ml @ 


 100 mls/hr  Q10H


 IV


   1/24/18 08:00


 2/23/18 07:59  1/27/18 15:25


 


 


 Vitamin B Complex/


 Vit C/Folic Acid


  (Nephrovite)  1 tab  DAILY


 ORAL


   1/22/18 09:00


 2/8/18 08:59  1/27/18 08:43


 

















Pablo Talley M.D. Jan 27, 2018 15:51

## 2018-01-27 NOTE — NEPHROLOGY PROGRESS NOTE
Assessment/Plan


Problem List:  


(1) Sickle cell trait


(2) Abdominal pain


(3) Renal cyst, native, hemorrhage


(4) Sepsis


(5) Hematuria


(6) Shortness of breath


(7) Chest pain


(8) Severe anemia


(9) Pneumonia


(10) DVT (deep venous thrombosis)


(11) Tachycardia


(12) Sickle cell anemia


(13) HAP (hospital-acquired pneumonia)


Plan


Continue current treatment plan


Off  heparin drip due to thrombocytopenia , 


Monitor PT/PTT, high risk for bleeding in the renal cyst , await HIT assay 


Monitor HR, cardio following


Continue o2 therapy prn


Monitor H&H and transfuse prn


Hematology, cardiology GI, and ID and pulmo following, will f/u with recs


Monitor lytes, correct prn


Continue abx per ID


Monitor renal function, avoid nephrotoxins 


Pain management prn


Continue neb treatment


Daily PPI


AM labs





Subjective


Constitutional:  Denies: no symptoms, chills, diaphoresis, fever, malaise, 

weakness, other


HEENT:  Denies: no symptoms, eye pain, blurred vision, tearing, double vision, 

ear pain, ear discharge, nose pain, nose congestion, throat pain, throat 

swelling, mouth pain, mouth swelling, other


Genitourinary:  Denies: no symptoms, burning, discharge, frequency, flank pain, 

hematuria, incontinence, pain, urgency, other


Neurologic/Psychiatric:  Denies: no symptoms, anxiety, depressed, emotional 

problems, headache, numbness, paresthesia, pre-existing deficit, seizure, 

tingling, tremors, weakness, other


Subjective


In bed, in no apparent distress, off heparin drip





Objective


Objective





Last 24 Hour Vital Signs








  Date Time  Temp Pulse Resp B/P (MAP) Pulse Ox O2 Delivery O2 Flow Rate FiO2


 


1/27/18 14:37  85 20  99 Nasal Cannula 2.0 28 1/27/18 14:31  83 20  99 Nasal Cannula 2.0 28 1/27/18 12:00 98.1 89 20 144/77 99 Nasal Cannula 2.0 


 


1/27/18 12:00  89      


 


1/27/18 10:37  103 22  99 Nasal Cannula 2.0 28 1/27/18 10:30  101 22  98 Nasal Cannula 2.0 28


 


1/27/18 08:00 97.7 99 18 154/86 96 Nasal Cannula 2.0 


 


1/27/18 08:00  102      


 


1/27/18 07:46      Nasal Cannula 2.0 28


 


1/27/18 07:45      Room Air  21


 


1/27/18 07:44     96 Room Air  21


 


1/27/18 07:44      Nasal Cannula 2.0 28


 


1/27/18 04:00  86      


 


1/27/18 04:00 97.5 86 19 155/87 96 Nasal Cannula 2.0 


 


1/27/18 03:35  98 18  99 Nasal Cannula 2.0 28


 


1/27/18 03:28  83 18  98 Nasal Cannula 2.0 28


 


1/27/18 00:00 98.1 91 19 156/85 96 Nasal Cannula 2.0 


 


1/27/18 00:00  91      


 


1/26/18 23:57  94 18  99 Nasal Cannula 2.0 28


 


1/26/18 23:44  91 20  97 Nasal Cannula 2.0 28


 


1/26/18 22:21 97.3       


 


1/26/18 21:00  89      


 


1/26/18 20:00 97.3 109 19 159/88 96 Nasal Cannula 2.0 


 


1/26/18 19:33  92 18  98 Nasal Cannula 2.0 28


 


1/26/18 19:29     96 Nasal Cannula 2.0 28


 


1/26/18 19:29      Nasal Cannula 2.0 28


 


1/26/18 19:26  88 20  95 Nasal Cannula 2.0 28

















Intake and Output  


 


 1/26/18 1/27/18





 19:00 07:00


 


Intake Total 1340 ml 1300 ml


 


Output Total 1500 ml 800 ml


 


Balance -160 ml 500 ml


 


  


 


Intake Oral 840 ml 200 ml


 


IV Total 500 ml 1100 ml


 


Output Urine Total 1500 ml 800 ml








Laboratory Tests


1/27/18 07:00: 


White Blood Count 12.7H, Red Blood Count 3.13L, Hemoglobin 8.8L, Hematocrit 

27.0L, Mean Corpuscular Volume 86, Mean Corpuscular Hemoglobin 28.1, Mean 

Corpuscular Hemoglobin Concent 32.6, Red Cell Distribution Width 13.9, Platelet 

Count 71L, Mean Platelet Volume 9.0, Neutrophils (%) (Auto) , Lymphocytes (%) (

Auto) , Monocytes (%) (Auto) , Eosinophils (%) (Auto) , Basophils (%) (Auto) , 

Differential Total Cells Counted 100, Neutrophils % (Manual) 78H, Lymphocytes % 

(Manual) 6L, Monocytes % (Manual) 9, Eosinophils % (Manual) 6H, Basophils % (

Manual) 1, Band Neutrophils 0, Platelet Estimate DecreasedL, Platelet 

Morphology Normal, Hypochromasia 1+, Sodium Level 141, Potassium Level 3.5, 

Chloride Level 112H, Carbon Dioxide Level 17L, Anion Gap 12, Blood Urea 

Nitrogen 16, Creatinine 2.3H, Estimat Glomerular Filtration Rate 34.9, Glucose 

Level 96, Calcium Level 8.5


Height (Feet):  5


Height (Inches):  10.00


Weight (Pounds):  137


General Appearance:  no apparent distress, alert


EENT:  TMs normal


Neck:  normal alignment


Cardiovascular:  normal rate, regular rhythm, no JVD


Respiratory/Chest:  decreased breath sounds


Abdomen:  soft


Extremities:  calf tenderness


Neurologic:  alert, oriented x 3, responsive, normal mood/affect











Dayanara Hawley N.P. Jan 27, 2018 18:06

## 2018-01-28 VITALS — DIASTOLIC BLOOD PRESSURE: 85 MMHG | SYSTOLIC BLOOD PRESSURE: 146 MMHG

## 2018-01-28 VITALS — DIASTOLIC BLOOD PRESSURE: 84 MMHG | SYSTOLIC BLOOD PRESSURE: 154 MMHG

## 2018-01-28 VITALS — SYSTOLIC BLOOD PRESSURE: 150 MMHG | DIASTOLIC BLOOD PRESSURE: 75 MMHG

## 2018-01-28 VITALS — DIASTOLIC BLOOD PRESSURE: 79 MMHG | SYSTOLIC BLOOD PRESSURE: 147 MMHG

## 2018-01-28 VITALS — DIASTOLIC BLOOD PRESSURE: 84 MMHG | SYSTOLIC BLOOD PRESSURE: 150 MMHG

## 2018-01-28 VITALS — DIASTOLIC BLOOD PRESSURE: 75 MMHG | SYSTOLIC BLOOD PRESSURE: 139 MMHG

## 2018-01-28 LAB
ADD MANUAL DIFF: YES
ANION GAP SERPL CALC-SCNC: 12 MMOL/L (ref 5–15)
APTT BLD: 38 SEC (ref 23–33)
BUN SERPL-MCNC: 17 MG/DL (ref 7–18)
CALCIUM SERPL-MCNC: 8.5 MG/DL (ref 8.5–10.1)
CHLORIDE SERPL-SCNC: 111 MMOL/L (ref 98–107)
CO2 SERPL-SCNC: 19 MMOL/L (ref 21–32)
CREAT SERPL-MCNC: 2.4 MG/DL (ref 0.55–1.3)
ERYTHROCYTE [DISTWIDTH] IN BLOOD BY AUTOMATED COUNT: 13.9 % (ref 11.6–14.8)
HCT VFR BLD CALC: 26.3 % (ref 42–52)
HGB BLD-MCNC: 8.6 G/DL (ref 14.2–18)
INR PPP: 1.2 (ref 0.9–1.1)
MCV RBC AUTO: 86 FL (ref 80–99)
PLATELET # BLD: 77 K/UL (ref 150–450)
POTASSIUM SERPL-SCNC: 3.5 MMOL/L (ref 3.5–5.1)
RBC # BLD AUTO: 3.04 M/UL (ref 4.7–6.1)
SODIUM SERPL-SCNC: 142 MMOL/L (ref 136–145)
WBC # BLD AUTO: 12.2 K/UL (ref 4.8–10.8)

## 2018-01-28 RX ADMIN — LEVALBUTEROL HYDROCHLORIDE SCH MG: 1.25 SOLUTION, CONCENTRATE RESPIRATORY (INHALATION) at 05:21

## 2018-01-28 RX ADMIN — LEVALBUTEROL HYDROCHLORIDE SCH MG: 1.25 SOLUTION, CONCENTRATE RESPIRATORY (INHALATION) at 11:12

## 2018-01-28 RX ADMIN — DEXTROSE MONOHYDRATE SCH MLS/HR: 50 INJECTION, SOLUTION INTRAVENOUS at 18:07

## 2018-01-28 RX ADMIN — Medication SCH MCG: at 15:00

## 2018-01-28 RX ADMIN — MORPHINE SULFATE PRN MG: 4 INJECTION INTRAVENOUS at 15:13

## 2018-01-28 RX ADMIN — LEVALBUTEROL HYDROCHLORIDE SCH MG: 1.25 SOLUTION, CONCENTRATE RESPIRATORY (INHALATION) at 15:00

## 2018-01-28 RX ADMIN — LEVALBUTEROL HYDROCHLORIDE SCH MG: 1.25 SOLUTION, CONCENTRATE RESPIRATORY (INHALATION) at 00:17

## 2018-01-28 RX ADMIN — DEXTROSE MONOHYDRATE SCH MLS/HR: 50 INJECTION, SOLUTION INTRAVENOUS at 01:11

## 2018-01-28 RX ADMIN — Medication SCH MCG: at 11:12

## 2018-01-28 RX ADMIN — Medication SCH MCG: at 00:17

## 2018-01-28 RX ADMIN — DOCUSATE SODIUM SCH MG: 100 CAPSULE, LIQUID FILLED ORAL at 18:00

## 2018-01-28 RX ADMIN — Medication SCH MCG: at 07:41

## 2018-01-28 RX ADMIN — LEVALBUTEROL HYDROCHLORIDE SCH MG: 1.25 SOLUTION, CONCENTRATE RESPIRATORY (INHALATION) at 18:40

## 2018-01-28 RX ADMIN — DOCUSATE SODIUM SCH MG: 100 CAPSULE, LIQUID FILLED ORAL at 09:00

## 2018-01-28 RX ADMIN — LEVALBUTEROL HYDROCHLORIDE SCH MG: 1.25 SOLUTION, CONCENTRATE RESPIRATORY (INHALATION) at 07:41

## 2018-01-28 RX ADMIN — Medication SCH MCG: at 05:21

## 2018-01-28 RX ADMIN — DEXTROSE MONOHYDRATE SCH MLS/HR: 50 INJECTION, SOLUTION INTRAVENOUS at 09:50

## 2018-01-28 RX ADMIN — Medication SCH MCG: at 23:53

## 2018-01-28 RX ADMIN — MORPHINE SULFATE PRN MG: 4 INJECTION INTRAVENOUS at 21:54

## 2018-01-28 RX ADMIN — Medication SCH TAB: at 09:51

## 2018-01-28 RX ADMIN — DOCUSATE SODIUM SCH MG: 100 CAPSULE, LIQUID FILLED ORAL at 12:43

## 2018-01-28 RX ADMIN — APIXABAN SCH MG: 2.5 TABLET, FILM COATED ORAL at 18:07

## 2018-01-28 RX ADMIN — Medication SCH MCG: at 18:40

## 2018-01-28 RX ADMIN — LEVALBUTEROL HYDROCHLORIDE SCH MG: 1.25 SOLUTION, CONCENTRATE RESPIRATORY (INHALATION) at 23:53

## 2018-01-28 RX ADMIN — APIXABAN SCH MG: 2.5 TABLET, FILM COATED ORAL at 10:35

## 2018-01-28 NOTE — GENERAL PROGRESS NOTE
Assessment/Plan


Status:  stable


Assessment/Plan


#. DVT of the bilateral lower extremities with concern for PE but unable to 

undergo CT angio


--> agree to continue heparin gtt at this time, can take xarelto or eliquis as 

outpatient


--> Patient currently started eliquis.


--> if anemia worsens in future, consider ivc filter


#. Anemia, likely related to chronic disease in addition to kidney disease.  

Ferritin is elevated


--> Hemoglobin goal >7


--> Does not need transfusion at the moment. 


--> Continue to trend cbc daily 


#. Leukocytosis with fever.  


--> Currently resolved and afebrile. 


--> It is unlikely the patient has a sickle cell disease  MCV.  


--> Peripheral smear has been reviewed.


#. Anemia secondary to sickle cell disease (hemoglobin electrophoresis confirms 

he has sickle trait)


#. Nausea, vomiting, and diarrhea.  Evaluated by GI Service.  Outpatient 

gastrointestinal procedure as needed.


#. Abdominal pain.  Imaging reviewed.


#. Sepsis.  Likely due to PNA, on broad specturm abx


#. Hypertension, currently better, is improving.  Continue to closely monitor.





Subjective


Date patient seen:  Jan 28, 2018


Constitutional:  Denies: no symptoms, chills, diaphoresis, fever, malaise, 

weakness, other


HEENT:  Denies: no symptoms, eye pain, blurred vision, tearing, double vision, 

ear pain, ear discharge, nose pain, nose congestion, throat pain, throat 

swelling, mouth pain, mouth swelling, other


Cardiovascular:  Denies: no symptoms, chest pain, edema, irregular heart rate, 

lightheadedness, palpitations, syncope, other


Respiratory:  Denies: no symptoms, cough, orthopnea, shortness of breath, SOB 

with excertion, SOB at rest, sputum, stridor, wheezing, other


Gastrointestinal/Abdominal:  Denies: no symptoms, abdomen distended, abdominal 

pain, black stools, tarry stools, blood in stool, constipated, diarrhea, 

difficulty swallowing, nausea, poor appetite, poor fluid intake, rectal bleeding

, vomiting, other


Hematologic/Lymphatic:  Reports: anemia, other - thrombocytopenia


Allergies:  


Coded Allergies:  


     CODEINE (Verified  Allergy, Unknown, 1/5/18)


Uncoded Allergies:  


     PEANUTS (Adverse Reaction, Severe, Anaphylaxis, 1/9/18)


Subjective


Started eliquis. Low Platelets. No fever.





Objective





Last 24 Hour Vital Signs








  Date Time  Temp Pulse Resp B/P (MAP) Pulse Ox O2 Delivery O2 Flow Rate FiO2


 


1/28/18 18:48  88 20  100 Room Air  21 1/28/18 18:43     98 Nasal Cannula 2.0 28 1/28/18 18:43      Nasal Cannula 2.0 28


 


1/28/18 18:41  87 20  99 Room Air  21 1/28/18 16:00 98.0 92 20 146/85 100 Nasal Cannula 2.0 


 


1/28/18 16:00  84      


 


1/28/18 15:15      Nasal Cannula  


 


1/28/18 15:15      Nasal Cannula  


 


1/28/18 12:00  96      


 


1/28/18 12:00 98.8 97 22 139/75 97 Room Air  


 


1/28/18 11:15  98 20  100 Room Air  21 1/28/18 11:05  91 20  98 Room Air  21 1/28/18 08:00 97.9 98 24 154/84 97 Nasal Cannula 2.0 


 


1/28/18 08:00  91      


 


1/28/18 07:45  93 21  100 Nasal Cannula 2.0 28 1/28/18 07:43      Nasal Cannula 2.0 28 1/28/18 07:36  87 22  98 Nasal Cannula 2.0 28 1/28/18 07:35     98 Nasal Cannula 2.0 28 1/28/18 04:00  94      


 


1/28/18 04:00 98.6 99 18 147/79 99 Nasal Cannula 2.0 


 


1/28/18 03:22  91 18  100 Nasal Cannula 2.0 28 1/28/18 03:07  98 22  100 Nasal Cannula 2.0 28 1/28/18 00:00 98.4 92 20 150/75 99 Nasal Cannula 2.0 


 


1/28/18 00:00  89      


 


1/27/18 23:14  95 20  99 Nasal Cannula 2.0 28 1/27/18 23:05  97 20  97 Nasal Cannula 2.0 28

















Intake and Output  


 


 1/27/18 1/28/18





 19:00 07:00


 


Intake Total 1282.5 ml 1200 ml


 


Output Total 1500 ml 2400 ml


 


Balance -217.5 ml -1200 ml


 


  


 


Intake Oral 800 ml 


 


IV Total 482.5 ml 1200 ml


 


Output Urine Total 1500 ml 2400 ml








Laboratory Tests


1/28/18 04:45: 


White Blood Count 12.2H, Red Blood Count 3.04L, Hemoglobin 8.6L, Hematocrit 

26.3L, Mean Corpuscular Volume 86, Mean Corpuscular Hemoglobin 28.4, Mean 

Corpuscular Hemoglobin Concent 32.8, Red Cell Distribution Width 13.9, Platelet 

Count 77L, Mean Platelet Volume 8.9, Neutrophils (%) (Auto) , Lymphocytes (%) (

Auto) , Monocytes (%) (Auto) , Eosinophils (%) (Auto) , Basophils (%) (Auto) , 

Differential Total Cells Counted 100, Neutrophils % (Manual) 83H, Lymphocytes % 

(Manual) 7L, Monocytes % (Manual) 9, Eosinophils % (Manual) 1, Basophils % (

Manual) 0, Band Neutrophils 0, Platelet Estimate DecreasedL, Platelet 

Morphology Normal, Hypochromasia 1+, Prothrombin Time 12.9H, Prothromb Time 

International Ratio 1.2H, Activated Partial Thromboplast Time 38H, Sodium Level 

142, Potassium Level 3.5, Chloride Level 111H, Carbon Dioxide Level 19L, Anion 

Gap 12, Blood Urea Nitrogen 17, Creatinine 2.4H, Estimat Glomerular Filtration 

Rate 33.2, Glucose Level 107H, Calcium Level 8.5


Height (Feet):  5


Height (Inches):  10.00


Weight (Pounds):  137


General Appearance:  no apparent distress


Cardiovascular:  normal rate, regular rhythm


Respiratory/Chest:  decreased breath sounds


Abdomen:  non tender











Joe Sanches Jan 28, 2018 21:12

## 2018-01-28 NOTE — DIAGNOSTIC IMAGING REPORT
--------------- APPROVED REPORT --------------





CPT Code: 01824



Present Symptoms

Lower Extremity Pain:   

Comments: R/O DVT.





RIGHT LEG: Venous imaging reveals acute, occlusive thrombus in the popliteal vein and the 

tibial veins (posterior and anterior tibial). Imaging also reveals patency of the common 

femoral and superficial femoral veins.  The greater saphenous vein is within normal 

limits. 



LEFT LEG: Venous imaging reveals acute, non-occlusive thrombus in the distal popliteal 

vein and acute, occlusive thrombus in the tibial veins (posterior tibial). Imaging also 

reveals patency of the common femoral and superficial femoral veins.  The greater 

saphenous vein is within normal limits.



ALDA Walters was informed of abnormal results at 10:00 hrs.

## 2018-01-28 NOTE — NEPHROLOGY PROGRESS NOTE
Assessment/Plan


Problem List:  


(1) Sickle cell trait


(2) Abdominal pain


(3) Renal cyst, native, hemorrhage


(4) Sepsis


(5) Hematuria


(6) Shortness of breath


(7) Chest pain


(8) Severe anemia


(9) Pneumonia


(10) DVT (deep venous thrombosis)


(11) Tachycardia


(12) Sickle cell anemia


(13) HAP (hospital-acquired pneumonia)


Plan


Continue current treatment plan


Off  heparin drip due to thrombocytopenia , 


Monitor PT/PTT, high risk for bleeding in the renal cyst , await HIT assay 


Monitor HR, cardio following


Continue o2 therapy prn


Monitor H&H and transfuse prn


Hematology, cardiology GI, and ID and pulmo following, will f/u with recs


Monitor lytes, correct prn


Continue abx per ID


Monitor renal function, avoid nephrotoxins 


Pain management prn


Continue neb treatment


Daily PPI


AM labs





Subjective


Constitutional:  Denies: no symptoms, chills, diaphoresis, fever, malaise, 

weakness, other


HEENT:  Denies: no symptoms, eye pain, blurred vision, tearing, double vision, 

ear pain, ear discharge, nose pain, nose congestion, throat pain, throat 

swelling, mouth pain, mouth swelling, other


Genitourinary:  Denies: no symptoms, burning, discharge, frequency, flank pain, 

hematuria, incontinence, pain, urgency, other


Neurologic/Psychiatric:  Reports: weakness


Subjective


In bed, in no apparent distress, off heparin drip





Objective


Objective





Last 24 Hour Vital Signs








  Date Time  Temp Pulse Resp B/P (MAP) Pulse Ox O2 Delivery O2 Flow Rate FiO2


 


1/28/18 18:48  88 20  100 Room Air  21 1/28/18 18:43     98 Nasal Cannula 2.0 28 1/28/18 18:43      Nasal Cannula 2.0 28 1/28/18 18:41  87 20  99 Room Air  21 1/28/18 16:00 98.0 92 20 146/85 100 Nasal Cannula 2.0 


 


1/28/18 16:00  84      


 


1/28/18 15:15      Nasal Cannula  


 


1/28/18 15:15      Nasal Cannula  


 


1/28/18 12:00  96      


 


1/28/18 12:00 98.8 97 22 139/75 97 Room Air  


 


1/28/18 11:15  98 20  100 Room Air  21 1/28/18 11:05  91 20  98 Room Air  21 1/28/18 08:00 97.9 98 24 154/84 97 Nasal Cannula 2.0 


 


1/28/18 08:00  91      


 


1/28/18 07:45  93 21  100 Nasal Cannula 2.0 28 1/28/18 07:43      Nasal Cannula 2.0 28


 


1/28/18 07:36  87 22  98 Nasal Cannula 2.0 28


 


1/28/18 07:35     98 Nasal Cannula 2.0 28


 


1/28/18 04:00  94      


 


1/28/18 04:00 98.6 99 18 147/79 99 Nasal Cannula 2.0 


 


1/28/18 03:22  91 18  100 Nasal Cannula 2.0 28


 


1/28/18 03:07  98 22  100 Nasal Cannula 2.0 28


 


1/28/18 00:00 98.4 92 20 150/75 99 Nasal Cannula 2.0 


 


1/28/18 00:00  89      


 


1/27/18 23:14  95 20  99 Nasal Cannula 2.0 28


 


1/27/18 23:05  97 20  97 Nasal Cannula 2.0 28

















Intake and Output  


 


 1/27/18 1/28/18





 19:00 07:00


 


Intake Total 1282.5 ml 1200 ml


 


Output Total 1500 ml 2400 ml


 


Balance -217.5 ml -1200 ml


 


  


 


Intake Oral 800 ml 


 


IV Total 482.5 ml 1200 ml


 


Output Urine Total 1500 ml 2400 ml








Laboratory Tests


1/28/18 04:45: 


White Blood Count 12.2H, Red Blood Count 3.04L, Hemoglobin 8.6L, Hematocrit 

26.3L, Mean Corpuscular Volume 86, Mean Corpuscular Hemoglobin 28.4, Mean 

Corpuscular Hemoglobin Concent 32.8, Red Cell Distribution Width 13.9, Platelet 

Count 77L, Mean Platelet Volume 8.9, Neutrophils (%) (Auto) , Lymphocytes (%) (

Auto) , Monocytes (%) (Auto) , Eosinophils (%) (Auto) , Basophils (%) (Auto) , 

Differential Total Cells Counted 100, Neutrophils % (Manual) 83H, Lymphocytes % 

(Manual) 7L, Monocytes % (Manual) 9, Eosinophils % (Manual) 1, Basophils % (

Manual) 0, Band Neutrophils 0, Platelet Estimate DecreasedL, Platelet 

Morphology Normal, Hypochromasia 1+, Prothrombin Time 12.9H, Prothromb Time 

International Ratio 1.2H, Activated Partial Thromboplast Time 38H, Sodium Level 

142, Potassium Level 3.5, Chloride Level 111H, Carbon Dioxide Level 19L, Anion 

Gap 12, Blood Urea Nitrogen 17, Creatinine 2.4H, Estimat Glomerular Filtration 

Rate 33.2, Glucose Level 107H, Calcium Level 8.5


Height (Feet):  5


Height (Inches):  10.00


Weight (Pounds):  137


General Appearance:  no apparent distress, alert


EENT:  normal ENT inspection


Neck:  supple


Cardiovascular:  regular rhythm


Respiratory/Chest:  decreased breath sounds


Abdomen:  soft


Extremities:  calf tenderness


Neurologic:  alert, oriented x 3, responsive, normal mood/affect











Dayanara Hawley N.P. Jan 28, 2018 20:15

## 2018-01-29 VITALS — SYSTOLIC BLOOD PRESSURE: 144 MMHG | DIASTOLIC BLOOD PRESSURE: 81 MMHG

## 2018-01-29 VITALS — SYSTOLIC BLOOD PRESSURE: 152 MMHG | DIASTOLIC BLOOD PRESSURE: 83 MMHG

## 2018-01-29 VITALS — DIASTOLIC BLOOD PRESSURE: 72 MMHG | SYSTOLIC BLOOD PRESSURE: 145 MMHG

## 2018-01-29 VITALS — DIASTOLIC BLOOD PRESSURE: 78 MMHG | SYSTOLIC BLOOD PRESSURE: 148 MMHG

## 2018-01-29 VITALS — SYSTOLIC BLOOD PRESSURE: 142 MMHG | DIASTOLIC BLOOD PRESSURE: 80 MMHG

## 2018-01-29 VITALS — DIASTOLIC BLOOD PRESSURE: 93 MMHG | SYSTOLIC BLOOD PRESSURE: 152 MMHG

## 2018-01-29 VITALS — DIASTOLIC BLOOD PRESSURE: 75 MMHG | SYSTOLIC BLOOD PRESSURE: 137 MMHG

## 2018-01-29 LAB
ADD MANUAL DIFF: YES
ANION GAP SERPL CALC-SCNC: 12 MMOL/L (ref 5–15)
APPEARANCE UR: CLEAR
APTT PPP: (no result) S
BUN SERPL-MCNC: 14 MG/DL (ref 7–18)
CALCIUM SERPL-MCNC: 8.3 MG/DL (ref 8.5–10.1)
CHLORIDE SERPL-SCNC: 112 MMOL/L (ref 98–107)
CO2 SERPL-SCNC: 18 MMOL/L (ref 21–32)
CREAT SERPL-MCNC: 2.2 MG/DL (ref 0.55–1.3)
ERYTHROCYTE [DISTWIDTH] IN BLOOD BY AUTOMATED COUNT: 13.4 % (ref 11.6–14.8)
GLUCOSE UR STRIP-MCNC: NEGATIVE MG/DL
HCT VFR BLD CALC: 25.7 % (ref 42–52)
HGB BLD-MCNC: 8.4 G/DL (ref 14.2–18)
KETONES UR QL STRIP: NEGATIVE
LEUKOCYTE ESTERASE UR QL STRIP: NEGATIVE
MCV RBC AUTO: 86 FL (ref 80–99)
NITRITE UR QL STRIP: NEGATIVE
PH UR STRIP: 6.5 [PH] (ref 4.5–8)
PLATELET # BLD: 88 K/UL (ref 150–450)
POTASSIUM SERPL-SCNC: 3.3 MMOL/L (ref 3.5–5.1)
PROT UR QL STRIP: (no result)
RBC # BLD AUTO: 2.98 M/UL (ref 4.7–6.1)
SODIUM SERPL-SCNC: 142 MMOL/L (ref 136–145)
SP GR UR STRIP: 1.01 (ref 1–1.03)
UROBILINOGEN UR-MCNC: NORMAL MG/DL (ref 0–1)
WBC # BLD AUTO: 10.3 K/UL (ref 4.8–10.8)

## 2018-01-29 RX ADMIN — DEXTROSE MONOHYDRATE SCH MLS/HR: 50 INJECTION, SOLUTION INTRAVENOUS at 23:50

## 2018-01-29 RX ADMIN — APIXABAN SCH MG: 2.5 TABLET, FILM COATED ORAL at 13:25

## 2018-01-29 RX ADMIN — LEVALBUTEROL HYDROCHLORIDE SCH MG: 1.25 SOLUTION, CONCENTRATE RESPIRATORY (INHALATION) at 07:36

## 2018-01-29 RX ADMIN — Medication SCH MCG: at 20:00

## 2018-01-29 RX ADMIN — Medication SCH TAB: at 09:17

## 2018-01-29 RX ADMIN — DEXTROSE MONOHYDRATE SCH MLS/HR: 50 INJECTION, SOLUTION INTRAVENOUS at 09:18

## 2018-01-29 RX ADMIN — LEVALBUTEROL HYDROCHLORIDE SCH MG: 1.25 SOLUTION, CONCENTRATE RESPIRATORY (INHALATION) at 03:08

## 2018-01-29 RX ADMIN — Medication SCH MCG: at 16:12

## 2018-01-29 RX ADMIN — DOCUSATE SODIUM SCH MG: 100 CAPSULE, LIQUID FILLED ORAL at 13:25

## 2018-01-29 RX ADMIN — LEVALBUTEROL HYDROCHLORIDE SCH MG: 1.25 SOLUTION, CONCENTRATE RESPIRATORY (INHALATION) at 16:12

## 2018-01-29 RX ADMIN — DEXTROSE MONOHYDRATE SCH MLS/HR: 50 INJECTION, SOLUTION INTRAVENOUS at 00:22

## 2018-01-29 RX ADMIN — Medication SCH MCG: at 07:36

## 2018-01-29 RX ADMIN — APIXABAN SCH MG: 2.5 TABLET, FILM COATED ORAL at 18:00

## 2018-01-29 RX ADMIN — Medication SCH MCG: at 11:15

## 2018-01-29 RX ADMIN — DOCUSATE SODIUM SCH MG: 100 CAPSULE, LIQUID FILLED ORAL at 09:17

## 2018-01-29 RX ADMIN — APIXABAN SCH MG: 2.5 TABLET, FILM COATED ORAL at 21:10

## 2018-01-29 RX ADMIN — LEVALBUTEROL HYDROCHLORIDE SCH MG: 1.25 SOLUTION, CONCENTRATE RESPIRATORY (INHALATION) at 20:01

## 2018-01-29 RX ADMIN — DOCUSATE SODIUM SCH MG: 100 CAPSULE, LIQUID FILLED ORAL at 18:00

## 2018-01-29 RX ADMIN — LEVALBUTEROL HYDROCHLORIDE SCH MG: 1.25 SOLUTION, CONCENTRATE RESPIRATORY (INHALATION) at 11:15

## 2018-01-29 RX ADMIN — Medication SCH MCG: at 03:08

## 2018-01-29 RX ADMIN — DEXTROSE MONOHYDRATE SCH MLS/HR: 50 INJECTION, SOLUTION INTRAVENOUS at 16:39

## 2018-01-29 NOTE — NEPHROLOGY PROGRESS NOTE
Assessment/Plan


Problem List:  


(1) Sickle cell trait


(2) Abdominal pain


(3) Renal cyst, native, hemorrhage


(4) Sepsis


(5) Hematuria


(6) Shortness of breath


(7) Chest pain


(8) Severe anemia


(9) Pneumonia


(10) DVT (deep venous thrombosis)


(11) Tachycardia


(12) Sickle cell anemia


(13) HAP (hospital-acquired pneumonia)


Plan


Continue current treatment plan


Off  heparin drip due to thrombocytopenia , 


Monitor PT/PTT, high risk for bleeding in the renal cyst , await HIT assay 


On eliquis 5mg BID


Monitor HR, cardio following


Continue o2 therapy prn


Monitor H&H and transfuse prn


Hematology, cardiology GI, and ID and pulmo following, will f/u with recs


Monitor lytes, correct prn


Continue abx per ID


Monitor renal function, avoid nephrotoxins 


Pain management prn


Continue neb treatment


Daily PPI


AM labs





Subjective


Constitutional:  Denies: no symptoms, chills, diaphoresis, fever, malaise, 

weakness, other


HEENT:  Denies: no symptoms, eye pain, blurred vision, tearing, double vision, 

ear pain, ear discharge, nose pain, nose congestion, throat pain, throat 

swelling, mouth pain, mouth swelling, other


Genitourinary:  Denies: no symptoms, burning, discharge, frequency, flank pain, 

hematuria, incontinence, pain, urgency, other


Neurologic/Psychiatric:  Denies: no symptoms, anxiety, depressed, emotional 

problems, headache, numbness, paresthesia, pre-existing deficit, seizure, 

tingling, tremors, weakness, other


Subjective


In bed, in no apparent distress, off heparin drip





Objective


Objective





Last 24 Hour Vital Signs








  Date Time  Temp Pulse Resp B/P (MAP) Pulse Ox O2 Delivery O2 Flow Rate FiO2


 


1/29/18 16:26  89 20  98 Nasal Cannula 2.0 28


 


1/29/18 16:12  89 20  98 Nasal Cannula 2.0 28


 


1/29/18 16:00  78      


 


1/29/18 16:00 97.8 93 22 142/80  Room Air  


 


1/29/18 14:13 97.5 88 20 152/83 98   


 


1/29/18 12:00  72      


 


1/29/18 12:00 97.5 88 20 152/93 98   


 


1/29/18 11:27  85 20  98 Nasal Cannula 2.0 28


 


1/29/18 11:15  85 20  98 Nasal Cannula 2.0 28


 


1/29/18 08:00  66      


 


1/29/18 08:00 98.1 93 22 137/75 97   


 


1/29/18 07:43  89 20  98 Nasal Cannula 2.0 28


 


1/29/18 07:36  89 20  98 Nasal Cannula 2.0 28


 


1/29/18 07:12     98 Nasal Cannula 2.0 28


 


1/29/18 07:12      Nasal Cannula 2.0 28


 


1/29/18 04:00  100      


 


1/29/18 04:00 98.4 98 20 145/72 97 Room Air  


 


1/29/18 03:17  90 20  99 Nasal Cannula 2.0 28


 


1/29/18 03:05  92 20  96 Nasal Cannula 2.0 28 1/29/18 03:05        28


 


1/29/18 00:00  94 20  99 Nasal Cannula 2.0 28 1/29/18 00:00 98.1 93 18 148/78 99 Nasal Cannula 2.0 


 


1/29/18 00:00  86      


 


1/28/18 23:50  92 20  97 Nasal Cannula 2.0 28 1/28/18 20:00  91      


 


1/28/18 20:00 98.2 91 18 150/84 99 Nasal Cannula 2.0 


 


1/28/18 18:48  88 20  100 Nasal Cannula 2.0 28 1/28/18 18:43     98 Nasal Cannula 2.0 28 1/28/18 18:43      Nasal Cannula 2.0 28 1/28/18 18:41  87 20  99 Nasal Cannula 2.0 28

















Intake and Output  


 


 1/28/18 1/29/18





 19:00 07:00


 


Intake Total 120 ml 1110.0 ml


 


Output Total 950 ml 900 ml


 


Balance -830 ml 210.0 ml


 


  


 


Intake Oral 120 ml 300 ml


 


IV Total  810.0 ml


 


Output Urine Total 950 ml 900 ml








Laboratory Tests


1/29/18 05:00: 


Urine Color Pale yellow, Urine Appearance Clear, Urine pH 6.5, Urine Specific 

Gravity 1.010, Urine Protein 3+H, Urine Glucose (UA) Negative, Urine Ketones 

Negative, Urine Occult Blood 5+H, Urine Nitrite Negative, Urine Bilirubin 

Negative, Urine Urobilinogen Normal, Urine Leukocyte Esterase Negative, Urine 

RBC 40-60H, Urine WBC 0-2, Urine Squamous Epithelial Cells None, Urine Bacteria 

Few, Urine Mucus FewH


1/29/18 05:20: 


White Blood Count 10.3, Red Blood Count 2.98L, Hemoglobin 8.4L, Hematocrit 25.7L

, Mean Corpuscular Volume 86, Mean Corpuscular Hemoglobin 28.2, Mean 

Corpuscular Hemoglobin Concent 32.8, Red Cell Distribution Width 13.4, Platelet 

Count 88L, Mean Platelet Volume 7.6, Neutrophils (%) (Auto) , Lymphocytes (%) (

Auto) , Monocytes (%) (Auto) , Eosinophils (%) (Auto) , Basophils (%) (Auto) , 

Differential Total Cells Counted 100, Neutrophils % (Manual) 84H, Lymphocytes % 

(Manual) 6L, Monocytes % (Manual) 6, Eosinophils % (Manual) 4H, Basophils % (

Manual) 0, Band Neutrophils 0, Platelet Estimate DecreasedL, Platelet 

Morphology Normal, Hypochromasia 2+, Anisocytosis 1+, Sodium Level 142, 

Potassium Level 3.3L, Chloride Level 112H, Carbon Dioxide Level 18L, Anion Gap 

12, Blood Urea Nitrogen 14, Creatinine 2.2H, Estimat Glomerular Filtration Rate 

36.7, Glucose Level 94, Calcium Level 8.3L


Height (Feet):  5


Height (Inches):  10.00


Weight (Pounds):  137


General Appearance:  no apparent distress, alert


EENT:  PERRL/EOMI


Neck:  non-tender


Cardiovascular:  normal rate


Respiratory/Chest:  decreased breath sounds


Abdomen:  soft


Extremities:  calf tenderness


Neurologic:  alert, oriented x 3, responsive, normal mood/affect











Dayanara Hawley N.P. Jan 29, 2018 18:11

## 2018-01-29 NOTE — CARDIAC ELECTROPHYSIOLOGY PN
Assessment/Plan


Assessment/Plan


1. Sinus Tachycardia due to sickle cell anemia, fever and acute bilateral DVT. 


     Echocardiogram  EF 60%.  No SVT or VT. Resolved.





2.  Sepsis, WBC of 26K. On intravenous antibiotic per Dr. Talley. Down to 10 K 





3.  Abdominal pain, likely due to renal cysts and hemorrhage.





4.  Diarrhea. Clostridium difficile colitis has been ruled out.  





5. Hematuria and anemia. Hb stable in 7.5-8 range





6. Acute bilateral LE DVT.  On Eliquis 10 bid that was switched to 5 bid as of 

today





7. Constipation.  





8. Hyperkalemia.





9. Renal failure Cr 2.3





DW RN and Gi





Subjective


Subjective


In NSR. No chest pain or SOB.





Objective





Last 24 Hour Vital Signs








  Date Time  Temp Pulse Resp B/P (MAP) Pulse Ox O2 Delivery O2 Flow Rate FiO2


 


1/29/18 07:43  89 20  98 Nasal Cannula 2.0 28 1/29/18 07:36  89 20  98 Nasal Cannula 2.0 28 1/29/18 07:12     98 Nasal Cannula 2.0 28 1/29/18 07:12      Nasal Cannula 2.0 28 1/29/18 04:00  100      


 


1/29/18 04:00 98.4 98 20 145/72 97 Room Air  


 


1/29/18 03:17  90 20  99 Nasal Cannula 2.0 28 1/29/18 03:05  92 20  96 Nasal Cannula 2.0 28 1/29/18 03:05        28 1/29/18 00:00  94 20  99 Nasal Cannula 2.0 28 1/29/18 00:00 98.1 93 18 148/78 99 Nasal Cannula 2.0 


 


1/29/18 00:00  86      


 


1/28/18 23:50  92 20  97 Nasal Cannula 2.0 28 1/28/18 20:00  91      


 


1/28/18 20:00 98.2 91 18 150/84 99 Nasal Cannula 2.0 


 


1/28/18 18:48  88 20  100 Nasal Cannula 2.0 28 1/28/18 18:43     98 Nasal Cannula 2.0 28 1/28/18 18:43      Nasal Cannula 2.0 28


 


1/28/18 18:41  87 20  99 Nasal Cannula 2.0 28 1/28/18 16:00 98.0 92 20 146/85 100 Nasal Cannula 2.0 


 


1/28/18 16:00  84      


 


1/28/18 15:15      Nasal Cannula  


 


1/28/18 15:15      Nasal Cannula  


 


1/28/18 12:00  96      


 


1/28/18 12:00 98.8 97 22 139/75 97 Room Air  


 


1/28/18 11:15  98 20  100 Room Air  21


 


1/28/18 11:05  91 20  98 Room Air  21

















Intake and Output  


 


 1/28/18 1/29/18





 19:00 07:00


 


Intake Total 120 ml 1110.0 ml


 


Output Total 950 ml 900 ml


 


Balance -830 ml 210.0 ml


 


  


 


Intake Oral 120 ml 300 ml


 


IV Total  810.0 ml


 


Output Urine Total 950 ml 900 ml











Laboratory Tests








Test


  1/29/18


05:00 1/29/18


05:20


 


Urine Color Pale yellow   


 


Urine Appearance Clear   


 


Urine pH 6.5 (4.5-8.0)   


 


Urine Specific Gravity


  1.010


(1.005-1.035) 


 


 


Urine Protein


  3+ (NEGATIVE)


H 


 


 


Urine Glucose (UA)


  Negative


(NEGATIVE) 


 


 


Urine Ketones


  Negative


(NEGATIVE) 


 


 


Urine Occult Blood


  5+ (NEGATIVE)


H 


 


 


Urine Nitrite


  Negative


(NEGATIVE) 


 


 


Urine Bilirubin


  Negative


(NEGATIVE) 


 


 


Urine Urobilinogen


  Normal MG/DL


(0.0-1.0) 


 


 


Urine Leukocyte Esterase


  Negative


(NEGATIVE) 


 


 


Urine RBC


  40-60 /HPF (0


- 0)  H 


 


 


Urine WBC


  0-2 /HPF (0 -


0) 


 


 


Urine Squamous Epithelial


Cells None /LPF


(NONE/OCC) 


 


 


Urine Bacteria


  Few /HPF


(NONE) 


 


 


Urine Mucus


  Few /LPF


(NONE/OCC)  H 


 


 


White Blood Count


  


  10.3 K/UL


(4.8-10.8)


 


Red Blood Count


  


  2.98 M/UL


(4.70-6.10)  L


 


Hemoglobin


  


  8.4 G/DL


(14.2-18.0)  L


 


Hematocrit


  


  25.7 %


(42.0-52.0)  L


 


Mean Corpuscular Volume  86 FL (80-99)  


 


Mean Corpuscular Hemoglobin


  


  28.2 PG


(27.0-31.0)


 


Mean Corpuscular Hemoglobin


Concent 


  32.8 G/DL


(32.0-36.0)


 


Red Cell Distribution Width


  


  13.4 %


(11.6-14.8)


 


Platelet Count


  


  88 K/UL


(150-450)  L


 


Mean Platelet Volume


  


  7.6 FL


(6.5-10.1)


 


Neutrophils (%) (Auto)


  


  % (45.0-75.0)


 


 


Lymphocytes (%) (Auto)


  


  % (20.0-45.0)


 


 


Monocytes (%) (Auto)   % (1.0-10.0)  


 


Eosinophils (%) (Auto)   % (0.0-3.0)  


 


Basophils (%) (Auto)   % (0.0-2.0)  


 


Neutrophils % (Manual)  Pending  


 


Lymphocytes % (Manual)  Pending  


 


Platelet Estimate  Pending  


 


Platelet Morphology  Pending  


 


Sodium Level


  


  142 MMOL/L


(136-145)


 


Potassium Level


  


  3.3 MMOL/L


(3.5-5.1)  L


 


Chloride Level


  


  112 MMOL/L


()  H


 


Carbon Dioxide Level


  


  18 MMOL/L


(21-32)  L


 


Anion Gap


  


  12 mmol/L


(5-15)


 


Blood Urea Nitrogen


  


  14 mg/dL


(7-18)


 


Creatinine


  


  2.2 MG/DL


(0.55-1.30)  H


 


Estimat Glomerular Filtration


Rate 


  36.7 mL/min


(>60)


 


Glucose Level


  


  94 MG/DL


()


 


Calcium Level


  


  8.3 MG/DL


(8.5-10.1)  L








Objective


HEAD AND NECK:  No JVD.


LUNGS:  Clear.


CARDIOVASCULAR:  Nl S1 and S2 with no gallop or murmur.


ABDOMEN:  Soft and nontender.


EXTREMITIES:  Bilateral 1 plus pitting edema.











KAYLEEN HAIR Jan 29, 2018 10:00

## 2018-01-29 NOTE — INFECTIOUS DISEASES PROG NOTE
Assessment/Plan


Problems:  


(1) HAP (hospital-acquired pneumonia)


Assessment & Plan:  sputum culture grew candida most likely colonization , 

continue  zosyn for two weeks total . EOT 2/1/2018  





(2) Sepsis


Assessment & Plan:  ruled out with negative blood culture, continue  zosyn for 

two weeks total 





(3) Renal cyst, native, hemorrhage


Assessment & Plan:  urine culture grew mixed contaminant , now on zosyn empiric 

coverage , had urology eval with no intervention, repeated CT scan of the 

abdomen showed recurrent bleeding . recommend urology eval again , since he is 

on heparin drip now 





(4) Abdominal pain


Assessment & Plan:  due to bleeding into the left renal cysts , recommend 

urology eval and better  pain management  





(5) Fever


Assessment & Plan:  improved, due to the above , on wide spectrum  antibiotics 

empiric coverage , continue  tylenol prn 





(6) Diarrhea


Assessment & Plan:  no evidence of  C diff , continue antidiarrhea meds  





(7) DVT (deep venous thrombosis)


Assessment & Plan:  in both legs, with possible PE, off  heparin drip due to 

thrombocytopenia , recommend close monitor of PT/PTT, he is high risk for 

bleeding in the renal cyst , await HIT assay 





(8) Thrombocytopenia


Assessment & Plan:  rule out HIT , await antibodies screening, hematology is 

following 








Subjective


Constitutional:  Reports: no symptoms


HEENT:  Reports: no symptoms


Respiratory:  Reports: no symptoms


Breasts:  Reports: no symptoms


Cardiovascular:  Reports: no symptoms


Gastrointestinal/Abdominal:  Reports: no symptoms


Genitourinary:  Reports: no symptoms


Neurologic:  Reports: no symptoms


Psychiatric:  Reports: no symptoms


Skin:  Reports: no symptoms


Endocrine:  Reports: no symptoms


Hematologic:  Reports: no symptoms


Musculoskeletal:  Reports: no symptoms


Allergies:  


Coded Allergies:  


     CODEINE (Verified  Allergy, Unknown, 1/5/18)


Uncoded Allergies:  


     PEANUTS (Adverse Reaction, Severe, Anaphylaxis, 1/9/18)


Subjective


he feels more comfortable today , with no SOB, no fever or chills





Objective


Vital Signs





Last 24 Hour Vital Signs








  Date Time  Temp Pulse Resp B/P (MAP) Pulse Ox O2 Delivery O2 Flow Rate FiO2


 


1/29/18 14:13 97.5 88 20 152/83 98   


 


1/29/18 12:00 97.5 88 20 152/93 98   


 


1/29/18 11:27  85 20  98 Nasal Cannula 2.0 28


 


1/29/18 11:15  85 20  98 Nasal Cannula 2.0 28 1/29/18 08:00 98.1 93 22 137/75 97   


 


1/29/18 07:43  89 20  98 Nasal Cannula 2.0 28 1/29/18 07:36  89 20  98 Nasal Cannula 2.0 28


 


1/29/18 07:12     98 Nasal Cannula 2.0 28


 


1/29/18 07:12      Nasal Cannula 2.0 28 1/29/18 04:00  100      


 


1/29/18 04:00 98.4 98 20 145/72 97 Room Air  


 


1/29/18 03:17  90 20  99 Nasal Cannula 2.0 28 1/29/18 03:05  92 20  96 Nasal Cannula 2.0 28 1/29/18 03:05        28 1/29/18 00:00  94 20  99 Nasal Cannula 2.0 28 1/29/18 00:00 98.1 93 18 148/78 99 Nasal Cannula 2.0 


 


1/29/18 00:00  86      


 


1/28/18 23:50  92 20  97 Nasal Cannula 2.0 28 1/28/18 20:00  91      


 


1/28/18 20:00 98.2 91 18 150/84 99 Nasal Cannula 2.0 


 


1/28/18 18:48  88 20  100 Nasal Cannula 2.0 28 1/28/18 18:43     98 Nasal Cannula 2.0 28 1/28/18 18:43      Nasal Cannula 2.0 28 1/28/18 18:41  87 20  99 Nasal Cannula 2.0 28


 


1/28/18 16:00 98.0 92 20 146/85 100 Nasal Cannula 2.0 


 


1/28/18 16:00  84      


 


1/28/18 15:15      Nasal Cannula  


 


1/28/18 15:15      Nasal Cannula  








Height (Feet):  5


Height (Inches):  10.00


Weight (Pounds):  137


General Appearance:  WD/WN, no acute distress


HEENT:  normocephalic, atraumatic, anicteric, mucous membranes moist


Respiratory/Chest:  chest wall non-tender, no respiratory distress, no 

accessory muscle use, decreased breath sounds, crackles/rales


Cardiovascular:  normal peripheral pulses, normal rate, regular rhythm, no 

gallop/murmur, no JVD


Abdomen:  normal bowel sounds, soft, non tender, no organomegaly, non distended

, no mass, no scars


Extremities:  no cyanosis, no clubbing


Skin:  no rash, no lesions, no ulcers


Neurologic/Psychiatric:  alert, oriented x 3, responsive





Laboratory Tests








Test


  1/29/18


05:00 1/29/18


05:20


 


Urine Color Pale yellow   


 


Urine Appearance Clear   


 


Urine pH 6.5 (4.5-8.0)   


 


Urine Specific Gravity


  1.010


(1.005-1.035) 


 


 


Urine Protein


  3+ (NEGATIVE)


H 


 


 


Urine Glucose (UA)


  Negative


(NEGATIVE) 


 


 


Urine Ketones


  Negative


(NEGATIVE) 


 


 


Urine Occult Blood


  5+ (NEGATIVE)


H 


 


 


Urine Nitrite


  Negative


(NEGATIVE) 


 


 


Urine Bilirubin


  Negative


(NEGATIVE) 


 


 


Urine Urobilinogen


  Normal MG/DL


(0.0-1.0) 


 


 


Urine Leukocyte Esterase


  Negative


(NEGATIVE) 


 


 


Urine RBC


  40-60 /HPF (0


- 0)  H 


 


 


Urine WBC


  0-2 /HPF (0 -


0) 


 


 


Urine Squamous Epithelial


Cells None /LPF


(NONE/OCC) 


 


 


Urine Bacteria


  Few /HPF


(NONE) 


 


 


Urine Mucus


  Few /LPF


(NONE/OCC)  H 


 


 


White Blood Count


  


  10.3 K/UL


(4.8-10.8)


 


Red Blood Count


  


  2.98 M/UL


(4.70-6.10)  L


 


Hemoglobin


  


  8.4 G/DL


(14.2-18.0)  L


 


Hematocrit


  


  25.7 %


(42.0-52.0)  L


 


Mean Corpuscular Volume  86 FL (80-99)  


 


Mean Corpuscular Hemoglobin


  


  28.2 PG


(27.0-31.0)


 


Mean Corpuscular Hemoglobin


Concent 


  32.8 G/DL


(32.0-36.0)


 


Red Cell Distribution Width


  


  13.4 %


(11.6-14.8)


 


Platelet Count


  


  88 K/UL


(150-450)  L


 


Mean Platelet Volume


  


  7.6 FL


(6.5-10.1)


 


Neutrophils (%) (Auto)


  


  % (45.0-75.0)


 


 


Lymphocytes (%) (Auto)


  


  % (20.0-45.0)


 


 


Monocytes (%) (Auto)   % (1.0-10.0)  


 


Eosinophils (%) (Auto)   % (0.0-3.0)  


 


Basophils (%) (Auto)   % (0.0-2.0)  


 


Differential Total Cells


Counted 


  100  


 


 


Neutrophils % (Manual)  84 % (45-75)  H


 


Lymphocytes % (Manual)  6 % (20-45)  L


 


Monocytes % (Manual)  6 % (1-10)  


 


Eosinophils % (Manual)  4 % (0-3)  H


 


Basophils % (Manual)  0 % (0-2)  


 


Band Neutrophils  0 % (0-8)  


 


Platelet Estimate  Decreased  L


 


Platelet Morphology  Normal  


 


Hypochromasia  2+  


 


Anisocytosis  1+  


 


Sodium Level


  


  142 MMOL/L


(136-145)


 


Potassium Level


  


  3.3 MMOL/L


(3.5-5.1)  L


 


Chloride Level


  


  112 MMOL/L


()  H


 


Carbon Dioxide Level


  


  18 MMOL/L


(21-32)  L


 


Anion Gap


  


  12 mmol/L


(5-15)


 


Blood Urea Nitrogen


  


  14 mg/dL


(7-18)


 


Creatinine


  


  2.2 MG/DL


(0.55-1.30)  H


 


Estimat Glomerular Filtration


Rate 


  36.7 mL/min


(>60)


 


Glucose Level


  


  94 MG/DL


()


 


Calcium Level


  


  8.3 MG/DL


(8.5-10.1)  L











Current Medications








 Medications


  (Trade)  Dose


 Ordered  Sig/Judie


 Route


 PRN Reason  Start Time


 Stop Time Status Last Admin


Dose Admin


 


 Acetaminophen


  (Tylenol)  650 mg  Q4H  PRN


 ORAL


 Mild Pain/Temp > 100.5  1/21/18 19:45


 2/5/18 19:44  1/24/18 03:19


 


 


 Apixaban


  (Eliquis)  5 mg  BID


 ORAL


   2/3/18 18:00


 2/17/18 17:59   


 


 


 Apixaban


  (Eliquis)  10 mg  BID


 ORAL


   1/27/18 18:00


 2/3/18 17:59  1/29/18 13:25


 


 


 Dextrose


  (Dextrose 50%)    STAT  PRN


 IV


 Hypoglycemia  1/21/18 19:45


 2/20/18 19:44   


 


 


 Diphenoxylate HCl/


 Atropine


  (Lomotil)  2.5 mg  Q4H  PRN


 ORAL


 Diarrhea  1/21/18 19:45


 2/9/18 19:44  1/22/18 04:00


 


 


 Docusate Sodium


  (Colace)  100 mg  THREE TIMES A  DAY


 ORAL


   1/21/18 18:00


 2/19/18 17:59  1/29/18 13:25


 


 


 Famotidine


  (Pepcid)  20 mg  QHS


 ORAL


   1/29/18 21:00


 2/28/18 20:59   


 


 


 Folic Acid


  (Folate)  1 mg  DAILY


 ORAL


   1/22/18 09:00


 2/11/18 14:59  1/29/18 09:17


 


 


 Ipratropium


 Bromide


  (Atrovent)  500 mcg  Q4HRT


 N


   1/25/18 07:45


 1/30/18 07:44  1/29/18 11:15


 


 


 Levalbuterol HCl


  (Xopenex)  1.25 mg  Q4HRT


 N


   1/25/18 07:45


 1/30/18 07:44  1/29/18 11:15


 


 


 Magnesium


 Hydroxide


  (Mom)  30 ml  BIDPRN  PRN


 ORAL


 constipation  1/21/18 19:46


 2/20/18 19:45   


 


 


 Morphine Sulfate


  (Morphine


 Sulfate)  2 mg  Q4H  PRN


 IVP


 PAIN 4-10  1/27/18 19:45


 1/31/18 18:44  1/28/18 21:54


 


 


 Ondansetron HCl


  (Zofran)  4 mg  Q6H  PRN


 IVP


 Nausea & Vomiting  1/21/18 19:46


 2/5/18 19:45   


 


 


 Pantoprazole


  (Protonix)  40 mg  DAILY


 ORAL


   1/22/18 09:00


 2/13/18 08:59  1/29/18 09:17


 


 


 Piperacillin Sod/


 Tazobactam Sod


 3.375 gm/Sodium


 Chloride  110 ml @ 


 27.5 mls/hr  Q8H


 IVPB


   1/24/18 08:00


 2/1/18 07:59  1/29/18 09:18


 


 


 Sodium Chloride  1,000 ml @ 


 100 mls/hr  Q10H


 IV


   1/24/18 08:00


 2/23/18 07:59  1/29/18 09:18


 


 


 Vitamin B Complex/


 Vit C/Folic Acid


  (Nephrovite)  1 tab  DAILY


 ORAL


   1/22/18 09:00


 2/8/18 08:59  1/29/18 09:17


 

















Pablo Talley M.D. Jan 29, 2018 14:21

## 2018-01-29 NOTE — GI PROGRESS NOTE
Assessment/Plan


Problems:  


(1) Diarrhea


ICD Codes:  R19.7 - Diarrhea, unspecified


SNOMED:  13422633


(2) Sickle cell trait


ICD Codes:  D57.3 - Sickle-cell trait


SNOMED:  60407781


(3) Abdominal pain


ICD Codes:  R10.9 - Unspecified abdominal pain


SNOMED:  96795534


(4) Renal cyst, native, hemorrhage


ICD Codes:  N28.89 - Other specified disorders of kidney and ureter; N28.1 - 

Cyst of kidney, acquired


SNOMED:  88440167


Status:  progressing


Status Narrative


Discussed with Dr. Gamez.


Assessment/Plan


OB stool r/o GI bleed  >> negative


stool cx >> negative


cdiff >> negative


O&P >> negative


diarrhea resolved >> lomotil prn





fu renal, hematology, ID recs


monitor H&H, prn transfusions


renal diet, tolerating


ppi, add H2B qhs


abx


fu labs





Subjective


Subjective


generalized weakness


diarrhea resolved, has had formed BM


epigastric pain


abdominal bloating





Objective





Last 24 Hour Vital Signs








  Date Time  Temp Pulse Resp B/P (MAP) Pulse Ox O2 Delivery O2 Flow Rate FiO2


 


1/29/18 07:43  89 20  98 Nasal Cannula 2.0 28 1/29/18 07:36  89 20  98 Nasal Cannula 2.0 28 1/29/18 07:12     98 Nasal Cannula 2.0 28 1/29/18 07:12      Nasal Cannula 2.0 28 1/29/18 04:00  100      


 


1/29/18 04:00 98.4 98 20 145/72 97 Room Air  


 


1/29/18 03:17  90 20  99 Nasal Cannula 2.0 28 1/29/18 03:05  92 20  96 Nasal Cannula 2.0 28 1/29/18 03:05        28


 


1/29/18 00:00  94 20  99 Nasal Cannula 2.0 28


 


1/29/18 00:00 98.1 93 18 148/78 99 Nasal Cannula 2.0 


 


1/29/18 00:00  86      


 


1/28/18 23:50  92 20  97 Nasal Cannula 2.0 28


 


1/28/18 20:00  91      


 


1/28/18 20:00 98.2 91 18 150/84 99 Nasal Cannula 2.0 


 


1/28/18 18:48  88 20  100 Nasal Cannula 2.0 28 1/28/18 18:43     98 Nasal Cannula 2.0 28


 


1/28/18 18:43      Nasal Cannula 2.0 28


 


1/28/18 18:41  87 20  99 Nasal Cannula 2.0 28


 


1/28/18 16:00 98.0 92 20 146/85 100 Nasal Cannula 2.0 


 


1/28/18 16:00  84      


 


1/28/18 15:15      Nasal Cannula  


 


1/28/18 15:15      Nasal Cannula  


 


1/28/18 12:00  96      


 


1/28/18 12:00 98.8 97 22 139/75 97 Room Air  


 


1/28/18 11:15  98 20  100 Room Air  21


 


1/28/18 11:05  91 20  98 Room Air  21

















Intake and Output  


 


 1/28/18 1/29/18





 19:00 07:00


 


Intake Total 120 ml 1110.0 ml


 


Output Total 950 ml 900 ml


 


Balance -830 ml 210.0 ml


 


  


 


Intake Oral 120 ml 300 ml


 


IV Total  810.0 ml


 


Output Urine Total 950 ml 900 ml











Laboratory Tests








Test


  1/29/18


05:00 1/29/18


05:20


 


Urine Color Pale yellow   


 


Urine Appearance Clear   


 


Urine pH 6.5 (4.5-8.0)   


 


Urine Specific Gravity


  1.010


(1.005-1.035) 


 


 


Urine Protein


  3+ (NEGATIVE)


H 


 


 


Urine Glucose (UA)


  Negative


(NEGATIVE) 


 


 


Urine Ketones


  Negative


(NEGATIVE) 


 


 


Urine Occult Blood


  5+ (NEGATIVE)


H 


 


 


Urine Nitrite


  Negative


(NEGATIVE) 


 


 


Urine Bilirubin


  Negative


(NEGATIVE) 


 


 


Urine Urobilinogen


  Normal MG/DL


(0.0-1.0) 


 


 


Urine Leukocyte Esterase


  Negative


(NEGATIVE) 


 


 


Urine RBC


  40-60 /HPF (0


- 0)  H 


 


 


Urine WBC


  0-2 /HPF (0 -


0) 


 


 


Urine Squamous Epithelial


Cells None /LPF


(NONE/OCC) 


 


 


Urine Bacteria


  Few /HPF


(NONE) 


 


 


Urine Mucus


  Few /LPF


(NONE/OCC)  H 


 


 


White Blood Count


  


  10.3 K/UL


(4.8-10.8)


 


Red Blood Count


  


  2.98 M/UL


(4.70-6.10)  L


 


Hemoglobin


  


  8.4 G/DL


(14.2-18.0)  L


 


Hematocrit


  


  25.7 %


(42.0-52.0)  L


 


Mean Corpuscular Volume  86 FL (80-99)  


 


Mean Corpuscular Hemoglobin


  


  28.2 PG


(27.0-31.0)


 


Mean Corpuscular Hemoglobin


Concent 


  32.8 G/DL


(32.0-36.0)


 


Red Cell Distribution Width


  


  13.4 %


(11.6-14.8)


 


Platelet Count


  


  88 K/UL


(150-450)  L


 


Mean Platelet Volume


  


  7.6 FL


(6.5-10.1)


 


Neutrophils (%) (Auto)


  


  % (45.0-75.0)


 


 


Lymphocytes (%) (Auto)


  


  % (20.0-45.0)


 


 


Monocytes (%) (Auto)   % (1.0-10.0)  


 


Eosinophils (%) (Auto)   % (0.0-3.0)  


 


Basophils (%) (Auto)   % (0.0-2.0)  


 


Neutrophils % (Manual)  Pending  


 


Lymphocytes % (Manual)  Pending  


 


Platelet Estimate  Pending  


 


Platelet Morphology  Pending  


 


Sodium Level


  


  142 MMOL/L


(136-145)


 


Potassium Level


  


  3.3 MMOL/L


(3.5-5.1)  L


 


Chloride Level


  


  112 MMOL/L


()  H


 


Carbon Dioxide Level


  


  18 MMOL/L


(21-32)  L


 


Anion Gap


  


  12 mmol/L


(5-15)


 


Blood Urea Nitrogen


  


  14 mg/dL


(7-18)


 


Creatinine


  


  2.2 MG/DL


(0.55-1.30)  H


 


Estimat Glomerular Filtration


Rate 


  36.7 mL/min


(>60)


 


Glucose Level


  


  94 MG/DL


()


 


Calcium Level


  


  8.3 MG/DL


(8.5-10.1)  L








Height (Feet):  5


Height (Inches):  10.00


Weight (Pounds):  137


General Appearance:  WD/WN, no apparent distress, alert


Cardiovascular:  normal rate


Respiratory/Chest:  normal breath sounds, no respiratory distress


Abdominal Exam:  normal bowel sounds, non tender, soft


Extremities:  normal range of motion, non-tender











Elsy Lakhani N.COLBY Jan 29, 2018 09:42

## 2018-01-30 VITALS — SYSTOLIC BLOOD PRESSURE: 153 MMHG | DIASTOLIC BLOOD PRESSURE: 86 MMHG

## 2018-01-30 VITALS — SYSTOLIC BLOOD PRESSURE: 160 MMHG | DIASTOLIC BLOOD PRESSURE: 86 MMHG

## 2018-01-30 VITALS — DIASTOLIC BLOOD PRESSURE: 81 MMHG | SYSTOLIC BLOOD PRESSURE: 140 MMHG

## 2018-01-30 VITALS — DIASTOLIC BLOOD PRESSURE: 75 MMHG | SYSTOLIC BLOOD PRESSURE: 133 MMHG

## 2018-01-30 VITALS — DIASTOLIC BLOOD PRESSURE: 91 MMHG | SYSTOLIC BLOOD PRESSURE: 168 MMHG

## 2018-01-30 VITALS — DIASTOLIC BLOOD PRESSURE: 86 MMHG | SYSTOLIC BLOOD PRESSURE: 147 MMHG

## 2018-01-30 LAB
ADD MANUAL DIFF: NO
ANION GAP SERPL CALC-SCNC: 12 MMOL/L (ref 5–15)
APTT BLD: 36 SEC (ref 23–33)
BASOPHILS NFR BLD AUTO: 2.3 % (ref 0–2)
BUN SERPL-MCNC: 13 MG/DL (ref 7–18)
CALCIUM SERPL-MCNC: 8.4 MG/DL (ref 8.5–10.1)
CHLORIDE SERPL-SCNC: 113 MMOL/L (ref 98–107)
CO2 SERPL-SCNC: 18 MMOL/L (ref 21–32)
CREAT SERPL-MCNC: 2.2 MG/DL (ref 0.55–1.3)
EOSINOPHIL NFR BLD AUTO: 5.3 % (ref 0–3)
ERYTHROCYTE [DISTWIDTH] IN BLOOD BY AUTOMATED COUNT: 13.5 % (ref 11.6–14.8)
HCT VFR BLD CALC: 25 % (ref 42–52)
HGB BLD-MCNC: 8.2 G/DL (ref 14.2–18)
INR PPP: 1.2 (ref 0.9–1.1)
LYMPHOCYTES NFR BLD AUTO: 8.3 % (ref 20–45)
MCV RBC AUTO: 86 FL (ref 80–99)
MONOCYTES NFR BLD AUTO: 8.6 % (ref 1–10)
NEUTROPHILS NFR BLD AUTO: 75.5 % (ref 45–75)
PLATELET # BLD: 107 K/UL (ref 150–450)
POTASSIUM SERPL-SCNC: 3.5 MMOL/L (ref 3.5–5.1)
RBC # BLD AUTO: 2.9 M/UL (ref 4.7–6.1)
SODIUM SERPL-SCNC: 143 MMOL/L (ref 136–145)
WBC # BLD AUTO: 9.4 K/UL (ref 4.8–10.8)

## 2018-01-30 RX ADMIN — DEXTROSE MONOHYDRATE SCH MLS/HR: 50 INJECTION, SOLUTION INTRAVENOUS at 17:07

## 2018-01-30 RX ADMIN — Medication SCH MCG: at 07:00

## 2018-01-30 RX ADMIN — DEXTROSE MONOHYDRATE SCH MLS/HR: 50 INJECTION, SOLUTION INTRAVENOUS at 08:44

## 2018-01-30 RX ADMIN — Medication SCH MCG: at 16:14

## 2018-01-30 RX ADMIN — LEVALBUTEROL HYDROCHLORIDE SCH MG: 1.25 SOLUTION, CONCENTRATE RESPIRATORY (INHALATION) at 07:00

## 2018-01-30 RX ADMIN — LEVALBUTEROL HYDROCHLORIDE SCH MG: 1.25 SOLUTION, CONCENTRATE RESPIRATORY (INHALATION) at 02:36

## 2018-01-30 RX ADMIN — APIXABAN SCH MG: 2.5 TABLET, FILM COATED ORAL at 18:50

## 2018-01-30 RX ADMIN — Medication SCH MCG: at 02:36

## 2018-01-30 RX ADMIN — LEVALBUTEROL HYDROCHLORIDE SCH MG: 1.25 SOLUTION, CONCENTRATE RESPIRATORY (INHALATION) at 20:17

## 2018-01-30 RX ADMIN — Medication SCH MCG: at 09:17

## 2018-01-30 RX ADMIN — DOCUSATE SODIUM SCH MG: 100 CAPSULE, LIQUID FILLED ORAL at 13:00

## 2018-01-30 RX ADMIN — Medication SCH MCG: at 20:17

## 2018-01-30 RX ADMIN — LEVALBUTEROL HYDROCHLORIDE SCH MG: 1.25 SOLUTION, CONCENTRATE RESPIRATORY (INHALATION) at 09:17

## 2018-01-30 RX ADMIN — APIXABAN SCH MG: 2.5 TABLET, FILM COATED ORAL at 08:46

## 2018-01-30 RX ADMIN — Medication SCH TAB: at 08:44

## 2018-01-30 RX ADMIN — DOCUSATE SODIUM SCH MG: 100 CAPSULE, LIQUID FILLED ORAL at 08:44

## 2018-01-30 RX ADMIN — LEVALBUTEROL HYDROCHLORIDE SCH MG: 1.25 SOLUTION, CONCENTRATE RESPIRATORY (INHALATION) at 16:14

## 2018-01-30 RX ADMIN — DOCUSATE SODIUM SCH MG: 100 CAPSULE, LIQUID FILLED ORAL at 18:00

## 2018-01-30 NOTE — NEPHROLOGY PROGRESS NOTE
Assessment/Plan


Problem List:  


(1) Abdominal pain


(2) Renal mass, left


(3) Sickle cell trait


Assessment:  with crisis? 





(4) Renal cyst, native, hemorrhage


(5) Fever


(6) Tachycardia


(7) DVT (deep venous thrombosis)


(8) Pneumonia


(9) Sepsis


(10) Hematuria


(11) Severe anemia


Plan


Dr. Sanches was called for hemeonc. 


cont IVF. cont pain management.


ID following. 


empiric cefepime and metronidazole. 


d/w Dr. Jim. 


D/w urology - no need for any intervention. 


now on heparin gtt. 


repeat CXR.





Subjective


Subjective


Better. No fever.





Objective


Objective





Last 24 Hour Vital Signs








  Date Time  Temp Pulse Resp B/P (MAP) Pulse Ox O2 Delivery O2 Flow Rate FiO2


 


1/30/18 20:00 99.2 91 20 153/86 95   


 


1/30/18 19:15  92 18  100 Room Air  21 1/30/18 19:08  86 20  99 Room Air  21 1/30/18 19:00      Room Air  21 1/30/18 19:00     99 Room Air  21 1/30/18 16:15  101 20  100 Nasal Cannula 2.0 28


 


1/30/18 16:03  99 18  97 Nasal Cannula 2.0 28


 


1/30/18 16:00 98.8 64 22 160/86 97 Room Air  


 


1/30/18 12:00 98.8 91 22 147/86 97 Room Air  


 


1/30/18 09:26  92 20  100 Nasal Cannula 2.0 28


 


1/30/18 09:21     97 Nasal Cannula 2.0 28


 


1/30/18 09:21      Nasal Cannula 2.0 28


 


1/30/18 09:20  101 22   Nasal Cannula 2.0 28


 


1/30/18 09:18  101 18  97 Nasal Cannula 2.0 28


 


1/30/18 08:00 98.0 101 22 168/91 98 Room Air  


 


1/30/18 04:47 98.7 95 21 140/81 97   


 


1/30/18 02:48  85 20  100 Nasal Cannula 2.0 28


 


1/30/18 02:37  80 18  98 Nasal Cannula 2.0 28


 


1/30/18 00:01      Nasal Cannula 2.0 


 


1/30/18 00:00 98.8 87 19 133/75 100   

















Intake and Output  


 


 1/29/18 1/30/18





 19:00 07:00


 


Intake Total 580 ml 1630.0 ml


 


Balance 580 ml 1630.0 ml


 


  


 


Intake Oral  720 ml


 


IV Total  910.0 ml


 


Other 580 ml 


 


# Voids 2 4








Laboratory Tests


1/30/18 06:50: 


White Blood Count 9.4, Red Blood Count 2.90L, Hemoglobin 8.2L, Hematocrit 25.0L

, Mean Corpuscular Volume 86, Mean Corpuscular Hemoglobin 28.4, Mean 

Corpuscular Hemoglobin Concent 32.9, Red Cell Distribution Width 13.5, Platelet 

Count 107L, Mean Platelet Volume 8.9, Neutrophils (%) (Auto) 75.5H, Lymphocytes 

(%) (Auto) 8.3L, Monocytes (%) (Auto) 8.6, Eosinophils (%) (Auto) 5.3H, 

Basophils (%) (Auto) 2.3H, Prothrombin Time 12.7H, Prothromb Time International 

Ratio 1.2H, Activated Partial Thromboplast Time 36H, Sodium Level 143, 

Potassium Level 3.5, Chloride Level 113H, Carbon Dioxide Level 18L, Anion Gap 12

, Blood Urea Nitrogen 13, Creatinine 2.2H, Estimat Glomerular Filtration Rate 

36.7, Glucose Level 95, Calcium Level 8.4L


Height (Feet):  5


Height (Inches):  10.00


Weight (Pounds):  137











DELVIS SKELTON Jan 30, 2018 23:11

## 2018-01-30 NOTE — GI PROGRESS NOTE
Assessment/Plan


Problems:  


(1) Diarrhea


ICD Codes:  R19.7 - Diarrhea, unspecified


SNOMED:  72713045


(2) Sickle cell trait


ICD Codes:  D57.3 - Sickle-cell trait


SNOMED:  05311570


(3) Abdominal pain


ICD Codes:  R10.9 - Unspecified abdominal pain


SNOMED:  76522193


(4) Renal cyst, native, hemorrhage


ICD Codes:  N28.89 - Other specified disorders of kidney and ureter; N28.1 - 

Cyst of kidney, acquired


SNOMED:  75660029


Status:  progressing


Status Narrative


Discussed with Dr. Gamez.


Assessment/Plan


OB stool r/o GI bleed  >> negative


stool cx >> negative


cdiff >> negative


O&P >> negative


diarrhea resolved >> lomotil prn





fu renal, hematology, ID recs


monitor H&H, prn transfusions


renal diet, tolerating


ppi, add H2B qhs


abx


fu labs


PT eval





Subjective


Subjective


generalized weakness


diarrhea resolved, has had formed BM


epigastric pain


abdominal bloating





Objective





Last 24 Hour Vital Signs








  Date Time  Temp Pulse Resp B/P (MAP) Pulse Ox O2 Delivery O2 Flow Rate FiO2


 


1/30/18 09:26  92 20  100 Nasal Cannula 2.0 28


 


1/30/18 09:21     97 Nasal Cannula 2.0 28


 


1/30/18 09:21      Nasal Cannula 2.0 28


 


1/30/18 09:20  101 22   Nasal Cannula 2.0 28


 


1/30/18 09:18  101 18  97 Nasal Cannula 2.0 28


 


1/30/18 08:00 98.0 101 22 168/91 98 Room Air  


 


1/30/18 04:47 98.7 95 21 140/81 97   


 


1/30/18 02:48  85 20  100 Nasal Cannula 2.0 28


 


1/30/18 02:37  80 18  98 Nasal Cannula 2.0 28


 


1/30/18 00:01      Nasal Cannula 2.0 


 


1/30/18 00:00 98.8 87 19 133/75 100   


 


1/29/18 20:37      Nasal Cannula 2.0 


 


1/29/18 20:36 99.2 90 17 144/81 100   


 


1/29/18 20:09  92 20  99 Nasal Cannula 2.0 28


 


1/29/18 20:01  92 20  98 Nasal Cannula 2.0 28


 


1/29/18 20:01     98 Nasal Cannula 2.0 28


 


1/29/18 20:01      Nasal Cannula 2.0 28


 


1/29/18 16:26  89 20  98 Nasal Cannula 2.0 28


 


1/29/18 16:12  89 20  98 Nasal Cannula 2.0 28 1/29/18 16:00  78      


 


1/29/18 16:00 97.8 93 22 142/80  Room Air  


 


1/29/18 14:13 97.5 88 20 152/83 98   


 


1/29/18 12:00  72      


 


1/29/18 12:00 97.5 88 20 152/93 98   


 


1/29/18 11:27  85 20  98 Nasal Cannula 2.0 28


 


1/29/18 11:15  85 20  98 Nasal Cannula 2.0 28

















Intake and Output  


 


 1/29/18 1/30/18





 19:00 07:00


 


Intake Total 580 ml 1630.0 ml


 


Balance 580 ml 1630.0 ml


 


  


 


Intake Oral  720 ml


 


IV Total  910.0 ml


 


Other 580 ml 


 


# Voids 2 4











Laboratory Tests








Test


  1/30/18


06:50


 


White Blood Count


  9.4 K/UL


(4.8-10.8)


 


Red Blood Count


  2.90 M/UL


(4.70-6.10)  L


 


Hemoglobin


  8.2 G/DL


(14.2-18.0)  L


 


Hematocrit


  25.0 %


(42.0-52.0)  L


 


Mean Corpuscular Volume 86 FL (80-99)  


 


Mean Corpuscular Hemoglobin


  28.4 PG


(27.0-31.0)


 


Mean Corpuscular Hemoglobin


Concent 32.9 G/DL


(32.0-36.0)


 


Red Cell Distribution Width


  13.5 %


(11.6-14.8)


 


Platelet Count


  107 K/UL


(150-450)  L


 


Mean Platelet Volume


  8.9 FL


(6.5-10.1)


 


Neutrophils (%) (Auto)


  75.5 %


(45.0-75.0)  H


 


Lymphocytes (%) (Auto)


  8.3 %


(20.0-45.0)  L


 


Monocytes (%) (Auto)


  8.6 %


(1.0-10.0)


 


Eosinophils (%) (Auto)


  5.3 %


(0.0-3.0)  H


 


Basophils (%) (Auto)


  2.3 %


(0.0-2.0)  H


 


Prothrombin Time


  12.7 SEC


(9.30-11.50)  H


 


Prothromb Time International


Ratio 1.2 (0.9-1.1)


H


 


Activated Partial


Thromboplast Time 36 SEC (23-33)


H


 


Sodium Level


  143 MMOL/L


(136-145)


 


Potassium Level


  3.5 MMOL/L


(3.5-5.1)


 


Chloride Level


  113 MMOL/L


()  H


 


Carbon Dioxide Level


  18 MMOL/L


(21-32)  L


 


Anion Gap


  12 mmol/L


(5-15)


 


Blood Urea Nitrogen


  13 mg/dL


(7-18)


 


Creatinine


  2.2 MG/DL


(0.55-1.30)  H


 


Estimat Glomerular Filtration


Rate 36.7 mL/min


(>60)


 


Glucose Level


  95 MG/DL


()


 


Calcium Level


  8.4 MG/DL


(8.5-10.1)  L








Height (Feet):  5


Height (Inches):  10.00


Weight (Pounds):  137


General Appearance:  WD/WN, no apparent distress, alert


Cardiovascular:  normal rate


Respiratory/Chest:  normal breath sounds, no respiratory distress


Abdominal Exam:  normal bowel sounds, non tender, soft


Extremities:  normal range of motion, non-tender











Elsy Lakhani N.P. Jan 30, 2018 10:23

## 2018-01-30 NOTE — GENERAL PROGRESS NOTE
Assessment/Plan


Status:  stable


Assessment/Plan


#. DVT of the bilateral lower extremities with concern for PE but unable to 

undergo CT angio


--> agree to continue heparin gtt at this time, can take xarelto or eliquis as 

outpatient


--> Patient on eliquis. 


--> if anemia worsens in future, consider ivc filter


#. Anemia, likely related to chronic disease in addition to kidney disease.  

Ferritin is elevated


--> Blood transfusion not required unless symptomatic or hgb <7. Currently >8


--> Continue to trend cbc daily 


#. Leukocytosis with fever.  


--> Currently resolved and afebrile. 


--> It is unlikely the patient has a sickle cell disease  MCV.  


--> Peripheral smear has been reviewed.


#. Anemia secondary to sickle cell disease (hemoglobin electrophoresis confirms 

he has sickle trait)


#. Nausea, vomiting, and diarrhea.  Evaluated by GI Service.  Outpatient 

gastrointestinal procedure as needed.


#. Abdominal pain.  Imaging reviewed.


#. Sepsis.  Likely due to PNA, on broad specturm abx


#. Hypertension, currently better, is improving.  Continue to closely monitor.





Subjective


Date patient seen:  Jan 30, 2018


Constitutional:  Denies: no symptoms, chills, diaphoresis, fever, malaise, 

weakness, other


HEENT:  Denies: no symptoms, eye pain, blurred vision, tearing, double vision, 

ear pain, ear discharge, nose pain, nose congestion, throat pain, throat 

swelling, mouth pain, mouth swelling, other


Cardiovascular:  Denies: no symptoms, chest pain, edema, irregular heart rate, 

lightheadedness, palpitations, syncope, other


Respiratory:  Denies: no symptoms, cough, orthopnea, shortness of breath, SOB 

with excertion, SOB at rest, sputum, stridor, wheezing, other


Gastrointestinal/Abdominal:  Denies: no symptoms, abdomen distended, abdominal 

pain, black stools, tarry stools, blood in stool, constipated, diarrhea, 

difficulty swallowing, nausea, poor appetite, poor fluid intake, rectal bleeding

, vomiting, other


Genitourinary:  Denies: no symptoms, burning, discharge, frequency, flank pain, 

hematuria, incontinence, pain, urgency, other


Hematologic/Lymphatic:  Reports: anemia


Allergies:  


Coded Allergies:  


     CODEINE (Verified  Allergy, Unknown, 1/5/18)


Uncoded Allergies:  


     PEANUTS (Adverse Reaction, Severe, Anaphylaxis, 1/9/18)


Subjective


On eliquis. Afebrile. NAD.





Objective





Last 24 Hour Vital Signs








  Date Time  Temp Pulse Resp B/P (MAP) Pulse Ox O2 Delivery O2 Flow Rate FiO2


 


1/30/18 20:00 99.2 91 20 153/86 95   


 


1/30/18 19:15  92 18  100 Room Air  21


 


1/30/18 19:08  86 20  99 Room Air  21 1/30/18 19:00      Room Air  21 1/30/18 19:00     99 Room Air  21 1/30/18 16:15  101 20  100 Nasal Cannula 2.0 28


 


1/30/18 16:03  99 18  97 Nasal Cannula 2.0 28


 


1/30/18 16:00 98.8 64 22 160/86 97 Room Air  


 


1/30/18 12:00 98.8 91 22 147/86 97 Room Air  


 


1/30/18 09:26  92 20  100 Nasal Cannula 2.0 28


 


1/30/18 09:21     97 Nasal Cannula 2.0 28


 


1/30/18 09:21      Nasal Cannula 2.0 28


 


1/30/18 09:20  101 22   Nasal Cannula 2.0 28


 


1/30/18 09:18  101 18  97 Nasal Cannula 2.0 28


 


1/30/18 08:00 98.0 101 22 168/91 98 Room Air  


 


1/30/18 04:47 98.7 95 21 140/81 97   


 


1/30/18 02:48  85 20  100 Nasal Cannula 2.0 28


 


1/30/18 02:37  80 18  98 Nasal Cannula 2.0 28


 


1/30/18 00:01      Nasal Cannula 2.0 


 


1/30/18 00:00 98.8 87 19 133/75 100   

















Intake and Output  


 


 1/29/18 1/30/18





 19:00 07:00


 


Intake Total 580 ml 1630.0 ml


 


Balance 580 ml 1630.0 ml


 


  


 


Intake Oral  720 ml


 


IV Total  910.0 ml


 


Other 580 ml 


 


# Voids 2 4








Laboratory Tests


1/30/18 06:50: 


White Blood Count 9.4, Red Blood Count 2.90L, Hemoglobin 8.2L, Hematocrit 25.0L

, Mean Corpuscular Volume 86, Mean Corpuscular Hemoglobin 28.4, Mean 

Corpuscular Hemoglobin Concent 32.9, Red Cell Distribution Width 13.5, Platelet 

Count 107L, Mean Platelet Volume 8.9, Neutrophils (%) (Auto) 75.5H, Lymphocytes 

(%) (Auto) 8.3L, Monocytes (%) (Auto) 8.6, Eosinophils (%) (Auto) 5.3H, 

Basophils (%) (Auto) 2.3H, Prothrombin Time 12.7H, Prothromb Time International 

Ratio 1.2H, Activated Partial Thromboplast Time 36H, Sodium Level 143, 

Potassium Level 3.5, Chloride Level 113H, Carbon Dioxide Level 18L, Anion Gap 12

, Blood Urea Nitrogen 13, Creatinine 2.2H, Estimat Glomerular Filtration Rate 

36.7, Glucose Level 95, Calcium Level 8.4L


Height (Feet):  5


Height (Inches):  10.00


Weight (Pounds):  137


General Appearance:  no apparent distress


Cardiovascular:  normal rate


Respiratory/Chest:  lungs clear


Abdomen:  soft











Joe Sanches Jan 30, 2018 22:24

## 2018-01-30 NOTE — INFECTIOUS DISEASES PROG NOTE
Assessment/Plan


Problems:  


(1) HAP (hospital-acquired pneumonia)


Assessment & Plan:  sputum culture grew candida most likely colonization , 

continue  zosyn for two weeks total . EOT 2/1/2018  





(2) Sepsis


Assessment & Plan:  ruled out with negative blood culture, continue  zosyn for 

two weeks total 





(3) Renal cyst, native, hemorrhage


Assessment & Plan:  urine culture grew mixed contaminant , now on zosyn empiric 

coverage , had urology eval with no intervention, repeated CT scan of the 

abdomen showed recurrent bleeding . recommend urology eval again , since he is 

on heparin drip now 





(4) Abdominal pain


Assessment & Plan:  due to bleeding into the left renal cysts , recommend 

urology eval and better  pain management  





(5) Fever


Assessment & Plan:  improved, due to the above , on wide spectrum  antibiotics 

empiric coverage , continue  tylenol prn 





(6) Diarrhea


Assessment & Plan:  no evidence of  C diff , continue antidiarrhea meds  





(7) DVT (deep venous thrombosis)


Assessment & Plan:  in both legs, with possible PE, off  heparin drip due to 

thrombocytopenia , recommend close monitor of PT/PTT, he is high risk for 

bleeding in the renal cyst , await HIT assay 





(8) Thrombocytopenia


Assessment & Plan:  rule out HIT , await antibodies screening, hematology is 

following 








Subjective


Allergies:  


Coded Allergies:  


     CODEINE (Verified  Allergy, Unknown, 1/5/18)


Uncoded Allergies:  


     PEANUTS (Adverse Reaction, Severe, Anaphylaxis, 1/9/18)


Subjective


he feels more comfortable today , with no SOB, no fever or chills





Objective


Vital Signs





Last 24 Hour Vital Signs








  Date Time  Temp Pulse Resp B/P (MAP) Pulse Ox O2 Delivery O2 Flow Rate FiO2


 


1/30/18 12:00 98.8 91 22 147/86 97 Room Air  


 


1/30/18 09:26  92 20  100 Nasal Cannula 2.0 28


 


1/30/18 09:21     97 Nasal Cannula 2.0 28


 


1/30/18 09:21      Nasal Cannula 2.0 28


 


1/30/18 09:20  101 22   Nasal Cannula 2.0 28


 


1/30/18 09:18  101 18  97 Nasal Cannula 2.0 28


 


1/30/18 08:00 98.0 101 22 168/91 98 Room Air  


 


1/30/18 04:47 98.7 95 21 140/81 97   


 


1/30/18 02:48  85 20  100 Nasal Cannula 2.0 28 1/30/18 02:37  80 18  98 Nasal Cannula 2.0 28 1/30/18 00:01      Nasal Cannula 2.0 


 


1/30/18 00:00 98.8 87 19 133/75 100   


 


1/29/18 20:37      Nasal Cannula 2.0 


 


1/29/18 20:36 99.2 90 17 144/81 100   


 


1/29/18 20:09  92 20  99 Nasal Cannula 2.0 28 1/29/18 20:01  92 20  98 Nasal Cannula 2.0 28 1/29/18 20:01     98 Nasal Cannula 2.0 28 1/29/18 20:01      Nasal Cannula 2.0 28 1/29/18 16:26  89 20  98 Nasal Cannula 2.0 28 1/29/18 16:12  89 20  98 Nasal Cannula 2.0 28 1/29/18 16:00  78      


 


1/29/18 16:00 97.8 93 22 142/80  Room Air  








Height (Feet):  5


Height (Inches):  10.00


Weight (Pounds):  137


General Appearance:  WD/WN, no acute distress


HEENT:  normocephalic, atraumatic, anicteric, mucous membranes moist


Respiratory/Chest:  chest wall non-tender, lungs clear, normal breath sounds, 

no respiratory distress, no accessory muscle use


Cardiovascular:  normal peripheral pulses, normal rate, regular rhythm, no 

gallop/murmur, no JVD


Abdomen:  normal bowel sounds, soft, non tender, no organomegaly, non distended

, no mass, no scars


Extremities:  no cyanosis, no clubbing


Skin:  no rash, no lesions, no ulcers


Neurologic/Psychiatric:  alert, oriented x 3





Laboratory Tests








Test


  1/30/18


06:50


 


White Blood Count


  9.4 K/UL


(4.8-10.8)


 


Red Blood Count


  2.90 M/UL


(4.70-6.10)  L


 


Hemoglobin


  8.2 G/DL


(14.2-18.0)  L


 


Hematocrit


  25.0 %


(42.0-52.0)  L


 


Mean Corpuscular Volume 86 FL (80-99)  


 


Mean Corpuscular Hemoglobin


  28.4 PG


(27.0-31.0)


 


Mean Corpuscular Hemoglobin


Concent 32.9 G/DL


(32.0-36.0)


 


Red Cell Distribution Width


  13.5 %


(11.6-14.8)


 


Platelet Count


  107 K/UL


(150-450)  L


 


Mean Platelet Volume


  8.9 FL


(6.5-10.1)


 


Neutrophils (%) (Auto)


  75.5 %


(45.0-75.0)  H


 


Lymphocytes (%) (Auto)


  8.3 %


(20.0-45.0)  L


 


Monocytes (%) (Auto)


  8.6 %


(1.0-10.0)


 


Eosinophils (%) (Auto)


  5.3 %


(0.0-3.0)  H


 


Basophils (%) (Auto)


  2.3 %


(0.0-2.0)  H


 


Prothrombin Time


  12.7 SEC


(9.30-11.50)  H


 


Prothromb Time International


Ratio 1.2 (0.9-1.1)


H


 


Activated Partial


Thromboplast Time 36 SEC (23-33)


H


 


Sodium Level


  143 MMOL/L


(136-145)


 


Potassium Level


  3.5 MMOL/L


(3.5-5.1)


 


Chloride Level


  113 MMOL/L


()  H


 


Carbon Dioxide Level


  18 MMOL/L


(21-32)  L


 


Anion Gap


  12 mmol/L


(5-15)


 


Blood Urea Nitrogen


  13 mg/dL


(7-18)


 


Creatinine


  2.2 MG/DL


(0.55-1.30)  H


 


Estimat Glomerular Filtration


Rate 36.7 mL/min


(>60)


 


Glucose Level


  95 MG/DL


()


 


Calcium Level


  8.4 MG/DL


(8.5-10.1)  L











Current Medications








 Medications


  (Trade)  Dose


 Ordered  Sig/Judie


 Route


 PRN Reason  Start Time


 Stop Time Status Last Admin


Dose Admin


 


 Acetaminophen


  (Tylenol)  650 mg  Q4H  PRN


 ORAL


 Mild Pain/Temp > 100.5  1/29/18 19:45


 2/5/18 19:44   


 


 


 Apixaban


  (Eliquis)  5 mg  BID


 ORAL


   2/3/18 18:00


 2/17/18 17:59   


 


 


 Apixaban


  (Eliquis)  10 mg  BID


 ORAL


   1/29/18 19:45


 2/28/18 19:44  1/30/18 08:46


 


 


 Dextrose


  (Dextrose 50%)    STAT  PRN


 IV


 Hypoglycemia  1/29/18 19:45


 2/20/18 19:44   


 


 


 Diphenoxylate HCl/


 Atropine


  (Lomotil)  2.5 mg  Q4H  PRN


 ORAL


 Diarrhea  1/29/18 19:45


 2/9/18 19:44   


 


 


 Docusate Sodium


  (Colace)  100 mg  THREE TIMES A  DAY


 ORAL


   1/30/18 09:00


 2/19/18 17:59  1/30/18 08:44


 


 


 Famotidine


  (Pepcid)  20 mg  QHS


 ORAL


   1/29/18 21:00


 2/28/18 20:59  1/29/18 21:10


 


 


 Folic Acid


  (Folate)  1 mg  DAILY


 ORAL


   1/30/18 09:00


 2/11/18 14:59  1/30/18 08:44


 


 


 Ipratropium


 Bromide


  (Atrovent)  500 mcg  Q4HRT


 HHN


   1/29/18 23:00


 1/30/18 22:59  1/30/18 09:17


 


 


 Levalbuterol HCl


  (Xopenex)  1.25 mg  Q4HRT


 HHN


   1/29/18 23:00


 1/30/18 22:59  1/30/18 09:17


 


 


 Magnesium


 Hydroxide


  (Mom)  30 ml  BIDPRN  PRN


 ORAL


 constipation  1/29/18 20:00


 2/20/18 19:45   


 


 


 Morphine Sulfate


  (Morphine


 Sulfate)  2 mg  Q4H  PRN


 IVP


 PAIN 4-10  1/29/18 19:45


 1/31/18 18:44  1/29/18 23:11


 


 


 Ondansetron HCl


  (Zofran)  4 mg  Q6H  PRN


 IVP


 Nausea & Vomiting  1/29/18 20:00


 2/5/18 19:45   


 


 


 Pantoprazole


  (Protonix)  40 mg  DAILY


 ORAL


   1/30/18 09:00


 2/13/18 08:59  1/30/18 08:44


 


 


 Piperacillin Sod/


 Tazobactam Sod


 3.375 gm/Sodium


 Chloride  110 ml @ 


 27.5 mls/hr  Q8H


 IVPB


   1/30/18 00:00


 2/1/18 07:59  1/30/18 08:44


 


 


 Sodium Chloride  1,000 ml @ 


 100 mls/hr  Q10H


 IV


   1/29/18 20:00


 2/23/18 19:59  1/30/18 06:07


 


 


 Vitamin B Complex/


 Vit C/Folic Acid


  (Nephrovite)  1 tab  DAILY


 ORAL


   1/30/18 09:00


 2/8/18 08:59  1/30/18 08:44


 

















Pablo Talley M.D. Jan 30, 2018 14:28

## 2018-01-30 NOTE — CARDIAC ELECTROPHYSIOLOGY PN
Assessment/Plan


Assessment/Plan


1. Sinus Tachycardia due to sickle cell anemia, fever and acute bilateral DVT. 


     EF 60%.  No SVT or VT. Resolved.





2.  Sepsis, WBC of 26K. On intravenous antibiotic per Dr. Talley. Down to 9.4





3.  Abdominal pain, likely due to renal cysts and hemorrhage.





4.  Diarrhea. Clostridium difficile colitis has been ruled out.  





5. Hematuria and anemia. Hb stable in 7.5-8 range





6. Acute bilateral LE DVT.  On Eliquis  





7. Constipation.  





8. Hyperkalemia.





9. Renal failure Cr 2.3 





DW pharmacist





Subjective


Subjective


In NSR. No chest pain or SOB. Alert in NAD. Off tele now.





Objective





Last 24 Hour Vital Signs








  Date Time  Temp Pulse Resp B/P (MAP) Pulse Ox O2 Delivery O2 Flow Rate FiO2


 


1/30/18 12:00 98.8 91 22 147/86 97 Room Air  


 


1/30/18 09:26  92 20  100 Nasal Cannula 2.0 28


 


1/30/18 09:21     97 Nasal Cannula 2.0 28


 


1/30/18 09:21      Nasal Cannula 2.0 28


 


1/30/18 09:20  101 22   Nasal Cannula 2.0 28


 


1/30/18 09:18  101 18  97 Nasal Cannula 2.0 28


 


1/30/18 08:00 98.0 101 22 168/91 98 Room Air  


 


1/30/18 04:47 98.7 95 21 140/81 97   


 


1/30/18 02:48  85 20  100 Nasal Cannula 2.0 28


 


1/30/18 02:37  80 18  98 Nasal Cannula 2.0 28


 


1/30/18 00:01      Nasal Cannula 2.0 


 


1/30/18 00:00 98.8 87 19 133/75 100   


 


1/29/18 20:37      Nasal Cannula 2.0 


 


1/29/18 20:36 99.2 90 17 144/81 100   


 


1/29/18 20:09  92 20  99 Nasal Cannula 2.0 28


 


1/29/18 20:01  92 20  98 Nasal Cannula 2.0 28


 


1/29/18 20:01     98 Nasal Cannula 2.0 28


 


1/29/18 20:01      Nasal Cannula 2.0 28


 


1/29/18 16:26  89 20  98 Nasal Cannula 2.0 28


 


1/29/18 16:12  89 20  98 Nasal Cannula 2.0 28

















Intake and Output  


 


 1/29/18 1/30/18





 19:00 07:00


 


Intake Total 580 ml 1630.0 ml


 


Balance 580 ml 1630.0 ml


 


  


 


Intake Oral  720 ml


 


IV Total  910.0 ml


 


Other 580 ml 


 


# Voids 2 4











Laboratory Tests








Test


  1/30/18


06:50


 


White Blood Count


  9.4 K/UL


(4.8-10.8)


 


Red Blood Count


  2.90 M/UL


(4.70-6.10)  L


 


Hemoglobin


  8.2 G/DL


(14.2-18.0)  L


 


Hematocrit


  25.0 %


(42.0-52.0)  L


 


Mean Corpuscular Volume 86 FL (80-99)  


 


Mean Corpuscular Hemoglobin


  28.4 PG


(27.0-31.0)


 


Mean Corpuscular Hemoglobin


Concent 32.9 G/DL


(32.0-36.0)


 


Red Cell Distribution Width


  13.5 %


(11.6-14.8)


 


Platelet Count


  107 K/UL


(150-450)  L


 


Mean Platelet Volume


  8.9 FL


(6.5-10.1)


 


Neutrophils (%) (Auto)


  75.5 %


(45.0-75.0)  H


 


Lymphocytes (%) (Auto)


  8.3 %


(20.0-45.0)  L


 


Monocytes (%) (Auto)


  8.6 %


(1.0-10.0)


 


Eosinophils (%) (Auto)


  5.3 %


(0.0-3.0)  H


 


Basophils (%) (Auto)


  2.3 %


(0.0-2.0)  H


 


Prothrombin Time


  12.7 SEC


(9.30-11.50)  H


 


Prothromb Time International


Ratio 1.2 (0.9-1.1)


H


 


Activated Partial


Thromboplast Time 36 SEC (23-33)


H


 


Sodium Level


  143 MMOL/L


(136-145)


 


Potassium Level


  3.5 MMOL/L


(3.5-5.1)


 


Chloride Level


  113 MMOL/L


()  H


 


Carbon Dioxide Level


  18 MMOL/L


(21-32)  L


 


Anion Gap


  12 mmol/L


(5-15)


 


Blood Urea Nitrogen


  13 mg/dL


(7-18)


 


Creatinine


  2.2 MG/DL


(0.55-1.30)  H


 


Estimat Glomerular Filtration


Rate 36.7 mL/min


(>60)


 


Glucose Level


  95 MG/DL


()


 


Calcium Level


  8.4 MG/DL


(8.5-10.1)  L








Objective


HEAD AND NECK:  No JVD.


LUNGS:  Clear.


CARDIOVASCULAR:  Nl S1 and S2 with no gallop or murmur.


ABDOMEN:  Soft and nontender.


EXTREMITIES: 1 plus pitting edema.











KAYLEEN HAIR Jan 30, 2018 16:15

## 2018-01-31 VITALS — SYSTOLIC BLOOD PRESSURE: 142 MMHG | DIASTOLIC BLOOD PRESSURE: 75 MMHG

## 2018-01-31 VITALS — SYSTOLIC BLOOD PRESSURE: 137 MMHG | DIASTOLIC BLOOD PRESSURE: 73 MMHG

## 2018-01-31 VITALS — SYSTOLIC BLOOD PRESSURE: 159 MMHG | DIASTOLIC BLOOD PRESSURE: 85 MMHG

## 2018-01-31 VITALS — DIASTOLIC BLOOD PRESSURE: 83 MMHG | SYSTOLIC BLOOD PRESSURE: 153 MMHG

## 2018-01-31 VITALS — DIASTOLIC BLOOD PRESSURE: 74 MMHG | SYSTOLIC BLOOD PRESSURE: 134 MMHG

## 2018-01-31 VITALS — SYSTOLIC BLOOD PRESSURE: 145 MMHG | DIASTOLIC BLOOD PRESSURE: 79 MMHG

## 2018-01-31 LAB
ADD MANUAL DIFF: NO
ANION GAP SERPL CALC-SCNC: 12 MMOL/L (ref 5–15)
APPEARANCE UR: CLEAR
APTT BLD: 35 SEC (ref 23–33)
APTT PPP: (no result) S
BASOPHILS NFR BLD AUTO: 1.1 % (ref 0–2)
BUN SERPL-MCNC: 12 MG/DL (ref 7–18)
CALCIUM SERPL-MCNC: 8.2 MG/DL (ref 8.5–10.1)
CHLORIDE SERPL-SCNC: 113 MMOL/L (ref 98–107)
CO2 SERPL-SCNC: 17 MMOL/L (ref 21–32)
CREAT SERPL-MCNC: 2.2 MG/DL (ref 0.55–1.3)
EOSINOPHIL NFR BLD AUTO: 4.9 % (ref 0–3)
ERYTHROCYTE [DISTWIDTH] IN BLOOD BY AUTOMATED COUNT: 13.4 % (ref 11.6–14.8)
GLUCOSE UR STRIP-MCNC: NEGATIVE MG/DL
HCT VFR BLD CALC: 25 % (ref 42–52)
HGB BLD-MCNC: 8.3 G/DL (ref 14.2–18)
INR PPP: 1.1 (ref 0.9–1.1)
KETONES UR QL STRIP: NEGATIVE
LEUKOCYTE ESTERASE UR QL STRIP: NEGATIVE
LYMPHOCYTES NFR BLD AUTO: 10.1 % (ref 20–45)
MCV RBC AUTO: 87 FL (ref 80–99)
MONOCYTES NFR BLD AUTO: 5.8 % (ref 1–10)
NEUTROPHILS NFR BLD AUTO: 78 % (ref 45–75)
NITRITE UR QL STRIP: NEGATIVE
PH UR STRIP: 7 [PH] (ref 4.5–8)
PLATELET # BLD: 144 K/UL (ref 150–450)
POTASSIUM SERPL-SCNC: 3.3 MMOL/L (ref 3.5–5.1)
PROT UR QL STRIP: (no result)
RBC # BLD AUTO: 2.89 M/UL (ref 4.7–6.1)
SODIUM SERPL-SCNC: 142 MMOL/L (ref 136–145)
SP GR UR STRIP: 1 (ref 1–1.03)
UROBILINOGEN UR-MCNC: NORMAL MG/DL (ref 0–1)
WBC # BLD AUTO: 10.1 K/UL (ref 4.8–10.8)

## 2018-01-31 RX ADMIN — DOCUSATE SODIUM SCH MG: 100 CAPSULE, LIQUID FILLED ORAL at 08:55

## 2018-01-31 RX ADMIN — DEXTROSE MONOHYDRATE SCH MLS/HR: 50 INJECTION, SOLUTION INTRAVENOUS at 08:00

## 2018-01-31 RX ADMIN — DEXTROSE MONOHYDRATE SCH MLS/HR: 50 INJECTION, SOLUTION INTRAVENOUS at 15:47

## 2018-01-31 RX ADMIN — Medication SCH TAB: at 08:48

## 2018-01-31 RX ADMIN — DOCUSATE SODIUM SCH MG: 100 CAPSULE, LIQUID FILLED ORAL at 08:48

## 2018-01-31 RX ADMIN — DEXTROSE MONOHYDRATE SCH MLS/HR: 50 INJECTION, SOLUTION INTRAVENOUS at 00:36

## 2018-01-31 RX ADMIN — DOCUSATE SODIUM SCH MG: 100 CAPSULE, LIQUID FILLED ORAL at 17:35

## 2018-01-31 RX ADMIN — APIXABAN SCH MG: 2.5 TABLET, FILM COATED ORAL at 08:49

## 2018-01-31 RX ADMIN — DOCUSATE SODIUM SCH MG: 100 CAPSULE, LIQUID FILLED ORAL at 12:41

## 2018-01-31 RX ADMIN — MORPHINE SULFATE PRN MG: 4 INJECTION INTRAVENOUS at 21:34

## 2018-01-31 RX ADMIN — APIXABAN SCH MG: 2.5 TABLET, FILM COATED ORAL at 17:35

## 2018-01-31 NOTE — NEPHROLOGY PROGRESS NOTE
Assessment/Plan


Problem List:  


(1) Sickle cell trait


(2) Abdominal pain


(3) Renal cyst, native, hemorrhage


(4) Sepsis


(5) Hematuria


(6) Shortness of breath


(7) Chest pain


(8) Severe anemia


(9) Pneumonia


(10) DVT (deep venous thrombosis)


(11) Tachycardia


(12) Sickle cell anemia


(13) HAP (hospital-acquired pneumonia)


Plan


Continue current treatment plan


Off  heparin drip due to thrombocytopenia , 


Monitor PT/PTT, high risk for bleeding in the renal cyst , await HIT assay 


On eliquis 5mg BID


Monitor HR, cardio following


Continue o2 therapy prn


Monitor H&H and transfuse prn


Hematology, cardiology GI, and ID and pulmo following, will f/u with recs


Monitor lytes, correct prn


Continue abx per ID


Monitor renal function, avoid nephrotoxins 


Pain management prn


Continue neb treatment


Daily PPI


Continue PT/OT


AM labs





Subjective


Constitutional:  Denies: no symptoms, chills, diaphoresis, fever, malaise, 

weakness, other


HEENT:  Denies: no symptoms, eye pain, blurred vision, tearing, double vision, 

ear pain, ear discharge, nose pain, nose congestion, throat pain, throat 

swelling, mouth pain, mouth swelling, other


Genitourinary:  Denies: no symptoms, burning, discharge, frequency, flank pain, 

hematuria, incontinence, pain, urgency, other


Neurologic/Psychiatric:  Denies: no symptoms, anxiety, depressed, emotional 

problems, headache, numbness, paresthesia, pre-existing deficit, seizure, 

tingling, tremors, weakness, other


Subjective


In bed, in no apparent distress, states that he does feel better





Objective


Objective





Last 24 Hour Vital Signs








  Date Time  Temp Pulse Resp B/P (MAP) Pulse Ox O2 Delivery O2 Flow Rate FiO2


 


1/31/18 16:00 98.4 87 20 153/83 97   


 


1/31/18 12:00 98.2 90 18 134/74 98   


 


1/31/18 08:00 98.1 83 19 142/75 99   


 


1/31/18 04:35 98.4 81 19 137/73 97   


 


1/31/18 00:56 98.9 90 19 145/79 95   

















Intake and Output  


 


 1/30/18 1/31/18





 19:00 07:00


 


Intake Total 720 ml 720 ml


 


Output Total 1000 ml 2800 ml


 


Balance -280 ml -2080 ml


 


  


 


Intake Oral 720 ml 720 ml


 


Output Urine Total 1000 ml 2800 ml


 


# Voids  5








Laboratory Tests


1/31/18 04:10: 


White Blood Count 10.1, Red Blood Count 2.89L, Hemoglobin 8.3L, Hematocrit 25.0L

, Mean Corpuscular Volume 87, Mean Corpuscular Hemoglobin 28.7, Mean 

Corpuscular Hemoglobin Concent 33.2, Red Cell Distribution Width 13.4, Platelet 

Count 144L, Mean Platelet Volume 6.9, Neutrophils (%) (Auto) 78.0H, Lymphocytes 

(%) (Auto) 10.1L, Monocytes (%) (Auto) 5.8, Eosinophils (%) (Auto) 4.9H, 

Basophils (%) (Auto) 1.1, Prothrombin Time 11.9H, Prothromb Time International 

Ratio 1.1, Activated Partial Thromboplast Time 35H, Sodium Level 142, Potassium 

Level 3.3L, Chloride Level 113H, Carbon Dioxide Level 17L, Anion Gap 12, Blood 

Urea Nitrogen 12, Creatinine 2.2H, Estimat Glomerular Filtration Rate 36.7, 

Glucose Level 91, Calcium Level 8.2L


1/31/18 10:50: 


Urine Color Pale yellow, Urine Appearance Clear, Urine pH 7, Urine Specific 

Gravity 1.005, Urine Protein 3+H, Urine Glucose (UA) Negative, Urine Ketones 

Negative, Urine Occult Blood 3+H, Urine Nitrite Negative, Urine Bilirubin 

Negative, Urine Urobilinogen Normal, Urine Leukocyte Esterase Negative, Urine 

RBC 5-10H, Urine WBC 0-2, Urine Squamous Epithelial Cells Occasional, Urine 

Bacteria Occasional


Height (Feet):  5


Height (Inches):  10.00


Weight (Pounds):  137


General Appearance:  no apparent distress, alert


EENT:  normal ENT inspection


Neck:  normal alignment


Cardiovascular:  normal rate


Respiratory/Chest:  decreased breath sounds


Abdomen:  soft


Extremities:  calf tenderness


Neurologic:  alert, oriented x 3, responsive, normal mood/affect











Dayanara Hawley N.P. Jan 31, 2018 20:04

## 2018-01-31 NOTE — GI PROGRESS NOTE
Assessment/Plan


Problems:  


(1) Diarrhea


ICD Codes:  R19.7 - Diarrhea, unspecified


SNOMED:  63168409


(2) Sickle cell trait


ICD Codes:  D57.3 - Sickle-cell trait


SNOMED:  41838906


(3) Abdominal pain


ICD Codes:  R10.9 - Unspecified abdominal pain


SNOMED:  52171612


(4) Renal cyst, native, hemorrhage


ICD Codes:  N28.89 - Other specified disorders of kidney and ureter; N28.1 - 

Cyst of kidney, acquired


SNOMED:  01709401


Status:  stable, progressing


Status Narrative


Discussed with Dr. Gamez.


Assessment/Plan


OB stool r/o GI bleed  >> negative


stool cx >> negative


cdiff >> negative


O&P >> negative


diarrhea resolved >> lomotil prn





fu renal, hematology, ID recs


monitor H&H, prn transfusions


renal diet, tolerating


ppi, add H2B qhs


abx


fu labs


PT eval





Subjective


Subjective


generalized weakness


diarrhea resolved, has had formed BM


epigastric pain


abdominal bloating





Objective





Last 24 Hour Vital Signs








  Date Time  Temp Pulse Resp B/P (MAP) Pulse Ox O2 Delivery O2 Flow Rate FiO2


 


1/31/18 08:00 98.1 83 19 142/75 99   


 


1/31/18 04:35 98.4 81 19 137/73 97   


 


1/31/18 00:56 98.9 90 19 145/79 95   


 


1/30/18 20:00 99.2 91 20 153/86 95   


 


1/30/18 19:15  92 18  100 Room Air  21


 


1/30/18 19:08  86 20  99 Room Air  21


 


1/30/18 19:00      Room Air  21


 


1/30/18 19:00     99 Room Air  21


 


1/30/18 16:15  101 20  100 Nasal Cannula 2.0 28


 


1/30/18 16:03  99 18  97 Nasal Cannula 2.0 28


 


1/30/18 16:00 98.8 64 22 160/86 97 Room Air  


 


1/30/18 12:00 98.8 91 22 147/86 97 Room Air  

















Intake and Output  


 


 1/30/18 1/31/18





 19:00 07:00


 


Intake Total 720 ml 720 ml


 


Output Total 1000 ml 2800 ml


 


Balance -280 ml -2080 ml


 


  


 


Intake Oral 720 ml 720 ml


 


Output Urine Total 1000 ml 2800 ml


 


# Voids  5











Laboratory Tests








Test


  1/31/18


04:10


 


White Blood Count


  10.1 K/UL


(4.8-10.8)


 


Red Blood Count


  2.89 M/UL


(4.70-6.10)  L


 


Hemoglobin


  8.3 G/DL


(14.2-18.0)  L


 


Hematocrit


  25.0 %


(42.0-52.0)  L


 


Mean Corpuscular Volume 87 FL (80-99)  


 


Mean Corpuscular Hemoglobin


  28.7 PG


(27.0-31.0)


 


Mean Corpuscular Hemoglobin


Concent 33.2 G/DL


(32.0-36.0)


 


Red Cell Distribution Width


  13.4 %


(11.6-14.8)


 


Platelet Count


  144 K/UL


(150-450)  L


 


Mean Platelet Volume


  6.9 FL


(6.5-10.1)


 


Neutrophils (%) (Auto)


  78.0 %


(45.0-75.0)  H


 


Lymphocytes (%) (Auto)


  10.1 %


(20.0-45.0)  L


 


Monocytes (%) (Auto)


  5.8 %


(1.0-10.0)


 


Eosinophils (%) (Auto)


  4.9 %


(0.0-3.0)  H


 


Basophils (%) (Auto)


  1.1 %


(0.0-2.0)


 


Prothrombin Time


  11.9 SEC


(9.30-11.50)  H


 


Prothromb Time International


Ratio 1.1 (0.9-1.1)  


 


 


Activated Partial


Thromboplast Time 35 SEC (23-33)


H


 


Sodium Level


  142 MMOL/L


(136-145)


 


Potassium Level


  3.3 MMOL/L


(3.5-5.1)  L


 


Chloride Level


  113 MMOL/L


()  H


 


Carbon Dioxide Level


  17 MMOL/L


(21-32)  L


 


Anion Gap


  12 mmol/L


(5-15)


 


Blood Urea Nitrogen


  12 mg/dL


(7-18)


 


Creatinine


  2.2 MG/DL


(0.55-1.30)  H


 


Estimat Glomerular Filtration


Rate 36.7 mL/min


(>60)


 


Glucose Level


  91 MG/DL


()


 


Calcium Level


  8.2 MG/DL


(8.5-10.1)  L








Height (Feet):  5


Height (Inches):  10.00


Weight (Pounds):  137


General Appearance:  WD/WN, no apparent distress, alert


Cardiovascular:  normal rate


Respiratory/Chest:  normal breath sounds, no respiratory distress


Abdominal Exam:  normal bowel sounds, non tender, soft


Extremities:  normal range of motion, non-tender











Elsy Lakhani N.P. Jan 31, 2018 10:41

## 2018-01-31 NOTE — CARDIAC ELECTROPHYSIOLOGY PN
Assessment/Plan


Assessment/Plan


1. Sinus Tachycardia due to sickle cell anemia, fever and  bilateral DVT. 


     EF 60%.  No SVT or VT. Resolved.





2.  Sepsis, WBC of 26K. On intravenous antibiotic per Dr. Talley. Down to 9.4





3.  Abdominal pain, likely due to renal cysts and hemorrhage.





4.  Diarrhea. Clostridium difficile colitis has been ruled out.  





5. Hematuria and anemia. Hb stable in 7.5-8 range





6. Acute bilateral LE DVT.  On Eliquis  





7. Constipation.  





8. Hyperkalemia.





9. Renal failure Cr 2.3





Subjective


Subjective


  No chest pain or SOB. Alert in NAD. Off tele





Objective





Last 24 Hour Vital Signs








  Date Time  Temp Pulse Resp B/P (MAP) Pulse Ox O2 Delivery O2 Flow Rate FiO2


 


1/31/18 12:00 98.2 90 18 134/74 98   


 


1/31/18 08:00 98.1 83 19 142/75 99   


 


1/31/18 04:35 98.4 81 19 137/73 97   


 


1/31/18 00:56 98.9 90 19 145/79 95   


 


1/30/18 20:00 99.2 91 20 153/86 95   


 


1/30/18 19:15  92 18  100 Room Air  21


 


1/30/18 19:08  86 20  99 Room Air  21


 


1/30/18 19:00      Room Air  21


 


1/30/18 19:00     99 Room Air  21


 


1/30/18 16:15  101 20  100 Nasal Cannula 2.0 28


 


1/30/18 16:03  99 18  97 Nasal Cannula 2.0 28


 


1/30/18 16:00 98.8 64 22 160/86 97 Room Air  

















Intake and Output  


 


 1/30/18 1/31/18





 19:00 07:00


 


Intake Total 720 ml 720 ml


 


Output Total 1000 ml 2800 ml


 


Balance -280 ml -2080 ml


 


  


 


Intake Oral 720 ml 720 ml


 


Output Urine Total 1000 ml 2800 ml


 


# Voids  5











Laboratory Tests








Test


  1/31/18


04:10 1/31/18


10:50


 


White Blood Count


  10.1 K/UL


(4.8-10.8) 


 


 


Red Blood Count


  2.89 M/UL


(4.70-6.10)  L 


 


 


Hemoglobin


  8.3 G/DL


(14.2-18.0)  L 


 


 


Hematocrit


  25.0 %


(42.0-52.0)  L 


 


 


Mean Corpuscular Volume 87 FL (80-99)   


 


Mean Corpuscular Hemoglobin


  28.7 PG


(27.0-31.0) 


 


 


Mean Corpuscular Hemoglobin


Concent 33.2 G/DL


(32.0-36.0) 


 


 


Red Cell Distribution Width


  13.4 %


(11.6-14.8) 


 


 


Platelet Count


  144 K/UL


(150-450)  L 


 


 


Mean Platelet Volume


  6.9 FL


(6.5-10.1) 


 


 


Neutrophils (%) (Auto)


  78.0 %


(45.0-75.0)  H 


 


 


Lymphocytes (%) (Auto)


  10.1 %


(20.0-45.0)  L 


 


 


Monocytes (%) (Auto)


  5.8 %


(1.0-10.0) 


 


 


Eosinophils (%) (Auto)


  4.9 %


(0.0-3.0)  H 


 


 


Basophils (%) (Auto)


  1.1 %


(0.0-2.0) 


 


 


Prothrombin Time


  11.9 SEC


(9.30-11.50)  H 


 


 


Prothromb Time International


Ratio 1.1 (0.9-1.1)  


  


 


 


Activated Partial


Thromboplast Time 35 SEC (23-33)


H 


 


 


Sodium Level


  142 MMOL/L


(136-145) 


 


 


Potassium Level


  3.3 MMOL/L


(3.5-5.1)  L 


 


 


Chloride Level


  113 MMOL/L


()  H 


 


 


Carbon Dioxide Level


  17 MMOL/L


(21-32)  L 


 


 


Anion Gap


  12 mmol/L


(5-15) 


 


 


Blood Urea Nitrogen


  12 mg/dL


(7-18) 


 


 


Creatinine


  2.2 MG/DL


(0.55-1.30)  H 


 


 


Estimat Glomerular Filtration


Rate 36.7 mL/min


(>60) 


 


 


Glucose Level


  91 MG/DL


() 


 


 


Calcium Level


  8.2 MG/DL


(8.5-10.1)  L 


 


 


Urine Color  Pale yellow  


 


Urine Appearance  Clear  


 


Urine pH  7 (4.5-8.0)  


 


Urine Specific Gravity


  


  1.005


(1.005-1.035)


 


Urine Protein


  


  3+ (NEGATIVE)


H


 


Urine Glucose (UA)


  


  Negative


(NEGATIVE)


 


Urine Ketones


  


  Negative


(NEGATIVE)


 


Urine Occult Blood


  


  3+ (NEGATIVE)


H


 


Urine Nitrite


  


  Negative


(NEGATIVE)


 


Urine Bilirubin


  


  Negative


(NEGATIVE)


 


Urine Urobilinogen


  


  Normal MG/DL


(0.0-1.0)


 


Urine Leukocyte Esterase


  


  Negative


(NEGATIVE)


 


Urine RBC


  


  5-10 /HPF (0 -


0)  H


 


Urine WBC


  


  0-2 /HPF (0 -


0)


 


Urine Squamous Epithelial


Cells 


  Occasional


/LPF


 


Urine Bacteria


  


  Occasional


/HPF (NONE)








Objective


HEAD AND NECK:  No JVD.


LUNGS:  Clear.


CARDIOVASCULAR:  Nl S1 and S2 with no gallop or murmur.


ABDOMEN:  Soft and nontender.


EXTREMITIES: 1 plus pitting edema.











KAYLEEN HAIR Jan 31, 2018 14:37

## 2018-02-01 VITALS — DIASTOLIC BLOOD PRESSURE: 87 MMHG | SYSTOLIC BLOOD PRESSURE: 154 MMHG

## 2018-02-01 VITALS — DIASTOLIC BLOOD PRESSURE: 86 MMHG | SYSTOLIC BLOOD PRESSURE: 152 MMHG

## 2018-02-01 VITALS — SYSTOLIC BLOOD PRESSURE: 146 MMHG | DIASTOLIC BLOOD PRESSURE: 83 MMHG

## 2018-02-01 VITALS — DIASTOLIC BLOOD PRESSURE: 81 MMHG | SYSTOLIC BLOOD PRESSURE: 139 MMHG

## 2018-02-01 VITALS — DIASTOLIC BLOOD PRESSURE: 94 MMHG | SYSTOLIC BLOOD PRESSURE: 154 MMHG

## 2018-02-01 VITALS — SYSTOLIC BLOOD PRESSURE: 160 MMHG | DIASTOLIC BLOOD PRESSURE: 95 MMHG

## 2018-02-01 LAB
ADD MANUAL DIFF: NO
ANION GAP SERPL CALC-SCNC: 10 MMOL/L (ref 5–15)
BASOPHILS NFR BLD AUTO: 1.8 % (ref 0–2)
BUN SERPL-MCNC: 12 MG/DL (ref 7–18)
CALCIUM SERPL-MCNC: 8.3 MG/DL (ref 8.5–10.1)
CHLORIDE SERPL-SCNC: 112 MMOL/L (ref 98–107)
CO2 SERPL-SCNC: 20 MMOL/L (ref 21–32)
CREAT SERPL-MCNC: 2.2 MG/DL (ref 0.55–1.3)
EOSINOPHIL NFR BLD AUTO: 5 % (ref 0–3)
ERYTHROCYTE [DISTWIDTH] IN BLOOD BY AUTOMATED COUNT: 13.3 % (ref 11.6–14.8)
HCT VFR BLD CALC: 25.8 % (ref 42–52)
HGB BLD-MCNC: 8.8 G/DL (ref 14.2–18)
LYMPHOCYTES NFR BLD AUTO: 9.7 % (ref 20–45)
MCV RBC AUTO: 86 FL (ref 80–99)
MONOCYTES NFR BLD AUTO: 6.4 % (ref 1–10)
NEUTROPHILS NFR BLD AUTO: 77 % (ref 45–75)
PLATELET # BLD: 165 K/UL (ref 150–450)
POTASSIUM SERPL-SCNC: 3.1 MMOL/L (ref 3.5–5.1)
RBC # BLD AUTO: 3.02 M/UL (ref 4.7–6.1)
SODIUM SERPL-SCNC: 142 MMOL/L (ref 136–145)
WBC # BLD AUTO: 9.8 K/UL (ref 4.8–10.8)

## 2018-02-01 RX ADMIN — DOCUSATE SODIUM SCH MG: 100 CAPSULE, LIQUID FILLED ORAL at 13:00

## 2018-02-01 RX ADMIN — DOCUSATE SODIUM SCH MG: 100 CAPSULE, LIQUID FILLED ORAL at 17:43

## 2018-02-01 RX ADMIN — DEXTROSE MONOHYDRATE SCH MLS/HR: 50 INJECTION, SOLUTION INTRAVENOUS at 00:12

## 2018-02-01 RX ADMIN — Medication SCH TAB: at 08:40

## 2018-02-01 RX ADMIN — APIXABAN SCH MG: 2.5 TABLET, FILM COATED ORAL at 08:40

## 2018-02-01 RX ADMIN — DOCUSATE SODIUM SCH MG: 100 CAPSULE, LIQUID FILLED ORAL at 08:41

## 2018-02-01 RX ADMIN — MORPHINE SULFATE PRN MG: 4 INJECTION INTRAVENOUS at 21:16

## 2018-02-01 RX ADMIN — DOCUSATE SODIUM SCH MG: 100 CAPSULE, LIQUID FILLED ORAL at 17:26

## 2018-02-01 RX ADMIN — APIXABAN SCH MG: 2.5 TABLET, FILM COATED ORAL at 17:26

## 2018-02-01 NOTE — CARDIAC ELECTROPHYSIOLOGY PN
Assessment/Plan


Assessment/Plan


1. Sinus Tachycardia due to sickle cell anemia, fever and  bilateral DVT. 


     EF 60%.  No SVT or VT. Resolved.





2.  Sepsis, WBC of 26K. On intravenous antibiotic per Dr. Talley. Down to 9.4





3.  Abdominal pain, likely due to renal cysts and hemorrhage.





4.  Diarrhea. Clostridium difficile colitis has been ruled out.  





5. Hematuria and anemia. Hb stable in 7.5-8 range





6. Acute bilateral LE DVT.  On Eliquis  





7. Constipation.  





8. Hyperkalemia.





9. Renal failure Cr 2.3





Subjective


Subjective


  No chest pain or SOB. Alert in NAD.





Objective





Last 24 Hour Vital Signs








  Date Time  Temp Pulse Resp B/P (MAP) Pulse Ox O2 Delivery O2 Flow Rate FiO2


 


2/1/18 08:00 98.3 87 19 139/81 100   


 


2/1/18 04:00 99.3 99 19 146/83 99   


 


2/1/18 00:00 99.8 84 18 152/86 99   


 


1/31/18 22:00 98.4       


 


1/31/18 20:00 99.4 83 18 159/85 99   


 


1/31/18 16:00 98.4 87 20 153/83 97   


 


1/31/18 12:00 98.2 90 18 134/74 98   

















Intake and Output  


 


 1/31/18 2/1/18





 19:00 07:00


 


Intake Total 2192.5 ml 1577.5 ml


 


Output Total 2000 ml 2875 ml


 


Balance 192.5 ml -1297.5 ml


 


  


 


Intake Oral 900 ml 240 ml


 


IV Total 1292.5 ml 1337.5 ml


 


Output Urine Total 2000 ml 2875 ml


 


# Voids  4











Laboratory Tests








Test


  2/1/18


06:25


 


White Blood Count


  9.8 K/UL


(4.8-10.8)


 


Red Blood Count


  3.02 M/UL


(4.70-6.10)  L


 


Hemoglobin


  8.8 G/DL


(14.2-18.0)  L


 


Hematocrit


  25.8 %


(42.0-52.0)  L


 


Mean Corpuscular Volume 86 FL (80-99)  


 


Mean Corpuscular Hemoglobin


  29.3 PG


(27.0-31.0)


 


Mean Corpuscular Hemoglobin


Concent 34.2 G/DL


(32.0-36.0)


 


Red Cell Distribution Width


  13.3 %


(11.6-14.8)


 


Platelet Count


  165 K/UL


(150-450)


 


Mean Platelet Volume


  8.3 FL


(6.5-10.1)


 


Neutrophils (%) (Auto)


  77.0 %


(45.0-75.0)  H


 


Lymphocytes (%) (Auto)


  9.7 %


(20.0-45.0)  L


 


Monocytes (%) (Auto)


  6.4 %


(1.0-10.0)


 


Eosinophils (%) (Auto)


  5.0 %


(0.0-3.0)  H


 


Basophils (%) (Auto)


  1.8 %


(0.0-2.0)


 


Sodium Level


  142 MMOL/L


(136-145)


 


Potassium Level


  3.1 MMOL/L


(3.5-5.1)  L


 


Chloride Level


  112 MMOL/L


()  H


 


Carbon Dioxide Level


  20 MMOL/L


(21-32)  L


 


Anion Gap


  10 mmol/L


(5-15)


 


Blood Urea Nitrogen


  12 mg/dL


(7-18)


 


Creatinine


  2.2 MG/DL


(0.55-1.30)  H


 


Estimat Glomerular Filtration


Rate 36.7 mL/min


(>60)


 


Glucose Level


  85 MG/DL


()


 


Calcium Level


  8.3 MG/DL


(8.5-10.1)  L








Objective


HEAD AND NECK:  No JVD.


LUNGS:  Clear.


CARDIOVASCULAR:  Nl S1 and S2 with no gallop or murmur.


ABDOMEN:  Soft and nontender.


EXTREMITIES: 1 plus pitting edema.











KAYLEEN HAIR Feb 1, 2018 11:02

## 2018-02-01 NOTE — NEPHROLOGY PROGRESS NOTE
Assessment/Plan


Problem List:  


(1) Sickle cell trait


Assessment:  ??





(2) Fever


(3) Sepsis


(4) Diarrhea


(5) Abdominal pain


(6) Renal mass, left


(7) Renal cyst, native, hemorrhage


(8) Shortness of breath


(9) Hematuria


(10) Chest pain


(11) Severe anemia


Plan


f/u hemeonc recs. 


monitor h/h. 


transfuse prn. 


abx per ID. 


off heparin gtt. on Eliquis.


d/c plan soon. shelter vs recup care.





Subjective


Subjective


feeling ok. No acute events.





Objective


Objective





Last 24 Hour Vital Signs








  Date Time  Temp Pulse Resp B/P (MAP) Pulse Ox O2 Delivery O2 Flow Rate FiO2


 


2/1/18 20:00 99.6 79 20 160/95 98   


 


2/1/18 16:00 99.0 82 19 154/87 99   


 


2/1/18 12:00 98.5 91 19 154/94 99   


 


2/1/18 08:00 98.3 87 19 139/81 100   


 


2/1/18 04:00 99.3 99 19 146/83 99   


 


2/1/18 00:00      Room Air  


 


2/1/18 00:00 99.8 84 18 152/86 99   

















Intake and Output  


 


 1/31/18 2/1/18





 19:00 07:00


 


Intake Total 2192.5 ml 1577.5 ml


 


Output Total 2000 ml 2875 ml


 


Balance 192.5 ml -1297.5 ml


 


  


 


Intake Oral 900 ml 240 ml


 


IV Total 1292.5 ml 1337.5 ml


 


Output Urine Total 2000 ml 2875 ml


 


# Voids  4








Laboratory Tests


2/1/18 06:25: 


White Blood Count 9.8, Red Blood Count 3.02L, Hemoglobin 8.8L, Hematocrit 25.8L

, Mean Corpuscular Volume 86, Mean Corpuscular Hemoglobin 29.3, Mean 

Corpuscular Hemoglobin Concent 34.2, Red Cell Distribution Width 13.3, Platelet 

Count 165, Mean Platelet Volume 8.3, Neutrophils (%) (Auto) 77.0H, Lymphocytes (

%) (Auto) 9.7L, Monocytes (%) (Auto) 6.4, Eosinophils (%) (Auto) 5.0H, 

Basophils (%) (Auto) 1.8, Sodium Level 142, Potassium Level 3.1L, Chloride 

Level 112H, Carbon Dioxide Level 20L, Anion Gap 10, Blood Urea Nitrogen 12, 

Creatinine 2.2H, Estimat Glomerular Filtration Rate 36.7, Glucose Level 85, 

Calcium Level 8.3L


Height (Feet):  5


Height (Inches):  10.00


Weight (Pounds):  137


General Appearance:  no apparent distress


Cardiovascular:  normal rate, regular rhythm


Respiratory/Chest:  lungs clear


Abdomen:  non tender, soft


Extremities:  non-pitting











SCARLETT ELIZABETH Feb 1, 2018 22:36

## 2018-02-01 NOTE — GI PROGRESS NOTE
Assessment/Plan


Problems:  


(1) Diarrhea


ICD Codes:  R19.7 - Diarrhea, unspecified


SNOMED:  69965885


(2) Sickle cell trait


ICD Codes:  D57.3 - Sickle-cell trait


SNOMED:  80760109


(3) Abdominal pain


ICD Codes:  R10.9 - Unspecified abdominal pain


SNOMED:  79656697


(4) Renal cyst, native, hemorrhage


ICD Codes:  N28.89 - Other specified disorders of kidney and ureter; N28.1 - 

Cyst of kidney, acquired


SNOMED:  00999708


Status:  progressing


Status Narrative


Discussed with Dr. Gamez.


Assessment/Plan


OB stool r/o GI bleed  >> negative


stool cx >> negative


cdiff >> negative


O&P >> negative


diarrhea resolved >> lomotil prn





fu renal, hematology, ID recs


monitor H&H, prn transfusions


renal diet, tolerating


ppi, add H2B qhs


abx


fu labs


PT eval





Subjective


Subjective


generalized weakness


diarrhea resolved, has had formed BM


improving





Objective





Last 24 Hour Vital Signs








  Date Time  Temp Pulse Resp B/P (MAP) Pulse Ox O2 Delivery O2 Flow Rate FiO2


 


2/1/18 08:00 98.3 87 19 139/81 100   


 


2/1/18 04:00 99.3 99 19 146/83 99   


 


2/1/18 00:00 99.8 84 18 152/86 99   


 


1/31/18 22:00 98.4       


 


1/31/18 20:00 99.4 83 18 159/85 99   


 


1/31/18 16:00 98.4 87 20 153/83 97   


 


1/31/18 12:00 98.2 90 18 134/74 98   

















Intake and Output  


 


 1/31/18 2/1/18





 19:00 07:00


 


Intake Total 2192.5 ml 1577.5 ml


 


Output Total 2000 ml 2875 ml


 


Balance 192.5 ml -1297.5 ml


 


  


 


Intake Oral 900 ml 240 ml


 


IV Total 1292.5 ml 1337.5 ml


 


Output Urine Total 2000 ml 2875 ml


 


# Voids  4











Laboratory Tests








Test


  1/31/18


10:50 2/1/18


06:25


 


Urine Color Pale yellow   


 


Urine Appearance Clear   


 


Urine pH 7 (4.5-8.0)   


 


Urine Specific Gravity


  1.005


(1.005-1.035) 


 


 


Urine Protein


  3+ (NEGATIVE)


H 


 


 


Urine Glucose (UA)


  Negative


(NEGATIVE) 


 


 


Urine Ketones


  Negative


(NEGATIVE) 


 


 


Urine Occult Blood


  3+ (NEGATIVE)


H 


 


 


Urine Nitrite


  Negative


(NEGATIVE) 


 


 


Urine Bilirubin


  Negative


(NEGATIVE) 


 


 


Urine Urobilinogen


  Normal MG/DL


(0.0-1.0) 


 


 


Urine Leukocyte Esterase


  Negative


(NEGATIVE) 


 


 


Urine RBC


  5-10 /HPF (0 -


0)  H 


 


 


Urine WBC


  0-2 /HPF (0 -


0) 


 


 


Urine Squamous Epithelial


Cells Occasional


/LPF 


 


 


Urine Bacteria


  Occasional


/HPF (NONE) 


 


 


White Blood Count


  


  9.8 K/UL


(4.8-10.8)


 


Red Blood Count


  


  3.02 M/UL


(4.70-6.10)  L


 


Hemoglobin


  


  8.8 G/DL


(14.2-18.0)  L


 


Hematocrit


  


  25.8 %


(42.0-52.0)  L


 


Mean Corpuscular Volume  86 FL (80-99)  


 


Mean Corpuscular Hemoglobin


  


  29.3 PG


(27.0-31.0)


 


Mean Corpuscular Hemoglobin


Concent 


  34.2 G/DL


(32.0-36.0)


 


Red Cell Distribution Width


  


  13.3 %


(11.6-14.8)


 


Platelet Count


  


  165 K/UL


(150-450)


 


Mean Platelet Volume


  


  8.3 FL


(6.5-10.1)


 


Neutrophils (%) (Auto)


  


  77.0 %


(45.0-75.0)  H


 


Lymphocytes (%) (Auto)


  


  9.7 %


(20.0-45.0)  L


 


Monocytes (%) (Auto)


  


  6.4 %


(1.0-10.0)


 


Eosinophils (%) (Auto)


  


  5.0 %


(0.0-3.0)  H


 


Basophils (%) (Auto)


  


  1.8 %


(0.0-2.0)


 


Sodium Level


  


  142 MMOL/L


(136-145)


 


Potassium Level


  


  3.1 MMOL/L


(3.5-5.1)  L


 


Chloride Level


  


  112 MMOL/L


()  H


 


Carbon Dioxide Level


  


  20 MMOL/L


(21-32)  L


 


Anion Gap


  


  10 mmol/L


(5-15)


 


Blood Urea Nitrogen


  


  12 mg/dL


(7-18)


 


Creatinine


  


  2.2 MG/DL


(0.55-1.30)  H


 


Estimat Glomerular Filtration


Rate 


  36.7 mL/min


(>60)


 


Glucose Level


  


  85 MG/DL


()


 


Calcium Level


  


  8.3 MG/DL


(8.5-10.1)  L








Height (Feet):  5


Height (Inches):  10.00


Weight (Pounds):  137


General Appearance:  WD/WN, no apparent distress, alert


Cardiovascular:  normal rate


Respiratory/Chest:  normal breath sounds, no respiratory distress


Abdominal Exam:  normal bowel sounds, non tender, soft


Extremities:  normal range of motion, non-tender











Elsy Lakhani N.P. Feb 1, 2018 10:07

## 2018-02-02 VITALS — SYSTOLIC BLOOD PRESSURE: 152 MMHG | DIASTOLIC BLOOD PRESSURE: 83 MMHG

## 2018-02-02 VITALS — DIASTOLIC BLOOD PRESSURE: 83 MMHG | SYSTOLIC BLOOD PRESSURE: 148 MMHG

## 2018-02-02 VITALS — SYSTOLIC BLOOD PRESSURE: 158 MMHG | DIASTOLIC BLOOD PRESSURE: 83 MMHG

## 2018-02-02 LAB
ADD MANUAL DIFF: NO
ANION GAP SERPL CALC-SCNC: 12 MMOL/L (ref 5–15)
BASOPHILS NFR BLD AUTO: 2.2 % (ref 0–2)
BUN SERPL-MCNC: 14 MG/DL (ref 7–18)
CALCIUM SERPL-MCNC: 8.4 MG/DL (ref 8.5–10.1)
CHLORIDE SERPL-SCNC: 113 MMOL/L (ref 98–107)
CO2 SERPL-SCNC: 18 MMOL/L (ref 21–32)
CREAT SERPL-MCNC: 2.2 MG/DL (ref 0.55–1.3)
EOSINOPHIL NFR BLD AUTO: 5.5 % (ref 0–3)
ERYTHROCYTE [DISTWIDTH] IN BLOOD BY AUTOMATED COUNT: 13.3 % (ref 11.6–14.8)
HCT VFR BLD CALC: 28.2 % (ref 42–52)
HGB BLD-MCNC: 9.2 G/DL (ref 14.2–18)
LYMPHOCYTES NFR BLD AUTO: 12.1 % (ref 20–45)
MCV RBC AUTO: 85 FL (ref 80–99)
MONOCYTES NFR BLD AUTO: 6.9 % (ref 1–10)
NEUTROPHILS NFR BLD AUTO: 73.3 % (ref 45–75)
PHOSPHATE SERPL-MCNC: 3.1 MG/DL (ref 2.5–4.9)
PLATELET # BLD: 188 K/UL (ref 150–450)
POTASSIUM SERPL-SCNC: 3.1 MMOL/L (ref 3.5–5.1)
RBC # BLD AUTO: 3.31 M/UL (ref 4.7–6.1)
SODIUM SERPL-SCNC: 143 MMOL/L (ref 136–145)
WBC # BLD AUTO: 9.7 K/UL (ref 4.8–10.8)

## 2018-02-02 RX ADMIN — Medication SCH TAB: at 08:46

## 2018-02-02 RX ADMIN — APIXABAN SCH MG: 2.5 TABLET, FILM COATED ORAL at 08:47

## 2018-02-02 RX ADMIN — DOCUSATE SODIUM SCH MG: 100 CAPSULE, LIQUID FILLED ORAL at 08:47

## 2018-02-02 RX ADMIN — DOCUSATE SODIUM SCH MG: 100 CAPSULE, LIQUID FILLED ORAL at 13:00

## 2018-02-02 NOTE — CARDIAC ELECTROPHYSIOLOGY PN
Assessment/Plan


Assessment/Plan


1. Sinus Tachycardia due to pain, anemia, fever and  bilateral DVT. 


     EF 60%.  No SVT or VT. Resolved.





2.  Sepsis, WBC of 26K. On intravenous antibiotic per Dr. Talley. Down to 9.7





3.  Abdominal pain, likely due to renal cysts and hemorrhage.





4.  Diarrhea. Clostridium difficile colitis has been ruled out.  





5. Hematuria and anemia. Hb stable in 7.5-8 range





6. Acute bilateral LE DVT.  On Eliquis 5 bid 





7. Constipation.  





8. Hyperkalemia.





9. Renal failure Cr 2.2





Subjective


Subjective


  No new events. Alert in NAD.





Objective





Last 24 Hour Vital Signs








  Date Time  Temp Pulse Resp B/P (MAP) Pulse Ox O2 Delivery O2 Flow Rate FiO2


 


2/2/18 08:00 98.7 84 18 158/83 84   


 


2/2/18 04:00 99.1 81 17 152/83 97   


 


2/2/18 00:00 99.6 79 18 148/83 98   


 


2/1/18 20:00 99.6 79 20 160/95 98   


 


2/1/18 16:00 99.0 82 19 154/87 99   

















Intake and Output  


 


 2/1/18 2/2/18





 19:00 07:00


 


Intake Total 1720 ml 480 ml


 


Output Total 1900 ml 2000 ml


 


Balance -180 ml -1520 ml


 


  


 


Intake Oral 720 ml 480 ml


 


IV Total 1000 ml 


 


Output Urine Total 1900 ml 2000 ml


 


# Voids 5 











Laboratory Tests








Test


  2/2/18


07:15


 


White Blood Count


  9.7 K/UL


(4.8-10.8)


 


Red Blood Count


  3.31 M/UL


(4.70-6.10)  L


 


Hemoglobin


  9.2 G/DL


(14.2-18.0)  L


 


Hematocrit


  28.2 %


(42.0-52.0)  L


 


Mean Corpuscular Volume 85 FL (80-99)  


 


Mean Corpuscular Hemoglobin


  27.9 PG


(27.0-31.0)


 


Mean Corpuscular Hemoglobin


Concent 32.8 G/DL


(32.0-36.0)


 


Red Cell Distribution Width


  13.3 %


(11.6-14.8)


 


Platelet Count


  188 K/UL


(150-450)


 


Mean Platelet Volume


  7.2 FL


(6.5-10.1)


 


Neutrophils (%) (Auto)


  73.3 %


(45.0-75.0)


 


Lymphocytes (%) (Auto)


  12.1 %


(20.0-45.0)  L


 


Monocytes (%) (Auto)


  6.9 %


(1.0-10.0)


 


Eosinophils (%) (Auto)


  5.5 %


(0.0-3.0)  H


 


Basophils (%) (Auto)


  2.2 %


(0.0-2.0)  H


 


Sodium Level


  143 MMOL/L


(136-145)


 


Potassium Level


  3.1 MMOL/L


(3.5-5.1)  L


 


Chloride Level


  113 MMOL/L


()  H


 


Carbon Dioxide Level


  18 MMOL/L


(21-32)  L


 


Anion Gap


  12 mmol/L


(5-15)


 


Blood Urea Nitrogen


  14 mg/dL


(7-18)


 


Creatinine


  2.2 MG/DL


(0.55-1.30)  H


 


Estimat Glomerular Filtration


Rate 36.7 mL/min


(>60)


 


Glucose Level


  98 MG/DL


()


 


Calcium Level


  8.4 MG/DL


(8.5-10.1)  L


 


Phosphorus Level


  3.1 MG/DL


(2.5-4.9)


 


Magnesium Level


  1.5 MG/DL


(1.8-2.4)  L








Objective


HEAD AND NECK:  No JVD.


LUNGS:  Clear.


CARDIOVASCULAR:  Nl S1 and S2 with no gallop or murmur.


ABDOMEN:  Soft and nontender.


EXTREMITIES: 1 plus pitting edema.











KAYLEEN HAIR Feb 2, 2018 13:09

## 2018-02-02 NOTE — INFECTIOUS DISEASES PROG NOTE
Assessment/Plan


Problems:  


(1) HAP (hospital-acquired pneumonia)


Assessment & Plan:  improved , sputum culture grew candida most likely 

colonization , S/P   zosyn for two weeks .





(2) Sepsis


Assessment & Plan:  ruled out with negative blood culture, continue  zosyn for 

two weeks total 





(3) Renal cyst, native, hemorrhage


Assessment & Plan:  urine culture grew mixed contaminant , now on zosyn empiric 

coverage , had urology eval with no intervention, repeated CT scan of the 

abdomen showed recurrent bleeding . recommend urology eval again , since he is 

on heparin drip now 





(4) Abdominal pain


Assessment & Plan:  due to bleeding into the left renal cysts , recommend 

urology eval and better  pain management  





(5) Fever


Assessment & Plan:  improved, due to the above , on wide spectrum  antibiotics 

empiric coverage , continue  tylenol prn 





(6) Diarrhea


Assessment & Plan:  no evidence of  C diff , continue antidiarrhea meds  





(7) DVT (deep venous thrombosis)


Assessment & Plan:  in both legs, with possible PE, off  heparin drip due to 

thrombocytopenia , recommend close monitor of PT/PTT, he is high risk for 

bleeding in the renal cyst , await HIT assay 





(8) Thrombocytopenia


Assessment & Plan:  with possible  HIT , hematology is following, recommend 

serotonin assay to confirm  








Subjective


Constitutional:  Reports: no symptoms


HEENT:  Reports: no symptoms


Respiratory:  Reports: no symptoms


Breasts:  Reports: no symptoms


Cardiovascular:  Reports: no symptoms


Gastrointestinal/Abdominal:  Reports: no symptoms


Genitourinary:  Reports: no symptoms


Neurologic:  Reports: no symptoms


Psychiatric:  Reports: no symptoms


Skin:  Reports: no symptoms


Endocrine:  Reports: no symptoms


Hematologic:  Reports: no symptoms


Musculoskeletal:  Reports: no symptoms


Allergies:  


Coded Allergies:  


     CODEINE (Verified  Allergy, Unknown, 1/5/18)


Uncoded Allergies:  


     PEANUTS (Adverse Reaction, Severe, Anaphylaxis, 1/9/18)


Subjective


he feels more comfortable today , with no SOB, no fever or chills





Objective


Vital Signs





Last 24 Hour Vital Signs








  Date Time  Temp Pulse Resp B/P (MAP) Pulse Ox O2 Delivery O2 Flow Rate FiO2


 


2/2/18 08:00 98.7 84 18 158/83 84   


 


2/2/18 04:00 99.1 81 17 152/83 97   


 


2/2/18 00:00 99.6 79 18 148/83 98   


 


2/1/18 20:00 99.6 79 20 160/95 98   








Height (Feet):  5


Height (Inches):  10.00


Weight (Pounds):  137


General Appearance:  WD/WN, no acute distress


HEENT:  normocephalic, atraumatic, anicteric, mucous membranes moist, PERRL


Respiratory/Chest:  chest wall non-tender, lungs clear, normal breath sounds, 

no respiratory distress, no accessory muscle use


Cardiovascular:  normal peripheral pulses, normal rate, regular rhythm, no 

gallop/murmur, no JVD


Abdomen:  normal bowel sounds, soft, non tender, no organomegaly, non distended

, no mass, no scars


Extremities:  no cyanosis, no clubbing


Skin:  no rash, no lesions, no ulcers


Neurologic/Psychiatric:  alert, oriented x 3





Laboratory Tests








Test


  2/2/18


07:15


 


White Blood Count


  9.7 K/UL


(4.8-10.8)


 


Red Blood Count


  3.31 M/UL


(4.70-6.10)  L


 


Hemoglobin


  9.2 G/DL


(14.2-18.0)  L


 


Hematocrit


  28.2 %


(42.0-52.0)  L


 


Mean Corpuscular Volume 85 FL (80-99)  


 


Mean Corpuscular Hemoglobin


  27.9 PG


(27.0-31.0)


 


Mean Corpuscular Hemoglobin


Concent 32.8 G/DL


(32.0-36.0)


 


Red Cell Distribution Width


  13.3 %


(11.6-14.8)


 


Platelet Count


  188 K/UL


(150-450)


 


Mean Platelet Volume


  7.2 FL


(6.5-10.1)


 


Neutrophils (%) (Auto)


  73.3 %


(45.0-75.0)


 


Lymphocytes (%) (Auto)


  12.1 %


(20.0-45.0)  L


 


Monocytes (%) (Auto)


  6.9 %


(1.0-10.0)


 


Eosinophils (%) (Auto)


  5.5 %


(0.0-3.0)  H


 


Basophils (%) (Auto)


  2.2 %


(0.0-2.0)  H


 


Sodium Level


  143 MMOL/L


(136-145)


 


Potassium Level


  3.1 MMOL/L


(3.5-5.1)  L


 


Chloride Level


  113 MMOL/L


()  H


 


Carbon Dioxide Level


  18 MMOL/L


(21-32)  L


 


Anion Gap


  12 mmol/L


(5-15)


 


Blood Urea Nitrogen


  14 mg/dL


(7-18)


 


Creatinine


  2.2 MG/DL


(0.55-1.30)  H


 


Estimat Glomerular Filtration


Rate 36.7 mL/min


(>60)


 


Glucose Level


  98 MG/DL


()


 


Calcium Level


  8.4 MG/DL


(8.5-10.1)  L


 


Phosphorus Level


  3.1 MG/DL


(2.5-4.9)


 


Magnesium Level


  1.5 MG/DL


(1.8-2.4)  L











Current Medications








 Medications


  (Trade)  Dose


 Ordered  Sig/Judie


 Route


 PRN Reason  Start Time


 Stop Time Status Last Admin


Dose Admin


 


 Acetaminophen


  (Tylenol)  650 mg  Q4H  PRN


 ORAL


 Mild Pain/Temp > 100.5  1/29/18 19:45


 2/5/18 19:44   


 


 


 Apixaban


  (Eliquis)  5 mg  BID


 ORAL


   2/2/18 00:00


 2/9/18 23:59   


 


 


 Apixaban


  (Eliquis)  5 mg  BID


 ORAL


   2/3/18 18:00


 2/17/18 17:59   


 


 


 Apixaban


  (Eliquis)  10 mg  BID


 ORAL


   1/29/18 19:45


 2/3/18 12:00  2/2/18 08:47


 


 


 Apixaban


  (Eliquis)  10 mg  BID


 ORAL


   2/2/18 00:00


 2/3/18 23:59   


 


 


 Dextrose


  (Dextrose 50%)    STAT  PRN


 IV


 Hypoglycemia  1/29/18 19:45


 2/20/18 19:44   


 


 


 Diphenoxylate HCl/


 Atropine


  (Lomotil)  2.5 mg  Q4H  PRN


 ORAL


 Diarrhea  1/29/18 19:45


 2/9/18 19:44   


 


 


 Docusate Sodium


  (Colace)  100 mg  THREE TIMES A  DAY


 ORAL


   1/30/18 09:00


 2/19/18 17:59  1/30/18 08:44


 


 


 Famotidine


  (Pepcid)  20 mg  QHS


 ORAL


   1/29/18 21:00


 2/28/18 20:59  2/1/18 21:16


 


 


 Folic Acid


  (Folate)  1 mg  DAILY


 ORAL


   1/30/18 09:00


 2/11/18 14:59  2/2/18 08:46


 


 


 Magnesium


 Hydroxide


  (Mom)  30 ml  BIDPRN  PRN


 ORAL


 constipation  1/29/18 20:00


 2/20/18 19:45   


 


 


 Morphine Sulfate


  (Morphine


 Sulfate)  2 mg  Q4H  PRN


 IVP


 Pain Scale 4-10  1/31/18 20:45


 2/7/18 20:44  2/1/18 21:16


 


 


 Ondansetron HCl


  (Zofran)  4 mg  Q6H  PRN


 IVP


 Nausea & Vomiting  1/29/18 20:00


 2/5/18 19:45   


 


 


 Pantoprazole


  (Protonix)  40 mg  DAILY


 ORAL


   1/30/18 09:00


 2/13/18 08:59  2/2/18 08:46


 


 


 Pantoprazole


  (Protonix)  40 mg  DAILY


 ORAL


   2/2/18 00:00


 2/11/18 23:59   


 


 


 Sodium Chloride  1,000 ml @ 


 100 mls/hr  Q10H


 IV


   1/29/18 20:00


 2/23/18 19:59  2/2/18 04:01


 


 


 Vitamin B Complex/


 Vit C/Folic Acid


  (Nephrovite)  1 tab  DAILY


 ORAL


   1/30/18 09:00


 2/8/18 08:59  2/2/18 08:46


 

















Pablo Talley M.D. Feb 2, 2018 17:13

## 2018-02-02 NOTE — GI PROGRESS NOTE
Assessment/Plan


Problems:  


(1) Diarrhea


ICD Codes:  R19.7 - Diarrhea, unspecified


SNOMED:  94228342


(2) Sickle cell trait


ICD Codes:  D57.3 - Sickle-cell trait


SNOMED:  53039336


(3) Abdominal pain


ICD Codes:  R10.9 - Unspecified abdominal pain


SNOMED:  12463837


(4) Renal cyst, native, hemorrhage


ICD Codes:  N28.89 - Other specified disorders of kidney and ureter; N28.1 - 

Cyst of kidney, acquired


SNOMED:  75195511


Status:  doing well, stable


Status Narrative


Discussed with Dr. Gamez.


Assessment/Plan


OB stool r/o GI bleed  >> negative


stool cx >> negative


cdiff >> negative


O&P >> negative


diarrhea resolved >> lomotil prn





okay for DC per GI standpoint


fu renal, hematology, ID recs


monitor H&H, prn transfusions


renal diet, tolerating


ppi, add H2B qhs


abx


fu labs


PT eval





Subjective


Subjective


diarrhea resolved


patient states he feels great





Objective





Last 24 Hour Vital Signs








  Date Time  Temp Pulse Resp B/P (MAP) Pulse Ox O2 Delivery O2 Flow Rate FiO2


 


2/2/18 08:00 98.7 84 18 158/83 84   


 


2/2/18 04:00 99.1 81 17 152/83 97   


 


2/2/18 00:00 99.6 79 18 148/83 98   


 


2/1/18 20:00 99.6 79 20 160/95 98   


 


2/1/18 16:00 99.0 82 19 154/87 99   


 


2/1/18 12:00 98.5 91 19 154/94 99   

















Intake and Output  


 


 2/1/18 2/2/18





 19:00 07:00


 


Intake Total 1720 ml 480 ml


 


Output Total 1900 ml 2000 ml


 


Balance -180 ml -1520 ml


 


  


 


Intake Oral 720 ml 480 ml


 


IV Total 1000 ml 


 


Output Urine Total 1900 ml 2000 ml


 


# Voids 5 











Laboratory Tests








Test


  2/2/18


07:15


 


White Blood Count


  9.7 K/UL


(4.8-10.8)


 


Red Blood Count


  3.31 M/UL


(4.70-6.10)  L


 


Hemoglobin


  9.2 G/DL


(14.2-18.0)  L


 


Hematocrit


  28.2 %


(42.0-52.0)  L


 


Mean Corpuscular Volume 85 FL (80-99)  


 


Mean Corpuscular Hemoglobin


  27.9 PG


(27.0-31.0)


 


Mean Corpuscular Hemoglobin


Concent 32.8 G/DL


(32.0-36.0)


 


Red Cell Distribution Width


  13.3 %


(11.6-14.8)


 


Platelet Count


  188 K/UL


(150-450)


 


Mean Platelet Volume


  7.2 FL


(6.5-10.1)


 


Neutrophils (%) (Auto)


  73.3 %


(45.0-75.0)


 


Lymphocytes (%) (Auto)


  12.1 %


(20.0-45.0)  L


 


Monocytes (%) (Auto)


  6.9 %


(1.0-10.0)


 


Eosinophils (%) (Auto)


  5.5 %


(0.0-3.0)  H


 


Basophils (%) (Auto)


  2.2 %


(0.0-2.0)  H


 


Sodium Level


  143 MMOL/L


(136-145)


 


Potassium Level


  3.1 MMOL/L


(3.5-5.1)  L


 


Chloride Level


  113 MMOL/L


()  H


 


Carbon Dioxide Level


  18 MMOL/L


(21-32)  L


 


Anion Gap


  12 mmol/L


(5-15)


 


Blood Urea Nitrogen


  14 mg/dL


(7-18)


 


Creatinine


  2.2 MG/DL


(0.55-1.30)  H


 


Estimat Glomerular Filtration


Rate 36.7 mL/min


(>60)


 


Glucose Level


  98 MG/DL


()


 


Calcium Level


  8.4 MG/DL


(8.5-10.1)  L


 


Phosphorus Level


  3.1 MG/DL


(2.5-4.9)


 


Magnesium Level


  1.5 MG/DL


(1.8-2.4)  L








Height (Feet):  5


Height (Inches):  10.00


Weight (Pounds):  137


General Appearance:  WD/WN, no apparent distress, alert


Cardiovascular:  normal rate


Respiratory/Chest:  normal breath sounds, no respiratory distress


Abdominal Exam:  normal bowel sounds, non tender, soft


Extremities:  normal range of motion, non-tender











Elsy Lakhani N.P. Feb 2, 2018 11:04

## 2018-02-02 NOTE — NEPHROLOGY PROGRESS NOTE
Assessment/Plan


Problem List:  


(1) Sickle cell trait


(2) Abdominal pain


(3) Renal cyst, native, hemorrhage


(4) Sepsis


(5) Hematuria


(6) Shortness of breath


(7) Chest pain


(8) Severe anemia


(9) Pneumonia


(10) DVT (deep venous thrombosis)


(11) Tachycardia


(12) Sickle cell anemia


(13) HAP (hospital-acquired pneumonia)


Plan


Continue current treatment plan


Off  heparin drip due to thrombocytopenia , 


Monitor PT/PTT, high risk for bleeding in the renal cyst , await HIT assay 


On eliquis 5mg BID


Monitor HR, cardio following


Continue o2 therapy prn


Monitor H&H and transfuse prn


Hematology, cardiology GI, and ID and pulmo following, will f/u with recs


Monitor lytes, correct prn


Continue abx per ID


Monitor renal function, avoid nephrotoxins 


Pain management prn


Continue neb treatment


Daily PPI


Continue PT/OT


Pt is being D'ellie at this time





Subjective


Constitutional:  Denies: no symptoms, chills, diaphoresis, fever, malaise, 

weakness, other


HEENT:  Denies: no symptoms, eye pain, blurred vision, tearing, double vision, 

ear pain, ear discharge, nose pain, nose congestion, throat pain, throat 

swelling, mouth pain, mouth swelling, other


Genitourinary:  Denies: no symptoms, burning, discharge, frequency, flank pain, 

hematuria, incontinence, pain, urgency, other


Neurologic/Psychiatric:  Denies: no symptoms, anxiety, depressed, emotional 

problems, headache, numbness, paresthesia, pre-existing deficit, seizure, 

tingling, tremors, weakness, other


Subjective


In bed, in no apparent distress, states that he does feel better





Objective


Objective





Last 24 Hour Vital Signs








  Date Time  Temp Pulse Resp B/P (MAP) Pulse Ox O2 Delivery O2 Flow Rate FiO2


 


2/2/18 08:00 98.7 84 18 158/83 84   


 


2/2/18 04:00 99.1 81 17 152/83 97   


 


2/2/18 00:00 99.6 79 18 148/83 98   


 


2/1/18 20:00 99.6 79 20 160/95 98   

















Intake and Output  


 


 2/1/18 2/2/18





 19:00 07:00


 


Intake Total 1720 ml 480 ml


 


Output Total 1900 ml 2000 ml


 


Balance -180 ml -1520 ml


 


  


 


Intake Oral 720 ml 480 ml


 


IV Total 1000 ml 


 


Output Urine Total 1900 ml 2000 ml


 


# Voids 5 








Laboratory Tests


2/2/18 07:15: 


White Blood Count 9.7, Red Blood Count 3.31L, Hemoglobin 9.2L, Hematocrit 28.2L

, Mean Corpuscular Volume 85, Mean Corpuscular Hemoglobin 27.9, Mean 

Corpuscular Hemoglobin Concent 32.8, Red Cell Distribution Width 13.3, Platelet 

Count 188, Mean Platelet Volume 7.2, Neutrophils (%) (Auto) 73.3, Lymphocytes (%

) (Auto) 12.1L, Monocytes (%) (Auto) 6.9, Eosinophils (%) (Auto) 5.5H, 

Basophils (%) (Auto) 2.2H, Sodium Level 143, Potassium Level 3.1L, Chloride 

Level 113H, Carbon Dioxide Level 18L, Anion Gap 12, Blood Urea Nitrogen 14, 

Creatinine 2.2H, Estimat Glomerular Filtration Rate 36.7, Glucose Level 98, 

Calcium Level 8.4L, Phosphorus Level 3.1, Magnesium Level 1.5L


Height (Feet):  5


Height (Inches):  10.00


Weight (Pounds):  137


General Appearance:  no apparent distress, alert


EENT:  normal ENT inspection


Neck:  normal alignment, supple


Cardiovascular:  normal rate, no JVD


Respiratory/Chest:  no respiratory distress


Abdomen:  soft


Extremities:  non-tender, normal inspection


Neurologic:  alert, oriented x 3, responsive, normal mood/affect











Dayanara aHwley N.P. Feb 2, 2018 17:47

## 2018-02-06 NOTE — DISCHARGE SUMMARY
Discharge Summary


Hospital Course


Date of Admission


Jan 5, 2018 at 20:53


Date of Discharge


Feb 2, 2018 at 16:00


Admitting Diagnosis


renal mass with hemorrhage


HPI


Abrahan Ayon is a 62 year old male who was admitted on Jan 5, 2018 at 20:

51 for Renal Mass With Hemorrange


Hospital Course


dc summary #[id number]





Discharge Medications


Continued Medications:  


Apixaban (Eliquis) 5 Mg Tablet


10 MG PO BID, TAB





Pantoprazole (Pantoprazole) 20 Mg Tablet.dr


40 MG ORAL DAILY, #10 TAB 0 Refills











Discharge


Condition Upon Discharge:  stable


Discharge Disposition


Patient was discharged to [facility]


Discharge Diagnoses:  











Bravo (Michelle),Ina RODRIGUEZ Feb 6, 2018 11:55

## 2018-02-07 NOTE — DISCHARGE SUMMARY 2 SIG
DATE OF ADMISSION:  01/05/2018



DATE OF DISCHARGE:  02/02/2018



REASON FOR ADMISSION:  62-year-old male with past medical

history of sickle cell trait, presented to the emergency department with

severe 10/10 abdominal pain.  The patient reported associated nausea, but

no vomiting or diarrhea.  The patient denied fever.  Denied chest pain or

shortness of breath.  Workup in the emergency department revealed

leukocytosis with gross hematuria.  CT of the abdomen and pelvis revealed

large left  complex renal mass: large left hemorrhagic renal cyst, possible 
left 

hemorrhagic tumor. Creatinine-2.7. The patient was given empiric antibiotics.  

The patient was admitted for further care.



ADMITTING DIAGNOSES:

1. Abdominal pain.

2. Left renal mass.

3. Sickle cell trait.

4. Renal cyst with  hemorrhage.

5. Sepsis



HOSPITAL COURSE:  The patient admitted.  Urology consult was requested.

Urologist seen and evaluated the patient.  Per urologist, the patient had

likely ruptured left renal cyst with hemorrhage, which should not be

affected the other side of the abdomen.  Per urologist, at that point, was 

unclear etiology for the rest of abdominal pain, however  hemorrhage was 

benign and self-limited . Per urologist, the patient required pain control, but 

no other  interventions.  



The patient was on the IV fluids.  The patient was on empiric antibiotics.   

ID consult was requested.  Blood cultures were negative.  Influenza screen 

was negative. Stool for C. difficile was negative.   Stool culture were negative

( stool culture and stool for C difficule were  obtained due to diarrhea).   

Repeated blood cultures were negative.  Sputum showed Candida x2,  likely 

colonization as per ID.  



Pulmonologist followed the patient when the patient started

to have hemoptysis.  Chest x-ray showed possible pneumonia.  CT of the

chest subsequently was done without contrast, which revealed a dense

consolidation with air bronchograms in the bilateral lower lobes most

concerning for multifocal pneumonia, linear atelectasis/scarring in the

middle lobe, large complex mass in the left kidney partially visualized.

Supplemental oxygen and pulmonary toilet provided as needed.  The patient

subsequently was able to  be weaned  from the oxygen.  The patient status post

empiric antibiotics for  pneumonia. Leukocytosis resolved.  The patient

was afebrile.  



The patient was anemic and required a total of 4 units of packed red blood cells

transfusion. Hematologist/oncologist followed.  Venous duplex revealed acute 
DVT 

of bilateral lower  extremities, and patient was started on heparin drip.  Upon 
discharge,

he was switched to oral Eliquis.   With DVT of the bilateral lower  extremities 
there was 

also a concern for PE, since the patient developed hemoptysis. Per pulmonologist
- probably 

pulmonary embolism with hemoptysis, but the patient was unable to undergo  CT  
angio 

due to  elevated creatinine. As mentioned, the patient was discharged  on 
Eliquis. If anemia 

worsen in future, IVC filter should be considered, as per hematologist  
recommendations.  

Anemia workup revealed anemia of chronic disease, likely due to sickle cell 
trait and renal disease. 

Hemoglobin electrophoresis and review of peripheral blood smear confirmed that 
the patient had a

sickle trait,  while sickle cell disease was unlikely,  as per hematologist. 
Prior to discharge,

hemoglobin- 9.2 and hematocrit -28.2.  



GI closely followed the patient for abdominal pain.  Stool for OB was negative. 
Stool C. difficile 

and stool culture were negative.  Stool for ova and parasites  was negative.  
The patient 

was symptomatically treated  for diarrhea with Lomotil.  Diarrhea  resolved.  
PPI added to 

existing regimen.  The patient  was working with physical therapist.Pain 
management was 

addressed, pain was controlled, No need for GI procedure. Patient was able to 
tolerate diet. 

Antiemetic provided as needed. GI recommended to consider GI procedures as 
outpatient if 

indicated.



Cardiologist seen the patient secondary to sinus tachycardia. According to 
cardiologist, 

sinus tachycardia was secondary to pain, anemia, fever and bilateral DVT.  No 
evidence 

of SVT.  No evidence of ventricular tachycardia.  Sinus tachycardia resolved as 
the 

patient's clinical condition improved.  ECHO revealed preserved ejection 
fraction of 60-65%. 



Nephrologist clsoely followed. Patient was initially on IV fluids, renal 
parameters and electrolytes 

were  closely monitored, electrolytes  were corrected as needed.  Nephrotoxics 
were avoided. 

Renal ultrasound revealed no hydronephrosis, but showed large left kidney mass. 

Creatinine down to 2.2 prior to discharge.  Blood pressure was closely 
monitored and managed with 

antihypertensive regimen.



The patient was able to ambulate with PT. Pain resolved. Afebrile.  Placement 
was found 

at recuperative care.    was closely working with the patient and 
recuperative 

centers until place was  found.  The patient was stable for discharge.



FINAL DIAGNOSES:

1. Sepsis.

2. Multifocal pneumonia.

3. Acute deep vein thrombosis bilateral lower extremities, probably

pulmonary embolus 

4. Sickle cell trait.

5. Anemia of sickle cell disease

6. Likely ruptured left kidney cyst with hemorrhage.

7. Left renal mass

8. Hypertension

9. Diarrhea, resolved.

10. Renal failure.

11. Sinus tachycardia-resolved 



DISCHARGE MEDICATIONS:  See medication reconciliation list.



DISCHARGE INSTRUCTIONS:  Patient was discharged to recuperative care.  

Follow up with the primary care provider in one week. 







  ______________________________________________

  Rajeev Jim M.D.



I have been assigned to dictate discharge summary on this account and I

was not involved in the patient's management.



  ______________________________________________

  Ina GuzmanElmira Psychiatric Centercassie N.PMari





DR:  RORO

D:  02/06/2018 11:57

T:  02/07/2018 04:50

JOB#:  [id number]

CC:



DAMIÁN